# Patient Record
Sex: FEMALE | Race: WHITE | Employment: OTHER | URBAN - METROPOLITAN AREA
[De-identification: names, ages, dates, MRNs, and addresses within clinical notes are randomized per-mention and may not be internally consistent; named-entity substitution may affect disease eponyms.]

---

## 2017-01-01 ENCOUNTER — TELEPHONE (OUTPATIENT)
Dept: FAMILY MEDICINE | Facility: CLINIC | Age: 82
End: 2017-01-01

## 2017-01-01 ENCOUNTER — HOSPITAL ENCOUNTER (OUTPATIENT)
Dept: GENERAL RADIOLOGY | Facility: CLINIC | Age: 82
End: 2017-07-17
Attending: RADIOLOGY
Payer: MEDICARE

## 2017-01-01 ENCOUNTER — ANTICOAGULATION THERAPY VISIT (OUTPATIENT)
Dept: ANTICOAGULATION | Facility: CLINIC | Age: 82
End: 2017-01-01
Payer: COMMERCIAL

## 2017-01-01 ENCOUNTER — APPOINTMENT (OUTPATIENT)
Dept: GENERAL RADIOLOGY | Facility: CLINIC | Age: 82
End: 2017-01-01
Attending: FAMILY MEDICINE
Payer: MEDICARE

## 2017-01-01 ENCOUNTER — OFFICE VISIT (OUTPATIENT)
Dept: FAMILY MEDICINE | Facility: CLINIC | Age: 82
End: 2017-01-01
Payer: COMMERCIAL

## 2017-01-01 ENCOUNTER — MEDICAL CORRESPONDENCE (OUTPATIENT)
Dept: HEALTH INFORMATION MANAGEMENT | Facility: CLINIC | Age: 82
End: 2017-01-01

## 2017-01-01 ENCOUNTER — ALLIED HEALTH/NURSE VISIT (OUTPATIENT)
Dept: FAMILY MEDICINE | Facility: CLINIC | Age: 82
End: 2017-01-01
Payer: COMMERCIAL

## 2017-01-01 ENCOUNTER — CARE COORDINATION (OUTPATIENT)
Dept: CARE COORDINATION | Facility: CLINIC | Age: 82
End: 2017-01-01

## 2017-01-01 ENCOUNTER — TRANSFERRED RECORDS (OUTPATIENT)
Dept: HEALTH INFORMATION MANAGEMENT | Facility: CLINIC | Age: 82
End: 2017-01-01

## 2017-01-01 ENCOUNTER — APPOINTMENT (OUTPATIENT)
Dept: GENERAL RADIOLOGY | Facility: CLINIC | Age: 82
End: 2017-01-01
Attending: EMERGENCY MEDICINE
Payer: MEDICARE

## 2017-01-01 ENCOUNTER — OFFICE VISIT (OUTPATIENT)
Dept: CARDIOLOGY | Facility: CLINIC | Age: 82
End: 2017-01-01
Attending: INTERNAL MEDICINE
Payer: COMMERCIAL

## 2017-01-01 ENCOUNTER — HOSPITAL ENCOUNTER (EMERGENCY)
Facility: CLINIC | Age: 82
Discharge: HOME | End: 2017-07-11
Attending: EMERGENCY MEDICINE | Admitting: EMERGENCY MEDICINE
Payer: MEDICARE

## 2017-01-01 ENCOUNTER — TELEPHONE (OUTPATIENT)
Dept: NURSING | Facility: CLINIC | Age: 82
End: 2017-01-01

## 2017-01-01 ENCOUNTER — OFFICE VISIT - HEALTHEAST (OUTPATIENT)
Dept: GERIATRICS | Facility: CLINIC | Age: 82
End: 2017-01-01

## 2017-01-01 ENCOUNTER — RADIANT APPOINTMENT (OUTPATIENT)
Dept: GENERAL RADIOLOGY | Facility: CLINIC | Age: 82
End: 2017-01-01
Attending: PHYSICIAN ASSISTANT
Payer: COMMERCIAL

## 2017-01-01 ENCOUNTER — RADIANT APPOINTMENT (OUTPATIENT)
Dept: GENERAL RADIOLOGY | Facility: CLINIC | Age: 82
End: 2017-01-01
Attending: FAMILY MEDICINE
Payer: COMMERCIAL

## 2017-01-01 ENCOUNTER — COMMUNICATION - HEALTHEAST (OUTPATIENT)
Dept: SCHEDULING | Facility: CLINIC | Age: 82
End: 2017-01-01

## 2017-01-01 ENCOUNTER — AMBULATORY - HEALTHEAST (OUTPATIENT)
Dept: ADMINISTRATIVE | Facility: CLINIC | Age: 82
End: 2017-01-01

## 2017-01-01 ENCOUNTER — ANTICOAGULATION THERAPY VISIT (OUTPATIENT)
Dept: ANTICOAGULATION | Facility: CLINIC | Age: 82
End: 2017-01-01

## 2017-01-01 ENCOUNTER — DOCUMENTATION ONLY (OUTPATIENT)
Dept: CARE COORDINATION | Facility: CLINIC | Age: 82
End: 2017-01-01

## 2017-01-01 ENCOUNTER — APPOINTMENT (OUTPATIENT)
Dept: CT IMAGING | Facility: CLINIC | Age: 82
End: 2017-01-01
Attending: FAMILY MEDICINE
Payer: MEDICARE

## 2017-01-01 ENCOUNTER — HOSPITAL ENCOUNTER (OUTPATIENT)
Dept: ULTRASOUND IMAGING | Facility: CLINIC | Age: 82
Discharge: HOME | End: 2017-10-30
Attending: PHYSICIAN ASSISTANT | Admitting: PHYSICIAN ASSISTANT
Payer: MEDICARE

## 2017-01-01 ENCOUNTER — HOSPITAL ENCOUNTER (OUTPATIENT)
Dept: ULTRASOUND IMAGING | Facility: CLINIC | Age: 82
Discharge: HOME | End: 2017-09-11
Attending: EMERGENCY MEDICINE | Admitting: EMERGENCY MEDICINE
Payer: MEDICARE

## 2017-01-01 ENCOUNTER — HOSPITAL ENCOUNTER (OUTPATIENT)
Dept: GENERAL RADIOLOGY | Facility: CLINIC | Age: 82
End: 2017-10-30
Attending: RADIOLOGY
Payer: MEDICARE

## 2017-01-01 ENCOUNTER — HOSPITAL ENCOUNTER (EMERGENCY)
Facility: CLINIC | Age: 82
Discharge: HOME | End: 2017-09-01
Attending: EMERGENCY MEDICINE | Admitting: EMERGENCY MEDICINE
Payer: MEDICARE

## 2017-01-01 ENCOUNTER — HOSPITAL ENCOUNTER (OUTPATIENT)
Dept: GENERAL RADIOLOGY | Facility: CLINIC | Age: 82
Discharge: HOME | End: 2017-10-31
Attending: RADIOLOGY | Admitting: RADIOLOGY
Payer: MEDICARE

## 2017-01-01 ENCOUNTER — NURSE TRIAGE (OUTPATIENT)
Dept: NURSING | Facility: CLINIC | Age: 82
End: 2017-01-01

## 2017-01-01 ENCOUNTER — HOSPITAL ENCOUNTER (OUTPATIENT)
Dept: ULTRASOUND IMAGING | Facility: CLINIC | Age: 82
Discharge: HOME | End: 2017-07-17
Attending: EMERGENCY MEDICINE | Admitting: EMERGENCY MEDICINE
Payer: MEDICARE

## 2017-01-01 ENCOUNTER — TELEPHONE (OUTPATIENT)
Dept: CARE COORDINATION | Facility: CLINIC | Age: 82
End: 2017-01-01

## 2017-01-01 ENCOUNTER — HOSPITAL ENCOUNTER (OUTPATIENT)
Dept: CARDIOLOGY | Facility: CLINIC | Age: 82
Discharge: HOME | End: 2017-11-21
Attending: FAMILY MEDICINE | Admitting: FAMILY MEDICINE
Payer: MEDICARE

## 2017-01-01 ENCOUNTER — DOCUMENTATION ONLY (OUTPATIENT)
Dept: CARDIOLOGY | Facility: CLINIC | Age: 82
End: 2017-01-01

## 2017-01-01 ENCOUNTER — RECORDS - HEALTHEAST (OUTPATIENT)
Dept: ADMINISTRATIVE | Facility: OTHER | Age: 82
End: 2017-01-01

## 2017-01-01 ENCOUNTER — APPOINTMENT (OUTPATIENT)
Dept: ULTRASOUND IMAGING | Facility: CLINIC | Age: 82
End: 2017-01-01
Attending: FAMILY MEDICINE
Payer: MEDICARE

## 2017-01-01 ENCOUNTER — HOSPITAL ENCOUNTER (OUTPATIENT)
Dept: GENERAL RADIOLOGY | Facility: CLINIC | Age: 82
End: 2017-09-11
Attending: RADIOLOGY
Payer: MEDICARE

## 2017-01-01 ENCOUNTER — HOSPITAL ENCOUNTER (EMERGENCY)
Facility: CLINIC | Age: 82
Discharge: ANOTHER HOSPITAL | End: 2017-07-26
Attending: FAMILY MEDICINE | Admitting: FAMILY MEDICINE
Payer: MEDICARE

## 2017-01-01 ENCOUNTER — AMBULATORY - HEALTHEAST (OUTPATIENT)
Dept: GERIATRICS | Facility: CLINIC | Age: 82
End: 2017-01-01

## 2017-01-01 ENCOUNTER — OFFICE VISIT (OUTPATIENT)
Dept: PHARMACY | Facility: CLINIC | Age: 82
End: 2017-01-01
Payer: COMMERCIAL

## 2017-01-01 ENCOUNTER — TELEPHONE (OUTPATIENT)
Dept: CARDIOLOGY | Facility: CLINIC | Age: 82
End: 2017-01-01

## 2017-01-01 VITALS
OXYGEN SATURATION: 94 % | HEIGHT: 66 IN | HEART RATE: 69 BPM | DIASTOLIC BLOOD PRESSURE: 55 MMHG | SYSTOLIC BLOOD PRESSURE: 116 MMHG | WEIGHT: 164.4 LBS | TEMPERATURE: 96.5 F | BODY MASS INDEX: 26.42 KG/M2

## 2017-01-01 VITALS
WEIGHT: 174.9 LBS | BODY MASS INDEX: 28.11 KG/M2 | HEIGHT: 66 IN | SYSTOLIC BLOOD PRESSURE: 121 MMHG | OXYGEN SATURATION: 96 % | DIASTOLIC BLOOD PRESSURE: 57 MMHG | TEMPERATURE: 96.5 F | HEART RATE: 69 BPM

## 2017-01-01 VITALS
DIASTOLIC BLOOD PRESSURE: 60 MMHG | WEIGHT: 168.3 LBS | HEIGHT: 66 IN | BODY MASS INDEX: 27.05 KG/M2 | SYSTOLIC BLOOD PRESSURE: 120 MMHG | TEMPERATURE: 96.1 F | OXYGEN SATURATION: 97 % | HEART RATE: 69 BPM

## 2017-01-01 VITALS
DIASTOLIC BLOOD PRESSURE: 53 MMHG | SYSTOLIC BLOOD PRESSURE: 116 MMHG | HEART RATE: 69 BPM | OXYGEN SATURATION: 97 % | WEIGHT: 171.3 LBS | TEMPERATURE: 96.4 F | BODY MASS INDEX: 27.53 KG/M2 | HEIGHT: 66 IN

## 2017-01-01 VITALS — DIASTOLIC BLOOD PRESSURE: 58 MMHG | SYSTOLIC BLOOD PRESSURE: 106 MMHG | HEART RATE: 70 BPM

## 2017-01-01 VITALS
HEART RATE: 67 BPM | OXYGEN SATURATION: 98 % | BODY MASS INDEX: 25.34 KG/M2 | WEIGHT: 157 LBS | DIASTOLIC BLOOD PRESSURE: 86 MMHG | SYSTOLIC BLOOD PRESSURE: 130 MMHG

## 2017-01-01 VITALS
WEIGHT: 171 LBS | BODY MASS INDEX: 27.48 KG/M2 | TEMPERATURE: 96.8 F | HEART RATE: 70 BPM | HEIGHT: 66 IN | SYSTOLIC BLOOD PRESSURE: 113 MMHG | DIASTOLIC BLOOD PRESSURE: 45 MMHG

## 2017-01-01 VITALS
HEIGHT: 66 IN | DIASTOLIC BLOOD PRESSURE: 57 MMHG | HEART RATE: 69 BPM | WEIGHT: 170.7 LBS | OXYGEN SATURATION: 97 % | BODY MASS INDEX: 27.43 KG/M2 | TEMPERATURE: 96.3 F | SYSTOLIC BLOOD PRESSURE: 124 MMHG

## 2017-01-01 VITALS
TEMPERATURE: 97 F | SYSTOLIC BLOOD PRESSURE: 115 MMHG | BODY MASS INDEX: 27.48 KG/M2 | DIASTOLIC BLOOD PRESSURE: 69 MMHG | WEIGHT: 171 LBS | HEART RATE: 70 BPM | HEIGHT: 66 IN | OXYGEN SATURATION: 96 %

## 2017-01-01 VITALS
TEMPERATURE: 97 F | BODY MASS INDEX: 27.48 KG/M2 | HEART RATE: 69 BPM | HEIGHT: 66 IN | WEIGHT: 171 LBS | SYSTOLIC BLOOD PRESSURE: 118 MMHG | DIASTOLIC BLOOD PRESSURE: 66 MMHG | OXYGEN SATURATION: 96 %

## 2017-01-01 VITALS
OXYGEN SATURATION: 94 % | HEART RATE: 71 BPM | RESPIRATION RATE: 20 BRPM | TEMPERATURE: 97.5 F | BODY MASS INDEX: 28.61 KG/M2 | HEIGHT: 66 IN | WEIGHT: 178 LBS | DIASTOLIC BLOOD PRESSURE: 63 MMHG | SYSTOLIC BLOOD PRESSURE: 118 MMHG

## 2017-01-01 VITALS
RESPIRATION RATE: 20 BRPM | OXYGEN SATURATION: 97 % | SYSTOLIC BLOOD PRESSURE: 126 MMHG | TEMPERATURE: 98.1 F | DIASTOLIC BLOOD PRESSURE: 48 MMHG

## 2017-01-01 VITALS
BODY MASS INDEX: 26.36 KG/M2 | OXYGEN SATURATION: 94 % | WEIGHT: 164 LBS | HEART RATE: 70 BPM | TEMPERATURE: 97.9 F | DIASTOLIC BLOOD PRESSURE: 72 MMHG | SYSTOLIC BLOOD PRESSURE: 124 MMHG | HEIGHT: 66 IN

## 2017-01-01 VITALS
WEIGHT: 174 LBS | OXYGEN SATURATION: 96 % | DIASTOLIC BLOOD PRESSURE: 64 MMHG | HEART RATE: 70 BPM | BODY MASS INDEX: 28.08 KG/M2 | SYSTOLIC BLOOD PRESSURE: 123 MMHG

## 2017-01-01 VITALS
SYSTOLIC BLOOD PRESSURE: 135 MMHG | OXYGEN SATURATION: 96 % | HEART RATE: 69 BPM | BODY MASS INDEX: 28.25 KG/M2 | HEIGHT: 66 IN | DIASTOLIC BLOOD PRESSURE: 44 MMHG | WEIGHT: 175.8 LBS

## 2017-01-01 VITALS
DIASTOLIC BLOOD PRESSURE: 76 MMHG | OXYGEN SATURATION: 97 % | TEMPERATURE: 97.9 F | HEART RATE: 70 BPM | SYSTOLIC BLOOD PRESSURE: 128 MMHG | HEIGHT: 66 IN

## 2017-01-01 VITALS
HEART RATE: 72 BPM | SYSTOLIC BLOOD PRESSURE: 119 MMHG | RESPIRATION RATE: 16 BRPM | OXYGEN SATURATION: 98 % | DIASTOLIC BLOOD PRESSURE: 68 MMHG

## 2017-01-01 VITALS
HEIGHT: 66 IN | TEMPERATURE: 97.9 F | DIASTOLIC BLOOD PRESSURE: 49 MMHG | OXYGEN SATURATION: 97 % | HEART RATE: 71 BPM | SYSTOLIC BLOOD PRESSURE: 123 MMHG

## 2017-01-01 VITALS
TEMPERATURE: 96.3 F | SYSTOLIC BLOOD PRESSURE: 111 MMHG | WEIGHT: 166.3 LBS | HEART RATE: 69 BPM | HEIGHT: 66 IN | BODY MASS INDEX: 26.73 KG/M2 | OXYGEN SATURATION: 98 % | DIASTOLIC BLOOD PRESSURE: 53 MMHG

## 2017-01-01 VITALS
HEIGHT: 66 IN | OXYGEN SATURATION: 97 % | DIASTOLIC BLOOD PRESSURE: 83 MMHG | HEART RATE: 69 BPM | BODY MASS INDEX: 27.5 KG/M2 | TEMPERATURE: 97.3 F | WEIGHT: 171.1 LBS | SYSTOLIC BLOOD PRESSURE: 118 MMHG

## 2017-01-01 VITALS
TEMPERATURE: 97.9 F | HEIGHT: 66 IN | HEART RATE: 70 BPM | DIASTOLIC BLOOD PRESSURE: 55 MMHG | OXYGEN SATURATION: 96 % | SYSTOLIC BLOOD PRESSURE: 122 MMHG

## 2017-01-01 VITALS
DIASTOLIC BLOOD PRESSURE: 59 MMHG | RESPIRATION RATE: 17 BRPM | SYSTOLIC BLOOD PRESSURE: 98 MMHG | OXYGEN SATURATION: 97 %

## 2017-01-01 VITALS
DIASTOLIC BLOOD PRESSURE: 63 MMHG | OXYGEN SATURATION: 98 % | RESPIRATION RATE: 18 BRPM | HEART RATE: 73 BPM | SYSTOLIC BLOOD PRESSURE: 103 MMHG

## 2017-01-01 DIAGNOSIS — L89.90 PRESSURE ULCER: ICD-10-CM

## 2017-01-01 DIAGNOSIS — R82.90 NONSPECIFIC FINDING ON EXAMINATION OF URINE: ICD-10-CM

## 2017-01-01 DIAGNOSIS — Z98.890 S/P TVR (TRICUSPID VALVE REPAIR): ICD-10-CM

## 2017-01-01 DIAGNOSIS — I50.32 CHRONIC DIASTOLIC CONGESTIVE HEART FAILURE (H): Primary | ICD-10-CM

## 2017-01-01 DIAGNOSIS — Z95.2 HEART VALVE REPLACED: ICD-10-CM

## 2017-01-01 DIAGNOSIS — Z79.01 LONG TERM CURRENT USE OF ANTICOAGULANT THERAPY: ICD-10-CM

## 2017-01-01 DIAGNOSIS — R41.3 MEMORY LOSS: ICD-10-CM

## 2017-01-01 DIAGNOSIS — J95.811 POSTPROCEDURAL PNEUMOTHORAX: Primary | ICD-10-CM

## 2017-01-01 DIAGNOSIS — R11.2 NON-INTRACTABLE VOMITING WITH NAUSEA, UNSPECIFIED VOMITING TYPE: ICD-10-CM

## 2017-01-01 DIAGNOSIS — J90 PLEURAL EFFUSION: ICD-10-CM

## 2017-01-01 DIAGNOSIS — I50.9 CONGESTIVE HEART FAILURE, UNSPECIFIED CONGESTIVE HEART FAILURE CHRONICITY, UNSPECIFIED CONGESTIVE HEART FAILURE TYPE: Chronic | ICD-10-CM

## 2017-01-01 DIAGNOSIS — Z98.890 S/P THORACENTESIS: ICD-10-CM

## 2017-01-01 DIAGNOSIS — I89.0 LYMPHEDEMA OF BOTH LOWER EXTREMITIES: ICD-10-CM

## 2017-01-01 DIAGNOSIS — I48.91 ATRIAL FIBRILLATION WITH RVR (H): Primary | Chronic | ICD-10-CM

## 2017-01-01 DIAGNOSIS — L89.159 DECUBITUS ULCER OF SACRAL REGION, UNSPECIFIED ULCER STAGE: Primary | ICD-10-CM

## 2017-01-01 DIAGNOSIS — I50.32 CHRONIC DIASTOLIC CONGESTIVE HEART FAILURE (H): Chronic | ICD-10-CM

## 2017-01-01 DIAGNOSIS — M85.80 OSTEOPENIA, UNSPECIFIED LOCATION: ICD-10-CM

## 2017-01-01 DIAGNOSIS — N30.00 ACUTE CYSTITIS WITHOUT HEMATURIA: Primary | ICD-10-CM

## 2017-01-01 DIAGNOSIS — E87.1 HYPONATREMIA: Primary | ICD-10-CM

## 2017-01-01 DIAGNOSIS — Z95.2 HEART VALVE REPLACED: Chronic | ICD-10-CM

## 2017-01-01 DIAGNOSIS — L89.159 DECUBITUS ULCER OF SACRAL REGION, UNSPECIFIED ULCER STAGE: ICD-10-CM

## 2017-01-01 DIAGNOSIS — I10 HTN (HYPERTENSION): ICD-10-CM

## 2017-01-01 DIAGNOSIS — E78.5 HYPERLIPIDEMIA LDL GOAL <100: ICD-10-CM

## 2017-01-01 DIAGNOSIS — J95.811 POSTPROCEDURAL PNEUMOTHORAX: ICD-10-CM

## 2017-01-01 DIAGNOSIS — E63.9 NUTRITIONAL DEFICIENCY: ICD-10-CM

## 2017-01-01 DIAGNOSIS — I07.1 TRICUSPID VALVE INSUFFICIENCY, UNSPECIFIED ETIOLOGY: Chronic | ICD-10-CM

## 2017-01-01 DIAGNOSIS — I07.1 SEVERE TRICUSPID REGURGITATION: ICD-10-CM

## 2017-01-01 DIAGNOSIS — G47.00 INSOMNIA, UNSPECIFIED TYPE: ICD-10-CM

## 2017-01-01 DIAGNOSIS — R79.89 ELEVATED SERUM CREATININE: ICD-10-CM

## 2017-01-01 DIAGNOSIS — J90 PLEURAL EFFUSION, LEFT: ICD-10-CM

## 2017-01-01 DIAGNOSIS — I10 ESSENTIAL HYPERTENSION: ICD-10-CM

## 2017-01-01 DIAGNOSIS — Z79.01 LONG TERM CURRENT USE OF ANTICOAGULANT THERAPY: Chronic | ICD-10-CM

## 2017-01-01 DIAGNOSIS — I10 BENIGN ESSENTIAL HYPERTENSION: ICD-10-CM

## 2017-01-01 DIAGNOSIS — Z95.1 S/P CABG (CORONARY ARTERY BYPASS GRAFT): ICD-10-CM

## 2017-01-01 DIAGNOSIS — M54.50 CHRONIC BILATERAL LOW BACK PAIN WITHOUT SCIATICA: ICD-10-CM

## 2017-01-01 DIAGNOSIS — J90 PLEURAL EFFUSION: Primary | ICD-10-CM

## 2017-01-01 DIAGNOSIS — R82.90 NONSPECIFIC FINDING ON EXAMINATION OF URINE: Primary | ICD-10-CM

## 2017-01-01 DIAGNOSIS — Z95.2 S/P AVR (AORTIC VALVE REPLACEMENT): ICD-10-CM

## 2017-01-01 DIAGNOSIS — G89.29 CHRONIC BILATERAL LOW BACK PAIN WITHOUT SCIATICA: ICD-10-CM

## 2017-01-01 DIAGNOSIS — I50.9 CONGESTIVE HEART FAILURE, UNSPECIFIED CONGESTIVE HEART FAILURE CHRONICITY, UNSPECIFIED CONGESTIVE HEART FAILURE TYPE: Primary | Chronic | ICD-10-CM

## 2017-01-01 DIAGNOSIS — I48.91 ATRIAL FIBRILLATION WITH RVR (H): ICD-10-CM

## 2017-01-01 DIAGNOSIS — Z87.2 HISTORY OF PRESSURE ULCER: ICD-10-CM

## 2017-01-01 DIAGNOSIS — Z23 NEED FOR PROPHYLACTIC VACCINATION AND INOCULATION AGAINST INFLUENZA: ICD-10-CM

## 2017-01-01 DIAGNOSIS — R39.89 URINARY PROBLEM: ICD-10-CM

## 2017-01-01 DIAGNOSIS — I48.91 ATRIAL FIBRILLATION WITH RVR (H): Chronic | ICD-10-CM

## 2017-01-01 DIAGNOSIS — I50.33 ACUTE ON CHRONIC DIASTOLIC CONGESTIVE HEART FAILURE (H): ICD-10-CM

## 2017-01-01 DIAGNOSIS — R41.82 ALTERED MENTAL STATUS, UNSPECIFIED ALTERED MENTAL STATUS TYPE: ICD-10-CM

## 2017-01-01 DIAGNOSIS — I89.0 LYMPHEDEMA OF BOTH LOWER EXTREMITIES: Chronic | ICD-10-CM

## 2017-01-01 DIAGNOSIS — I50.32 CHRONIC DIASTOLIC CONGESTIVE HEART FAILURE (H): Primary | Chronic | ICD-10-CM

## 2017-01-01 DIAGNOSIS — I07.1 TRICUSPID VALVE INSUFFICIENCY, UNSPECIFIED ETIOLOGY: ICD-10-CM

## 2017-01-01 DIAGNOSIS — I50.23 ACUTE ON CHRONIC SYSTOLIC CONGESTIVE HEART FAILURE (H): ICD-10-CM

## 2017-01-01 DIAGNOSIS — I25.10 CAD (CORONARY ARTERY DISEASE): ICD-10-CM

## 2017-01-01 DIAGNOSIS — R32 URINARY INCONTINENCE, UNSPECIFIED TYPE: Primary | ICD-10-CM

## 2017-01-01 DIAGNOSIS — L30.8 OTHER ECZEMA: Primary | ICD-10-CM

## 2017-01-01 DIAGNOSIS — I48.91 A-FIB (H): ICD-10-CM

## 2017-01-01 DIAGNOSIS — R06.02 SHORTNESS OF BREATH ON EXERTION: ICD-10-CM

## 2017-01-01 DIAGNOSIS — R82.90 BAD ODOR OF URINE: ICD-10-CM

## 2017-01-01 DIAGNOSIS — I89.0 LYMPHEDEMA OF BOTH LOWER EXTREMITIES: Primary | Chronic | ICD-10-CM

## 2017-01-01 DIAGNOSIS — F03.90 DEMENTIA WITHOUT BEHAVIORAL DISTURBANCE, UNSPECIFIED DEMENTIA TYPE: ICD-10-CM

## 2017-01-01 DIAGNOSIS — J30.2 ACUTE SEASONAL ALLERGIC RHINITIS, UNSPECIFIED TRIGGER: ICD-10-CM

## 2017-01-01 DIAGNOSIS — L30.9 DERMATITIS: ICD-10-CM

## 2017-01-01 DIAGNOSIS — I10 BENIGN ESSENTIAL HYPERTENSION: Chronic | ICD-10-CM

## 2017-01-01 DIAGNOSIS — N39.0 URINARY TRACT INFECTION, SITE UNSPECIFIED: ICD-10-CM

## 2017-01-01 DIAGNOSIS — R06.00 DYSPNEA, UNSPECIFIED TYPE: ICD-10-CM

## 2017-01-01 DIAGNOSIS — R41.3 MEMORY LOSS OR IMPAIRMENT: ICD-10-CM

## 2017-01-01 DIAGNOSIS — M54.9 BACK PAIN, UNSPECIFIED BACK LOCATION, UNSPECIFIED BACK PAIN LATERALITY, UNSPECIFIED CHRONICITY: ICD-10-CM

## 2017-01-01 DIAGNOSIS — R63.4 WEIGHT LOSS: ICD-10-CM

## 2017-01-01 DIAGNOSIS — J90 RECURRENT PLEURAL EFFUSION ON LEFT: ICD-10-CM

## 2017-01-01 DIAGNOSIS — Z79.01 LONG TERM CURRENT USE OF ANTICOAGULANT THERAPY: Primary | Chronic | ICD-10-CM

## 2017-01-01 DIAGNOSIS — L29.9 PRURITIC DISORDER: ICD-10-CM

## 2017-01-01 DIAGNOSIS — R06.02 SOB (SHORTNESS OF BREATH): ICD-10-CM

## 2017-01-01 DIAGNOSIS — Z95.5 STENTED CORONARY ARTERY: ICD-10-CM

## 2017-01-01 DIAGNOSIS — R06.02 SOB (SHORTNESS OF BREATH): Primary | ICD-10-CM

## 2017-01-01 DIAGNOSIS — L72.3 SEBACEOUS CYST: ICD-10-CM

## 2017-01-01 DIAGNOSIS — R53.83 FATIGUE, UNSPECIFIED TYPE: ICD-10-CM

## 2017-01-01 DIAGNOSIS — R39.89 URINARY PROBLEM: Primary | ICD-10-CM

## 2017-01-01 DIAGNOSIS — K59.00 CONSTIPATION, UNSPECIFIED CONSTIPATION TYPE: ICD-10-CM

## 2017-01-01 DIAGNOSIS — R63.0 DECREASED APPETITE: ICD-10-CM

## 2017-01-01 DIAGNOSIS — R32 URINARY INCONTINENCE, UNSPECIFIED TYPE: ICD-10-CM

## 2017-01-01 DIAGNOSIS — K92.1 BLACK STOOL: ICD-10-CM

## 2017-01-01 DIAGNOSIS — J95.811 PNEUMOTHORAX OF LEFT LUNG AFTER BIOPSY: ICD-10-CM

## 2017-01-01 DIAGNOSIS — F03.90 DEMENTIA WITHOUT BEHAVIORAL DISTURBANCE, UNSPECIFIED DEMENTIA TYPE: Primary | ICD-10-CM

## 2017-01-01 DIAGNOSIS — N30.00 ACUTE CYSTITIS WITHOUT HEMATURIA: ICD-10-CM

## 2017-01-01 DIAGNOSIS — K80.20 CHOLELITHIASIS WITHOUT CHOLANGITIS: ICD-10-CM

## 2017-01-01 LAB
ALBUMIN SERPL-MCNC: 3.2 G/DL (ref 3.4–5)
ALBUMIN SERPL-MCNC: 3.4 G/DL (ref 3.4–5)
ALBUMIN SERPL-MCNC: 3.6 G/DL (ref 3.4–5)
ALBUMIN SERPL-MCNC: 3.7 G/DL (ref 3.4–5)
ALBUMIN UR-MCNC: ABNORMAL MG/DL
ALBUMIN UR-MCNC: ABNORMAL MG/DL
ALBUMIN UR-MCNC: NEGATIVE MG/DL
ALP SERPL-CCNC: 184 U/L (ref 40–150)
ALP SERPL-CCNC: 197 U/L (ref 40–150)
ALP SERPL-CCNC: 198 U/L (ref 40–150)
ALP SERPL-CCNC: 267 U/L (ref 40–150)
ALT SERPL W P-5'-P-CCNC: 17 U/L (ref 0–50)
ALT SERPL W P-5'-P-CCNC: 21 U/L (ref 0–50)
ALT SERPL W P-5'-P-CCNC: 24 U/L (ref 0–50)
ALT SERPL W P-5'-P-CCNC: 26 U/L (ref 0–50)
ANION GAP SERPL CALCULATED.3IONS-SCNC: 10 MMOL/L (ref 3–14)
ANION GAP SERPL CALCULATED.3IONS-SCNC: 5 MMOL/L (ref 3–14)
ANION GAP SERPL CALCULATED.3IONS-SCNC: 5 MMOL/L (ref 3–14)
ANION GAP SERPL CALCULATED.3IONS-SCNC: 6 MMOL/L (ref 3–14)
ANION GAP SERPL CALCULATED.3IONS-SCNC: 7 MMOL/L (ref 3–14)
ANION GAP SERPL CALCULATED.3IONS-SCNC: 8 MMOL/L (ref 3–14)
ANION GAP SERPL CALCULATED.3IONS-SCNC: 9 MMOL/L (ref 3–14)
APPEARANCE UR: ABNORMAL
APPEARANCE UR: ABNORMAL
APPEARANCE UR: CLEAR
AST SERPL W P-5'-P-CCNC: 23 U/L (ref 0–45)
AST SERPL W P-5'-P-CCNC: 25 U/L (ref 0–45)
AST SERPL W P-5'-P-CCNC: 28 U/L (ref 0–45)
AST SERPL W P-5'-P-CCNC: 31 U/L (ref 0–45)
BACTERIA #/AREA URNS HPF: ABNORMAL /HPF
BACTERIA SPEC CULT: ABNORMAL
BACTERIA SPEC CULT: ABNORMAL
BACTERIA SPEC CULT: NORMAL
BASE EXCESS BLDV CALC-SCNC: 3.6 MMOL/L
BASOPHILS # BLD AUTO: 0 10E9/L (ref 0–0.2)
BASOPHILS # BLD AUTO: 0 10E9/L (ref 0–0.2)
BASOPHILS # BLD AUTO: 0.1 10E9/L (ref 0–0.2)
BASOPHILS # BLD AUTO: 0.1 10E9/L (ref 0–0.2)
BASOPHILS NFR BLD AUTO: 0.3 %
BASOPHILS NFR BLD AUTO: 0.6 %
BASOPHILS NFR BLD AUTO: 0.7 %
BASOPHILS NFR BLD AUTO: 1 %
BILIRUB SERPL-MCNC: 0.8 MG/DL (ref 0.2–1.3)
BILIRUB SERPL-MCNC: 0.8 MG/DL (ref 0.2–1.3)
BILIRUB SERPL-MCNC: 0.9 MG/DL (ref 0.2–1.3)
BILIRUB SERPL-MCNC: 1.1 MG/DL (ref 0.2–1.3)
BILIRUB UR QL STRIP: NEGATIVE
BUN SERPL-MCNC: 20 MG/DL (ref 7–30)
BUN SERPL-MCNC: 21 MG/DL (ref 7–30)
BUN SERPL-MCNC: 24 MG/DL (ref 7–30)
BUN SERPL-MCNC: 25 MG/DL (ref 7–30)
BUN SERPL-MCNC: 26 MG/DL (ref 7–30)
BUN SERPL-MCNC: 27 MG/DL (ref 7–30)
BUN SERPL-MCNC: 36 MG/DL (ref 7–30)
CALCIUM SERPL-MCNC: 10 MG/DL (ref 8.5–10.1)
CALCIUM SERPL-MCNC: 10.1 MG/DL (ref 8.5–10.1)
CALCIUM SERPL-MCNC: 10.1 MG/DL (ref 8.5–10.1)
CALCIUM SERPL-MCNC: 10.3 MG/DL (ref 8.5–10.1)
CALCIUM SERPL-MCNC: 10.4 MG/DL (ref 8.5–10.1)
CALCIUM SERPL-MCNC: 9.7 MG/DL (ref 8.5–10.1)
CALCIUM SERPL-MCNC: 9.8 MG/DL (ref 8.5–10.1)
CHLORIDE SERPL-SCNC: 100 MMOL/L (ref 94–109)
CHLORIDE SERPL-SCNC: 100 MMOL/L (ref 94–109)
CHLORIDE SERPL-SCNC: 101 MMOL/L (ref 94–109)
CHLORIDE SERPL-SCNC: 102 MMOL/L (ref 94–109)
CHLORIDE SERPL-SCNC: 103 MMOL/L (ref 94–109)
CHLORIDE SERPL-SCNC: 103 MMOL/L (ref 94–109)
CHLORIDE SERPL-SCNC: 104 MMOL/L (ref 94–109)
CO2 SERPL-SCNC: 26 MMOL/L (ref 20–32)
CO2 SERPL-SCNC: 26 MMOL/L (ref 20–32)
CO2 SERPL-SCNC: 27 MMOL/L (ref 20–32)
CO2 SERPL-SCNC: 28 MMOL/L (ref 20–32)
CO2 SERPL-SCNC: 29 MMOL/L (ref 20–32)
COLOR UR AUTO: YELLOW
CREAT SERPL-MCNC: 0.99 MG/DL (ref 0.52–1.04)
CREAT SERPL-MCNC: 1.07 MG/DL (ref 0.52–1.04)
CREAT SERPL-MCNC: 1.12 MG/DL (ref 0.52–1.04)
CREAT SERPL-MCNC: 1.12 MG/DL (ref 0.52–1.04)
CREAT SERPL-MCNC: 1.14 MG/DL (ref 0.52–1.04)
CREAT SERPL-MCNC: 1.18 MG/DL (ref 0.52–1.04)
CREAT SERPL-MCNC: 1.38 MG/DL (ref 0.52–1.04)
DIFFERENTIAL METHOD BLD: ABNORMAL
EOSINOPHIL # BLD AUTO: 0.1 10E9/L (ref 0–0.7)
EOSINOPHIL # BLD AUTO: 0.2 10E9/L (ref 0–0.7)
EOSINOPHIL # BLD AUTO: 0.2 10E9/L (ref 0–0.7)
EOSINOPHIL # BLD AUTO: 0.6 10E9/L (ref 0–0.7)
EOSINOPHIL NFR BLD AUTO: 0.7 %
EOSINOPHIL NFR BLD AUTO: 3.9 %
EOSINOPHIL NFR BLD AUTO: 4 %
EOSINOPHIL NFR BLD AUTO: 7.3 %
ERYTHROCYTE [DISTWIDTH] IN BLOOD BY AUTOMATED COUNT: 14.9 % (ref 10–15)
ERYTHROCYTE [DISTWIDTH] IN BLOOD BY AUTOMATED COUNT: 15.1 % (ref 10–15)
ERYTHROCYTE [DISTWIDTH] IN BLOOD BY AUTOMATED COUNT: 15.3 % (ref 10–15)
ERYTHROCYTE [DISTWIDTH] IN BLOOD BY AUTOMATED COUNT: 17.9 % (ref 10–15)
FOLATE SERPL-MCNC: 57.8 NG/ML
GFR SERPL CREATININE-BSD FRML MDRD: 36 ML/MIN/1.7M2
GFR SERPL CREATININE-BSD FRML MDRD: 44 ML/MIN/1.7M2
GFR SERPL CREATININE-BSD FRML MDRD: 45 ML/MIN/1.7M2
GFR SERPL CREATININE-BSD FRML MDRD: 46 ML/MIN/1.7M2
GFR SERPL CREATININE-BSD FRML MDRD: 46 ML/MIN/1.7M2
GFR SERPL CREATININE-BSD FRML MDRD: 49 ML/MIN/1.7M2
GFR SERPL CREATININE-BSD FRML MDRD: 53 ML/MIN/1.7M2
GLUCOSE SERPL-MCNC: 100 MG/DL (ref 70–99)
GLUCOSE SERPL-MCNC: 119 MG/DL (ref 70–99)
GLUCOSE SERPL-MCNC: 121 MG/DL (ref 70–99)
GLUCOSE SERPL-MCNC: 135 MG/DL (ref 70–99)
GLUCOSE SERPL-MCNC: 155 MG/DL (ref 70–99)
GLUCOSE SERPL-MCNC: 82 MG/DL (ref 70–99)
GLUCOSE SERPL-MCNC: 94 MG/DL (ref 70–99)
GLUCOSE UR STRIP-MCNC: NEGATIVE MG/DL
HCO3 BLDV-SCNC: 28 MMOL/L (ref 21–28)
HCT VFR BLD AUTO: 35.8 % (ref 35–47)
HCT VFR BLD AUTO: 39.6 % (ref 35–47)
HCT VFR BLD AUTO: 40.8 % (ref 35–47)
HCT VFR BLD AUTO: 41.1 % (ref 35–47)
HGB BLD-MCNC: 11.3 G/DL (ref 11.7–15.7)
HGB BLD-MCNC: 12.3 G/DL (ref 11.7–15.7)
HGB BLD-MCNC: 12.9 G/DL (ref 11.7–15.7)
HGB BLD-MCNC: 13 G/DL (ref 11.7–15.7)
HGB UR QL STRIP: ABNORMAL
HGB UR QL STRIP: ABNORMAL
HGB UR QL STRIP: NEGATIVE
HYALINE CASTS #/AREA URNS LPF: 9 /LPF (ref 0–2)
IMM GRANULOCYTES # BLD: 0 10E9/L (ref 0–0.4)
IMM GRANULOCYTES NFR BLD: 0 %
IMM GRANULOCYTES NFR BLD: 0.3 %
IMM GRANULOCYTES NFR BLD: 0.4 %
INR POINT OF CARE: 2.3 (ref 0.86–1.14)
INR POINT OF CARE: 2.4 (ref 0.86–1.14)
INR POINT OF CARE: 2.5 (ref 0.86–1.14)
INR POINT OF CARE: 2.5 (ref 0.86–1.14)
INR POINT OF CARE: 2.6 (ref 0.86–1.14)
INR POINT OF CARE: 2.8 (ref 0.86–1.14)
INR POINT OF CARE: 3 (ref 0.86–1.14)
INR POINT OF CARE: 3.1 (ref 0.86–1.14)
INR POINT OF CARE: 3.3 (ref 0.86–1.14)
INR POINT OF CARE: 4.5 (ref 0.86–1.14)
INR POINT OF CARE: 4.6 (ref 0.86–1.14)
INR PPP: 1.1
INR PPP: 1.14 (ref 0.86–1.14)
INR PPP: 1.18 (ref 0.86–1.14)
INR PPP: 1.23 (ref 0.86–1.14)
INR PPP: 1.4
INR PPP: 1.7
INR PPP: 1.76 (ref 0.86–1.14)
INR PPP: 1.8
INR PPP: 1.86
INR PPP: 1.86 (ref 0.86–1.14)
INR PPP: 1.88 (ref 0.86–1.14)
INR PPP: 2.15 (ref 0.86–1.14)
INR PPP: 2.2
INR PPP: 2.33 (ref 0.86–1.14)
INR PPP: 2.6
INR PPP: 2.6
INR PPP: 2.7
INR PPP: 2.8
INR PPP: 3.3
INR PPP: 3.3
INR PPP: 3.6
INR PPP: 4.4
INR PPP: 5.3
KETONES UR STRIP-MCNC: NEGATIVE MG/DL
LACTATE BLD-SCNC: 1.1 MMOL/L (ref 0.7–2.1)
LACTATE BLD-SCNC: 1.7 MMOL/L (ref 0.7–2.1)
LEUKOCYTE ESTERASE UR QL STRIP: ABNORMAL
LEUKOCYTE ESTERASE UR QL STRIP: NEGATIVE
LIPASE SERPL-CCNC: 60 U/L (ref 73–393)
LYMPHOCYTES # BLD AUTO: 0.6 10E9/L (ref 0.8–5.3)
LYMPHOCYTES # BLD AUTO: 0.8 10E9/L (ref 0.8–5.3)
LYMPHOCYTES # BLD AUTO: 1 10E9/L (ref 0.8–5.3)
LYMPHOCYTES # BLD AUTO: 1 10E9/L (ref 0.8–5.3)
LYMPHOCYTES NFR BLD AUTO: 12.6 %
LYMPHOCYTES NFR BLD AUTO: 14.6 %
LYMPHOCYTES NFR BLD AUTO: 16.4 %
LYMPHOCYTES NFR BLD AUTO: 4.4 %
Lab: NORMAL
MCH RBC QN AUTO: 26.2 PG (ref 26.5–33)
MCH RBC QN AUTO: 26.2 PG (ref 26.5–33)
MCH RBC QN AUTO: 26.5 PG (ref 26.5–33)
MCH RBC QN AUTO: 28.2 PG (ref 26.5–33)
MCHC RBC AUTO-ENTMCNC: 31.1 G/DL (ref 31.5–36.5)
MCHC RBC AUTO-ENTMCNC: 31.4 G/DL (ref 31.5–36.5)
MCHC RBC AUTO-ENTMCNC: 31.6 G/DL (ref 31.5–36.5)
MCHC RBC AUTO-ENTMCNC: 31.9 G/DL (ref 31.5–36.5)
MCV RBC AUTO: 82 FL (ref 78–100)
MCV RBC AUTO: 84 FL (ref 78–100)
MCV RBC AUTO: 85 FL (ref 78–100)
MCV RBC AUTO: 89 FL (ref 78–100)
MICRO REPORT STATUS: ABNORMAL
MICRO REPORT STATUS: NORMAL
MICROORGANISM SPEC CULT: ABNORMAL
MONOCYTES # BLD AUTO: 0.7 10E9/L (ref 0–1.3)
MONOCYTES # BLD AUTO: 0.9 10E9/L (ref 0–1.3)
MONOCYTES # BLD AUTO: 1 10E9/L (ref 0–1.3)
MONOCYTES # BLD AUTO: 1.5 10E9/L (ref 0–1.3)
MONOCYTES NFR BLD AUTO: 10.7 %
MONOCYTES NFR BLD AUTO: 12.5 %
MONOCYTES NFR BLD AUTO: 12.9 %
MONOCYTES NFR BLD AUTO: 15.3 %
NEUTROPHILS # BLD AUTO: 11.8 10E9/L (ref 1.6–8.3)
NEUTROPHILS # BLD AUTO: 3.9 10E9/L (ref 1.6–8.3)
NEUTROPHILS # BLD AUTO: 3.9 10E9/L (ref 1.6–8.3)
NEUTROPHILS # BLD AUTO: 5.4 10E9/L (ref 1.6–8.3)
NEUTROPHILS NFR BLD AUTO: 63.7 %
NEUTROPHILS NFR BLD AUTO: 66.2 %
NEUTROPHILS NFR BLD AUTO: 67.9 %
NEUTROPHILS NFR BLD AUTO: 83.6 %
NITRATE UR QL: NEGATIVE
NITRATE UR QL: POSITIVE
NON-SQ EPI CELLS #/AREA URNS LPF: ABNORMAL /LPF
NON-SQ EPI CELLS #/AREA URNS LPF: ABNORMAL /LPF
NT-PROBNP SERPL-MCNC: 1424 PG/ML (ref 0–1800)
NT-PROBNP SERPL-MCNC: 1633 PG/ML (ref 0–1800)
PCO2 BLDV: 41 MM HG (ref 40–50)
PH BLDV: 7.44 PH (ref 7.32–7.43)
PH UR STRIP: 7 PH (ref 5–7)
PH UR STRIP: 7 PH (ref 5–7)
PH UR STRIP: 7.5 PH (ref 5–7)
PLATELET # BLD AUTO: 220 10E9/L (ref 150–450)
PLATELET # BLD AUTO: 222 10E9/L (ref 150–450)
PLATELET # BLD AUTO: 243 10E9/L (ref 150–450)
PLATELET # BLD AUTO: 250 10E9/L (ref 150–450)
PO2 BLDV: 33 MM HG (ref 25–47)
POTASSIUM SERPL-SCNC: 3.9 MMOL/L (ref 3.4–5.3)
POTASSIUM SERPL-SCNC: 4 MMOL/L (ref 3.4–5.3)
POTASSIUM SERPL-SCNC: 4 MMOL/L (ref 3.4–5.3)
POTASSIUM SERPL-SCNC: 4.1 MMOL/L (ref 3.4–5.3)
POTASSIUM SERPL-SCNC: 4.3 MMOL/L (ref 3.4–5.3)
PROT SERPL-MCNC: 6.7 G/DL (ref 6.8–8.8)
PROT SERPL-MCNC: 7.6 G/DL (ref 6.8–8.8)
PROT SERPL-MCNC: 7.7 G/DL (ref 6.8–8.8)
PROT SERPL-MCNC: 7.7 G/DL (ref 6.8–8.8)
RBC # BLD AUTO: 4.01 10E12/L (ref 3.8–5.2)
RBC # BLD AUTO: 4.64 10E12/L (ref 3.8–5.2)
RBC # BLD AUTO: 4.92 10E12/L (ref 3.8–5.2)
RBC # BLD AUTO: 4.96 10E12/L (ref 3.8–5.2)
RBC #/AREA URNS AUTO: 1 /HPF (ref 0–2)
RBC #/AREA URNS AUTO: ABNORMAL /HPF
RBC #/AREA URNS AUTO: ABNORMAL /HPF
RBC #/AREA URNS AUTO: ABNORMAL /HPF (ref 0–2)
SODIUM SERPL-SCNC: 131 MMOL/L (ref 133–144)
SODIUM SERPL-SCNC: 136 MMOL/L (ref 133–144)
SODIUM SERPL-SCNC: 137 MMOL/L (ref 133–144)
SODIUM SERPL-SCNC: 139 MMOL/L (ref 133–144)
SODIUM SERPL-SCNC: 140 MMOL/L (ref 133–144)
SOURCE: ABNORMAL
SP GR UR STRIP: 1.01 (ref 1–1.03)
SP GR UR STRIP: 1.02 (ref 1–1.03)
SPECIMEN SOURCE: ABNORMAL
SPECIMEN SOURCE: ABNORMAL
SPECIMEN SOURCE: NORMAL
SQUAMOUS #/AREA URNS AUTO: 2 /HPF (ref 0–1)
TROPONIN I SERPL-MCNC: <0.015 UG/L (ref 0–0.04)
TROPONIN I SERPL-MCNC: NORMAL UG/L (ref 0–0.04)
TROPONIN I SERPL-MCNC: NORMAL UG/L (ref 0–0.04)
TSH SERPL DL<=0.005 MIU/L-ACNC: 2.21 MU/L (ref 0.4–4)
URN SPEC COLLECT METH UR: ABNORMAL
URN SPEC COLLECT METH UR: ABNORMAL
UROBILINOGEN UR STRIP-ACNC: 0.2 EU/DL (ref 0.2–1)
UROBILINOGEN UR STRIP-MCNC: NORMAL MG/DL (ref 0–2)
UROBILINOGEN UR STRIP-MCNC: NORMAL MG/DL (ref 0–2)
VIT B12 SERPL-MCNC: 873 PG/ML (ref 193–986)
WBC # BLD AUTO: 14.1 10E9/L (ref 4–11)
WBC # BLD AUTO: 5.8 10E9/L (ref 4–11)
WBC # BLD AUTO: 6.2 10E9/L (ref 4–11)
WBC # BLD AUTO: 8.1 10E9/L (ref 4–11)
WBC #/AREA URNS AUTO: 3 /HPF (ref 0–2)
WBC #/AREA URNS AUTO: >100 /HPF
WBC #/AREA URNS AUTO: ABNORMAL /HPF
WBC #/AREA URNS AUTO: ABNORMAL /HPF (ref 0–2)
WBC CLUMPS #/AREA URNS HPF: PRESENT /HPF

## 2017-01-01 PROCEDURE — 71010 XR CHEST 1 VW: CPT

## 2017-01-01 PROCEDURE — 99605 MTMS BY PHARM NP 15 MIN: CPT | Performed by: PHARMACIST

## 2017-01-01 PROCEDURE — 99207 ZZC NO CHARGE NURSE ONLY: CPT

## 2017-01-01 PROCEDURE — 99215 OFFICE O/P EST HI 40 MIN: CPT | Performed by: FAMILY MEDICINE

## 2017-01-01 PROCEDURE — 40000986 XR CHEST 1 VW

## 2017-01-01 PROCEDURE — 99285 EMERGENCY DEPT VISIT HI MDM: CPT | Mod: 25 | Performed by: FAMILY MEDICINE

## 2017-01-01 PROCEDURE — 99207 ZZC NO CHARGE NURSE ONLY: CPT | Performed by: REGISTERED NURSE

## 2017-01-01 PROCEDURE — 99607 MTMS BY PHARM ADDL 15 MIN: CPT | Performed by: PHARMACIST

## 2017-01-01 PROCEDURE — 32555 ASPIRATE PLEURA W/ IMAGING: CPT

## 2017-01-01 PROCEDURE — 85610 PROTHROMBIN TIME: CPT | Mod: QW

## 2017-01-01 PROCEDURE — 93288 INTERROG EVL PM/LDLS PM IP: CPT

## 2017-01-01 PROCEDURE — 81003 URINALYSIS AUTO W/O SCOPE: CPT | Performed by: FAMILY MEDICINE

## 2017-01-01 PROCEDURE — 99213 OFFICE O/P EST LOW 20 MIN: CPT | Performed by: FAMILY MEDICINE

## 2017-01-01 PROCEDURE — 80048 BASIC METABOLIC PNL TOTAL CA: CPT | Performed by: FAMILY MEDICINE

## 2017-01-01 PROCEDURE — 85025 COMPLETE CBC W/AUTO DIFF WBC: CPT | Performed by: FAMILY MEDICINE

## 2017-01-01 PROCEDURE — 93288 INTERROG EVL PM/LDLS PM IP: CPT | Mod: 26 | Performed by: INTERNAL MEDICINE

## 2017-01-01 PROCEDURE — 93005 ELECTROCARDIOGRAM TRACING: CPT

## 2017-01-01 PROCEDURE — 36416 COLLJ CAPILLARY BLOOD SPEC: CPT

## 2017-01-01 PROCEDURE — 36415 COLL VENOUS BLD VENIPUNCTURE: CPT | Performed by: FAMILY MEDICINE

## 2017-01-01 PROCEDURE — 99214 OFFICE O/P EST MOD 30 MIN: CPT | Performed by: FAMILY MEDICINE

## 2017-01-01 PROCEDURE — 85610 PROTHROMBIN TIME: CPT | Performed by: RADIOLOGY

## 2017-01-01 PROCEDURE — 81001 URINALYSIS AUTO W/SCOPE: CPT | Performed by: EMERGENCY MEDICINE

## 2017-01-01 PROCEDURE — 71260 CT THORAX DX C+: CPT

## 2017-01-01 PROCEDURE — 80053 COMPREHEN METABOLIC PANEL: CPT | Performed by: EMERGENCY MEDICINE

## 2017-01-01 PROCEDURE — 71020 XR CHEST 2 VW: CPT

## 2017-01-01 PROCEDURE — 87086 URINE CULTURE/COLONY COUNT: CPT | Performed by: PHYSICIAN ASSISTANT

## 2017-01-01 PROCEDURE — 85610 PROTHROMBIN TIME: CPT | Performed by: FAMILY MEDICINE

## 2017-01-01 PROCEDURE — 81001 URINALYSIS AUTO W/SCOPE: CPT | Performed by: PHYSICIAN ASSISTANT

## 2017-01-01 PROCEDURE — 80053 COMPREHEN METABOLIC PANEL: CPT | Performed by: FAMILY MEDICINE

## 2017-01-01 PROCEDURE — 93010 ELECTROCARDIOGRAM REPORT: CPT | Performed by: FAMILY MEDICINE

## 2017-01-01 PROCEDURE — 83605 ASSAY OF LACTIC ACID: CPT | Performed by: FAMILY MEDICINE

## 2017-01-01 PROCEDURE — 99284 EMERGENCY DEPT VISIT MOD MDM: CPT | Mod: 25 | Performed by: EMERGENCY MEDICINE

## 2017-01-01 PROCEDURE — 87088 URINE BACTERIA CULTURE: CPT | Performed by: PHYSICIAN ASSISTANT

## 2017-01-01 PROCEDURE — 36415 COLL VENOUS BLD VENIPUNCTURE: CPT | Performed by: EMERGENCY MEDICINE

## 2017-01-01 PROCEDURE — 87086 URINE CULTURE/COLONY COUNT: CPT | Performed by: EMERGENCY MEDICINE

## 2017-01-01 PROCEDURE — 83880 ASSAY OF NATRIURETIC PEPTIDE: CPT | Performed by: EMERGENCY MEDICINE

## 2017-01-01 PROCEDURE — 99214 OFFICE O/P EST MOD 30 MIN: CPT | Performed by: PHYSICIAN ASSISTANT

## 2017-01-01 PROCEDURE — 84443 ASSAY THYROID STIM HORMONE: CPT | Performed by: FAMILY MEDICINE

## 2017-01-01 PROCEDURE — 99215 OFFICE O/P EST HI 40 MIN: CPT | Performed by: PHYSICIAN ASSISTANT

## 2017-01-01 PROCEDURE — 87040 BLOOD CULTURE FOR BACTERIA: CPT | Mod: 91 | Performed by: FAMILY MEDICINE

## 2017-01-01 PROCEDURE — 99285 EMERGENCY DEPT VISIT HI MDM: CPT | Mod: 25 | Performed by: EMERGENCY MEDICINE

## 2017-01-01 PROCEDURE — 83880 ASSAY OF NATRIURETIC PEPTIDE: CPT | Performed by: FAMILY MEDICINE

## 2017-01-01 PROCEDURE — 99214 OFFICE O/P EST MOD 30 MIN: CPT | Performed by: INTERNAL MEDICINE

## 2017-01-01 PROCEDURE — 25000128 H RX IP 250 OP 636: Performed by: FAMILY MEDICINE

## 2017-01-01 PROCEDURE — 83690 ASSAY OF LIPASE: CPT | Performed by: FAMILY MEDICINE

## 2017-01-01 PROCEDURE — 85025 COMPLETE CBC W/AUTO DIFF WBC: CPT | Performed by: EMERGENCY MEDICINE

## 2017-01-01 PROCEDURE — 90662 IIV NO PRSV INCREASED AG IM: CPT | Performed by: FAMILY MEDICINE

## 2017-01-01 PROCEDURE — G0180 MD CERTIFICATION HHA PATIENT: HCPCS | Performed by: FAMILY MEDICINE

## 2017-01-01 PROCEDURE — G0179 MD RECERTIFICATION HHA PT: HCPCS | Performed by: FAMILY MEDICINE

## 2017-01-01 PROCEDURE — 84484 ASSAY OF TROPONIN QUANT: CPT | Performed by: FAMILY MEDICINE

## 2017-01-01 PROCEDURE — 25000125 ZZHC RX 250: Performed by: RADIOLOGY

## 2017-01-01 PROCEDURE — 99213 OFFICE O/P EST LOW 20 MIN: CPT | Mod: 25 | Performed by: FAMILY MEDICINE

## 2017-01-01 PROCEDURE — 93010 ELECTROCARDIOGRAM REPORT: CPT | Performed by: EMERGENCY MEDICINE

## 2017-01-01 PROCEDURE — 84484 ASSAY OF TROPONIN QUANT: CPT | Performed by: EMERGENCY MEDICINE

## 2017-01-01 PROCEDURE — 87186 SC STD MICRODIL/AGAR DIL: CPT | Performed by: PHYSICIAN ASSISTANT

## 2017-01-01 PROCEDURE — 87086 URINE CULTURE/COLONY COUNT: CPT | Performed by: FAMILY MEDICINE

## 2017-01-01 PROCEDURE — 87088 URINE BACTERIA CULTURE: CPT | Performed by: FAMILY MEDICINE

## 2017-01-01 PROCEDURE — 85610 PROTHROMBIN TIME: CPT | Performed by: EMERGENCY MEDICINE

## 2017-01-01 PROCEDURE — 99285 EMERGENCY DEPT VISIT HI MDM: CPT | Mod: 25

## 2017-01-01 PROCEDURE — G0008 ADMIN INFLUENZA VIRUS VAC: HCPCS | Performed by: FAMILY MEDICINE

## 2017-01-01 PROCEDURE — 82746 ASSAY OF FOLIC ACID SERUM: CPT | Performed by: FAMILY MEDICINE

## 2017-01-01 PROCEDURE — 99441 ZZC PHYSICIAN TELEPHONE EVALUATION 5-10 MIN: CPT | Performed by: PHYSICIAN ASSISTANT

## 2017-01-01 PROCEDURE — 83605 ASSAY OF LACTIC ACID: CPT | Performed by: EMERGENCY MEDICINE

## 2017-01-01 PROCEDURE — 96374 THER/PROPH/DIAG INJ IV PUSH: CPT | Mod: 59

## 2017-01-01 PROCEDURE — 25000125 ZZHC RX 250: Performed by: FAMILY MEDICINE

## 2017-01-01 PROCEDURE — 82607 VITAMIN B-12: CPT | Performed by: FAMILY MEDICINE

## 2017-01-01 PROCEDURE — 87186 SC STD MICRODIL/AGAR DIL: CPT | Performed by: FAMILY MEDICINE

## 2017-01-01 PROCEDURE — 82803 BLOOD GASES ANY COMBINATION: CPT | Performed by: EMERGENCY MEDICINE

## 2017-01-01 PROCEDURE — 76705 ECHO EXAM OF ABDOMEN: CPT

## 2017-01-01 RX ORDER — WARFARIN SODIUM 4 MG/1
TABLET ORAL
Qty: 30 TABLET | Refills: 2 | Status: SHIPPED | OUTPATIENT
Start: 2017-01-01 | End: 2017-01-01

## 2017-01-01 RX ORDER — CLOPIDOGREL BISULFATE 75 MG/1
75 TABLET ORAL DAILY
Qty: 90 TABLET | Refills: 0 | Status: SHIPPED | OUTPATIENT
Start: 2017-01-01

## 2017-01-01 RX ORDER — CLOPIDOGREL BISULFATE 75 MG/1
75 TABLET ORAL DAILY
Qty: 90 TABLET | Refills: 0 | Status: CANCELLED | OUTPATIENT
Start: 2017-01-01

## 2017-01-01 RX ORDER — WARFARIN SODIUM 4 MG/1
TABLET ORAL
Qty: 40 TABLET | Refills: 2 | Status: SHIPPED | OUTPATIENT
Start: 2017-01-01 | End: 2017-01-01

## 2017-01-01 RX ORDER — FUROSEMIDE 10 MG/ML
40 INJECTION INTRAMUSCULAR; INTRAVENOUS ONCE
Status: COMPLETED | OUTPATIENT
Start: 2017-01-01 | End: 2017-01-01

## 2017-01-01 RX ORDER — TORSEMIDE 20 MG/1
TABLET ORAL
Qty: 60 TABLET | Refills: 0 | Status: SHIPPED | OUTPATIENT
Start: 2017-01-01 | End: 2017-01-01

## 2017-01-01 RX ORDER — EZETIMIBE 10 MG/1
10 TABLET ORAL DAILY
Qty: 90 TABLET | Refills: 0 | Status: SHIPPED | OUTPATIENT
Start: 2017-01-01 | End: 2017-01-01

## 2017-01-01 RX ORDER — DONEPEZIL HYDROCHLORIDE 5 MG/1
TABLET, FILM COATED ORAL
Qty: 90 TABLET | Refills: 0 | Status: ON HOLD | OUTPATIENT
Start: 2017-01-01 | End: 2018-01-01

## 2017-01-01 RX ORDER — SILVER SULFADIAZINE 10 MG/G
CREAM TOPICAL DAILY PRN
COMMUNITY
End: 2017-01-01

## 2017-01-01 RX ORDER — POTASSIUM CHLORIDE 750 MG/1
10 TABLET, EXTENDED RELEASE ORAL 2 TIMES DAILY
Qty: 90 TABLET | Refills: 1 | Status: SHIPPED | OUTPATIENT
Start: 2017-01-01 | End: 2017-01-01

## 2017-01-01 RX ORDER — WARFARIN SODIUM 4 MG/1
TABLET ORAL
Qty: 30 TABLET | Refills: 2 | COMMUNITY
Start: 2017-01-01 | End: 2017-01-01

## 2017-01-01 RX ORDER — POTASSIUM CHLORIDE 750 MG/1
TABLET, EXTENDED RELEASE ORAL
Qty: 90 TABLET | Refills: 0 | OUTPATIENT
Start: 2017-01-01

## 2017-01-01 RX ORDER — TRIAMCINOLONE ACETONIDE 1 MG/G
CREAM TOPICAL
Qty: 30 G | Refills: 0 | Status: ON HOLD | OUTPATIENT
Start: 2017-01-01 | End: 2018-01-01

## 2017-01-01 RX ORDER — POTASSIUM CHLORIDE 750 MG/1
TABLET, EXTENDED RELEASE ORAL
Qty: 180 TABLET | Refills: 1 | Status: SHIPPED | OUTPATIENT
Start: 2017-01-01

## 2017-01-01 RX ORDER — SPIRONOLACTONE 25 MG/1
25 TABLET ORAL 2 TIMES DAILY
Qty: 60 TABLET | Refills: 1 | Status: ON HOLD
Start: 2017-01-01 | End: 2018-01-01

## 2017-01-01 RX ORDER — CIPROFLOXACIN 250 MG/1
250 TABLET, FILM COATED ORAL 2 TIMES DAILY
Qty: 6 TABLET | Refills: 0 | Status: SHIPPED | OUTPATIENT
Start: 2017-01-01 | End: 2017-01-01

## 2017-01-01 RX ORDER — DONEPEZIL HYDROCHLORIDE 5 MG/1
TABLET, FILM COATED ORAL
Qty: 90 TABLET | Refills: 0 | Status: SHIPPED | OUTPATIENT
Start: 2017-01-01 | End: 2017-01-01

## 2017-01-01 RX ORDER — WARFARIN SODIUM 4 MG/1
TABLET ORAL
Qty: 40 TABLET | Refills: 2 | COMMUNITY
Start: 2017-01-01 | End: 2018-01-01

## 2017-01-01 RX ORDER — WARFARIN SODIUM 4 MG/1
TABLET ORAL
Qty: 30 TABLET | Refills: 1 | COMMUNITY
Start: 2017-01-01 | End: 2017-01-01

## 2017-01-01 RX ORDER — POTASSIUM CHLORIDE 750 MG/1
TABLET, EXTENDED RELEASE ORAL
Qty: 90 TABLET | Refills: 1 | Status: SHIPPED | OUTPATIENT
Start: 2017-01-01 | End: 2017-01-01

## 2017-01-01 RX ORDER — ACETAMINOPHEN 325 MG/1
325-650 TABLET ORAL EVERY 6 HOURS PRN
Qty: 100 TABLET | Refills: 1 | Status: SHIPPED | OUTPATIENT
Start: 2017-01-01

## 2017-01-01 RX ORDER — WARFARIN SODIUM 4 MG/1
TABLET ORAL
Qty: 40 TABLET | Refills: 2 | COMMUNITY
Start: 2017-01-01 | End: 2017-01-01

## 2017-01-01 RX ORDER — CLOPIDOGREL BISULFATE 75 MG/1
TABLET ORAL
Qty: 90 TABLET | Refills: 0 | OUTPATIENT
Start: 2017-01-01

## 2017-01-01 RX ORDER — CEPHALEXIN 500 MG/1
500 CAPSULE ORAL 2 TIMES DAILY
Qty: 10 CAPSULE | Refills: 0 | Status: SHIPPED | OUTPATIENT
Start: 2017-01-01 | End: 2017-01-01

## 2017-01-01 RX ORDER — WARFARIN SODIUM 4 MG/1
TABLET ORAL
Qty: 30 TABLET | Refills: 1 | Status: SHIPPED | OUTPATIENT
Start: 2017-01-01 | End: 2017-01-01

## 2017-01-01 RX ORDER — TORSEMIDE 20 MG/1
20 TABLET ORAL 2 TIMES DAILY
Qty: 60 TABLET | Refills: 0 | Status: SHIPPED | OUTPATIENT
Start: 2017-01-01

## 2017-01-01 RX ORDER — LIDOCAINE 40 MG/G
CREAM TOPICAL
Status: DISCONTINUED | OUTPATIENT
Start: 2017-01-01 | End: 2017-01-01 | Stop reason: HOSPADM

## 2017-01-01 RX ORDER — DONEPEZIL HYDROCHLORIDE 5 MG/1
5 TABLET, FILM COATED ORAL AT BEDTIME
Qty: 90 TABLET | Refills: 0 | Status: SHIPPED | OUTPATIENT
Start: 2017-01-01 | End: 2017-01-01

## 2017-01-01 RX ORDER — IOPAMIDOL 755 MG/ML
80 INJECTION, SOLUTION INTRAVASCULAR ONCE
Status: COMPLETED | OUTPATIENT
Start: 2017-01-01 | End: 2017-01-01

## 2017-01-01 RX ORDER — TRIAMCINOLONE ACETONIDE 1 MG/G
CREAM TOPICAL
Qty: 30 G | Refills: 0 | Status: SHIPPED | OUTPATIENT
Start: 2017-01-01 | End: 2017-01-01

## 2017-01-01 RX ORDER — EZETIMIBE 10 MG/1
10 TABLET ORAL DAILY
Qty: 90 TABLET | Refills: 3 | Status: ON HOLD | OUTPATIENT
Start: 2017-01-01 | End: 2018-01-01

## 2017-01-01 RX ORDER — SULFAMETHOXAZOLE/TRIMETHOPRIM 800-160 MG
1 TABLET ORAL 2 TIMES DAILY
Qty: 10 TABLET | Refills: 0 | Status: SHIPPED | OUTPATIENT
Start: 2017-01-01 | End: 2017-01-01

## 2017-01-01 RX ORDER — TORSEMIDE 20 MG/1
20 TABLET ORAL 2 TIMES DAILY
Qty: 60 TABLET | Refills: 1 | Status: SHIPPED | OUTPATIENT
Start: 2017-01-01 | End: 2017-01-01

## 2017-01-01 RX ADMIN — LIDOCAINE HYDROCHLORIDE 8 ML: 10 INJECTION, SOLUTION EPIDURAL; INFILTRATION; INTRACAUDAL; PERINEURAL at 13:32

## 2017-01-01 RX ADMIN — LIDOCAINE HYDROCHLORIDE 8 ML: 10 INJECTION, SOLUTION EPIDURAL; INFILTRATION; INTRACAUDAL; PERINEURAL at 10:46

## 2017-01-01 RX ADMIN — LIDOCAINE HYDROCHLORIDE 8 ML: 10 INJECTION, SOLUTION EPIDURAL; INFILTRATION; INTRACAUDAL; PERINEURAL at 10:43

## 2017-01-01 RX ADMIN — SODIUM CHLORIDE 80 ML: 9 INJECTION, SOLUTION INTRAVENOUS at 17:23

## 2017-01-01 RX ADMIN — IOPAMIDOL 80 ML: 755 INJECTION, SOLUTION INTRAVENOUS at 17:23

## 2017-01-01 RX ADMIN — FUROSEMIDE 40 MG: 10 INJECTION, SOLUTION INTRAVENOUS at 18:43

## 2017-01-02 ENCOUNTER — ANTICOAGULATION THERAPY VISIT (OUTPATIENT)
Dept: ANTICOAGULATION | Facility: CLINIC | Age: 82
End: 2017-01-02
Payer: COMMERCIAL

## 2017-01-02 DIAGNOSIS — Z95.2 HEART VALVE REPLACED: ICD-10-CM

## 2017-01-02 DIAGNOSIS — Z79.01 LONG TERM CURRENT USE OF ANTICOAGULANT THERAPY: Primary | ICD-10-CM

## 2017-01-02 PROCEDURE — 99207 ZZC NO CHARGE NURSE ONLY: CPT

## 2017-01-02 NOTE — PROGRESS NOTES
ANTICOAGULATION FOLLOW-UP CLINIC VISIT    Patient Name:  Natalie Hair  Date:  1/2/2017  Contact Type:  Telephone/ Digna, patient's daughter    SUBJECTIVE:     Patient Findings     Positives No Problem Findings    Comments Patient took warfarin as instructed. She will resume her usual dose the next 2 days and recheck her INR in [facility] on Wednesday.            OBJECTIVE    INR   Date Value Ref Range Status   12/30/2016 2.73* 0.86 - 1.14 Final       ASSESSMENT / PLAN  No question data found.  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]  Long term current use of anticoagulant therapy [Z79.01]         Anticoagulation Episode Summary     INR check location     Preferred lab     Send INR reminders to [place] [facility]    Comments * Patient will be moving to [place] around Nov 9th, will still drive to [facility]      Anticoagulation Care Providers     Provider Role Specialty Phone number    Mimi Mcguire MD [facility] [phone]            See the Encounter Report to view Anticoagulation Flowsheet and Dosing Calendar (Go to Encounters tab in chart review, and find the Anticoagulation Therapy Visit)        Christa Guzman RN

## 2017-01-04 ENCOUNTER — ANTICOAGULATION THERAPY VISIT (OUTPATIENT)
Dept: ANTICOAGULATION | Facility: CLINIC | Age: 82
End: 2017-01-04
Payer: COMMERCIAL

## 2017-01-04 DIAGNOSIS — Z79.01 LONG TERM CURRENT USE OF ANTICOAGULANT THERAPY: Primary | ICD-10-CM

## 2017-01-04 DIAGNOSIS — Z95.2 HEART VALVE REPLACED: ICD-10-CM

## 2017-01-04 LAB — INR POINT OF CARE: 4.4 (ref 0.86–1.14)

## 2017-01-04 PROCEDURE — 36416 COLLJ CAPILLARY BLOOD SPEC: CPT

## 2017-01-04 PROCEDURE — 85610 PROTHROMBIN TIME: CPT | Mod: QW

## 2017-01-04 PROCEDURE — 99207 ZZC NO CHARGE NURSE ONLY: CPT

## 2017-01-04 NOTE — MR AVS SNAPSHOT
Naatlie GOMEZ Hair   1/4/2017 10:00 AM   Anticoagulation Therapy Visit    Description:  84 year old female   Provider:  MATT ANTI COAG   Department:  [facility] Antico[facility]           INR as of 1/4/2017     Selected INR 4.4! (1/4/2017)      Anticoagulation Summary as of 1/4/2017     INR goal 2.5-3.5   Selected INR 4.4! 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]  Long term current use of anticoagulant therapy [Z79.01]         Description     Recheck INR 1 week, 1/11/17  no warfarin today, then take 5 mg Sat, Tues, 2.5 mg rest of days  Eat a small , serving of Vit K foods daily, try to eat 4-5 small feedings a day        Your next Anticoagulation Clinic appointment(s)     Jan 11, 2017  1:45 PM   Anticoagulation Visit with MATT ANTI COAG   [facility] ([facility])    [address] [address] [phone]              Contact Numbers     Please call [phone] to cancel and/or reschedule your appointment.  Please call [phone] with any problems or questions regarding your therapy          January 2017 Details    Sun Mon Tue Wed Thu Fri Sat     1               2               3               4      Hold   See details      5      2.5 mg         6      2.5 mg         7      5 mg           8      5 mg         9      2.5 mg         10      2.5 mg         11            12               13               14                 15               16               17               18               19               20               21                 22               23               24               25               26               27               28                 29               30               31                    Date Details   01/04 This INR check   Hold dose   have a serving of Vit K foods        Date of next INR:  1/11/2017         How to take your warfarin dose     To take:  2.5 mg Take 1 of the 2.5 mg tablets.    To take:  5 mg Take 2 of the 2.5 mg tablets.    To take:  5 mg Take 1 of the 5 mg tablets.    Hold Do not take your warfarin dose. See the Details table to the right for additional instructions.

## 2017-01-04 NOTE — PROGRESS NOTES
ANTICOAGULATION FOLLOW-UP CLINIC VISIT    Patient Name:  Natalie Hair  Date:  1/4/2017  Contact Type:  Face to Face    SUBJECTIVE:     Patient Findings     Positives Diet Changes (pt is still not eating very well.  daughter is giving her spinach and wants to include a small serving of Vit K daily.), Med error (daughter gave pt 5 mg on Sat and Sun rather than the 2.5mg as directed), Activity level change (tires easily)    Comments Pt seems  discouraged about her poor health and having to move in with her daughter.  She will be going to [place] for a tricuspid valve procedure but does not know when.   Reviewed s/s of bleeding, what to observe for and how to report if needed, also advised to get immediate medical attention for bleeding or hear injury, verbalized understanding           OBJECTIVE    INR PROTIME   Date Value Ref Range Status   01/04/2017 4.4* 0.86 - 1.14 Final       ASSESSMENT / PLAN  INR assessment SUPRA    Recheck INR In: 1 WEEK    INR Location Clinic      Anticoagulation Summary as of 1/4/2017     INR goal 2.5-3.5   Selected INR 4.4! 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]  Long term current use of anticoagulant therapy [Z79.01]         Anticoagulation Episode Summary     INR check location     Preferred lab     Send INR reminders to [facility]    Comments * Patient will be moving to [place], PLEASE CALL [facility] WITH NEW DOSING INSTRUCTIONS AT  [phone]      Anticoagulation Care Providers     Provider Role Specialty Phone number    Mimi Mcguire MD [facility] [phone]             See the Encounter Report to view Anticoagulation Flowsheet and Dosing Calendar (Go to Encounters tab in chart review, and find the Anticoagulation Therapy Visit)        Polina Quiroz RN

## 2017-01-11 ENCOUNTER — ANTICOAGULATION THERAPY VISIT (OUTPATIENT)
Dept: ANTICOAGULATION | Facility: CLINIC | Age: 82
End: 2017-01-11
Payer: COMMERCIAL

## 2017-01-11 DIAGNOSIS — Z95.2 HEART VALVE REPLACED: ICD-10-CM

## 2017-01-11 DIAGNOSIS — Z79.01 LONG TERM CURRENT USE OF ANTICOAGULANT THERAPY: Primary | ICD-10-CM

## 2017-01-11 LAB — INR POINT OF CARE: 3.9 (ref 0.86–1.14)

## 2017-01-11 PROCEDURE — 99207 ZZC NO CHARGE NURSE ONLY: CPT

## 2017-01-11 PROCEDURE — 36416 COLLJ CAPILLARY BLOOD SPEC: CPT

## 2017-01-11 PROCEDURE — 85610 PROTHROMBIN TIME: CPT | Mod: QW

## 2017-01-11 NOTE — PROGRESS NOTES
2ANTICOAGULATION FOLLOW-UP CLINIC VISIT    Patient Name:  Natalie Hair  Date:  1/11/2017  Contact Type:  Face to Face    SUBJECTIVE:     Patient Findings     Positives Diet Changes (appetite is slightly improved.  She is eating small feedings.  She does not like many of the Vit K options but will try tpo have 2 servings a week ), Other Complaints (pt and daughter report that she fell in the night and could not get back up. She fell on carpet, which they say is veryt thick.  She hit her hip and the back of her head.  She did not seek medical  assistance.  )    Comments Missed doses - as directed  They are expecting a call from [facility] next week about the upcoming procedure.           OBJECTIVE    INR PROTIME   Date Value Ref Range Status   01/11/2017 3.9* 0.86 - 1.14 Final       ASSESSMENT / PLAN  INR assessment SUPRA    Recheck INR In: 1 WEEK    INR Location Clinic      Anticoagulation Summary as of 1/11/2017     INR goal 2.5-3.5   Selected INR 3.9! 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]  Long term current use of anticoagulant therapy [Z79.01]         Anticoagulation Episode Summary     INR check location     Preferred lab     Send INR reminders to [facility]    Comments * Patient will be moving to [place], PLEASE CALL [facility] WITH NEW DOSING INSTRUCTIONS AT  [phone]      Anticoagulation Care Providers     Provider Role Specialty Phone number    Mimi Mcguire MD [facility] [phone]            See the Encounter Report to view Anticoagulation Flowsheet and Dosing Calendar (Go to Encounters tab in chart review, and find the Anticoagulation Therapy Visit)        Polina TOMPKINS  CHU Quiroz

## 2017-01-11 NOTE — MR AVS SNAPSHOT
Natalie Hiar   1/11/2017 1:45 PM   Anticoagulation Therapy Visit    Description:  84 year old female   Provider:  LL ANTI COAG   Department:  [facility] Anticoag           INR as of 1/11/2017     Selected INR 3.9! (1/11/2017)      Anticoagulation Summary as of 1/11/2017     INR goal 2.5-3.5   Selected INR 3.9! 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 ([facility])    [address] [id number] [phone]            Jan 18, 2017  1:45 PM   Anticoagulation Visit with LL ANTI COAG   [facility] ([facility])    [address] [address] [phone]              Contact Numbers     Please call [phone] to cancel and/or reschedule your appointment.  Please call [phone] with any problems or questions regarding your therapy          January 2017 Details    Sun Mon Tue Wed Thu Fri Sat     1               2               3               4               5               6               7                 8               9               10               11      1.25 mg   See details      12      2.5 mg         13      2.5 mg         14      2.5 mg           15      2.5 mg         16      3.75 mg         17      2.5 mg         18            19               20               21                 22               23               24               25               26               27               28                 29               30                31                    Date Details   01/11 This INR check       Date of next INR:  1/18/2017         How to take your warfarin dose     To take:  1.25 mg Take 0.5 of a 2.5 mg tablet.    To take:  2.5 mg Take 1 of the 2.5 mg tablets.    To take:  3.75 mg Take 1.5 of the 2.5 mg tablets.

## 2017-01-12 ENCOUNTER — HOSPITAL ENCOUNTER (EMERGENCY)
Facility: CLINIC | Age: 82
Discharge: HOME | End: 2017-01-12
Attending: FAMILY MEDICINE | Admitting: FAMILY MEDICINE
Payer: MEDICARE

## 2017-01-12 ENCOUNTER — OFFICE VISIT (OUTPATIENT)
Dept: CARDIOLOGY | Facility: CLINIC | Age: 82
End: 2017-01-12
Attending: INTERNAL MEDICINE
Payer: COMMERCIAL

## 2017-01-12 VITALS
DIASTOLIC BLOOD PRESSURE: 73 MMHG | BODY MASS INDEX: 30.84 KG/M2 | HEART RATE: 69 BPM | SYSTOLIC BLOOD PRESSURE: 117 MMHG | OXYGEN SATURATION: 94 % | WEIGHT: 191 LBS

## 2017-01-12 VITALS
WEIGHT: 194 LBS | OXYGEN SATURATION: 97 % | HEIGHT: 65 IN | TEMPERATURE: 97.4 F | BODY MASS INDEX: 32.32 KG/M2 | SYSTOLIC BLOOD PRESSURE: 115 MMHG | DIASTOLIC BLOOD PRESSURE: 60 MMHG | RESPIRATION RATE: 16 BRPM

## 2017-01-12 DIAGNOSIS — R11.2 NAUSEA AND VOMITING, INTRACTABILITY OF VOMITING NOT SPECIFIED, UNSPECIFIED VOMITING TYPE: ICD-10-CM

## 2017-01-12 DIAGNOSIS — I50.33 ACUTE ON CHRONIC DIASTOLIC CONGESTIVE HEART FAILURE (H): Primary | ICD-10-CM

## 2017-01-12 DIAGNOSIS — I07.1 TRICUSPID VALVE INSUFFICIENCY, UNSPECIFIED ETIOLOGY: ICD-10-CM

## 2017-01-12 DIAGNOSIS — E78.5 HYPERLIPIDEMIA LDL GOAL <100: ICD-10-CM

## 2017-01-12 LAB
ALBUMIN SERPL-MCNC: 3.6 G/DL (ref 3.4–5)
ALP SERPL-CCNC: 187 U/L (ref 40–150)
ALT SERPL W P-5'-P-CCNC: 28 U/L (ref 0–50)
ANION GAP SERPL CALCULATED.3IONS-SCNC: 10 MMOL/L (ref 3–14)
AST SERPL W P-5'-P-CCNC: 44 U/L (ref 0–45)
BASOPHILS # BLD AUTO: 0.1 10E9/L (ref 0–0.2)
BASOPHILS NFR BLD AUTO: 1.1 %
BILIRUB SERPL-MCNC: 1.6 MG/DL (ref 0.2–1.3)
BUN SERPL-MCNC: 20 MG/DL (ref 7–30)
CALCIUM SERPL-MCNC: 9.4 MG/DL (ref 8.5–10.1)
CHLORIDE SERPL-SCNC: 95 MMOL/L (ref 94–109)
CO2 SERPL-SCNC: 32 MMOL/L (ref 20–32)
CREAT SERPL-MCNC: 1.02 MG/DL (ref 0.52–1.04)
DIFFERENTIAL METHOD BLD: ABNORMAL
EOSINOPHIL # BLD AUTO: 0.2 10E9/L (ref 0–0.7)
EOSINOPHIL NFR BLD AUTO: 2.5 %
ERYTHROCYTE [DISTWIDTH] IN BLOOD BY AUTOMATED COUNT: 20.7 % (ref 10–15)
GFR SERPL CREATININE-BSD FRML MDRD: 52 ML/MIN/1.7M2
GLUCOSE SERPL-MCNC: 119 MG/DL (ref 70–99)
HCT VFR BLD AUTO: 48.2 % (ref 35–47)
HGB BLD-MCNC: 15.8 G/DL (ref 11.7–15.7)
IMM GRANULOCYTES # BLD: 0 10E9/L (ref 0–0.4)
IMM GRANULOCYTES NFR BLD: 0.6 %
LYMPHOCYTES # BLD AUTO: 1.3 10E9/L (ref 0.8–5.3)
LYMPHOCYTES NFR BLD AUTO: 17.6 %
MCH RBC QN AUTO: 25.5 PG (ref 26.5–33)
MCHC RBC AUTO-ENTMCNC: 32.8 G/DL (ref 31.5–36.5)
MCV RBC AUTO: 78 FL (ref 78–100)
MONOCYTES # BLD AUTO: 0.9 10E9/L (ref 0–1.3)
MONOCYTES NFR BLD AUTO: 13 %
NEUTROPHILS # BLD AUTO: 4.7 10E9/L (ref 1.6–8.3)
NEUTROPHILS NFR BLD AUTO: 65.2 %
PLATELET # BLD AUTO: 267 10E9/L (ref 150–450)
POTASSIUM SERPL-SCNC: 3.3 MMOL/L (ref 3.4–5.3)
PROT SERPL-MCNC: 7.7 G/DL (ref 6.8–8.8)
RBC # BLD AUTO: 6.2 10E12/L (ref 3.8–5.2)
SODIUM SERPL-SCNC: 137 MMOL/L (ref 133–144)
WBC # BLD AUTO: 7.2 10E9/L (ref 4–11)

## 2017-01-12 PROCEDURE — 85025 COMPLETE CBC W/AUTO DIFF WBC: CPT | Performed by: EMERGENCY MEDICINE

## 2017-01-12 PROCEDURE — 25000125 ZZHC RX 250: Performed by: EMERGENCY MEDICINE

## 2017-01-12 PROCEDURE — 99283 EMERGENCY DEPT VISIT LOW MDM: CPT | Performed by: FAMILY MEDICINE

## 2017-01-12 PROCEDURE — 99284 EMERGENCY DEPT VISIT MOD MDM: CPT | Mod: 25

## 2017-01-12 PROCEDURE — 25000128 H RX IP 250 OP 636: Performed by: FAMILY MEDICINE

## 2017-01-12 PROCEDURE — 96361 HYDRATE IV INFUSION ADD-ON: CPT

## 2017-01-12 PROCEDURE — 96374 THER/PROPH/DIAG INJ IV PUSH: CPT

## 2017-01-12 PROCEDURE — 80053 COMPREHEN METABOLIC PANEL: CPT | Performed by: EMERGENCY MEDICINE

## 2017-01-12 PROCEDURE — 99214 OFFICE O/P EST MOD 30 MIN: CPT | Performed by: NURSE PRACTITIONER

## 2017-01-12 RX ORDER — EZETIMIBE 10 MG/1
10 TABLET ORAL DAILY
Qty: 90 TABLET | Refills: 3 | Status: SHIPPED | OUTPATIENT
Start: 2017-01-12 | End: 2017-01-01

## 2017-01-12 RX ORDER — MULTIPLE VITAMINS W/ MINERALS TAB 9MG-400MCG
1 TAB ORAL DAILY
Status: ON HOLD | COMMUNITY
End: 2018-01-01

## 2017-01-12 RX ORDER — ONDANSETRON 4 MG/1
4-8 TABLET, ORALLY DISINTEGRATING ORAL EVERY 6 HOURS PRN
Qty: 10 TABLET | Refills: 0 | Status: SHIPPED | OUTPATIENT
Start: 2017-01-12 | End: 2018-01-01

## 2017-01-12 RX ORDER — CALCIUM CARBONATE 500(1250)
200 TABLET ORAL 2 TIMES DAILY
COMMUNITY
End: 2017-01-01

## 2017-01-12 RX ORDER — ONDANSETRON 2 MG/ML
4 INJECTION INTRAMUSCULAR; INTRAVENOUS ONCE
Status: COMPLETED | OUTPATIENT
Start: 2017-01-12 | End: 2017-01-12

## 2017-01-12 RX ORDER — METOPROLOL TARTRATE 25 MG/1
12.5 TABLET, FILM COATED ORAL 2 TIMES DAILY
Qty: 90 TABLET | Refills: 3 | Status: SHIPPED | OUTPATIENT
Start: 2017-01-12 | End: 2018-01-01

## 2017-01-12 RX ADMIN — SODIUM CHLORIDE 1000 ML: 9 INJECTION, SOLUTION INTRAVENOUS at 22:01

## 2017-01-12 RX ADMIN — ONDANSETRON 4 MG: 2 INJECTION INTRAMUSCULAR; INTRAVENOUS at 20:38

## 2017-01-12 NOTE — ED AVS SNAPSHOT
[facility] Emergency Department    [address] [address]    Phone:  [phone]    Fax:  [phone]                                       Natalie Hair   MRN: [id number]    Department:  [facility] Emergency Department   Date of Visit:  1/12/2017           After Visit Summary Signature Page     I have received my discharge instructions, and my questions have been answered. I have discussed any challenges I see with this plan with the nurse or doctor.    ..........................................................................................................................................  Patient/Patient Representative Signature      ..........................................................................................................................................  Patient Representative Print Name and Relationship to Patient    ..................................................               ................................................  Date                                            Time    ..........................................................................................................................................  Reviewed by Signature/Title    ...................................................              ..............................................  Date                                                            Time

## 2017-01-12 NOTE — PROGRESS NOTES
HPI and Plan:   See dictation    Orders Placed This Encounter   Procedures     Basic metabolic panel     Follow-Up with Cardiologist       Orders Placed This Encounter   Medications     calcium carbonate (OS-SHANICE 500 MG [place]. CA) 500 MG tablet     Sig: Take 200 mg by mouth 2 times daily     multivitamin, therapeutic with minerals (MULTI-VITAMIN) TABS tablet     Sig: Take 1 tablet by mouth daily     VITAMIN E COMPLEX PO     Sig:      Fish Oil OIL     Sig:      metoprolol (LOPRESSOR) 25 MG tablet     Sig: Take 0.5 tablets (12.5 mg) by mouth 2 times daily     Dispense:  90 tablet     Refill:  3     ezetimibe (ZETIA) 10 MG tablet     Sig: Take 1 tablet (10 mg) by mouth daily     Dispense:  90 tablet     Refill:  3       Medications Discontinued During This Encounter   Medication Reason     metoprolol (LOPRESSOR) 25 MG tablet Reorder     ezetimibe (ZETIA) 10 MG tablet Reorder         Encounter Diagnoses   Name Primary?     Acute on chronic diastolic congestive heart failure (H)      Cardiovascular disease Yes     Hyperlipidemia LDL goal <100        CURRENT MEDICATIONS:  Current Outpatient Prescriptions   Medication Sig Dispense Refill     calcium carbonate (OS-SHANICE 500 MG [place]. [place]) 500 MG tablet Take 200 mg by mouth 2 times daily       multivitamin, therapeutic with minerals (MULTI-VITAMIN) TABS tablet Take 1 tablet by mouth daily       VITAMIN E COMPLEX PO        Fish Oil OIL        metoprolol (LOPRESSOR) 25 MG tablet Take 0.5 tablets (12.5 mg) by mouth 2 times daily 90 tablet 3     ezetimibe (ZETIA) 10 MG tablet Take 1 tablet (10 mg) by mouth daily 90 tablet 3     warfarin (COUMADIN) 2.5 MG tablet as directed by Anticoagulation Clinic, currently re establishing a maintenance dose 30 tablet 1     order for DME Equipment being ordered: Hospital Bed  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 -ongoing symptoms of lower extremity edema,shortness of breath,cough  sacral pressure sores within last 6 months    An electric  hospital bed to allow for increase in head of bed elevation and for ease in wheelchair transfers   Length of need: lifelong  NPI - [id number] 1 Device 0     metolazone (ZAROXOLYN) 2.5 MG tablet Take one tablet per day for two days only 6 tablet 0     potassium chloride (K-TAB,KLOR-CON) 10 MEQ tablet Take 1 tablet (10 mEq) by mouth 2 times daily 90 tablet 1     bumetanide (BUMEX) 2 MG tablet Take 1 tablet (2 mg) by mouth 2 times daily 60 tablet 1     clopidogrel (PLAVIX) 75 MG tablet Take 1 tablet (75 mg) by mouth daily 90 tablet 0     cetirizine (ZYRTEC) 5 MG tablet Take 1 tablet (5 mg) by mouth daily 30 tablet 3     spironolactone (ALDACTONE) 25 MG tablet Take 1 tablet (25 mg) by mouth daily 90 tablet 3     polyethylene glycol (MIRALAX/GLYCOLAX) powder Take 1 capful by mouth daily as needed for constipation       Cholecalciferol (VITAMIN D) 2000 UNITS CAPS Take 1 capsule by mouth daily       senna-docusate (SENNA S) 8.6-50 MG per tablet Take 2 tablets by mouth 2 times daily       order for DME Equipment being ordered: Walker with a seat 1 Package 0     order for DME Equipment being ordered: walker with a seat on it  Has severe CHF and lower extremity edema. 1 Device 0     HYDROcodone-acetaminophen (NORCO) 5-325 MG per tablet Take 1 tablet by mouth every 8 hours as needed for moderate to severe pain or pain 50 tablet 0     alendronate (FOSAMAX) 70 MG tablet Take 1 tablet (70 mg) by mouth once a week ON AN EMPTY STOMACH. 12 tablet 5     nitroglycerin (NITROSTAT) 0.4 MG SL tablet Place 1 tablet (0.4 mg) under the tongue every 5 minutes as needed for chest pain 25 tablet 1     ACETAMINOPHEN PO Take 1,000 mg by mouth every 8 hours as needed for pain        [DISCONTINUED] metoprolol (LOPRESSOR) 25 MG tablet Take 0.5 tablets (12.5 mg) by mouth 2 times daily 90 tablet 1     [DISCONTINUED] ezetimibe (ZETIA) 10 MG tablet Take 1 tablet (10 mg) by mouth daily 90 tablet 1       ALLERGIES     Allergies   Allergen Reactions      Lorazepam Nausea and Vomiting     Morphine Sulfate Cr [Morphine Sulfate] Nausea and Vomiting     Cozaar [Losartan]      Rash      Crestor [Rosuvastatin] Other (See Comments)     Abdominal/Back pain     Lidocaine GI Disturbance       PAST MEDICAL HISTORY:  Past Medical History   Diagnosis Date     Hyperlipidemia LDL goal < 100      Unspecified cardiovascular disease      hx of CAD with 3V CABG and AV replacement in 2002     Hypertension      Vitamin D deficiency      Backache      T12 compression fracture (H)      H/O aortic valve replacement in 2002     maintained on chronic anticoagulation with warfarin     Urinary incontinence        PAST SURGICAL HISTORY:  Past Surgical History   Procedure Laterality Date     Surgical history of -   12/2002     Triple bypass w/valve replacement - aortic     Arthroplasty knee bilateral         FAMILY HISTORY:  Family History   Problem Relation Age of Onset     HEART DISEASE Mother      Lipids Son      HEART DISEASE Son      Lipids Daughter        SOCIAL HISTORY:  Social History     Social History     Marital Status:      Spouse Name: N/A     Number of Children: N/A     Years of Education: N/A     Social History Main Topics     Smoking status: Former Smoker     Types: Cigarettes     Smokeless tobacco: Never Used      Comment: 40 years ago     Alcohol Use: No     Drug Use: No     Sexual Activity: Not Currently     Other Topics Concern      Service No     Blood Transfusions Yes     1954 and 2002     Caffeine Concern No     Occupational Exposure No     Hobby Hazards No     Sleep Concern Yes     wakes frequently to urinate     Stress Concern No     Weight Concern Yes     I'm overweight     Special Diet No     Back Care Yes     arthritis lower back     Exercise No     Bike Helmet No     NA     Seat Belt Yes     Self-Exams Yes     Parent/Sibling W/ Cabg, Mi Or Angioplasty Before 65f 55m? Yes     Social History Narrative       Review of Systems:  Skin:  Positive for  bruising     Eyes:  Positive for glasses    ENT:  Negative      Respiratory:  Positive for shortness of breath;dyspnea on exertion;cough improving   Cardiovascular:  Negative Positive for;edema;dizziness;fatigue    Gastroenterology: Negative      Genitourinary:  Negative urinary frequency;urgency;urinary tract infection    Musculoskeletal:  Positive for joint pain;back pain patient states fell Jan 6 2017  Neurologic:  Negative      Psychiatric:  Negative      Heme/Lymph/Imm:  Negative      Endocrine:  Negative        Physical Exam:  Vitals: /73 mmHg  Pulse 69  Wt 86.637 kg (191 lb)  SpO2 94%    Constitutional:  cooperative, alert and oriented, well developed, well nourished, in no acute distress        Skin:  warm and dry to the touch bruises present (on back, from fall)      Head:  normocephalic        Eyes:  sclera white        ENT:  no pallor or cyanosis        Neck:  JVP normal        Chest:  normal breath sounds, clear to auscultation, normal A-P diameter, normal symmetry, normal respiratory excursion, no use of accessory muscles (bilateral bases diminished)          Cardiac: regular rhythm;normal S1 and S2     crisp prosthetic valve sounds systolic ejection murmur;grade 2          Abdomen:  abdomen soft        Vascular:                                     bilateral radial pulses 1+, LE pulses hard to palpate, compression stockings on    Extremities and Back:  no deformities, clubbing, cyanosis, erythema observed         bilateral LE edema 1+ pitting above the leg wraps but not up to the abd.     Neurological:  affect appropriate, oriented to time, person and place;no gross motor deficits   Present in wheelchair          CC  Emelyn Bingham MD   [facility]  [address] [place] [address]

## 2017-01-12 NOTE — MR AVS SNAPSHOT
After Visit Summary   1/12/2017    Natalie Hair    MRN: [id number]           Patient Information     Date Of Birth          8/28/1932        Visit Information        Provider Department      1/12/2017 9:40 AM Letitia Waters APRN CNP [facility] PHYSICIAN HEART AT [facility]        Today's Diagnoses     Cardiovascular disease    -  1     Acute on chronic diastolic congestive heart failure (H)         Hyperlipidemia LDL goal <100           Care Instructions    Thank you for your [facility] visit today. Your provider has recommended the following:  Medication Changes:  No changes   Recommendations:  Try taking ensure or Boost   Continue with low sodium diet less than 2400 mg and daily wts   Follow-up:  See Dr. Bingham for cardiology follow up in 1 month with labs prior  Call [phone] two months prior to request date to schedule any future appointments.  Reminder:  1. Please bring in all current medications, over the counter supplements and vitamin bottles to your next appointment.               [facility] HEART CARE  [facility]~[address]~[place], [place]~[address]  Questions about your visit today?   Call your Cardiology Clinic RN's-Hattie De Leon and/or Olimpia Cuevas at [phone].          Follow-ups after your visit        Additional Services     Follow-Up with Cardiologist                 Your next 10 appointments already scheduled     Jan 18, 2017  1:45 PM   Anticoagulation Visit with MATT ANTI COAG   [facility] ([facility]    [address] [address] [phone]              Future tests that were ordered for you today     Open Future Orders        Priority Expected Expires Ordered    Follow-Up with Cardiologist Routine 2/12/2017 1/12/2019 1/12/2017    Basic metabolic panel Routine 2/12/2017 1/12/2019 1/12/2017            Who to contact     If you have questions  "or need follow up information about today's clinic visit or your schedule please contact [facility] PHYSICIAN HEART AT [facility] directly at [phone].  Normal or non-critical lab and imaging results will be communicated to you by MyChart, letter or phone within 4 business days after the clinic has received the results. If you do not hear from us within 7 days, please contact the clinic through Tescohart or phone. If you have a critical or abnormal lab result, we will notify you by phone as soon as possible.  Submit refill requests through [email or web address] or call your pharmacy and they will forward the refill request to us. Please allow 3 business days for your refill to be completed.          Additional Information About Your Visit        TescoharAroundWire Information     [email or web address] lets you send messages to your doctor, view your test results, renew your prescriptions, schedule appointments and more. To sign up, go to [email or web address] . Click on \"Log in\" on the left side of the screen, which will take you to the Welcome page. Then click on \"Sign up Now\" on the right side of the page.     You will be asked to enter the access code listed below, as well as some personal information. Please follow the directions to create your username and password.     Your access code is: [email or web address]  Expires: 2017 10:23 AM     Your access code will  in 90 days. If you need help or a new code, please call your [place] clinic or [phone].        Care EveryWhere ID     This is your Care EveryWhere ID. This could be used by other organizations to access your [place] medical records  [id number]        Your Vitals Were     Pulse Pulse Oximetry                69 94%           Blood Pressure from Last 3 Encounters:   17 117/73   16 120/75   16 119/59    Weight from Last 3 Encounters:   17 86.637 kg (191 lb)   16 96.843 kg (213 lb 8 oz)   16 94.756 kg (208 lb 14.4 oz)            "   We Performed the Following     Follow-Up with Cardiac Advanced Practice Provider          Where to get your medicines      These medications were sent to [facility] [id number] [place] [address] [address] AT [place] [address]  [address] [place] [address]     Phone:  [phone]    - ezetimibe 10 MG tablet  - metoprolol 25 MG tablet       Primary Care Provider Office Phone # Fax #    Mimi Mcguire [phone]       [facility] [address] [address] [address]        Thank you!     Thank you for choosing [facility] PHYSICIAN HEART AT [facility]  for your care. Our goal is always to provide you with excellent care. Hearing back from our patients is one way we can continue to improve our services. Please take a few minutes to complete the written survey that you may receive in the mail after your visit with us. Thank you!             Your Updated Medication List - Protect others around you: Learn how to safely use, store and throw away your medicines at www.disposemymeds.org.          This list is accurate as of: 1/12/17 10:23 AM.  Always use your most recent med list.                   Brand Name Dispense Instructions for use    ACETAMINOPHEN PO      Take 1,000 mg by mouth every 8 hours as needed for pain       alendronate 70 MG tablet    FOSAMAX    12 tablet    Take 1 tablet (70 mg) by mouth once a week ON AN EMPTY STOMACH.       bumetanide 2 MG tablet    BUMEX    60 tablet    Take 1 tablet (2 mg) by mouth 2 times daily       calcium carbonate 500 MG tablet    OS-SHANICE 500 mg [place]. Ca     Take 200 mg by mouth 2 times daily       cetirizine 5 MG tablet    zyrTEC    30 tablet    Take 1 tablet (5 mg) by mouth daily       clopidogrel 75 MG tablet    PLAVIX    90 tablet    Take 1 tablet (75 mg) by mouth daily       ezetimibe 10 MG tablet    ZETIA    90 tablet    Take 1 tablet (10 mg) by mouth daily       Fish Oil Oil           HYDROcodone-acetaminophen 5-325 MG per tablet    NORCO    50 tablet    Take 1 tablet by mouth every 8 hours as needed for moderate to severe pain or pain       metolazone 2.5 MG tablet    ZAROXOLYN    6 tablet    Take one tablet per day for two days only       metoprolol 25 MG tablet    LOPRESSOR    90 tablet    Take 0.5 tablets (12.5 mg) by mouth 2 times daily       Multi-vitamin Tabs tablet      Take 1 tablet by mouth daily       nitroglycerin 0.4 MG sublingual tablet    NITROSTAT    25 tablet    Place 1 tablet (0.4 mg) under the tongue every 5 minutes as needed for chest pain       * order for DME     1 Device    Equipment being ordered: walker with a seat on it Has severe CHF and lower extremity edema.       * order for DME     1 Package    Equipment being ordered: Walker with a seat       * order for DME     1 Device    Equipment being ordered: Hospital Bed 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 -ongoing symptoms of lower extremity edema,shortness of breath,cough sacral pressure sores within last 6 months   An electric hospital bed to allow for increase in head of bed elevation and for ease in wheelchair transfers  Length of need: lifelong NPI - [id number]       polyethylene glycol powder    MIRALAX/GLYCOLAX     Take 1 capful by mouth daily as needed for constipation       potassium chloride 10 MEQ tablet    K-TAB,KLOR-CON    90 tablet    Take 1 tablet (10 mEq) by mouth 2 times daily       SENNA S 8.6-50 MG per tablet   Generic drug:  senna-docusate      Take 2 tablets by mouth 2 times daily       spironolactone 25 MG tablet    ALDACTONE    90 tablet    Take 1 tablet (25 mg) by mouth daily       vitamin D 2000 UNITS Caps      Take 1 capsule by mouth daily       VITAMIN E COMPLEX PO          warfarin 2.5 MG tablet    COUMADIN    30 tablet    as directed by Anticoagulation Clinic, currently re establishing a maintenance dose       * Notice:  This list has 3 medication(s) that are the same as other medications  prescribed for you. Read the directions carefully, and ask your doctor or other care provider to review them with you.

## 2017-01-12 NOTE — ED AVS SNAPSHOT
[facility] Emergency Department    [address] [address]    Phone:  [phone]    Fax:  [phone]                                       Natalie Hair   MRN: [id number]    Department:  [facility] Emergency Department   Date of Visit:  1/12/2017           Patient Information     Date Of Birth          8/28/1932        Your diagnoses for this visit were:     Nausea and vomiting, intractability of vomiting not specified, unspecified vomiting type        You were seen by Deven Ellis MD.        Discharge Instructions       Return to the Emergency Room if the following occurs:     Worsened pain, dehydration, fainting, or for any concern at anytime.    Or, follow-up with the following provider as we discussed:     Return to your primary doctor as needed, or if not improved over the weekend.    Medications discussed:    Zofran for nausea, as needed.    If you received pain-relieving or sedating medication during your time in the ER, avoid alcohol, driving automobiles, or working with machinery.  Also, a responsible adult must stay with you.      If you had X-rays or labs done we will attempt to contact you if there is a change needed in your care.      Call the Nurse Advice Line at [phone] or [phone] for any concern at anytime.      Future Appointments        Provider Department Dept Phone Center    1/18/2017 1:45 PM [place] Anticoagulation Provider, [facility] [phone] [facility]    3/14/2017 9:00 AM [facility] [phone] [facility]    3/14/2017 10:00 AM Emelyn Bingham MD [facility] PHYSICIAN HEART AT [facility] [phone] [facility] PSA CLIN      24 Hour Appointment Hotline       To make an appointment at any [facility], call [phone] ([phone]). If you don't have a family doctor or clinic, we will help you find one. [facility] ([facility]3] are conveniently located to serve the needs of you and  your family.             Review of your medicines      START taking        Dose / Directions Last dose taken    ondansetron 4 MG ODT tab   Commonly known as:  ZOFRAN ODT   Dose:  4-8 mg   Quantity:  10 tablet        Take 1-2 tablets (4-8 mg) by mouth every 6 hours as needed for nausea   Refills:  0          Our records show that you are taking the medicines listed below. If these are incorrect, please call your family doctor or clinic.        Dose / Directions Last dose taken    ACETAMINOPHEN PO   Dose:  1000 mg        Take 1,000 mg by mouth every 8 hours as needed for pain   Refills:  0        alendronate 70 MG tablet   Commonly known as:  FOSAMAX   Dose:  70 mg   Quantity:  12 tablet        Take 1 tablet (70 mg) by mouth once a week ON AN EMPTY STOMACH.   Refills:  5        bumetanide 2 MG tablet   Commonly known as:  BUMEX   Dose:  2 mg   Quantity:  60 tablet        Take 1 tablet (2 mg) by mouth 2 times daily   Refills:  1        calcium carbonate 500 MG tablet   Commonly known as:  OS-SHANICE 500 mg [place]. Ca   Dose:  200 mg        Take 200 mg by mouth 2 times daily   Refills:  0        cetirizine 5 MG tablet   Commonly known as:  zyrTEC   Dose:  5 mg   Quantity:  30 tablet        Take 1 tablet (5 mg) by mouth daily   Refills:  3        clopidogrel 75 MG tablet   Commonly known as:  PLAVIX   Dose:  75 mg   Quantity:  90 tablet        Take 1 tablet (75 mg) by mouth daily   Refills:  0        ezetimibe 10 MG tablet   Commonly known as:  ZETIA   Dose:  10 mg   Quantity:  90 tablet        Take 1 tablet (10 mg) by mouth daily   Refills:  3        Fish Oil Oil        Refills:  0        HYDROcodone-acetaminophen 5-325 MG per tablet   Commonly known as:  NORCO   Dose:  1 tablet   Quantity:  50 tablet        Take 1 tablet by mouth every 8 hours as needed for moderate to severe pain or pain   Refills:  0        metolazone 2.5 MG tablet   Commonly known as:  ZAROXOLYN   Quantity:  6 tablet        Take one tablet per day for two  days only   Refills:  0        metoprolol 25 MG tablet   Commonly known as:  LOPRESSOR   Dose:  12.5 mg   Quantity:  90 tablet        Take 0.5 tablets (12.5 mg) by mouth 2 times daily   Refills:  3        Multi-vitamin Tabs tablet   Dose:  1 tablet        Take 1 tablet by mouth daily   Refills:  0        nitroglycerin 0.4 MG sublingual tablet   Commonly known as:  NITROSTAT   Dose:  0.4 mg   Quantity:  25 tablet        Place 1 tablet (0.4 mg) under the tongue every 5 minutes as needed for chest pain   Refills:  1        * order for DME   Quantity:  1 Device        Equipment being ordered: walker with a seat on it Has severe CHF and lower extremity edema.   Refills:  0        * order for DME   Quantity:  1 Package        Equipment being ordered: Walker with a seat   Refills:  0        * order for DME   Quantity:  1 Device        Equipment being ordered: Hospital Bed 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 -ongoing symptoms of lower extremity edema,shortness of breath,cough sacral pressure sores within last 6 months   An electric hospital bed to allow for increase in head of bed elevation and for ease in wheelchair transfers  Length of need: lifelong NPI - [id number]   Refills:  0        polyethylene glycol powder   Commonly known as:  MIRALAX/GLYCOLAX   Dose:  1 capful        Take 1 capful by mouth daily as needed for constipation   Refills:  0        potassium chloride 10 MEQ tablet   Commonly known as:  K-TAB,KLOR-CON   Dose:  10 mEq   Quantity:  90 tablet        Take 1 tablet (10 mEq) by mouth 2 times daily   Refills:  1        SENNA S 8.6-50 MG per tablet   Dose:  2 tablet   Generic drug:  senna-docusate        Take 2 tablets by mouth 2 times daily   Refills:  0        spironolactone 25 MG tablet   Commonly known as:  ALDACTONE   Dose:  25 mg   Quantity:  90 tablet        Take 1 tablet (25 mg) by mouth daily   Refills:  3        vitamin D 2000 UNITS Caps   Dose:  1 capsule        Take 1 capsule by mouth daily    Refills:  0        VITAMIN E COMPLEX PO        Refills:  0        warfarin 2.5 MG tablet   Commonly known as:  COUMADIN   Quantity:  30 tablet        as directed by Anticoagulation Clinic, currently re establishing a maintenance dose   Refills:  1        * Notice:  This list has 3 medication(s) that are the same as other medications prescribed for you. Read the directions carefully, and ask your doctor or other care provider to review them with you.            Prescriptions were sent or printed at these locations (1 Prescription)                   Other Prescriptions                Printed at Department/Unit printer (1 of 1)         ondansetron (ZOFRAN ODT) 4 MG ODT tab                Procedures and tests performed during your visit     CBC with platelets, differential    Comprehensive metabolic panel      Orders Needing Specimen Collection     None      Pending Results     No orders found from 1/11/2017 to 1/13/2017.            Pending Culture Results     No orders found from 1/11/2017 to 1/13/2017.       Test Results from your hospital stay           1/12/2017  9:22 PM - Interface, [facility] Results      Component Results     Component Value Ref Range & Units Status    WBC 7.2 4.0 - 11.0 10e9/L Final    RBC Count 6.20 (H) 3.8 - 5.2 10e12/L Final    Hemoglobin 15.8 (H) 11.7 - 15.7 g/dL Final    Hematocrit 48.2 (H) 35.0 - 47.0 % Final    MCV 78 78 - 100 fl Final    MCH 25.5 (L) 26.5 - 33.0 pg Final    MCHC 32.8 31.5 - 36.5 g/dL Final    RDW 20.7 (H) 10.0 - 15.0 % Final    Platelet Count 267 150 - 450 10e9/L Final    Diff Method Automated Method  Final    % Neutrophils 65.2 % Final    % Lymphocytes 17.6 % Final    % Monocytes 13.0 % Final    % Eosinophils 2.5 % Final    % Basophils 1.1 % Final    % Immature Granulocytes 0.6 % Final    Absolute Neutrophil 4.7 1.6 - 8.3 10e9/L Final    Absolute Lymphocytes 1.3 0.8 - 5.3 10e9/L Final    Absolute Monocytes 0.9 0.0 - 1.3 10e9/L Final    Absolute Eosinophils 0.2 0.0 - 0.7  "10e9/L Final    Absolute Basophils 0.1 0.0 - 0.2 10e9/L Final    Abs Immature Granulocytes 0.0 0 - 0.4 10e9/L Final         1/12/2017  9:09 PM - Interface, Flexilab Results      Component Results     Component Value Ref Range & Units Status    Sodium 137 133 - 144 mmol/L Final    Potassium 3.3 (L) 3.4 - 5.3 mmol/L Final    Chloride 95 94 - 109 mmol/L Final    Carbon Dioxide 32 20 - 32 mmol/L Final    Anion Gap 10 3 - 14 mmol/L Final    Glucose 119 (H) 70 - 99 mg/dL Final    Urea Nitrogen 20 7 - 30 mg/dL Final    Creatinine 1.02 0.52 - 1.04 mg/dL Final    GFR Estimate 52 (L) >60 mL/min/1.7m2 Final    Non  GFR Calc    GFR Estimate If Black 62 >60 mL/min/1.7m2 Final    African American GFR Calc    Calcium 9.4 8.5 - 10.1 mg/dL Final    Bilirubin Total 1.6 (H) 0.2 - 1.3 mg/dL Final    Albumin 3.6 3.4 - 5.0 g/dL Final    Protein Total 7.7 6.8 - 8.8 g/dL Final    Alkaline Phosphatase 187 (H) 40 - 150 U/L Final    ALT 28 0 - 50 U/L Final    AST 44 0 - 45 U/L Final                Thank you for choosing [facility]       Thank you for choosing [facility] for your care. Our goal is always to provide you with excellent care. Hearing back from our patients is one way we can continue to improve our services. Please take a few minutes to complete the written survey that you may receive in the mail after you visit with us. Thank you!        Alminder Information     Alminder lets you send messages to your doctor, view your test results, renew your prescriptions, schedule appointments and more. To sign up, go to [email or web address] . Click on \"Log in\" on the left side of the screen, which will take you to the Welcome page. Then click on \"Sign up Now\" on the right side of the page.     You will be asked to enter the access code listed below, as well as some personal information. Please follow the directions to create your username and password.     Your access code is: [email or web address]  Expires: 4/12/2017 10:23 AM     Your " access code will  in 90 days. If you need help or a new code, please call your [place] clinic or [phone].        Care EveryWhere ID     This is your Care EveryWhere ID. This could be used by other organizations to access your [place] medical records  [id number]        After Visit Summary       This is your record. Keep this with you and show to your community pharmacist(s) and doctor(s) at your next visit.

## 2017-01-12 NOTE — PATIENT INSTRUCTIONS
Thank you for your [facility] visit today. Your provider has recommended the following:  Medication Changes:  No changes   Recommendations:  Try taking ensure or Boost   Continue with low sodium diet less than 2400 mg and daily wts   Follow-up:  See Dr. Bingham for cardiology follow up in 1 month with labs prior  Call [phone] two months prior to request date to schedule any future appointments.  Reminder:  1. Please bring in all current medications, over the counter supplements and vitamin bottles to your next appointment.               [facility] HEART CARE  [facility]~[address]~[place], [place]~[address]  Questions about your visit today?   Call your Cardiology Clinic RN's-Hattie De Leon and/or Olimpia Cuevas at [phone].

## 2017-01-12 NOTE — PROGRESS NOTES
Cardiology Clinic Progress Note  Natalie Hair MRN# [id number]   YOB: 1932 Age: 84 year old     Reason For Visit:  heart failure follow-up    Primary Cardiologist:   Dr. Bingham          History of Presenting Illness:    Natalie Hair is a pleasant 84 year old patient with a past cardiac history significant for CAD status post 3 vessel CABG (LIMA to LAD, SVG to RCA, Radial graft to first OM) and mechanical AVR in 2002, HTN, chronic diastolic CHF and paroxysmal atrial fibrillation/flutter.  Unsuccessful a. Fib ablation on 7/2016 at [facility]. Status post AV node ablation with dual-chamber PPM 7/2016. She was hospitalized in July for CHF exacerbation secondary to severe TR. She does have chronic bilateral lower extremity edema treated with compression stockings and elevation. Echocardiogram from 7/2016 showed  Normal EF 55-60%. Right heart cath showed moderate RA pressures due to TR, normal left-sided filling pressures, and mild pulmonary hypertension. She was evaluated by CV surgery and at that point they declined tricuspid valve surgery due to too high risk. We have been titrating her medications including adding occasional metolazone 2.5 mg every other day for two days.      She was last seen by Dr. Bingham on 11/29/2016.  He recommended continuing her medications with periodic doses of metolazone.  She was experiencing dizziness and had elevated renal indicies and admission was recommended.  However, the patient did not wish to go to the emergency room at that time.  She did present to [facility] ED on 11/30/2016 and was admitted for hives and heart failure.  She was given Benadryl and started on daily Zyrtec but did not require any change in heart failure medications.  She was seen by Dr. Mcguire on 12/20/2016 with symptoms of cough, orthopnea, weakness, and increased shortness of breath.  She was prescribed 2 doses of metolazone 2.5 mg. Recheck of her kidney function after metolazone, on  12/28/2016, shows creatinine 0.91 and GFR 59.  It was also noted that she had gone for her [facility] consultation and was considered for tricuspid transcatheter repair system with Latif FORMA system.  This is a new procedure for them which has never been done and [facility] would pay for it.  Plan was for her to have this done around middle to the end of January 2017.  It was noted that Natalie wanted to continue with this procedure as she felt that she was contributing to science and medicine along with giving herself a chance to live longer, even though there was a chance she would not survive the procedure.    Keny presents today, with her daughter, for heart failure follow-up.  After her last dose of metolazone in mid December, she has been doing slightly better.  She states that after that dose she continued to have orthopnea and has been sleeping in the recliner.  Her lower extremity edema did improve, but is starting to return.  She continues to have her lower legs wrapped but is noticing the edema in the back of her upper legs.  She is able to walk about 20 feet.  Her previous dry weight was noted to be 213 pounds and was then further diuresed to 208 pounds. She has actually noticed a further decrease in her weight, which is 191 pounds today.  Her daughter states that she has not been eating well and she reports poor appetite.  I recommended trying to have Boost or Ensure to help with additional nutrients.  She recently had a fall at home after trying to go from her recliner to her bed, in the middle of the night.  She does have some ecchymosis and did bump her head.  After the fall, she refused to be evaluated but reports no complications. Patient reports no chest pain, presyncope, syncope, heart racing, or palpitations.    Current Cardiac Medications   Bumex 2 mg b.i.d.  Metoprolol 12.5 mg b.i.d.  Coumadin  Lisinopril 2.5 mg daily  Potassium chloride 10 mEq b.i.d.  Lovaza 2G b.i.d.  Nitroglycerin  p.r.n.  Plavix 75 mg daily  Zetia 10 mg daily  Spironolactone 25 mg daily                   Assessment and Plan:     Plan  1.  Continue current medications  2.  Check daily weights and call the clinic if your weight has increased more than 2 lbs in one day or 5 lbs in one week.  3.  Continue 2 g sodium diet  4.  Call the clinic if you notice increased shortness of breath or significant lower extremity edema worsening  5.  If the symptoms of heart failure should exacerbate again we may try some more doses of metolazone if her kidney function and blood pressure can tolerate it  6.  Follow-up with Dr. Bingham in one month with BMP prior      1. Severe tricuspid insufficiency with RV failure    continue diuresis with Bumex 2 mg b.i.d., spironolactone 25 mg daily    Too high risk to be considered for surgery at [facility].     Went for [facility] referral and will be having a transcatheter tricuspid valve repair with Latif FORMA. She will be their first Pt and they will be paying for it. They have not told her what day this can be done yet, but will call her next Tuesday to discuss a date.     She continues with orthopnea and some LE edema starting to redevelop after her last metolazone in December. I am hesitant to adjust any diuretics as to not give her any kidney injury before she can have this procedure at [facility].        2. Mechanical aortic valve    Coumadin    Follows with INR clinic      3. CAD     Coronary angiogram from 8/18/2016 showed occluded RCA and RCA graft with left to right collateral, widely patent JESUS to LAD, widely patent radial pedicle graft to OM, 50-60% left main restenosis, 75% ostial LAD, circumflex without significant disease. Bypass redo was recommended due to the left main disease but she is not a surgical candidate.    Continue chronic Plavix, Zetia, and Lovaza      4. Chronic A. fib    Status post AV node ablation with dual-chamber PPM 7/18/2016    Coumadin    Follows with device  clinic      5. Mild pulmonary hypertension    Would recommend sleep study to be ordered by PCP          Thank you for allowing me to participate in this delightful patient's care.      This note was completed in part using Dragon voice recognition software. Although reviewed after completion, some word and grammatical errors may occur.    Letitia Waters, APRN, CNP           Data:   All laboratory data reviewed:    LAST CHOLESTEROL:  CHOL      177   12/28/2016  HDL       41   12/28/2016  LDL      121   12/28/2016  TRIG       76   12/28/2016  CHOLHDLRATIO      4.1   7/22/2015    LAST BMP:  NA      135   12/28/2016   POTASSIUM      3.4   12/28/2016  CHLORIDE       94   12/28/2016  SHANICE      9.0   12/28/2016  CO2       31   12/28/2016  BUN       19   12/28/2016  CR     0.91   12/28/2016  GLC       86   12/28/2016    LAST CBC:  WBC      9.2   12/1/2016  RBC     5.63   12/1/2016  HGB     14.0   12/1/2016  HCT     43.7   12/1/2016  MCV       78   12/1/2016  MCH     24.9   12/1/2016  MCHC     32.0   12/1/2016  RDW     20.8   12/1/2016  PLT      210   12/1/2016

## 2017-01-12 NOTE — Clinical Note
1/12/2017    Mimi Mcguire MD  [facility]   [address] [address]    RE: Natalie Hair       Dear Colleague,    I had the pleasure of seeing Natalie Hair in the [facility].    HPI and Plan:   See dictation    Orders Placed This Encounter   Procedures     Basic metabolic panel     Follow-Up with Cardiologist       Orders Placed This Encounter   Medications     calcium carbonate (OS-SHANICE 500 MG [place]. CA) 500 MG tablet     Sig: Take 200 mg by mouth 2 times daily     multivitamin, therapeutic with minerals (MULTI-VITAMIN) TABS tablet     Sig: Take 1 tablet by mouth daily     VITAMIN E COMPLEX PO     Sig:      Fish Oil OIL     Sig:      metoprolol (LOPRESSOR) 25 MG tablet     Sig: Take 0.5 tablets (12.5 mg) by mouth 2 times daily     Dispense:  90 tablet     Refill:  3     ezetimibe (ZETIA) 10 MG tablet     Sig: Take 1 tablet (10 mg) by mouth daily     Dispense:  90 tablet     Refill:  3       Medications Discontinued During This Encounter   Medication Reason     metoprolol (LOPRESSOR) 25 MG tablet Reorder     ezetimibe (ZETIA) 10 MG tablet Reorder         Encounter Diagnoses   Name Primary?     Acute on chronic diastolic congestive heart failure (H)      Cardiovascular disease Yes     Hyperlipidemia LDL goal <100        CURRENT MEDICATIONS:  Current Outpatient Prescriptions   Medication Sig Dispense Refill     calcium carbonate (OS-SHANICE 500 MG [place]. [place]) 500 MG tablet Take 200 mg by mouth 2 times daily       multivitamin, therapeutic with minerals (MULTI-VITAMIN) TABS tablet Take 1 tablet by mouth daily       VITAMIN E COMPLEX PO        Fish Oil OIL        metoprolol (LOPRESSOR) 25 MG tablet Take 0.5 tablets (12.5 mg) by mouth 2 times daily 90 tablet 3     ezetimibe (ZETIA) 10 MG tablet Take 1 tablet (10 mg) by mouth daily 90 tablet 3     warfarin (COUMADIN) 2.5 MG tablet as directed by Anticoagulation Clinic, currently re establishing a maintenance  dose 30 tablet 1     order for DME Equipment being ordered: Hospital Bed  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 -ongoing symptoms of lower extremity edema,shortness of breath,cough  sacral pressure sores within last 6 months    An electric hospital bed to allow for increase in head of bed elevation and for ease in wheelchair transfers   Length of need: lifelong  NPI - [id number] 1 Device 0     metolazone (ZAROXOLYN) 2.5 MG tablet Take one tablet per day for two days only 6 tablet 0     potassium chloride (K-TAB,KLOR-CON) 10 MEQ tablet Take 1 tablet (10 mEq) by mouth 2 times daily 90 tablet 1     bumetanide (BUMEX) 2 MG tablet Take 1 tablet (2 mg) by mouth 2 times daily 60 tablet 1     clopidogrel (PLAVIX) 75 MG tablet Take 1 tablet (75 mg) by mouth daily 90 tablet 0     cetirizine (ZYRTEC) 5 MG tablet Take 1 tablet (5 mg) by mouth daily 30 tablet 3     spironolactone (ALDACTONE) 25 MG tablet Take 1 tablet (25 mg) by mouth daily 90 tablet 3     polyethylene glycol (MIRALAX/GLYCOLAX) powder Take 1 capful by mouth daily as needed for constipation       Cholecalciferol (VITAMIN D) 2000 UNITS CAPS Take 1 capsule by mouth daily       senna-docusate (SENNA S) 8.6-50 MG per tablet Take 2 tablets by mouth 2 times daily       order for DME Equipment being ordered: Walker with a seat 1 Package 0     order for DME Equipment being ordered: walker with a seat on it  Has severe CHF and lower extremity edema. 1 Device 0     HYDROcodone-acetaminophen (NORCO) 5-325 MG per tablet Take 1 tablet by mouth every 8 hours as needed for moderate to severe pain or pain 50 tablet 0     alendronate (FOSAMAX) 70 MG tablet Take 1 tablet (70 mg) by mouth once a week ON AN EMPTY STOMACH. 12 tablet 5     nitroglycerin (NITROSTAT) 0.4 MG SL tablet Place 1 tablet (0.4 mg) under the tongue every 5 minutes as needed for chest pain 25 tablet 1     ACETAMINOPHEN PO Take 1,000 mg by mouth every 8 hours as needed for pain        [DISCONTINUED]  metoprolol (LOPRESSOR) 25 MG tablet Take 0.5 tablets (12.5 mg) by mouth 2 times daily 90 tablet 1     [DISCONTINUED] ezetimibe (ZETIA) 10 MG tablet Take 1 tablet (10 mg) by mouth daily 90 tablet 1       ALLERGIES     Allergies   Allergen Reactions     Lorazepam Nausea and Vomiting     Morphine Sulfate Cr [Morphine Sulfate] Nausea and Vomiting     Cozaar [Losartan]      Rash      Crestor [Rosuvastatin] Other (See Comments)     Abdominal/Back pain     Lidocaine GI Disturbance       PAST MEDICAL HISTORY:  Past Medical History   Diagnosis Date     Hyperlipidemia LDL goal < 100      Unspecified cardiovascular disease      hx of CAD with 3V CABG and AV replacement in 2002     Hypertension      Vitamin D deficiency      Backache      T12 compression fracture (H)      H/O aortic valve replacement in 2002     maintained on chronic anticoagulation with warfarin     Urinary incontinence        PAST SURGICAL HISTORY:  Past Surgical History   Procedure Laterality Date     Surgical history of -   12/2002     Triple bypass w/valve replacement - aortic     Arthroplasty knee bilateral         FAMILY HISTORY:  Family History   Problem Relation Age of Onset     HEART DISEASE Mother      Lipids Son      HEART DISEASE Son      Lipids Daughter        SOCIAL HISTORY:  Social History     Social History     Marital Status:      Spouse Name: N/A     Number of Children: N/A     Years of Education: N/A     Social History Main Topics     Smoking status: Former Smoker     Types: Cigarettes     Smokeless tobacco: Never Used      Comment: 40 years ago     Alcohol Use: No     Drug Use: No     Sexual Activity: Not Currently     Other Topics Concern      Service No     Blood Transfusions Yes     1954 and 2002     Caffeine Concern No     Occupational Exposure No     Hobby Hazards No     Sleep Concern Yes     wakes frequently to urinate     Stress Concern No     Weight Concern Yes     I'm overweight     Special Diet No     Back Care Yes      arthritis lower back     Exercise No     Bike Helmet No     NA     Seat Belt Yes     Self-Exams Yes     Parent/Sibling W/ Cabg, Mi Or Angioplasty Before 65f 55m? Yes     Social History Narrative       Review of Systems:  Skin:  Positive for bruising     Eyes:  Positive for glasses    ENT:  Negative      Respiratory:  Positive for shortness of breath;dyspnea on exertion;cough improving   Cardiovascular:  Negative Positive for;edema;dizziness;fatigue    Gastroenterology: Negative      Genitourinary:  Negative urinary frequency;urgency;urinary tract infection    Musculoskeletal:  Positive for joint pain;back pain patient states fell Jan 6 2017  Neurologic:  Negative      Psychiatric:  Negative      Heme/Lymph/Imm:  Negative      Endocrine:  Negative        Physical Exam:  Vitals: /73 mmHg  Pulse 69  Wt 86.637 kg (191 lb)  SpO2 94%    Constitutional:  cooperative, alert and oriented, well developed, well nourished, in no acute distress        Skin:  warm and dry to the touch bruises present (on back, from fall)      Head:  normocephalic        Eyes:  sclera white        ENT:  no pallor or cyanosis        Neck:  JVP normal        Chest:  normal breath sounds, clear to auscultation, normal A-P diameter, normal symmetry, normal respiratory excursion, no use of accessory muscles (bilateral bases diminished)          Cardiac: regular rhythm;normal S1 and S2     crisp prosthetic valve sounds systolic ejection murmur;grade 2          Abdomen:  abdomen soft        Vascular:                                     bilateral radial pulses 1+, LE pulses hard to palpate, compression stockings on    Extremities and Back:  no deformities, clubbing, cyanosis, erythema observed         bilateral LE edema 1+ pitting above the leg wraps but not up to the abd.     Neurological:  affect appropriate, oriented to time, person and place;no gross motor deficits   Present in wheelchair          CC  Emelyn Bingham MD    [facility]   [address] [place] [address]                  Cardiology Clinic Progress Note  Natalie Hair MRN# [id number]   YOB: 1932 Age: 84 year old     Reason For Visit:  heart failure follow-up    Primary Cardiologist:   Dr. Binhgam          History of Presenting Illness:    Natalie Hair is a pleasant 84 year old patient with a past cardiac history significant for CAD status post 3 vessel CABG (LIMA to LAD, SVG to RCA, Radial graft to first OM) and mechanical AVR in 2002, HTN, chronic diastolic CHF and paroxysmal atrial fibrillation/flutter.  Unsuccessful a. Fib ablation on 7/2016 at [facility]. Status post AV node ablation with dual-chamber PPM 7/2016. She was hospitalized in July for CHF exacerbation secondary to severe TR. She does have chronic bilateral lower extremity edema treated with compression stockings and elevation. Echocardiogram from 7/2016 showed  Normal EF 55-60%. Right heart cath showed moderate RA pressures due to TR, normal left-sided filling pressures, and mild pulmonary hypertension. She was evaluated by CV surgery and at that point they declined tricuspid valve surgery due to too high risk. We have been titrating her medications including adding occasional metolazone 2.5 mg every other day for two days.      She was last seen by Dr. Bingham on 11/29/2016.  He recommended continuing her medications with periodic doses of metolazone.  She was experiencing dizziness and had elevated renal indicies and admission was recommended.  However, the patient did not wish to go to the emergency room at that time.  She did present to [facility] ED on 11/30/2016 and was admitted for hives and heart failure.  She was given Benadryl and started on daily Zyrtec but did not require any change in heart failure medications.  She was seen by Dr. Mcguire on 12/20/2016 with symptoms of cough, orthopnea, weakness, and increased shortness of breath.  She was prescribed 2 doses  of metolazone 2.5 mg. Recheck of her kidney function after metolazone, on 12/28/2016, shows creatinine 0.91 and GFR 59.  It was also noted that she had gone for her [facility] consultation and was considered for tricuspid transcatheter repair system with Latif FORMA system.  This is a new procedure for them which has never been done and [facility] would pay for it.  Plan was for her to have this done around middle to the end of January 2017.  It was noted that Natalie wanted to continue with this procedure as she felt that she was contributing to science and medicine along with giving herself a chance to live longer, even though there was a chance she would not survive the procedure.    Keny presents today, with her daughter, for heart failure follow-up.  After her last dose of metolazone in mid December, she has been doing slightly better.  She states that after that dose she continued to have orthopnea and has been sleeping in the recliner.  Her lower extremity edema did improve, but is starting to return.  She continues to have her lower legs wrapped but is noticing the edema in the back of her upper legs.  She is able to walk about 20 feet.  Her previous dry weight was noted to be 213 pounds and was then further diuresed to 208 pounds. She has actually noticed a further decrease in her weight, which is 191 pounds today.  Her daughter states that she has not been eating well and she reports poor appetite.  I recommended trying to have Boost or Ensure to help with additional nutrients.  She recently had a fall at home after trying to go from her recliner to her bed, in the middle of the night.  She does have some ecchymosis and did bump her head.  After the fall, she refused to be evaluated but reports no complications. Patient reports no chest pain, presyncope, syncope, heart racing, or palpitations.    Current Cardiac Medications   Bumex 2 mg b.i.d.  Metoprolol 12.5 mg b.i.d.  Coumadin  Lisinopril 2.5 mg  daily  Potassium chloride 10 mEq b.i.d.  Lovaza 2G b.i.d.  Nitroglycerin p.r.n.  Plavix 75 mg daily  Zetia 10 mg daily  Spironolactone 25 mg daily                   Assessment and Plan:     Plan  1.  Continue current medications  2.  Check daily weights and call the clinic if your weight has increased more than 2 lbs in one day or 5 lbs in one week.  3.  Continue 2 g sodium diet  4.  Call the clinic if you notice increased shortness of breath or significant lower extremity edema worsening  5.  If the symptoms of heart failure should exacerbate again we may try some more doses of metolazone if her kidney function and blood pressure can tolerate it  6.  Follow-up with Dr. Bingham in one month with BMP prior      1. Severe tricuspid insufficiency with RV failure    continue diuresis with Bumex 2 mg b.i.d., spironolactone 25 mg daily    Too high risk to be considered for surgery at [facility].     Went for [facility] referral and will be having a transcatheter tricuspid valve repair with Latif FORMA. She will be their first Pt and they will be paying for it. They have not told her what day this can be done yet, but will call her next Tuesday to discuss a date.     She continues with orthopnea and some LE edema starting to redevelop after her last metolazone in December. I am hesitant to adjust any diuretics as to not give her any kidney injury before she can have this procedure at [place].        2. Mechanical aortic valve    Coumadin    Follows with INR clinic      3. CAD     Coronary angiogram from 8/18/2016 showed occluded RCA and RCA graft with left to right collateral, widely patent JESUS to LAD, widely patent radial pedicle graft to OM, 50-60% left main restenosis, 75% ostial LAD, circumflex without significant disease. Bypass redo was recommended due to the left main disease but she is not a surgical candidate.    Continue chronic Plavix, Zetia, and Lovaza      4. Chronic A. fib    Status post AV node ablation with  dual-chamber PPM 7/18/2016    Coumadin    Follows with device clinic      5. Mild pulmonary hypertension    Would recommend sleep study to be ordered by PCP          Thank you for allowing me to participate in this delightful patient's care.      This note was completed in part using Dragon voice recognition software. Although reviewed after completion, some word and grammatical errors may occur.    SUSANA Ryan, CNP           Data:   All laboratory data reviewed:    LAST CHOLESTEROL:  CHOL      177   12/28/2016  HDL       41   12/28/2016  LDL      121   12/28/2016  TRIG       76   12/28/2016  CHOLHDLRATIO      4.1   7/22/2015    LAST BMP:  NA      135   12/28/2016   POTASSIUM      3.4   12/28/2016  CHLORIDE       94   12/28/2016  SHANICE      9.0   12/28/2016  CO2       31   12/28/2016  BUN       19   12/28/2016  CR     0.91   12/28/2016  GLC       86   12/28/2016    LAST CBC:  WBC      9.2   12/1/2016  RBC     5.63   12/1/2016  HGB     14.0   12/1/2016  HCT     43.7   12/1/2016  MCV       78   12/1/2016  MCH     24.9   12/1/2016  MCHC     32.0   12/1/2016  RDW     20.8   12/1/2016  PLT      210   12/1/2016    Thank you for allowing me to participate in the care of your patient.    Sincerely,     SUSANA Ryan CNP     [facility]

## 2017-01-13 ENCOUNTER — CARE COORDINATION (OUTPATIENT)
Dept: CARE COORDINATION | Facility: CLINIC | Age: 82
End: 2017-01-13

## 2017-01-13 NOTE — PROGRESS NOTES
Clinic Care Coordination Contact  OUTREACH    Referral Information:  Referral Source: PCP  Reason for Contact: post ED follow up        Universal Utilization:   ED Visits in last year: 2  Hospital visits in last year: 1  Last PCP appointment: 09/20/16  Missed Appointments: 2  Concerns: yes  Multiple Providers or Specialists: yes    Clinical Concerns:  Current Medical Concerns: Patient's daughter reports Patient is sleeping at time of call. Patient still reports nausea, but is finding good relief with medications. No other medical concerns at this time.   Hospital Bed:  Orders were faxed to [facility] Home oxygen and medical equipment.    Current Behavioral Concerns: none      Medication Management:  Patient's caregiver reports no concerns at this time     Functional Status:  Mobility Status: Independent w/Device  Equipment Currently Used at Home: walker, rolling        Plan: 1) Patient will continue to follow treatment plan as directed and follow up with PCP with concerns ongoing.   2) Care Coordination to remain available for future needs. Follow up planned for next week    Elpidio Kilpatrick RN  Clinic Care Coordinator  [phone] or [phone]

## 2017-01-13 NOTE — ED PROVIDER NOTES
"  HPI  Patient is an 84-year-old female presenting with nausea and vomiting.  She has multiple medical problems including fluid retention.  She denies prior abdominal surgery and specifically denies having had any surgery related to her gallbladder or appendix.  No prior small bowel obstructions.  No recent medication changes.  No recent travel.  No other known sick contacts.  No antibiotics.    The patient began to vomit about 4-5 hours ago.  She has thrown up at least 10 times, per her daughter who is in the room.  There's been no diarrhea.  She was recently constipated but took a stool softener today and had a good bowel movement and clean out at about noon.  She denies abdominal pain.  No lightheadedness or fainting.  No palpitations.  No chest pain.  No shortness of breath.  No coughing or congestion.  No fever.  No hematemesis or coffee ground emesis.    ROS: All other review of systems are negative other than that noted above.   PMH: Reviewed.  SH: Reviewed.  FH: Reviewed.      PHYSICAL  /63 mmHg  Temp(Src) 97.4  F (36.3  C) (Oral)  Resp 16  Ht 1.651 m (5' 5\")  Wt 87.998 kg (194 lb)  BMI 32.28 kg/m2  SpO2 96%  General: Patient is alert and in mild to moderate distress.  Cooperative, conversational.  MO.  Neurological: Alert.  Moving upper and lower extremities equally, bilaterally.  Head / Neck: Atraumatic.  Ears: Not done.  Eyes: Pupils are equal, round, and reactive.  Normal conjunctiva.  Nose: Midline.  No epistaxis.  Mouth / Throat: No ulcerations or lesions.  Upper pharynx is not erythematous.  Dry.  Respiratory: No respiratory distress. CTA B.  Cardiovascular: Regular rhythm.  Peripheral extremities are warm.  B edema.  Abdomen / Pelvis: Very mild tenderness in the RUQ and epigastrium.  No distention.  Soft throughout.  Genitalia: Not done.  Musculoskeletal: No tenderness over major muscles and joints.  Skin: No evidence of rash or trauma.        PHYSICIAN  2045.  Patient has nausea with " vomiting.  No changes in bowels other than that noted above.  No abdominal pain described.  She does have some very mild tenderness in the epigastrium and right upper quadrant.  Zofran given and her nausea has resolved.  Fluid bolus with 1 L normal saline.  Her mucosa is a bit dry and having thrown up that frequently over the long of a period of time would suggest she does have some mild to moderate dehydration.    Labs Ordered and Resulted from Time of ED Arrival Up to the Time of Departure from the ED   CBC WITH PLATELETS DIFFERENTIAL - Abnormal; Notable for the following:     RBC Count 6.20 (*)     Hemoglobin 15.8 (*)     Hematocrit 48.2 (*)     MCH 25.5 (*)     RDW 20.7 (*)     All other components within normal limits   COMPREHENSIVE METABOLIC PANEL - Abnormal; Notable for the following:     Potassium 3.3 (*)     Glucose 119 (*)     GFR Estimate 52 (*)     Bilirubin Total 1.6 (*)     Alkaline Phosphatase 187 (*)     All other components within normal limits       2212.  Patient has unremarkable lab work.  She is able to tolerate both fluid and crackers by mouth.  No recurrent vomiting while here.  She denies nausea.  No pain.  No further work up required here in the ED.  Perhaps early gastroenteritis.  Zofran prescription for home, #10 tablets.  Follow up discussed.  Return for worsening.      IMPRESSION    ICD-10-CM    1. Nausea and vomiting, intractability of vomiting not specified, unspecified vomiting type R11.2            Critical Care Time:  none   Trauma:      CMS Coding:  None        Deven Ellis MD  01/12/17 8786

## 2017-01-13 NOTE — ED NOTES
Pt brought to ED by family after vomiting for about 4 hours now. Pt denies pain or other symptoms.

## 2017-01-13 NOTE — DISCHARGE INSTRUCTIONS
Return to the Emergency Room if the following occurs:     Worsened pain, dehydration, fainting, or for any concern at anytime.    Or, follow-up with the following provider as we discussed:     Return to your primary doctor as needed, or if not improved over the weekend.    Medications discussed:    Zofran for nausea, as needed.    If you received pain-relieving or sedating medication during your time in the ER, avoid alcohol, driving automobiles, or working with machinery.  Also, a responsible adult must stay with you.      If you had X-rays or labs done we will attempt to contact you if there is a change needed in your care.      Call the Nurse Advice Line at [phone] or [phone] for any concern at anytime.

## 2017-01-18 ENCOUNTER — CARE COORDINATION (OUTPATIENT)
Dept: CARE COORDINATION | Facility: CLINIC | Age: 82
End: 2017-01-18

## 2017-01-18 ENCOUNTER — ANTICOAGULATION THERAPY VISIT (OUTPATIENT)
Dept: ANTICOAGULATION | Facility: CLINIC | Age: 82
End: 2017-01-18
Payer: COMMERCIAL

## 2017-01-18 DIAGNOSIS — Z79.01 LONG TERM CURRENT USE OF ANTICOAGULANT THERAPY: Primary | ICD-10-CM

## 2017-01-18 DIAGNOSIS — Z95.2 HEART VALVE REPLACED: ICD-10-CM

## 2017-01-18 LAB — INR POINT OF CARE: 1.8 (ref 0.86–1.14)

## 2017-01-18 PROCEDURE — 36416 COLLJ CAPILLARY BLOOD SPEC: CPT

## 2017-01-18 PROCEDURE — 99207 ZZC NO CHARGE NURSE ONLY: CPT

## 2017-01-18 PROCEDURE — 85610 PROTHROMBIN TIME: CPT | Mod: QW

## 2017-01-18 NOTE — MR AVS SNAPSHOT
Natalie Hair   1/18/2017 1:45 PM   Anticoagulation Therapy Visit    Description:  84 year old female   Provider:  LL ANTI COAG   Department:  [facility]           INR as of 1/18/2017     Selected INR 1.8! (1/18/2017)      Anticoagulation Summary as of 1/18/2017     INR goal 2.5-3.5   Selected INR 1.8! 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  Please call [phone] with any problems or questions regarding your therapy          January 2017 Details    Sun Mon Tue Wed Thu Fri Sat     1               2               3               4               5               6               7                 8               9               10               11               12               13               14                 15               16               17               18      3.75 mg   See details      19      2.5 mg         20      2.5 mg         21      3.75 mg           22      2.5 mg         23      2.5 mg         24      2.5 mg         25            26               27               28                 29               30               31                    Date Details   01/18 This INR check       Date of next INR:  1/25/2017         How to take your warfarin dose     To take:  2.5 mg Take 1 of the 2.5 mg tablets.    To take:  3.75 mg Take 1.5 of the 2.5 mg tablets.

## 2017-01-18 NOTE — PROGRESS NOTES
ANTICOAGULATION FOLLOW-UP CLINIC VISIT    Patient Name:  Natalie Hair  Date:  1/18/2017  Contact Type:  Face to Face    SUBJECTIVE:     Patient Findings     Positives Diet Changes (pt has had a small amount of Vit K foods but is eating a little better since Sat.), Other Complaints (pt was in ED on 1/12/17 with Nausea and vomiting. She was given IV fluids and Zofran and oral zofran which she took for 2 days then it was not needed any longer.  No further vomiting.  , Missed doses (vomited her warfarin dose on Thurs night)    Comments The [facility] has told the pt and family that the review board now is looking at her case to see if they give approval her for surgery.  They will be calling her in about a week with final information.           OBJECTIVE    INR PROTIME   Date Value Ref Range Status   01/18/2017 1.8* 0.86 - 1.14 Final       ASSESSMENT / PLAN  INR assessment SUB    Recheck INR In: 1 WEEK    INR Location Clinic      Anticoagulation Summary as of 1/18/2017     INR goal 2.5-3.5   Selected INR 1.8! 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]  Long term current use of anticoagulant therapy [Z79.01]         Anticoagulation Episode Summary     INR check location     Preferred lab     Send INR reminders to [facility]    Comments * Patient will be moving to [place], PLEASE CALL [facility] WITH NEW DOSING INSTRUCTIONS AT  [phone]      Anticoagulation Care Providers     Provider Role Specialty Phone number    Mimi Mcguire MD [facility] [phone]            See the Encounter Report to view Anticoagulation Flowsheet and Dosing Calendar (Go to Encounters tab  in chart review, and find the Anticoagulation Therapy Visit)        Polina Quiroz RN

## 2017-01-25 ENCOUNTER — ANTICOAGULATION THERAPY VISIT (OUTPATIENT)
Dept: ANTICOAGULATION | Facility: CLINIC | Age: 82
End: 2017-01-25
Payer: COMMERCIAL

## 2017-01-25 DIAGNOSIS — Z95.2 HEART VALVE REPLACED: ICD-10-CM

## 2017-01-25 DIAGNOSIS — Z79.01 LONG TERM CURRENT USE OF ANTICOAGULANT THERAPY: Primary | ICD-10-CM

## 2017-01-25 LAB — INR POINT OF CARE: 1.9 (ref 0.86–1.14)

## 2017-01-25 PROCEDURE — 85610 PROTHROMBIN TIME: CPT | Mod: QW

## 2017-01-25 PROCEDURE — 36416 COLLJ CAPILLARY BLOOD SPEC: CPT

## 2017-01-25 PROCEDURE — 99207 ZZC NO CHARGE NURSE ONLY: CPT

## 2017-01-25 NOTE — MR AVS SNAPSHOT
Natalie Hair   1/25/2017 10:15 AM   Anticoagulation Therapy Visit    Description:  84 year old female   Provider:  LL ANTI COAG   Department:  [facility]           INR as of 1/25/2017     Selected INR 1.9! (1/25/2017)      Anticoagulation Summary as of 1/25/2017     INR goal 2.5-3.5   Selected INR 1.9! 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]  Long term current use of anticoagulant therapy [Z79.01]         Description     Recheck INR 1 week, 2/1/17  Increase warfarin to 5 mg Wed, Sat, 2.5 mg rest of week       Your next Anticoagulation Clinic appointment(s)     Jan 25, 2017 10:15 AM   Anticoagulation Visit with LL ANTI COAG   [facility]    [address] [address] [phone]            Feb 01, 2017 10:30 AM   Anticoagulation Visit with LL ANTI COAG   [facility] ([facility])    [address] [address] [phone]              Contact Numbers     Please call [phone] to cancel and/or reschedule your appointment.  Please call [phone] with any problems or questions regarding your therapy          January 2017 Details    Sun Mon Tue Wed Thu Fri Sat     1               2               3               4               5               6               7                 8               9               10               11               12               13               14                 15               16               17               18               19               20               21                 22               23               24               25      5 mg   See details      26      2.5 mg         27      2.5 mg         28      5 mg           29      2.5 mg         30      2.5 mg         31      2.5 mg              Date Details   01/25 This INR check                How to take your warfarin dose     To take:  2.5 mg Take 1 of the 2.5 mg tablets.    To take:  5 mg Take 2 of the 2.5 mg tablets.           February 2017 Details    Sun Mon Tue Wed Thu Fri Sat        1            2               3               4                 5               6               7               8               9               10               11                 12               13               14               15               16               17               18                 19               20               21               22               23               24               25                 26               27               28                    Date Details   No additional details    Date of next INR:  2/1/2017         How to take your warfarin dose     To take:  5 mg Take 2 of the 2.5 mg tablets.

## 2017-01-25 NOTE — PROGRESS NOTES
ANTICOAGULATION FOLLOW-UP CLINIC VISIT    Patient Name:  Natalie Hair  Date:  1/25/2017  Contact Type:  Face to Face    SUBJECTIVE:     Patient Findings     Positives Diet Changes (appetite is improving, she is eating more at meal time.  Provider at [facility] wants her to start drinking Boost as she has been losing weight.  She is a picky eater and does not like Boost but will try to drink 2 servings a week divided into 4 diluted drinks), Activity level change (she will try to increase her walking and begin chair exercises)    Comments They have heard from [facility] and are  expecting to have the valve procedure in mid Feb.  Writer expecting will have  to increase her warfarin dose if she does begin drinking the Boost.           OBJECTIVE    INR PROTIME   Date Value Ref Range Status   01/25/2017 1.9* 0.86 - 1.14 Final       ASSESSMENT / PLAN  INR assessment SUB    Recheck INR In: 1 WEEK    INR Location Clinic      Anticoagulation Summary as of 1/25/2017     INR goal 2.5-3.5   Selected INR 1.9! 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]  Long term current use of anticoagulant therapy [Z79.01]         Anticoagulation Episode Summary     INR check location     Preferred lab     Send INR reminders to [facility]    Comments * Patient will be moving to [place], PLEASE CALL [facility] WITH NEW DOSING INSTRUCTIONS AT  [phone]      Anticoagulation Care Providers     Provider Role Specialty Phone number    Mimi Mcguire MD Respons[facility] [phone]            See the Encounter Report to view Anticoagulation Flowsheet and Dosing Calendar (Go to Encounters tab in chart review, and  find the Anticoagulation Therapy Visit)        Polina Quiroz RN

## 2017-01-30 DIAGNOSIS — E78.5 HYPERLIPIDEMIA LDL GOAL <100: Primary | ICD-10-CM

## 2017-02-01 ENCOUNTER — ANTICOAGULATION THERAPY VISIT (OUTPATIENT)
Dept: ANTICOAGULATION | Facility: CLINIC | Age: 82
End: 2017-02-01
Payer: COMMERCIAL

## 2017-02-01 DIAGNOSIS — Z95.2 HEART VALVE REPLACED: ICD-10-CM

## 2017-02-01 DIAGNOSIS — Z79.01 LONG TERM CURRENT USE OF ANTICOAGULANT THERAPY: Primary | ICD-10-CM

## 2017-02-01 LAB — INR POINT OF CARE: 2.6 (ref 0.86–1.14)

## 2017-02-01 PROCEDURE — 99207 ZZC NO CHARGE NURSE ONLY: CPT

## 2017-02-01 PROCEDURE — 36416 COLLJ CAPILLARY BLOOD SPEC: CPT

## 2017-02-01 PROCEDURE — 85610 PROTHROMBIN TIME: CPT | Mod: QW

## 2017-02-01 NOTE — MR AVS SNAPSHOT
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  Please call [phone] with any problems or questions regarding your therapy          February 2017 Details    Sun Mon Tue Wed Thu Fri Sat        1      5 mg   See details      2      2.5 mg         3      5 mg         4      2.5 mg           5      2.5 mg         6      5 mg         7      2.5 mg         8            9               10               11                 12               13               14               15               16               17               18                 19               20               21               22               23               24               25                 26               27               28                    Date Details   02/01 This INR check       Date of next INR:  2/8/2017         How to take your warfarin dose     To take:  2.5 mg Take 1 of the 2.5 mg tablets.    To take:  5 mg Take 2 of the 2.5 mg tablets.

## 2017-02-01 NOTE — PROGRESS NOTES
ANTICOAGULATION FOLLOW-UP CLINIC VISIT    Patient Name:  Natalie Hair  Date:  2/1/2017  Contact Type:  Face to Face    SUBJECTIVE:     Patient Findings     Positives Diet Changes (appetite is improved.  She tried tp drink Boost or Ensure but she does not like it and won't be drinking it..  States she just does not like it.  She states she is a fussy eater), Activity level change (improving, no new falls.  P always comes t [facility] in w/chair but does walk art home for shot distances with her walker)    Comments They expect to hear From the [facility] by the end of Feb regarding the experimental heart valve procedure.  Diet is stabalizing           OBJECTIVE    INR PROTIME   Date Value Ref Range Status   02/01/2017 2.6* 0.86 - 1.14 Final       ASSESSMENT / PLAN  INR assessment THER    Recheck INR In: 1 WEEK    INR Location Clinic      Anticoagulation Summary as of 2/1/2017     INR goal 2.5-3.5   Selected INR 2.6 (2/1/2017)   Maintenance plan 5 mg (2.5 mg x 2) on Mon, Wed, Fri; 2.5 mg (2.5 mg x 1) all other days   Full instructions 5 mg on Mon, Wed, Fri; 2.5 mg all other days   Weekly total 25 mg   Plan last modified Polina Quiroz RN (2/1/2017)   Next INR check 2/8/2017   Priority INR   Target end date Indefinite    Indications   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]  Long term current use of anticoagulant therapy [Z79.01]         Anticoagulation Episode Summary     INR check location     Preferred lab     Send INR reminders to [facility]    Comments * Patient will be moving to [place], PLEASE CALL [facility] WITH NEW DOSING INSTRUCTIONS AT  [phone]      Anticoagulation Care Providers     Provider Role Specialty Phone number    Mimi Mcguire MD [facility] [phone]            See the Encounter Report to view Anticoagulation Flowsheet and Dosing Calendar (Go to Encounters tab in chart review, and find the Anticoagulation Therapy Visit)        Polina Quiroz RN

## 2017-02-08 ENCOUNTER — ANTICOAGULATION THERAPY VISIT (OUTPATIENT)
Dept: ANTICOAGULATION | Facility: CLINIC | Age: 82
End: 2017-02-08
Payer: COMMERCIAL

## 2017-02-08 DIAGNOSIS — Z79.01 LONG TERM CURRENT USE OF ANTICOAGULANT THERAPY: Primary | ICD-10-CM

## 2017-02-08 DIAGNOSIS — Z95.2 HEART VALVE REPLACED: ICD-10-CM

## 2017-02-08 LAB — INR POINT OF CARE: 2.3 (ref 0.86–1.14)

## 2017-02-08 PROCEDURE — 99207 ZZC NO CHARGE NURSE ONLY: CPT

## 2017-02-08 PROCEDURE — 36416 COLLJ CAPILLARY BLOOD SPEC: CPT

## 2017-02-08 PROCEDURE — 85610 PROTHROMBIN TIME: CPT | Mod: QW

## 2017-02-08 RX ORDER — WARFARIN SODIUM 2.5 MG/1
TABLET ORAL
Qty: 130 TABLET | Refills: 0 | Status: SHIPPED | OUTPATIENT
Start: 2017-02-08 | End: 2017-02-15

## 2017-02-08 NOTE — TELEPHONE ENCOUNTER
Warfarin 2.5mg    Last Written Prescription Date: 12/28/16  Last Fill Qty: 30, # refills: 1  Last Office Visit with [facility] or [facility] prescribing provider: 12/20/16     Next 5 appointments (look out 90 days)     Mar 14, 2017 10:00 AM   Return Visit with Emelyn Bingham MD   [facility] PHYSICIAN HEART AT [facility] ([facility])    [address] [place] [address]   [phone]                   Date and Result of Last PT/INR:   INR      2.3   2/8/2017  INR      2.6   2/1/2017  INR     2.73   12/30/2016  INR     4.79   12/28/2016  INR      3.7   9/16/2010  INR      3.7   8/12/2010

## 2017-02-08 NOTE — TELEPHONE ENCOUNTER
Signed Prescriptions:                        Disp   Refills    warfarin (COUMADIN) 2.5 MG tablet          130 ta*0        Sig: as directed by Anticoagulation Clinic (5 mg MWF; 2.5           mg all other days)  Authorizing Provider: EVER ARENAS  Ordering User: ISABEL CUI RN refilled medication per [facility] refill protocol.  Isabel Cui RN

## 2017-02-08 NOTE — PROGRESS NOTES
ANTICOAGULATION FOLLOW-UP CLINIC VISIT    Patient Name:  Natalie Hair  Date:  2/8/2017  Contact Type:  Face to Face    SUBJECTIVE:     Patient Findings     Positives Unexplained INR or factor level change    Comments No changes in medications, diet, or activity noted. Took warfarin as instructed, denies missed doses.  Patient had 25mg in the previous 7 days, will increase dose to 27.5mg by the next INR check in 1 week (~10% increase).             OBJECTIVE    INR PROTIME   Date Value Ref Range Status   02/08/2017 2.3* 0.86 - 1.14 Final       ASSESSMENT / PLAN  INR assessment SUB    Recheck INR In: 1 WEEK    INR Location Clinic      Anticoagulation Summary as of 2/8/2017     INR goal 2.5-3.5   Selected INR 2.3! 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]  Long term current use of anticoagulant therapy [Z79.01]         Anticoagulation Episode Summary     INR check location     Preferred lab     Send INR reminders to [facility]    Comments * Patient will be moving to [place], PLEASE CALL [facility] WITH NEW DOSING INSTRUCTIONS AT  [phone]      Anticoagulation Care Providers     Provider Role Specialty Phone number    Mimi Mcguire MD [facility] [phone]            See the Encounter Report to view Anticoagulation Flowsheet and Dosing Calendar (Go to Encounters tab in chart review, and find the Anticoagulation Therapy Visit)        Christa Guzman, CHU

## 2017-02-08 NOTE — MR AVS SNAPSHOT
Natalie Hair   2/8/2017 9:45 AM   Anticoagulation Therapy Visit    Description:  84 year old female   Provider:  MATT ANTI COAG   Department:  [facility]           INR as of 2/8/2017     Selected INR 2.3! (2/8/2017)      Anticoagulation Summary as of 2/8/2017     INR goal 2.5-3.5   Selected INR 2.3! 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]  Long term current use of anticoagulant therapy [Z79.01]         Your next Anticoagulation Clinic appointment(s)     Feb 08, 2017  9:45 AM   Anticoagulation Visit with LL ANTI COAG   [facility]    [address] [address] [phone]            Feb 15, 2017 10:30 AM   Anticoagulation Visit with LL ANTI COAG   [facility] ([facility])    [address] [id number] [phone]              Contact Numbers     Please call [phone] to cancel and/or reschedule your appointment.  Please call [phone] with any problems or questions regarding your therapy          February 2017 Details    Sun Mon Tue Wed Thu Fri Sat        1               2               3               4                 5               6               7               8      7.5 mg   See details      9      2.5 mg         10      5 mg         11      2.5 mg           12      2.5 mg         13      5 mg         14      2.5 mg         15            16               17               18                 19               20               21               22               23               24               25                 26               27               28                    Date Details   02/08 This INR check       Date of next INR:  2/15/2017         How to take your warfarin dose     To take:  2.5 mg Take 1 of the 2.5 mg tablets.    To take:  5 mg Take 2  of the 2.5 mg tablets.    To take:  7.5 mg Take 3 of the 2.5 mg tablets.

## 2017-02-10 DIAGNOSIS — I89.0 LYMPHEDEMA OF BOTH LOWER EXTREMITIES: Chronic | ICD-10-CM

## 2017-02-10 DIAGNOSIS — I50.9 CONGESTIVE HEART FAILURE, UNSPECIFIED CONGESTIVE HEART FAILURE CHRONICITY, UNSPECIFIED CONGESTIVE HEART FAILURE TYPE: Primary | Chronic | ICD-10-CM

## 2017-02-10 RX ORDER — BUMETANIDE 2 MG/1
2 TABLET ORAL 2 TIMES DAILY
Qty: 60 TABLET | Refills: 0 | Status: SHIPPED | OUTPATIENT
Start: 2017-02-10 | End: 2017-03-21

## 2017-02-10 NOTE — TELEPHONE ENCOUNTER
bumetanide      Last Written Prescription Date: 12/6/16  Last Fill Quantity: 60, # refills: 1  Last Office Visit with [facility], [facility] or [facility] prescribing provider: 12/20/16   Next 5 appointments (look out 90 days)     Mar 14, 2017 10:00 AM   Return Visit with Emelyn Bingham MD   [facility] PHYSICIAN HEART AT [facility] ([facility])    [address] [address] [phone]                   POTASSIUM   Date Value Ref Range Status   01/12/2017 3.3* 3.4 - 5.3 mmol/L Final     CREATININE   Date Value Ref Range Status   01/12/2017 1.02 0.52 - 1.04 mg/dL Final     BP Readings from Last 3 Encounters:   01/12/17 115/60   01/12/17 117/73   12/20/16 120/75

## 2017-02-15 ENCOUNTER — ANTICOAGULATION THERAPY VISIT (OUTPATIENT)
Dept: ANTICOAGULATION | Facility: CLINIC | Age: 82
End: 2017-02-15
Payer: COMMERCIAL

## 2017-02-15 DIAGNOSIS — Z95.2 HEART VALVE REPLACED: ICD-10-CM

## 2017-02-15 DIAGNOSIS — Z79.01 LONG TERM CURRENT USE OF ANTICOAGULANT THERAPY: ICD-10-CM

## 2017-02-15 LAB — INR POINT OF CARE: 2.2 (ref 0.86–1.14)

## 2017-02-15 PROCEDURE — 85610 PROTHROMBIN TIME: CPT | Mod: QW

## 2017-02-15 PROCEDURE — 36416 COLLJ CAPILLARY BLOOD SPEC: CPT

## 2017-02-15 PROCEDURE — 99207 ZZC NO CHARGE NURSE ONLY: CPT

## 2017-02-15 RX ORDER — WARFARIN SODIUM 2.5 MG/1
TABLET ORAL
Qty: 130 TABLET | Refills: 0 | COMMUNITY
Start: 2017-02-15 | End: 2017-05-10

## 2017-02-15 NOTE — PROGRESS NOTES
ANTICOAGULATION FOLLOW-UP CLINIC VISIT    Patient Name:  Natalie Hair  Date:  2/15/2017  Contact Type:  Face to Face    SUBJECTIVE:     Patient Findings     Positives Change in diet/appetite (daughter reports that pt is eating but no vit k foods)    Comments Pt will be at [facility] next Thurs for tests and will possibly be scheduled for a procedure in the next 2 weeks           OBJECTIVE    INR Protime   Date Value Ref Range Status   02/15/2017 2.2 (A) 0.86 - 1.14 Final       ASSESSMENT / PLAN  INR assessment SUB    Recheck INR In: 1 WEEK    INR Location Clinic      Anticoagulation Summary as of 2/15/2017     INR goal 2.5-3.5   Today's INR 2.2!   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]  Long term current use of anticoagulant therapy [Z79.01]         Anticoagulation Episode Summary     INR check location     Preferred lab     Send INR reminders to [facility]    Comments * Patient will be moving to [place], PLEASE CALL [facility] WITH NEW DOSING INSTRUCTIONS AT  [phone]      Anticoagulation Care Providers     Provider Role Specialty Phone number    Mimi Mcguire MD [facility] [phone]            See the Encounter Report to view Anticoagulation Flowsheet and Dosing Calendar (Go to Encounters tab in chart review, and find the Anticoagulation Therapy Visit)        Polina Quiroz RN

## 2017-02-15 NOTE — MR AVS SNAPSHOT
Natalie Hair   2/15/2017 10:30 AM   Anticoagulation Therapy Visit    Description:  84 year old female   Provider:  LL ANTI COAG   Department:  [facility]           INR as of 2/15/2017     Today's INR 2.2!      Anticoagulation Summary as of 2/15/2017     INR goal 2.5-3.5   Today's INR 2.2!   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]  Long term current use of anticoagulant therapy [Z79.01]         Description     Recheck INR  1 week, 2/22/17  Increase warfarin 7.5 mg Wed, 2.5mg  Sat and Tues, 5 mg rest of week  (18% increase, last 7 days 27.5 mg, next 7 days 32.5 mg )      Your next Anticoagulation Clinic appointment(s)     Feb 22, 2017  2:45 PM CST   Anticoagulation Visit with [facility] ANTI COAG   [facility]    [address] [address] [phone]              Contact Numbers     Please call [phone] to cancel and/or reschedule your appointment.  Please call [phone] with any problems or questions regarding your therapy          February 2017 Details    Sun Mon Tue Wed Thu Fri Sat        1               2               3               4                 5               6               7               8               9               10               11                 12               13               14               15      7.5 mg   See details      16      5 mg         17      5 mg         18      2.5 mg           19      5 mg         20      5 mg         21      2.5 mg         22            23               24               25                 26               27               28                    Date Details   02/15 This INR check       Date of next INR:  2/22/2017         How to take your warfarin dose     To take:  2.5 mg Take 1 of the 2.5 mg tablets.    To take:  5 mg Take 1 of the 5 mg tablets.    To take:   7.5 mg Take 1 of the 2.5 mg tablets and 1 of the 5 mg tablets.

## 2017-02-21 DIAGNOSIS — Z76.89 HEALTH CARE HOME: Primary | ICD-10-CM

## 2017-02-21 NOTE — TELEPHONE ENCOUNTER
Omega-3-Acid       Last Written Prescription Date: Historical  Last Fill Quantity: ?, # refills: ?    Last Office Visit with [facility] or [facility] prescribing provider:  12/20/2016   Future Office Visit:    Next 5 appointments (look out 90 days)     Mar 14, 2017 10:00 AM CDT   Return Visit with Emelyn Bingham MD   [facility] PHYSICIAN HEART AT [facility] ([facility])    [address] [address]   [phone]                  Cholesterol   Date Value Ref Range Status   12/28/2016 177 <200 mg/dL Final     HDL Cholesterol   Date Value Ref Range Status   12/28/2016 41 (L) >49 mg/dL Final     LDL Cholesterol Calculated   Date Value Ref Range Status   12/28/2016 121 (H) <100 mg/dL Final     Comment:     Above desirable:  100-129 mg/dl   Borderline High:  130-159 mg/dL   High:             160-189 mg/dL   Very high:       >189 mg/dl       Triglycerides   Date Value Ref Range Status   12/28/2016 76 <150 mg/dL Final     Cholesterol/HDL Ratio   Date Value Ref Range Status   07/22/2015 4.1 0.0 - 5.0 Final     ALT   Date Value Ref Range Status   01/12/2017 28 0 - 50 U/L Final

## 2017-02-22 ENCOUNTER — ANTICOAGULATION THERAPY VISIT (OUTPATIENT)
Dept: ANTICOAGULATION | Facility: CLINIC | Age: 82
End: 2017-02-22
Payer: COMMERCIAL

## 2017-02-22 DIAGNOSIS — Z79.01 LONG TERM CURRENT USE OF ANTICOAGULANT THERAPY: ICD-10-CM

## 2017-02-22 DIAGNOSIS — Z95.2 HEART VALVE REPLACED: ICD-10-CM

## 2017-02-22 LAB — INR POINT OF CARE: 3.1 (ref 0.86–1.14)

## 2017-02-22 PROCEDURE — 36416 COLLJ CAPILLARY BLOOD SPEC: CPT

## 2017-02-22 PROCEDURE — 85610 PROTHROMBIN TIME: CPT | Mod: QW

## 2017-02-22 PROCEDURE — 99207 ZZC NO CHARGE NURSE ONLY: CPT

## 2017-02-22 RX ORDER — OMEGA-3 FATTY ACIDS/FISH OIL 300-1000MG
1 CAPSULE ORAL DAILY
Qty: 90 CAPSULE | Refills: 3 | Status: SHIPPED | OUTPATIENT
Start: 2017-02-22 | End: 2018-01-01

## 2017-02-22 NOTE — MR AVS SNAPSHOT
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]  Long term current use of anticoagulant therapy [Z79.01]         Description     Recheck INR 1 week, 3/1/17  Continue warfarin 2.5 mg Tues, 5 mg rest of week       Your next Anticoagulation Clinic appointment(s)     Feb 22, 2017  2:45 PM CST   Anticoagulation Visit with LL ANTI COAG   [facility] ([facility])    [address] [address] [phone]            Mar 01, 2017 10:30 AM CST   Anticoagulation Visit with LL ANTI COAG   [facility] ([facility])    [address] [address] [phone]              Contact Numbers     Please call [phone] to cancel and/or reschedule your appointment.  Please call [phone] with any problems or questions regarding your therapy          February 2017 Details    Sun Mon Tue Wed Thu Fri Sat        1               2               3               4                 5               6               7               8               9               10               11                 12               13               14               15               16               17               18                 19               20               21               22      5 mg   See details      23      5 mg         24      5 mg         25      5 mg           26      5 mg         27      5 mg         28      2.5 mg              Date Details   02/22 This INR check               How to take your warfarin dose     To take:  2.5 mg Take 1 of the 2.5 mg tablets.    To  take:  5 mg Take 1 of the 5 mg tablets.           March 2017 Details    Sun Mon Tue Wed Thu Fri Sat        1            2               3               4                 5               6               7               8               9               10               11                 12               13               14               15               16               17               18                 19               20               21               22               23               24               25                 26               27               28               29               30               31                 Date Details   No additional details    Date of next INR:  3/1/2017         How to take your warfarin dose     To take:  5 mg Take 1 of the 5 mg tablets.

## 2017-03-01 ENCOUNTER — ANTICOAGULATION THERAPY VISIT (OUTPATIENT)
Dept: ANTICOAGULATION | Facility: CLINIC | Age: 82
End: 2017-03-01
Payer: COMMERCIAL

## 2017-03-01 DIAGNOSIS — Z95.2 HEART VALVE REPLACED: ICD-10-CM

## 2017-03-01 DIAGNOSIS — Z79.01 LONG TERM CURRENT USE OF ANTICOAGULANT THERAPY: ICD-10-CM

## 2017-03-01 LAB — INR POINT OF CARE: 2.6 (ref 0.86–1.14)

## 2017-03-01 PROCEDURE — 99207 ZZC NO CHARGE NURSE ONLY: CPT

## 2017-03-01 PROCEDURE — 36416 COLLJ CAPILLARY BLOOD SPEC: CPT

## 2017-03-01 PROCEDURE — 85610 PROTHROMBIN TIME: CPT | Mod: QW

## 2017-03-01 NOTE — PROGRESS NOTES
ANTICOAGULATION FOLLOW-UP CLINIC VISIT    Patient Name:  Natalie Hair  Date:  3/1/2017  Contact Type:  Face to Face    SUBJECTIVE:     Patient Findings     Positives Change in diet/appetite (states no changes in diet), Other complaints (pt had her testing at [facility] last week and is waiting to hear from the panel of cardiologists if she is a canidate for the MV procedure), Missed doses (states no missed doses)           OBJECTIVE    INR Protime   Date Value Ref Range Status   03/01/2017 2.6 (A) 0.86 - 1.14 Final       ASSESSMENT / PLAN  INR assessment THER    Recheck INR In: 2 WEEKS    INR Location Clinic      Anticoagulation Summary as of 3/1/2017     INR goal 2.5-3.5   Today's INR 2.6   Maintenance plan 2.5 mg (2.5 mg x 1) on Tue; 5 mg (5 mg x 1) all other days   Full instructions 2.5 mg on Tue; 5 mg all other days   Weekly total 32.5 mg   No change documented Polina Quiroz RN   Plan last modified Polina Quiroz RN (2/15/2017)   Next INR check 3/15/2017   Priority INR   Target end date Indefinite    Indications   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]  Long term current use of anticoagulant therapy [Z79.01]         Anticoagulation Episode Summary     INR check location     Preferred lab     Send INR reminders to [facility]    Comments * Patient will be moving to [place], PLEASE CALL [facility] WITH NEW DOSING INSTRUCTIONS AT  [phone]      Anticoagulation Care Providers     Provider Role Specialty Phone number    Mimi Mcguire MD [facility] [phone]            See the Encounter Report to view Anticoagulation Flowsheet and Dosing Calendar (Go to Encounters tab in chart review, and find the Anticoagulation Therapy Visit)        Polina Quiroz RN

## 2017-03-01 NOTE — MR AVS SNAPSHOT
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 [address] [phone]              Contact Numbers     Please call [phone] to cancel and/or reschedule your appointment.  Please call [phone] with any problems or questions regarding your therapy          March 2017 Details    Sun Mon Tue Wed Thu Fri Sat        1      5 mg   See details      2      5 mg         3      5 mg         4      5 mg           5      5 mg         6      5 mg         7      2.5 mg         8      5 mg         9      5 mg         10      5 mg         11      5 mg           12      5 mg         13      5 mg         14      2.5 mg         15            16               17               18                 19               20               21               22               23               24               25                 26               27               28               29               30               31                 Date Details   03/01 This INR check       Date of next INR:  3/15/2017         How to take your warfarin dose     To take:  2.5 mg Take 1 of the 2.5 mg tablets.    To take:  5 mg Take 1 of the 5 mg tablets.

## 2017-03-03 ENCOUNTER — TELEPHONE (OUTPATIENT)
Dept: FAMILY MEDICINE | Facility: CLINIC | Age: 82
End: 2017-03-03

## 2017-03-03 DIAGNOSIS — I50.9 CONGESTIVE HEART FAILURE, UNSPECIFIED CONGESTIVE HEART FAILURE CHRONICITY, UNSPECIFIED CONGESTIVE HEART FAILURE TYPE: Chronic | ICD-10-CM

## 2017-03-03 DIAGNOSIS — I50.43 ACUTE ON CHRONIC COMBINED SYSTOLIC AND DIASTOLIC CONGESTIVE HEART FAILURE (H): ICD-10-CM

## 2017-03-03 DIAGNOSIS — I50.810 RIGHT-SIDED CONGESTIVE HEART FAILURE (H): ICD-10-CM

## 2017-03-03 DIAGNOSIS — I89.0 LYMPHEDEMA OF BOTH LOWER EXTREMITIES: ICD-10-CM

## 2017-03-03 RX ORDER — CLOPIDOGREL BISULFATE 75 MG/1
75 TABLET ORAL DAILY
Qty: 90 TABLET | Refills: 0 | Status: SHIPPED | OUTPATIENT
Start: 2017-03-03 | End: 2017-01-01

## 2017-03-03 RX ORDER — METOLAZONE 2.5 MG/1
TABLET ORAL
Qty: 6 TABLET | Refills: 0 | Status: SHIPPED | OUTPATIENT
Start: 2017-03-03 | End: 2017-01-01

## 2017-03-03 NOTE — TELEPHONE ENCOUNTER
Patient's daughter reports that they only use the metolazone when the other medication is not working for her water retention.   Please advise for refills in Dr. Mcguire's absence. Thank you.  Amy Gama RN

## 2017-03-03 NOTE — TELEPHONE ENCOUNTER
Daughter is calling wondering if the DME for [facility] bed ever got send?  They still have not heard anything from anywhere.  I do not see any notation that it was faxed anywhere.  They would like DME faxed to [facility], so this has been done.  Hopefully they will still take the order that is dated 12/20/16.  Thank you..Claire Carroll

## 2017-03-03 NOTE — TELEPHONE ENCOUNTER
clopidogrel           Last Written Prescription Date: 12/6/16  Last Fill Quantity: 90, # refills: 0    Last Office Visit with [facility] or  Health prescribing provider:  12/20/16   Future Office Visit:    Next 5 appointments (look out 90 days)     Mar 14, 2017 10:00 AM CDT   Return Visit with Emelyn Bingham MD   [facility] PHYSICIAN HEART [facility] ([facility])    [address] [place] [address]   [phone]                   Lab Results   Component Value Date    WBC 7.2 01/12/2017     Lab Results   Component Value Date    RBC 6.20 01/12/2017     Lab Results   Component Value Date    HGB 15.8 01/12/2017     Lab Results   Component Value Date    HCT 48.2 01/12/2017     No components found for: MCT  Lab Results   Component Value Date    MCV 78 01/12/2017     Lab Results   Component Value Date    MCH 25.5 01/12/2017     Lab Results   Component Value Date    MCHC 32.8 01/12/2017     Lab Results   Component Value Date    RDW 20.7 01/12/2017     Lab Results   Component Value Date     01/12/2017     Lab Results   Component Value Date    AST 44 01/12/2017     Lab Results   Component Value Date    ALT 28 01/12/2017     Creatinine   Date Value Ref Range Status   01/12/2017 1.02 0.52 - 1.04 mg/dL Final   ]      metolazone      Last Written Prescription Date: 12/20/16  Last Fill Quantity: 6, # refills: 0  Last Office Visit with [facility] or  Health prescribing provider: 12/20/16  Next 5 appointments (look out 90 days)     Mar 14, 2017 10:00 AM CDT   Return Visit with Emelyn Bingham MD   [facility] PHYSICIAN HEART AT [facility] ([facility])    [address] [place] [address]   [phone]                   Potassium   Date Value Ref Range Status   01/12/2017 3.3 (L) 3.4 - 5.3 mmol/L Final     Creatinine   Date Value Ref Range Status   01/12/2017 1.02 0.52 - 1.04 mg/dL Final     BP Readings from Last 3 Encounters:   01/12/17  115/60   01/12/17 117/73   12/20/16 120/75

## 2017-03-10 ENCOUNTER — TELEPHONE (OUTPATIENT)
Dept: FAMILY MEDICINE | Facility: CLINIC | Age: 82
End: 2017-03-10

## 2017-03-14 ENCOUNTER — OFFICE VISIT (OUTPATIENT)
Dept: CARDIOLOGY | Facility: CLINIC | Age: 82
End: 2017-03-14
Attending: NURSE PRACTITIONER
Payer: COMMERCIAL

## 2017-03-14 VITALS
OXYGEN SATURATION: 94 % | SYSTOLIC BLOOD PRESSURE: 104 MMHG | DIASTOLIC BLOOD PRESSURE: 64 MMHG | BODY MASS INDEX: 32.25 KG/M2 | WEIGHT: 193.8 LBS | HEART RATE: 70 BPM

## 2017-03-14 DIAGNOSIS — I50.33 ACUTE ON CHRONIC DIASTOLIC CONGESTIVE HEART FAILURE (H): ICD-10-CM

## 2017-03-14 LAB
ANION GAP SERPL CALCULATED.3IONS-SCNC: 8 MMOL/L (ref 3–14)
BUN SERPL-MCNC: 21 MG/DL (ref 7–30)
CALCIUM SERPL-MCNC: 10 MG/DL (ref 8.5–10.1)
CHLORIDE SERPL-SCNC: 100 MMOL/L (ref 94–109)
CO2 SERPL-SCNC: 33 MMOL/L (ref 20–32)
CREAT SERPL-MCNC: 0.96 MG/DL (ref 0.52–1.04)
GFR SERPL CREATININE-BSD FRML MDRD: 55 ML/MIN/1.7M2
GLUCOSE SERPL-MCNC: 126 MG/DL (ref 70–99)
POTASSIUM SERPL-SCNC: 3.8 MMOL/L (ref 3.4–5.3)
SODIUM SERPL-SCNC: 141 MMOL/L (ref 133–144)

## 2017-03-14 PROCEDURE — 80048 BASIC METABOLIC PNL TOTAL CA: CPT | Performed by: NURSE PRACTITIONER

## 2017-03-14 PROCEDURE — 99214 OFFICE O/P EST MOD 30 MIN: CPT | Performed by: INTERNAL MEDICINE

## 2017-03-14 PROCEDURE — 36415 COLL VENOUS BLD VENIPUNCTURE: CPT | Performed by: NURSE PRACTITIONER

## 2017-03-14 NOTE — LETTER
3/14/2017    Mimi Mcguire MD  [facility]   [address] [address]    RE: Natalie Hair       Dear Colleague,       I had the pleasure to follow up with your patient, Ms. Natalie Hair, along with her daughter in the Cardiovascular Medicine Clinic.  She is a patient well known to me.  She unfortunately has a very complex recent and remote cardiac history.  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  She has had multiple admissions over the last year and a half for diastolic heart failure.  She also has had periods of time where she was in paroxysmal atrial fibrillation and flutter.      This past summer, she was admitted to [facility] with an attempted ablation; however, this was unsuccessful.  She ultimately underwent AV node ablation with a dual-chamber pacemaker placement at that point.  She unfortunately has had progressive symptoms related to severe tricuspid regurgitation.      During her previous admissions, she has had a formal evaluation by the Cardiothoracic Surgery Service at the [facility].  The consensus opinion was that she would be prohibitively high risk and we have recommended that she go to [facility] for a second opinion.  The patient does have an appointment scheduled on 12/09/2016.  The patient has been on a variety of diuretics and her regimen has been relatively stable.       She states that her p.o. intake is quite poor.  She has no desire or taste for food.  This is confirmed by her daughter.  She has been seen at [facility] for potential transcatheter approach for her severe TR (Latif FORMA valve).  They will be contacting her shortly whether she is a good candidate.       PHYSICAL EXAMINATION:   VITAL SIGNS:  Blood pressure 104/64, pulse 70, weight 87.9 kg (193 lb 12.8 oz), SpO2 94 %, not currently breastfeeding.  GENERAL:  The patient is alert, oriented, appears older than stated age.    HEENT:  Oropharynx is clear.   NECK:  JVP is 7-8 cm at a seated position with pulsations of her jugular veins noted.   CARDIOVASCULAR:  Distant heart tones, irregular with a 1-2/6 systolic ejection murmur best heard at the right upper sternal border.   LUNGS:  Diminished, however, no rales are noted.   ABDOMEN:  Obese, soft, nontender.     EXTREMITIES:  Dressings are in place.     Outpatient Encounter Prescriptions as of 3/14/2017   Medication Sig Dispense Refill     metolazone (ZAROXOLYN) 2.5 MG tablet Take one tablet per day for two days only 6 tablet 0     clopidogrel (PLAVIX) 75 MG tablet Take 1 tablet (75 mg) by mouth daily 90 tablet 0     omega 3 1000 MG CAPS Take 1 g by mouth daily 90 capsule 3     warfarin (COUMADIN) 2.5 MG tablet as directed by Anticoagulation Clinic (2.5 mg Tues, 5 mg rest of week ) 130 tablet 0     bumetanide (BUMEX) 2 MG tablet Take 1 tablet (2 mg) by mouth 2 times daily 60 tablet 0     calcium carbonate (OS-SHANICE 500 MG [place]. CA) 500 MG tablet Take 200 mg by mouth 2 times daily       multivitamin, therapeutic with minerals (MULTI-VITAMIN) TABS tablet Take 1 tablet by mouth daily       VITAMIN E COMPLEX PO        Fish Oil OIL        metoprolol (LOPRESSOR) 25 MG tablet Take 0.5 tablets (12.5 mg) by mouth 2 times daily 90 tablet 3     ezetimibe (ZETIA) 10 MG tablet Take 1 tablet (10 mg) by mouth daily 90 tablet 3     ondansetron (ZOFRAN ODT) 4 MG ODT tab Take 1-2 tablets (4-8 mg) by mouth every 6 hours as needed for nausea 10 tablet 0     potassium chloride (K-TAB,KLOR-CON) 10 MEQ tablet Take 1 tablet (10 mEq) by mouth 2 times daily 90 tablet 1     cetirizine (ZYRTEC) 5 MG tablet Take 1 tablet (5 mg) by mouth daily 30 tablet 3     spironolactone (ALDACTONE) 25 MG tablet Take 1 tablet (25 mg) by mouth daily 90 tablet 3     polyethylene glycol (MIRALAX/GLYCOLAX) powder Take 1 capful by mouth daily as needed for constipation       Cholecalciferol (VITAMIN D) 2000 UNITS CAPS Take 1 capsule by  mouth daily       senna-docusate (SENNA S) 8.6-50 MG per tablet Take 2 tablets by mouth 2 times daily       HYDROcodone-acetaminophen (NORCO) 5-325 MG per tablet Take 1 tablet by mouth every 8 hours as needed for moderate to severe pain or pain 50 tablet 0     alendronate (FOSAMAX) 70 MG tablet Take 1 tablet (70 mg) by mouth once a week ON AN EMPTY STOMACH. 12 tablet 5     nitroglycerin (NITROSTAT) 0.4 MG SL tablet Place 1 tablet (0.4 mg) under the tongue every 5 minutes as needed for chest pain 25 tablet 1     ACETAMINOPHEN PO Take 1,000 mg by mouth every 8 hours as needed for pain        order for DME Equipment being ordered: Hospital Bed  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 -ongoing symptoms of lower extremity edema,shortness of breath,cough  sacral pressure sores within last 6 months    An electric hospital bed to allow for increase in head of bed elevation and for ease in wheelchair transfers   Length of need: lifelong  NPI - [id number] (Patient not taking: Reported on 3/14/2017) 1 Device 0     order for DME Equipment being ordered: Walker with a seat (Patient not taking: Reported on 3/14/2017) 1 Package 0     order for DME Equipment being ordered: walker with a seat on it  Has severe CHF and lower extremity edema. (Patient not taking: Reported on 3/14/2017) 1 Device 0     No facility-administered encounter medications on file as of 3/14/2017.       ASSESSMENT:   1.  Severe tricuspid regurgitation with primarily right-sided heart failure symptoms.  The patient is following at [facility] for possible Latif FORMA valve.  2.  Congestive heart failure with diastolic dysfunction and valvular heart disease.  Most recent echocardiogram was performed on 07/09/2016.  Ejection fraction was preserved with severe tricuspid regurgitation.  Bioprosthetic aortic valve was noted with mean gradient of 7 mm across this valve.  PA systolic pressure could not be estimated due to incomplete envelope on the tricuspid  regurgitation per report.   3.  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  Please see recent cardiac catheterization this past summer.   4.  Status post pacemaker placement for atrial fibrillation/flutter with AV node ablation.      RECOMMENDATIONS:      1.  Went over recent testing and evaluations  2.  At present is is possible candidate for Latif FORMA valve trial at [facility]  3.  Continue presents medications and have liberalized her diet  4.  RTC in ~ 3 months             Thank you for allowing me to participate in the care of your patient.    Sincerely,     Emelyn Bingham MD     [facility]

## 2017-03-14 NOTE — MR AVS SNAPSHOT
After Visit Summary   3/14/2017    Natalie Hair    MRN: [id number]           Patient Information     Date Of Birth          8/28/1932        Visit Information        Provider Department      3/14/2017 10:00 AM Emelyn Bingham MD [facility] PHYSICIAN HEART AT [facility]        Today's Diagnoses     Acute on chronic diastolic congestive heart failure (H)          Care Instructions    Thank you for your [facility] visit today. Your provider has recommended the following:  Medication Changes:  None today. Continue taking your medications as you have been.  Recommendations:  None   Follow-up:  See Dr. Bingham for cardiology follow up in June 2017.  We kindly ask that you call cardiology scheduling at [phone] three months prior to requested revisit date to schedule future cardiology appointments.  Reminder:  1. Please bring in your current medication list or your medication, over the counter supplements and vitamin bottles as we will review these at each office visit.               [facility]~[address]~[place], [place]~[address]  Questions about your visit today?  Call your Cardiology Clinic RN's-Hattie De Leon and/or Olimpia Cuevas at [phone].              Follow-ups after your visit        Additional Services     Follow-Up with Cardiologist                 Your next 10 appointments already scheduled     Mar 14, 2017 10:00 AM CDT   Return Visit with Emelyn Bingham MD   [facility] PHYSICIAN HEART AT [facility] ([facility])    [address] [address] [phone]            Mar 15, 2017  9:45 AM CDT   Anticoagulation Visit with LL ANTI COAJARROD   [facility] ([facility])    [address] [id number] [phone]              Future tests that were ordered for you today     Open Future Orders         "Priority Expected Expires Ordered    Follow-Up with Cardiologist Routine 2017 2017 3/14/2017            Who to contact     If you have questions or need follow up information about today's clinic visit or your schedule please contact [facility] PHYSICIAN HEART AT [facility] directly at [phone].  Normal or non-critical lab and imaging results will be communicated to you by MyChart, letter or phone within 4 business days after the clinic has received the results. If you do not hear from us within 7 days, please contact the clinic through MyChart or phone. If you have a critical or abnormal lab result, we will notify you by phone as soon as possible.  Submit refill requests through [employer] or call your pharmacy and they will forward the refill request to us. Please allow 3 business days for your refill to be completed.          Additional Information About Your Visit        MyChart Information     [employer] lets you send messages to your doctor, view your test results, renew your prescriptions, schedule appointments and more. To sign up, go to [email or web address] . Click on \"Log in\" on the left side of the screen, which will take you to the Welcome page. Then click on \"Sign up Now\" on the right side of the page.     You will be asked to enter the access code listed below, as well as some personal information. Please follow the directions to create your username and password.     Your access code is: [email or web address]  Expires: 2017 11:23 AM     Your access code will  in 90 days. If you need help or a new code, please call your [place] clinic or [phone].        Care EveryWhere ID     This is your Care EveryWhere ID. This could be used by other organizations to access your [place] medical records  [id number]        Your Vitals Were     Pulse Pulse Oximetry BMI (Body Mass Index)             70 94% 32.25 kg/m2          Blood Pressure from Last 3 Encounters:   17 " 104/64   01/12/17 115/60   01/12/17 117/73    Weight from Last 3 Encounters:   03/14/17 87.9 kg (193 lb 12.8 oz)   01/12/17 88 kg (194 lb)   01/12/17 86.6 kg (191 lb)              We Performed the Following     Follow-Up with Cardiologist        Primary Care Provider Office Phone # Fax #    Mimi Mcguire [phone]       [facility] [address] [address] [address]        Thank you!     Thank you for choosing [facility] PHYSICIAN HEART AT [facility]  for your care. Our goal is always to provide you with excellent care. Hearing back from our patients is one way we can continue to improve our services. Please take a few minutes to complete the written survey that you may receive in the mail after your visit with us. Thank you!             Your Updated Medication List - Protect others around you: Learn how to safely use, store and throw away your medicines at www.disposemymeds.org.          This list is accurate as of: 3/14/17  9:50 AM.  Always use your most recent med list.                   Brand Name Dispense Instructions for use    ACETAMINOPHEN PO      Take 1,000 mg by mouth every 8 hours as needed for pain       alendronate 70 MG tablet    FOSAMAX    12 tablet    Take 1 tablet (70 mg) by mouth once a week ON AN EMPTY STOMACH.       bumetanide 2 MG tablet    BUMEX    60 tablet    Take 1 tablet (2 mg) by mouth 2 times daily       calcium carbonate 500 MG tablet    OS-SHANICE 500 mg [place]. Ca     Take 200 mg by mouth 2 times daily       cetirizine 5 MG tablet    zyrTEC    30 tablet    Take 1 tablet (5 mg) by mouth daily       clopidogrel 75 MG tablet    PLAVIX    90 tablet    Take 1 tablet (75 mg) by mouth daily       ezetimibe 10 MG tablet    ZETIA    90 tablet    Take 1 tablet (10 mg) by mouth daily       Fish Oil Oil          HYDROcodone-acetaminophen 5-325 MG per tablet    NORCO    50 tablet    Take 1 tablet by mouth every 8 hours as needed for moderate to  severe pain or pain       metolazone 2.5 MG tablet    ZAROXOLYN    6 tablet    Take one tablet per day for two days only       metoprolol 25 MG tablet    LOPRESSOR    90 tablet    Take 0.5 tablets (12.5 mg) by mouth 2 times daily       Multi-vitamin Tabs tablet      Take 1 tablet by mouth daily       nitroglycerin 0.4 MG sublingual tablet    NITROSTAT    25 tablet    Place 1 tablet (0.4 mg) under the tongue every 5 minutes as needed for chest pain       omega 3 1000 MG Caps     90 capsule    Take 1 g by mouth daily       ondansetron 4 MG ODT tab    ZOFRAN ODT    10 tablet    Take 1-2 tablets (4-8 mg) by mouth every 6 hours as needed for nausea       * order for DME     1 Device    Equipment being ordered: walker with a seat on it Has severe CHF and lower extremity edema.       * order for DME     1 Package    Equipment being ordered: Walker with a seat       * order for DME     1 Device    Equipment being ordered: Hospital Bed 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 -ongoing symptoms of lower extremity edema,shortness of breath,cough sacral pressure sores within last 6 months   An electric hospital bed to allow for increase in head of bed elevation and for ease in wheelchair transfers  Length of need: lifelong NPI - [id number]       polyethylene glycol powder    MIRALAX/GLYCOLAX     Take 1 capful by mouth daily as needed for constipation       potassium chloride 10 MEQ tablet    K-TAB,KLOR-CON    90 tablet    Take 1 tablet (10 mEq) by mouth 2 times daily       SENNA S 8.6-50 MG per tablet   Generic drug:  senna-docusate      Take 2 tablets by mouth 2 times daily       spironolactone 25 MG tablet    ALDACTONE    90 tablet    Take 1 tablet (25 mg) by mouth daily       vitamin D 2000 UNITS Caps      Take 1 capsule by mouth daily       VITAMIN E COMPLEX PO          warfarin 2.5 MG tablet    COUMADIN    130 tablet    as directed by Anticoagulation Clinic (2.5 mg Tues, 5 mg rest of week )       * Notice:  This list has  3 medication(s) that are the same as other medications prescribed for you. Read the directions carefully, and ask your doctor or other care provider to review them with you.

## 2017-03-14 NOTE — PATIENT INSTRUCTIONS
Thank you for your [facility] visit today. Your provider has recommended the following:  Medication Changes:  None today. Continue taking your medications as you have been.  Recommendations:  None   Follow-up:  See Dr. Bingham for cardiology follow up in June 2017.  We kindly ask that you call cardiology scheduling at [phone] three months prior to requested revisit date to schedule future cardiology appointments.  Reminder:  1. Please bring in your current medication list or your medication, over the counter supplements and vitamin bottles as we will review these at each office visit.               [facility] HEART CARE  [facility]~[address]~[place], [place]~[address]  Questions about your visit today?  Call your Cardiology Clinic RN's-Hattie De Leon and/or Olimpia Cuevas at [phone].

## 2017-03-14 NOTE — PROGRESS NOTES
Dear Dr. Mcguire:      I had the pleasure to follow up with your patient, Ms. Natalie Hair, along with her daughter in the Cardiovascular Medicine Clinic.  She is a patient well known to me.  She unfortunately has a very complex recent and remote cardiac history.  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  She has had multiple admissions over the last year and a half for diastolic heart failure.  She also has had periods of time where she was in paroxysmal atrial fibrillation and flutter.      This past summer, she was admitted to [facility] with an attempted ablation; however, this was unsuccessful.  She ultimately underwent AV node ablation with a dual-chamber pacemaker placement at that point.  She unfortunately has had progressive symptoms related to severe tricuspid regurgitation.      During her previous admissions, she has had a formal evaluation by the Cardiothoracic Surgery Service at the [facility].  The consensus opinion was that she would be prohibitively high risk and we have recommended that she go to [facility] for a second opinion.  The patient does have an appointment scheduled on 12/09/2016.  The patient has been on a variety of diuretics and her regimen has been relatively stable.       She states that her p.o. intake is quite poor.  She has no desire or taste for food.  This is confirmed by her daughter.  She has been seen at [place] for potential transcatheter approach for her severe TR (Latif FORMA valve).  They will be contacting her shortly whether she is a good candidate.         PHYSICAL EXAMINATION:   VITAL SIGNS:  Blood pressure 104/64, pulse 70, weight 87.9 kg (193 lb 12.8 oz), SpO2 94 %, not currently breastfeeding.  GENERAL:  The patient is alert, oriented, appears older than stated age.   HEENT:  Oropharynx is clear.   NECK:  JVP is 7-8 cm at a seated position with pulsations of her jugular veins noted.    CARDIOVASCULAR:  Distant heart tones, irregular with a 1-2/6 systolic ejection murmur best heard at the right upper sternal border.   LUNGS:  Diminished, however, no rales are noted.   ABDOMEN:  Obese, soft, nontender.     EXTREMITIES:  Dressings are in place.      ASSESSMENT:   1.  Severe tricuspid regurgitation with primarily right-sided heart failure symptoms.  The patient is following at [facility] for possible Latif FORMA valve.  2.  Congestive heart failure with diastolic dysfunction and valvular heart disease.  Most recent echocardiogram was performed on 07/09/2016.  Ejection fraction was preserved with severe tricuspid regurgitation.  Bioprosthetic aortic valve was noted with mean gradient of 7 mm across this valve.  PA systolic pressure could not be estimated due to incomplete envelope on the tricuspid regurgitation per report.   3.  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  Please see recent cardiac catheterization this past summer.   4.  Status post pacemaker placement for atrial fibrillation/flutter with AV node ablation.      RECOMMENDATIONS:      1.  Went over recent testing and evaluations  2.  At present is is possible candidate for Latif FORMA valve trial at [place]  3.  Continue presents medications and have liberalized her diet  4.  RTC in ~ 3 months       Emelyn Bingham MD

## 2017-03-15 ENCOUNTER — ANTICOAGULATION THERAPY VISIT (OUTPATIENT)
Dept: ANTICOAGULATION | Facility: CLINIC | Age: 82
End: 2017-03-15
Payer: COMMERCIAL

## 2017-03-15 DIAGNOSIS — Z95.2 HEART VALVE REPLACED: ICD-10-CM

## 2017-03-15 DIAGNOSIS — Z79.01 LONG TERM CURRENT USE OF ANTICOAGULANT THERAPY: ICD-10-CM

## 2017-03-15 LAB — INR POINT OF CARE: 2.7 (ref 0.86–1.14)

## 2017-03-15 PROCEDURE — 85610 PROTHROMBIN TIME: CPT | Mod: QW

## 2017-03-15 PROCEDURE — 99207 ZZC NO CHARGE NURSE ONLY: CPT

## 2017-03-15 PROCEDURE — 36416 COLLJ CAPILLARY BLOOD SPEC: CPT

## 2017-03-15 NOTE — PROGRESS NOTES
"  ANTICOAGULATION FOLLOW-UP CLINIC VISIT    Patient Name:  Natalie Hair  Date:  3/15/2017  Contact Type:  Face to Face    SUBJECTIVE:     Patient Findings     Positives No Problem Findings (no changes, concerns or problems reported)    Comments Pt is still waiting for an answer from [facility] on the possibility of the heart valve procedure, they haven't said yes or no, just \" wait for a call\"           OBJECTIVE    INR Protime   Date Value Ref Range Status   03/15/2017 2.7 (A) 0.86 - 1.14 Final       ASSESSMENT / PLAN  INR assessment THER    Recheck INR In: 2 WEEKS    INR Location Clinic      Anticoagulation Summary as of 3/15/2017     INR goal 2.5-3.5   Today's INR 2.7   Maintenance plan 2.5 mg (2.5 mg x 1) on Tue; 5 mg (5 mg x 1) all other days   Full instructions 2.5 mg on Tue; 5 mg all other days   Weekly total 32.5 mg   No change documented Polina Quiroz RN   Plan last modified Polina Quiroz RN (2/15/2017)   Next INR check 3/29/2017   Priority INR   Target end date Indefinite    Indications   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]  Long term current use of anticoagulant therapy [Z79.01]         Anticoagulation Episode Summary     INR check location     Preferred lab     Send INR reminders to [facility]    Comments * Patient will be moving to [place], PLEASE CALL [facility] WITH NEW DOSING INSTRUCTIONS AT  [phone]      Anticoagulation Care Providers     Provider Role Specialty Phone number    Mimi Mcguire MD [facility] [phone]            See the Encounter Report to view Anticoagulation Flowsheet and Dosing Calendar (Go to Encounters tab in chart review, and find the Anticoagulation Therapy Visit)        Polina Quiroz RN               "

## 2017-03-15 NOTE — MR AVS SNAPSHOT
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  Please call [phone] with any problems or questions regarding your therapy          March 2017 Details    Sun Mon Tue Wed Thu Fri Sat        1               2               3               4                 5               6               7               8               9               10               11                 12               13               14               15      5 mg   See details      16      5 mg         17      5 mg         18      5 mg           19      5 mg         20      5 mg         21      2.5 mg         22      5 mg         23      5 mg         24      5 mg         25      5 mg           26      5 mg         27      5 mg         28      2.5 mg         29            30               31                 Date Details   03/15 This INR check       Date of next INR:  3/29/2017         How to take your warfarin dose     To take:  2.5 mg Take 1 of the 2.5 mg tablets.    To take:  5 mg Take 1 of the 5 mg tablets.

## 2017-03-21 DIAGNOSIS — I50.9 CONGESTIVE HEART FAILURE, UNSPECIFIED CONGESTIVE HEART FAILURE CHRONICITY, UNSPECIFIED CONGESTIVE HEART FAILURE TYPE: Chronic | ICD-10-CM

## 2017-03-21 DIAGNOSIS — I89.0 LYMPHEDEMA OF BOTH LOWER EXTREMITIES: Chronic | ICD-10-CM

## 2017-03-21 NOTE — TELEPHONE ENCOUNTER
Routing refill request to provider for review/approval because:  Would like Provider to review 3/14/17 bmp lab and make refill decision. Thank you.  Anam Dejesus RN

## 2017-03-21 NOTE — TELEPHONE ENCOUNTER
Bumetanide      Last Written Prescription Date: 2/10/17  Last Fill Quantity: 60, # refills: 0  Last Office Visit with [facility], [facility] or [facility] prescribing provider: 12/20/16  Next 5 appointments (look out 90 days)     Jun 05, 2017 10:00 AM CDT   Return Visit with Emelyn Bingham MD   [facility] PHYSICIAN HEART AT [facility] ([facility] Clin[facility])    [address] [place] [address] [phone]                   Potassium   Date Value Ref Range Status   03/14/2017 3.8 3.4 - 5.3 mmol/L Final     Creatinine   Date Value Ref Range Status   03/14/2017 0.96 0.52 - 1.04 mg/dL Final     BP Readings from Last 3 Encounters:   03/14/17 104/64   01/12/17 115/60   01/12/17 117/73

## 2017-03-22 RX ORDER — BUMETANIDE 2 MG/1
2 TABLET ORAL 2 TIMES DAILY
Qty: 60 TABLET | Refills: 0 | Status: SHIPPED | OUTPATIENT
Start: 2017-03-22 | End: 2017-04-18

## 2017-03-22 NOTE — TELEPHONE ENCOUNTER
Daughter called and spoke with Claire  and said that Natalie had been out of her pills for 2 days. I justo Cardiologist 3/14/27 office visit note and result note comment stating that she should continue current medications so I ordered it.  Anam Dejesus RN

## 2017-03-28 NOTE — TELEPHONE ENCOUNTER
The ASCVD Risk score (Madison DELORIS Jr, et al., 2013) failed to calculate for the following reasons:    The 2013 ASCVD risk score is only valid for ages 40 to 79

## 2017-03-29 ENCOUNTER — ANTICOAGULATION THERAPY VISIT (OUTPATIENT)
Dept: ANTICOAGULATION | Facility: CLINIC | Age: 82
End: 2017-03-29
Payer: COMMERCIAL

## 2017-03-29 DIAGNOSIS — Z79.01 LONG TERM CURRENT USE OF ANTICOAGULANT THERAPY: ICD-10-CM

## 2017-03-29 DIAGNOSIS — Z95.2 HEART VALVE REPLACED: ICD-10-CM

## 2017-03-29 LAB — INR POINT OF CARE: 3.5 (ref 0.86–1.14)

## 2017-03-29 PROCEDURE — 99207 ZZC NO CHARGE NURSE ONLY: CPT

## 2017-03-29 PROCEDURE — 85610 PROTHROMBIN TIME: CPT | Mod: QW

## 2017-03-29 PROCEDURE — 36416 COLLJ CAPILLARY BLOOD SPEC: CPT

## 2017-03-29 NOTE — MR AVS SNAPSHOT
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]  Long term current use of anticoagulant therapy [Z79.01]         Description     Recheck INR 4/12/17  Continue warfarin 2.5 mg Tues, 5 mg rest of week      Your next Anticoagulation Clinic appointment(s)     Mar 29, 2017  9:45 AM CDT   Anticoagulation Visit with LL ANTI COAG   [facility]    [address] [address] [phone]            Apr 12, 2017 11:45 AM CDT   Anticoagulation Visit with LL ANTI COAG   [facility] ([facility])    [address] [address] [phone]              Contact Numbers     Please call [phone] to cancel and/or reschedule your appointment.  Please call [phone] with any problems or questions regarding your therapy          March 2017 Details    Sun Mon Tue Wed Thu Fri Sat        1               2               3               4                 5               6               7               8               9               10               11                 12               13               14               15               16               17               18                 19               20               21               22               23               24               25                 26               27               28               29      5 mg   See details      30      5 mg         31      5 mg           Date Details   03/29 This INR check               How to take your warfarin dose     To take:  5 mg Take 1 of the 5 mg  tablets.           April 2017 Details    Sun Mon Tue Wed Thu Fri Sat           1      5 mg           2      5 mg         3      5 mg         4      2.5 mg         5      5 mg         6      5 mg         7      5 mg         8      5 mg           9      5 mg         10      5 mg         11      2.5 mg         12            13               14               15                 16               17               18               19               20               21               22                 23               24               25               26               27               28               29                 30                      Date Details   No additional details    Date of next INR:  4/12/2017         How to take your warfarin dose     To take:  2.5 mg Take 1 of the 2.5 mg tablets.    To take:  5 mg Take 1 of the 5 mg tablets.

## 2017-03-29 NOTE — PROGRESS NOTES
ANTICOAGULATION FOLLOW-UP CLINIC VISIT    Patient Name:  Natalie Hair  Date:  3/29/2017  Contact Type:  Face to Face    SUBJECTIVE:     Patient Findings     Positives Other complaints (pt will be going to [place] TO HAVE A TRICUSPID VALVE PROCEDURE to be done 4/20,  her pre-op is there on 4/19)           OBJECTIVE    INR Protime   Date Value Ref Range Status   03/29/2017 3.5 (A) 0.86 - 1.14 Final       ASSESSMENT / PLAN  INR assessment THER    Recheck INR In: 2 WEEKS    INR Location Clinic      Anticoagulation Summary as of 3/29/2017     INR goal 2.5-3.5   Today's INR 3.5   Maintenance plan 2.5 mg (2.5 mg x 1) on Tue; 5 mg (5 mg x 1) all other days   Full instructions 2.5 mg on Tue; 5 mg all other days   Weekly total 32.5 mg   No change documented Polina Quiroz RN   Plan last modified Polina Quiroz RN (2/15/2017)   Next INR check 4/12/2017   Priority INR   Target end date Indefinite    Indications   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]  Long term current use of anticoagulant therapy [Z79.01]         Anticoagulation Episode Summary     INR check location     Preferred lab     Send INR reminders to [facility]    Comments * Patient will be moving to [place], PLEASE CALL [facility] WITH NEW DOSING INSTRUCTIONS AT  [phone]      Anticoagulation Care Providers     Provider Role Specialty Phone number    Mimi Mcguire MD [facility] [phone]            See the Encounter Report to view Anticoagulation Flowsheet and Dosing Calendar (Go to Encounters tab in chart review, and find the Anticoagulation Therapy Visit)        Polina Quiroz RN

## 2017-04-12 ENCOUNTER — ANTICOAGULATION THERAPY VISIT (OUTPATIENT)
Dept: ANTICOAGULATION | Facility: CLINIC | Age: 82
End: 2017-04-12
Payer: COMMERCIAL

## 2017-04-12 DIAGNOSIS — Z95.2 HEART VALVE REPLACED: ICD-10-CM

## 2017-04-12 DIAGNOSIS — Z79.01 LONG TERM CURRENT USE OF ANTICOAGULANT THERAPY: ICD-10-CM

## 2017-04-12 LAB — INR POINT OF CARE: 4.7 (ref 0.86–1.14)

## 2017-04-12 PROCEDURE — 36416 COLLJ CAPILLARY BLOOD SPEC: CPT

## 2017-04-12 PROCEDURE — 85610 PROTHROMBIN TIME: CPT | Mod: QW

## 2017-04-12 PROCEDURE — 99207 ZZC NO CHARGE NURSE ONLY: CPT

## 2017-04-12 NOTE — MR AVS SNAPSHOT
Natalie GOMEZ Hair   4/12/2017 11:45 AM   Anticoagulation Therapy Visit    Description:  84 year old female   Provider:  LL ANTI COAG   Department:  [facility] Antico[facility]           INR as of 4/12/2017     Today's INR 4.7!      Anticoagulation Summary as of 4/12/2017     INR goal 2.5-3.5   Today's INR 4.7!   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]  Long term current use of anticoagulant therapy [Z79.01]         Description     Recheck INR 2 weeks, 4/26/17  No warfarin today, 2.5 mg Thurs, then resume usual dose of 2.5 mg Tues, 5 mg rest of week       Your next Anticoagulation Clinic appointment(s)     Apr 12, 2017 11:45 AM CDT   Anticoagulation Visit with LL ANTI COAG   [facility] ([facility])    [address] [id number] [phone]            Apr 26, 2017 11:30 AM CDT   Anticoagulation Visit with LL ANTI COAG   [facility] ([facility])    [address] [address] [phone]              Contact Numbers     Please call [phone] to cancel and/or reschedule your appointment.  Please call [phone] with any problems or questions regarding your therapy          April 2017 Details    Sun Mon Tue Wed Thu Fri Sat           1                 2               3               4               5               6               7               8                 9               10               11               12      Hold   See details      13      2.5 mg         14      5 mg         15      5 mg           16      5 mg         17      5 mg         18      2.5 mg         19      5 mg         20      5 mg         21      5 mg         22      5 mg           23      5 mg         24      5 mg         25      2.5 mg         26            27               28               29                 30                       Date Details   04/12 This INR check   Hold dose   have a serving of eda PUSHPA       Date of next INR:  4/26/2017         How to take your warfarin dose     To take:  2.5 mg Take 1 of the 2.5 mg tablets.    To take:  5 mg Take 1 of the 5 mg tablets.    Hold Do not take your warfarin dose. See the Details table to the right for additional instructions.

## 2017-04-12 NOTE — PROGRESS NOTES
ANTICOAGULATION FOLLOW-UP CLINIC VISIT    Patient Name:  Natalie Hair  Date:  4/12/2017  Contact Type:  Face to Face    SUBJECTIVE:     Patient Findings     Positives Change in diet/appetite (She will have an axtra serving of greens today, then resume her usual weekly amount), Other complaints (pt has been very stressed about the up coming surgery and one of her daughters has been very ill and is in the hospital, possibly out today), Activity level change (minimal activity due to SOB)    Comments Surgery has been rescheduled for 5/4/17 at [place].  Unsure why INR elevated today, cause may be stress. Daughter reports that the front of pt legs are a darker pink color than usual.  There are no other changes such as pain, or  increase swelling or SOB. Reviewed s/s of bleeding with daughter and pt, what to observe for and how to report if needed, and to seek immediate medical assistance for bleeding or head trauma., verbalized understanding           OBJECTIVE    INR Protime   Date Value Ref Range Status   04/12/2017 4.7 (A) 0.86 - 1.14 Final       ASSESSMENT / PLAN  INR assessment SUPRA    Recheck INR In: 2 WEEKS sooner if problems with bruising or bleeding   INR Location Clinic      Anticoagulation Summary as of 4/12/2017     INR goal 2.5-3.5   Today's INR 4.7!   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]  Long term current use of anticoagulant therapy [Z79.01]         Anticoagulation Episode Summary     INR check location     Preferred lab     Send INR reminders to [facility]    Comments * Patient will be moving to [place], PLEASE CALL REENA WITH NEW DOSING INSTRUCTIONS AT   [phone]  To [place] for surgery 5/3, surgery 5/4      Anticoagulation Care Providers     Provider Role Specialty Phone number    Mimi Mcguire MD [facility] [phone]            See the Encounter Report to view Anticoagulation Flowsheet and Dosing Calendar (Go to Encounters tab in chart review, and find the Anticoagulation Therapy Visit)        Polina Quiroz RN

## 2017-04-18 DIAGNOSIS — I50.9 CONGESTIVE HEART FAILURE, UNSPECIFIED CONGESTIVE HEART FAILURE CHRONICITY, UNSPECIFIED CONGESTIVE HEART FAILURE TYPE: Chronic | ICD-10-CM

## 2017-04-18 DIAGNOSIS — I89.0 LYMPHEDEMA OF BOTH LOWER EXTREMITIES: Chronic | ICD-10-CM

## 2017-04-18 NOTE — TELEPHONE ENCOUNTER
BUMETANIDE 2MG TABLETS        Last Written Prescription Date: 3/22/17  Last Fill Quantity: 60, # refills: 0  Last Office Visit with [facility], [facility] or [facility] prescribing provider: 12/20/16  Next 5 appointments (look out 90 days)     Jun 05, 2017 10:00 AM CDT   Return Visit with Emelyn Bingham MD   [facility] PHYSICIAN HEART AT [facility] ([facility])    [address] [place] [address] [phone]                   Potassium   Date Value Ref Range Status   03/14/2017 3.8 3.4 - 5.3 mmol/L Final     Creatinine   Date Value Ref Range Status   03/14/2017 0.96 0.52 - 1.04 mg/dL Final     BP Readings from Last 3 Encounters:   03/14/17 104/64   01/12/17 115/60   01/12/17 117/73

## 2017-04-19 RX ORDER — BUMETANIDE 2 MG/1
TABLET ORAL
Qty: 180 TABLET | Refills: 2 | Status: SHIPPED | OUTPATIENT
Start: 2017-04-19 | End: 2017-01-01

## 2017-04-26 ENCOUNTER — ANTICOAGULATION THERAPY VISIT (OUTPATIENT)
Dept: ANTICOAGULATION | Facility: CLINIC | Age: 82
End: 2017-04-26
Payer: COMMERCIAL

## 2017-04-26 DIAGNOSIS — Z95.2 HEART VALVE REPLACED: ICD-10-CM

## 2017-04-26 DIAGNOSIS — Z79.01 LONG TERM CURRENT USE OF ANTICOAGULANT THERAPY: ICD-10-CM

## 2017-04-26 LAB — INR POINT OF CARE: 6.3 (ref 0.86–1.14)

## 2017-04-26 PROCEDURE — 85610 PROTHROMBIN TIME: CPT | Mod: QW

## 2017-04-26 PROCEDURE — 36416 COLLJ CAPILLARY BLOOD SPEC: CPT

## 2017-04-26 PROCEDURE — 99207 ZZC NO CHARGE NURSE ONLY: CPT

## 2017-04-26 NOTE — MR AVS SNAPSHOT
Natalie GOMEZ Reginaldo   4/26/2017 11:30 AM   Anticoagulation Therapy Visit    Description:  84 year old female   Provider:  MATT ANTI JOE   Department:  [facility]           INR as of 4/26/2017     Today's INR 6.3!      Anticoagulation Summary as of 4/26/2017     INR goal 2.5-3.5   Today's INR 6.3!   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  Please call [phone] with any problems or questions regarding your therapy          April 2017 Details    Sun Mon Tue Wed Thu Fri Sat           1                 2               3               4               5               6               7               8                 9               10               11               12               13               14               15                 16               17               18               19               20               21               22                 23               24               25               26      Hold   See details      27      Hold   See details      28 29 30                      Date Details   04/26 This INR check   Hold dose   have a serving of Boost today      04/27 Hold dose   Have a serving of Boost today       Date of next INR:  4/28/2017         How to take your warfarin dose     To take:  5 mg Take 1 of the 5 mg tablets.    Hold Do not  take your warfarin dose. See the Details table to the right for additional instructions.

## 2017-04-26 NOTE — PROGRESS NOTES
ANTICOAGULATION FOLLOW-UP CLINIC VISIT    Patient Name:  Natalie Hair  Date:  4/26/2017  Contact Type:  Face to Face    SUBJECTIVE:     Patient Findings     Positives Change in diet/appetite (she will have a serving of Boost today and tomorow for elevated INR), Other complaints (Pt seen at [facility] last week, see below.)    Comments Daughter reports that they were called last week and asked to go to [place] for testing which she did on Wed. They did some tests but the INR was 3.9, too high to finish what was planned.  She was sent home to hold a dose of warfarin, eat a large serving of greens and come back the next day to finish the tests.  Her INR was 2.9 on Friday.  They did not do the test they had planned but did place a stent, which they were told would help her to breathe easier.  She did stay o/night at [facility], returning home Sat. Pt states she is not breathing easier.  Daughter also tells writer in private that she feels her mother is too weak to do the valve procedure.  They return to [place] on 5/3/17 for a recheck of the valve placement.    Discussed the increased risk of bleeding with the elevated INR, discussed what to observe for and how to report if needed.  Also instructed pt to seek medical assistance for active bleeding or head trauma, verbalized understanding           OBJECTIVE    INR Protime   Date Value Ref Range Status   04/26/2017 6.3 (A) 0.86 - 1.14 Final       ASSESSMENT / PLAN  INR assessment SUPRA    Recheck INR In: 2 DAYS    INR Location Outside lab      Anticoagulation Summary as of 4/26/2017     INR goal 2.5-3.5   Today's INR 6.3!   Maintenance plan 2.5 mg (2.5 mg x 1) on Tue; 5 mg (5 mg x 1) all other days   Full instructions 4/26: Hold; 4/27: Hold; Otherwise 2.5 mg on Tue; 5 mg all other days   Weekly total 32.5 mg   Plan last modified Polina Quiroz RN (2/15/2017)   Next INR check 4/28/2017   Priority INR   Target end date Indefinite    Indications   Heart  valve replaced [Z95.2]  Long term current use of anticoagulant therapy [Z79.01]         Anticoagulation Episode Summary     INR check location     Preferred lab     Send INR reminders to [facility]    Comments * Patient will be moving to [place], PLEASE CALL REENA WITH NEW DOSING INSTRUCTIONS AT  [phone]  To [place] for surgery 5/3, surgery 5/4      Anticoagulation Care Providers     Provider Role Specialty Phone number    Mimi Mcguire MD [facility] [phone]            See the Encounter Report to view Anticoagulation Flowsheet and Dosing Calendar (Go to Encounters tab in chart review, and find the Anticoagulation Therapy Visit)        Polina Quiroz RN

## 2017-04-28 ENCOUNTER — ANTICOAGULATION THERAPY VISIT (OUTPATIENT)
Dept: ANTICOAGULATION | Facility: CLINIC | Age: 82
End: 2017-04-28
Payer: COMMERCIAL

## 2017-04-28 DIAGNOSIS — Z95.2 HEART VALVE REPLACED: ICD-10-CM

## 2017-04-28 DIAGNOSIS — Z79.01 LONG TERM CURRENT USE OF ANTICOAGULANT THERAPY: ICD-10-CM

## 2017-04-28 LAB — INR PPP: 1.64 (ref 0.86–1.14)

## 2017-04-28 PROCEDURE — 85610 PROTHROMBIN TIME: CPT | Performed by: FAMILY MEDICINE

## 2017-04-28 PROCEDURE — 36415 COLL VENOUS BLD VENIPUNCTURE: CPT | Performed by: FAMILY MEDICINE

## 2017-04-28 PROCEDURE — 99207 ZZC NO CHARGE NURSE ONLY: CPT | Performed by: REGISTERED NURSE

## 2017-04-28 NOTE — MR AVS SNAPSHOT
Natalie PATRICIA Hair   4/28/2017   Anticoagulation Therapy Visit    Description:  84 year old female   Provider:  Polina Quiroz, RN   Department:  [facility]           INR as of 4/28/2017     Today's INR 1.64!      Anticoagulation Summary as of 4/28/2017     INR goal 2.5-3.5   Today's INR 1.64!   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]  Long term current use of anticoagulant therapy [Z79.01]         Description     Recheck [facility] [place] [facility], 5/2/17  Take warfarin 7.5 mg today, 5 mg Sat, Sun, Mon      Your next Anticoagulation Clinic appointment(s)     May 02, 2017  9:45 AM CDT   Anticoagulation Visit with [place] ANTI COAG   [facility] ([facility])    [address] [address]   [phone]            May 10, 2017 10:45 AM CDT   Anticoagulation Visit with  ANTI COAG   [facility] ([facility])    [address] [address]   [phone]              April 2017 Details    Sun Mon Tue Wed Thu Fri Sat           1                 2               3               4               5               6               7               8                 9               10               11               12               13               14               15                 16               17               18               19               20               21               22                 23               24               25               26               27               28      7.5 mg   See details      29      5 mg           30      5 mg                Date Details   04/28 This INR check               How to take your warfarin dose     To take:  5 mg Take 1 of the 5 mg tablets.    To take:  7.5 mg Take 1 of the 2.5 mg tablets and 1 of the 5 mg tablets.           May 2017 Details    Sun Mon Tue Wed Thu Fri Sat      1       5 mg         2            3               4               5               6                 7               8               9               10               11               12               13                 14               15               16               17               18               19               20                 21               22               23               24               25               26               27                 28               29               30               31                   Date Details   No additional details    Date of next INR:  5/2/2017         How to take your warfarin dose     To take:  2.5 mg Take 1 of the 2.5 mg tablets.    To take:  5 mg Take 1 of the 5 mg tablets.

## 2017-04-28 NOTE — PROGRESS NOTES
ANTICOAGULATION FOLLOW-UP CLINIC VISIT    Patient Name:  Natalie Hair  Date:  4/28/2017  Contact Type:  Telephone/ INR resulted from a lab draw today, pt called writer talked with her daughter, Emely.  She was given new dosing and recheck instructions, verbalized understanding    SUBJECTIVE:     Patient Findings     Positives Change in diet/appetite (Pt has a serving of Boost Wed and Thurs and 1 salad)    Comments Activity level change - daughter reports pt continues t be weak, she needs assistance to go from sit to stand at the toilet and from her chair.  Daughter is also walking wiht her when she walks to the bathroom   Pt will be going back ot the [facility] for a recheck on Wed.           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]  Long term current use of anticoagulant therapy [Z79.01]         Anticoagulation Episode Summary     INR check location     Preferred lab     Send INR reminders to [facility]    Comments * Patient will be moving to [place], PLEASE CALL EMELY WITH NEW DOSING INSTRUCTIONS AT  [phone]  To [place] for surgery 5/3, surgery 5/4      Anticoagulation Care Providers     Provider Role Specialty Phone number    Mimi Mcguire MD [facility] [phone]            See the Encounter Report to view Anticoagulation Flowsheet and Dosing Calendar (Go to Encounters tab in chart review, and find  the Anticoagulation Therapy Visit)        Polina Quiroz RN

## 2017-05-02 ENCOUNTER — ANTICOAGULATION THERAPY VISIT (OUTPATIENT)
Dept: ANTICOAGULATION | Facility: CLINIC | Age: 82
End: 2017-05-02
Payer: COMMERCIAL

## 2017-05-02 DIAGNOSIS — Z79.01 LONG TERM CURRENT USE OF ANTICOAGULANT THERAPY: ICD-10-CM

## 2017-05-02 DIAGNOSIS — Z95.2 HEART VALVE REPLACED: ICD-10-CM

## 2017-05-02 LAB — INR POINT OF CARE: 2 (ref 0.86–1.14)

## 2017-05-02 PROCEDURE — 36416 COLLJ CAPILLARY BLOOD SPEC: CPT

## 2017-05-02 PROCEDURE — 85610 PROTHROMBIN TIME: CPT | Mod: QW

## 2017-05-02 PROCEDURE — 99207 ZZC NO CHARGE NURSE ONLY: CPT

## 2017-05-02 NOTE — PROGRESS NOTES
ANTICOAGULATION FOLLOW-UP CLINIC VISIT    Patient Name:  Natalie Hair  Date:  5/2/2017  Contact Type:  Face to Face    SUBJECTIVE:     Patient Findings     Positives Missed doses    Comments Surgery at [facility] was postponed due to pt needing a stent, she has follow up there tomorrow and will communicate any changes to the [facility]           OBJECTIVE    INR Protime   Date Value Ref Range Status   05/02/2017 2.0 (A) 0.86 - 1.14 Final       ASSESSMENT / PLAN  INR assessment SUB    Recheck INR In: 6 DAYS    INR Location Clinic      Anticoagulation Summary as of 5/2/2017     INR goal 2.5-3.5   Today's INR 2.0!   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]  Long term current use of anticoagulant therapy [Z79.01]         Anticoagulation Episode Summary     INR check location     Preferred lab     Send INR reminders to [facility]    Comments * Patient will be moving to [place], PLEASE CALL REENA WITH NEW DOSING INSTRUCTIONS AT  [phone]  To [place] 5/3 for follow of stent placement surgery 6/1      Anticoagulation Care Providers     Provider Role Specialty Phone number    Mimi Mcguire MD [facility] [phone]            See the Encounter Report to view Anticoagulation Flowsheet and Dosing Calendar (Go to Encounters tab in chart review, and find the Anticoagulation Therapy Visit)        Letitia Aquino RN

## 2017-05-02 NOTE — MR AVS SNAPSHOT
Natalie GOMEZ Hair   5/2/2017 9:45 AM   Anticoagulation Therapy Visit    Description:  84 year old female   Provider:  [place] ANTI COAG   Department:  [facility]           INR as of 5/2/2017     Today's INR 2.0!      Anticoagulation Summary as of 5/2/2017     INR goal 2.5-3.5   Today's INR 2.0!   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        Your next Anticoagulation Clinic appointment(s)     May 10, 2017 10:45 AM CDT   Anticoagulation Visit with LL ANTI COAG   [facility] ([facility])    [address] [id number] [phone]              Contact Numbers     Please call [phone] to cancel and/or reschedule your appointment.  Please call [phone] with any problems or questions regarding your therapy          May 2017 Details    Sun Mon Tue Wed Thu Fri Sat      1               2      7.5 mg   See details      3      5 mg         4      5 mg         5      5 mg         6      5 mg           7      5 mg         8      5 mg         9      2.5 mg         10            11               12               13                 14               15               16               17               18               19               20                 21               22               23               24               25               26               27                 28               29               30               31                   Date Details   05/02 This INR check       Date of next INR:  5/10/2017         How to take your warfarin dose     To take:  2.5 mg Take 1 of the 2.5 mg tablets.    To take:  5 mg Take 1 of the 5 mg tablets.    To take:  7.5 mg Take 1 of the 2.5 mg tablets and 1 of  the 5 mg tablets.

## 2017-05-04 ENCOUNTER — AMBULATORY - HEALTHEAST (OUTPATIENT)
Dept: CARDIAC REHAB | Facility: HOSPITAL | Age: 82
End: 2017-05-04

## 2017-05-04 DIAGNOSIS — Z95.5 STENTED CORONARY ARTERY: ICD-10-CM

## 2017-05-06 DIAGNOSIS — Z95.2 HEART VALVE REPLACED: ICD-10-CM

## 2017-05-06 DIAGNOSIS — Z79.01 LONG TERM CURRENT USE OF ANTICOAGULANT THERAPY: ICD-10-CM

## 2017-05-08 RX ORDER — WARFARIN SODIUM 2.5 MG/1
TABLET ORAL
Qty: 156 TABLET | Refills: 0 | Status: SHIPPED | OUTPATIENT
Start: 2017-05-08 | End: 2017-01-01

## 2017-05-08 NOTE — TELEPHONE ENCOUNTER
WARFARIN SOD 2.5MG TABLETS (GREEN)      Last Written Prescription Date: 2/15/17  Last Fill Qty: 130, # refills: 0  Last Office Visit with [facility], [facility] or [facility] prescribing provider: 12/20/16  Next 5 appointments (look out 90 days)     Jun 05, 2017 10:00 AM CDT   Return Visit with Emelyn Bingham MD   [facility] PHYSICIAN HEART AT [facility] ([facility])    [address] [place] [address]   [phone]                   Date and Result of Last PT/INR:   Lab Results   Component Value Date    INR 2.0 05/02/2017    INR 1.64 04/28/2017    INR 6.3 04/26/2017    INR 2.73 12/30/2016

## 2017-05-10 ENCOUNTER — ANTICOAGULATION THERAPY VISIT (OUTPATIENT)
Dept: ANTICOAGULATION | Facility: CLINIC | Age: 82
End: 2017-05-10
Payer: COMMERCIAL

## 2017-05-10 DIAGNOSIS — Z79.01 LONG TERM CURRENT USE OF ANTICOAGULANT THERAPY: ICD-10-CM

## 2017-05-10 DIAGNOSIS — Z95.2 HEART VALVE REPLACED: ICD-10-CM

## 2017-05-10 LAB — INR POINT OF CARE: 4.8 (ref 0.86–1.14)

## 2017-05-10 PROCEDURE — 85610 PROTHROMBIN TIME: CPT | Mod: QW

## 2017-05-10 PROCEDURE — 36416 COLLJ CAPILLARY BLOOD SPEC: CPT

## 2017-05-10 RX ORDER — WARFARIN SODIUM 2.5 MG/1
TABLET ORAL
Qty: 130 TABLET | Refills: 0 | COMMUNITY
Start: 2017-05-10 | End: 2017-05-22

## 2017-05-10 NOTE — MR AVS SNAPSHOT
Natalie PATRICIA Hair   5/10/2017 10:45 AM   Anticoagulation Therapy Visit    Description:  84 year old female   Provider:  LL ANTI COAG   Department:  [facility]tico[facility]           INR as of 5/10/2017     Today's INR 4.8!      Anticoagulation Summary as of 5/10/2017     INR goal 2.5-3.5   Today's INR 4.8!   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]  Long term current use of anticoagulant therapy [Z79.01]         Description     Recheck INR 1 week  No warfarn today, 2.5 mg Thurs, 5 mg rest of week       Your next Anticoagulation Clinic appointment(s)     May 10, 2017 10:45 AM CDT   Anticoagulation Visit with LL ANTI COAG   [facility] ([facility])    [address] [address] [phone]            May 17, 2017  1:45 PM CDT   Anticoagulation Visit with LL ANTI COAG   [facility] ([facility])    [address] [address] [phone]              Contact Numbers     Please call [phone] to cancel and/or reschedule your appointment.  Please call [phone] with any problems or questions regarding your therapy          May 2017 Details    Sun Mon Tue Wed Thu Fri Sat      1               2               3               4               5               6                 7               8               9               10      Hold   See details      11      5 mg         12      2.5 mg         13      5 mg           14      5 mg         15      2.5 mg         16      5 mg         17            18               19               20                 21               22               23               24               25               26               27                 28               29               30               31                   Date Details   05/10 This INR check       Date of next INR:   5/17/2017         How to take your warfarin dose     To take:  2.5 mg Take 1 of the 2.5 mg tablets.    To take:  5 mg Take 1 of the 5 mg tablets.    Hold Do not take your warfarin dose. See the Details table to the right for additional instructions.

## 2017-05-11 ENCOUNTER — AMBULATORY - HEALTHEAST (OUTPATIENT)
Dept: CARDIAC REHAB | Facility: HOSPITAL | Age: 82
End: 2017-05-11

## 2017-05-11 DIAGNOSIS — Z95.5 STENTED CORONARY ARTERY: ICD-10-CM

## 2017-05-11 ASSESSMENT — MIFFLIN-ST. JEOR: SCORE: 1245.08

## 2017-05-15 ENCOUNTER — AMBULATORY - HEALTHEAST (OUTPATIENT)
Dept: CARDIAC REHAB | Facility: HOSPITAL | Age: 82
End: 2017-05-15

## 2017-05-15 DIAGNOSIS — Z95.5 STENTED CORONARY ARTERY: ICD-10-CM

## 2017-05-17 ENCOUNTER — ANTICOAGULATION THERAPY VISIT (OUTPATIENT)
Dept: ANTICOAGULATION | Facility: CLINIC | Age: 82
End: 2017-05-17
Payer: COMMERCIAL

## 2017-05-17 ENCOUNTER — TELEPHONE (OUTPATIENT)
Dept: ANTICOAGULATION | Facility: CLINIC | Age: 82
End: 2017-05-17

## 2017-05-17 DIAGNOSIS — Z95.2 HEART VALVE REPLACED: ICD-10-CM

## 2017-05-17 DIAGNOSIS — Z79.01 LONG TERM CURRENT USE OF ANTICOAGULANT THERAPY: ICD-10-CM

## 2017-05-17 LAB — INR POINT OF CARE: 4.5 (ref 0.86–1.14)

## 2017-05-17 PROCEDURE — 36416 COLLJ CAPILLARY BLOOD SPEC: CPT

## 2017-05-17 PROCEDURE — 85610 PROTHROMBIN TIME: CPT | Mod: QW

## 2017-05-17 PROCEDURE — 99207 ZZC NO CHARGE NURSE ONLY: CPT

## 2017-05-17 NOTE — MR AVS SNAPSHOT
Natalie GOMEZ Hair   5/17/2017 1:45 PM   Anticoagulation Therapy Visit    Description:  84 year old female   Provider:  LL ANTI COAG   Department:  [facility]           INR as of 5/17/2017     Today's INR 4.5!      Anticoagulation Summary as of 5/17/2017     INR goal 2.5-3.5   Today's INR 4.5!   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  Please call [phone] with any problems or questions regarding your therapy          May 2017 Details    Sun Mon Tue Wed Thu Fri Sat      1               2               3               4               5               6                 7               8               9               10               11               12               13                 14               15               16               17      Hold   See details      18      Hold         19      2.5 mg         20      5 mg           21      5 mg         22            23               24               25               26               27                 28               29               30               31                   Date Details   05/17 This INR check       Date of next INR:  5/22/2017         How to take your warfarin dose     To take:  2.5 mg Take 1 of the 2.5 mg tablets.    To take:  5 mg Take 1 of the 5 mg tablets.    Hold Do not take your warfarin dose. See the Details table to the right for additional instructions.

## 2017-05-17 NOTE — TELEPHONE ENCOUNTER
Dr Mcguire,    Pt is having valve repair at [facility] on May 30. Going to [place] on May 28,29 for testing. Per daughter pt was told to hold warfarin starting May 26. No mention made of bridging with enoxaparin. Just checking with you if pt would need bridging? She has done lovenox bridging in the past with cardiac procedure.    Bryanna Burch, RN, BSN  [facility]

## 2017-05-17 NOTE — PROGRESS NOTES
ANTICOAGULATION FOLLOW-UP CLINIC VISIT    Patient Name:  Natalie Hair  Date:  5/17/2017  Contact Type:  Face to Face    SUBJECTIVE:     Patient Findings     Positives No Problem Findings    Comments Poor appetite. Not drinking much of the boost, too rich. INR still high at 4.5   Plans to go to [place] on May 30 for 2 days of testing before her valve repair on June 1. Per daughter she is to stop the warfarin on May 26.   She has been tired. Tried cardiac rehab but they told her she was too shortness of breath to do the exercises.              OBJECTIVE    INR Protime   Date Value Ref Range Status   05/17/2017 4.5 (A) 0.86 - 1.14 Final       ASSESSMENT / PLAN  INR assessment SUPRA    Recheck INR In: 5 DAYS    INR Location Clinic      Anticoagulation Summary as of 5/17/2017     INR goal 2.5-3.5   Today's INR 4.5!   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]  Long term current use of anticoagulant therapy [Z79.01]         Anticoagulation Episode Summary     INR check location     Preferred lab     Send INR reminders to [facility]    Comments * Patient will be moving to [place], PLEASE CALL REENA WITH NEW DOSING INSTRUCTIONS AT  [phone]  To [place] 5/3 for follow of stent placement surgery 6/1      Anticoagulation Care Providers     Provider Role Specialty Phone number    Mimi Mcguire MD Responsible Family Practice [phone]            See the Encounter Report to view Anticoagulation Flowsheet and Dosing Calendar (Go to Encounters tab in chart review, and find the Anticoagulation Therapy Visit)        Bryanna Burch RN

## 2017-05-19 NOTE — TELEPHONE ENCOUNTER
Spoke with patient's daughter Digna regarding Dr. Mcguire's response below to not bridge if Shmuel did not say to. Digna asked if patient needed a preop visit for cardiac invasive procedure in June, appointment scheduled for May 26th with PCP for pre-op.  Zaynab

## 2017-05-19 NOTE — TELEPHONE ENCOUNTER
If [place] cardiology told her not to worry about bridging, then I wouldn't worry about it. They understand which risk factors are important to bridge for.   cgb

## 2017-05-22 ENCOUNTER — ANTICOAGULATION THERAPY VISIT (OUTPATIENT)
Dept: ANTICOAGULATION | Facility: CLINIC | Age: 82
End: 2017-05-22
Payer: COMMERCIAL

## 2017-05-22 DIAGNOSIS — Z79.01 LONG TERM CURRENT USE OF ANTICOAGULANT THERAPY: ICD-10-CM

## 2017-05-22 DIAGNOSIS — Z95.2 HEART VALVE REPLACED: ICD-10-CM

## 2017-05-22 LAB — INR POINT OF CARE: 2.6 (ref 0.86–1.14)

## 2017-05-22 PROCEDURE — 85610 PROTHROMBIN TIME: CPT | Mod: QW

## 2017-05-22 PROCEDURE — 99207 ZZC NO CHARGE NURSE ONLY: CPT

## 2017-05-22 PROCEDURE — 36416 COLLJ CAPILLARY BLOOD SPEC: CPT

## 2017-05-22 RX ORDER — WARFARIN SODIUM 2.5 MG/1
TABLET ORAL
Qty: 130 TABLET | Refills: 0 | COMMUNITY
Start: 2017-05-22 | End: 2017-01-01

## 2017-05-22 NOTE — PROGRESS NOTES
ANTICOAGULATION FOLLOW-UP CLINIC VISIT    Patient Name:  Natalie Hair  Date:  5/22/2017  Contact Type:  Face to Face    SUBJECTIVE:     Patient Findings     Positives Change in diet/appetite (pt has had a half serving of Boost for 3 of the past 5 days, )    Comments To [facility] for testing 5/30 and 31, Latif Forma Tricuspid Transcath repair on 6/1/17             OBJECTIVE    INR Protime   Date Value Ref Range Status   05/22/2017 2.6 (A) 0.86 - 1.14 Final       ASSESSMENT / PLAN  INR assessment THER    Recheck INR In: 2 WEEKS    INR Location Clinic      Anticoagulation Summary as of 5/22/2017     INR goal 2.5-3.5   Today's INR 2.6   Maintenance plan 2.5 mg (2.5 mg x 1) on Mon, Wed, Fri; 5 mg (5 mg x 1) all other days   Full instructions 5/22: 5 mg; 5/23: 2.5 mg; 5/25: 2.5 mg; 5/26: Hold; 5/27: Hold; 5/28: Hold; 5/29: Hold; 5/30: Hold; 5/31: Hold; 6/1: Hold; Otherwise 2.5 mg on Mon, Wed, Fri; 5 mg all other days   Weekly total 27.5 mg   Plan last modified Polina Quiroz RN (5/10/2017)   Next INR check 6/5/2017   Priority INR   Target end date Indefinite    Indications   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]  Long term current use of anticoagulant therapy [Z79.01]         Anticoagulation Episode Summary     INR check location     Preferred lab     Send INR reminders to [facility]    Comments * Patient will be moving to [place], PLEASE CALL REENA WITH NEW DOSING INSTRUCTIONS AT  [phone]  To [place] 5/3 for follow of stent placement surgery 6/1      Anticoagulation Care Providers     Provider Role Specialty Phone number    Mimi Mcguire MD [facility] [phone]            See the Encounter Report to view Anticoagulation Flowsheet and Dosing Calendar (Go to Encounters tab in chart review, and find the Anticoagulation Therapy Visit)        Polina Quiroz RN

## 2017-05-22 NOTE — MR AVS SNAPSHOT
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]  Long term current use of anticoagulant therapy [Z79.01]         Description     Recheck INR 6/7/17      Your next Anticoagulation Clinic appointment(s)     Jun 05, 2017 10:45 AM CDT   Anticoagulation Visit with [place] [facility]   [facility]    [address] [id number] [phone]            Jun 07, 2017 10:45 AM CDT   Anticoagulation Visit with  ANTI COAG   [facility]    [address] [address] [phone]              Contact Numbers     Please call [phone] to cancel and/or reschedule your appointment.  Please call [phone] with any problems or questions regarding your therapy          May 2017 Details    Sun Mon Tue Wed Thu Fri Sat      1               2               3               4               5               6                 7               8               9               10               11               12               13                 14               15               16               17               18               19               20                 21               22      5 mg   See details      23      2.5 mg         24      2.5 mg         25      2.5 mg         26      Hold         27      Hold           28      Hold         29      Hold         30      Hold   See details      31      Hold    See details          Date Details   05/22 This INR check      05/30 Hold dose   [facility] for cardiac tests      05/31 Hold dose   [facility] for cardiac tests               How to take your warfarin dose     To take:  2.5 mg Take 1 of the 2.5 mg tablets.    To take:  5 mg Take 1 of the 5 mg tablets.    Hold Do not take your warfarin dose. See the Details table to the right for additional instructions.                June 2017 Details    Sun Mon Tue Wed Thu Fri Sat         1      Hold   See details      2      2.5 mg         3      5 mg           4      5 mg         5      2.5 mg         6      5 mg         7            8               9               10                 11               12               13               14               15               16               17                 18               19               20               21               22               23               24                 25               26               27               28               29               30                 Date Details   06/01 Hold dose   Latif Forma Tricuspid Trans cath repair system       Date of next INR:  6/7/2017         How to take your warfarin dose     To take:  2.5 mg Take 1 of the 2.5 mg tablets.    To take:  5 mg Take 1 of the 5 mg tablets.    Hold Do not take your warfarin dose. See the Details table to the right for additional instructions.

## 2017-05-26 ENCOUNTER — CARE COORDINATION (OUTPATIENT)
Dept: CARE COORDINATION | Facility: CLINIC | Age: 82
End: 2017-05-26

## 2017-05-26 ENCOUNTER — OFFICE VISIT (OUTPATIENT)
Dept: FAMILY MEDICINE | Facility: CLINIC | Age: 82
End: 2017-05-26
Payer: COMMERCIAL

## 2017-05-26 VITALS
BODY MASS INDEX: 28.57 KG/M2 | TEMPERATURE: 95.3 F | HEIGHT: 66 IN | DIASTOLIC BLOOD PRESSURE: 61 MMHG | SYSTOLIC BLOOD PRESSURE: 134 MMHG | WEIGHT: 177.8 LBS | HEART RATE: 70 BPM

## 2017-05-26 DIAGNOSIS — I50.32 CHRONIC DIASTOLIC CONGESTIVE HEART FAILURE (H): Chronic | ICD-10-CM

## 2017-05-26 DIAGNOSIS — Z79.01 LONG TERM CURRENT USE OF ANTICOAGULANT THERAPY: Chronic | ICD-10-CM

## 2017-05-26 DIAGNOSIS — I10 BENIGN ESSENTIAL HYPERTENSION: Chronic | ICD-10-CM

## 2017-05-26 DIAGNOSIS — Z87.2 HISTORY OF PRESSURE ULCER: ICD-10-CM

## 2017-05-26 DIAGNOSIS — Z95.2 HEART VALVE REPLACED: Chronic | ICD-10-CM

## 2017-05-26 DIAGNOSIS — I48.91 ATRIAL FIBRILLATION WITH RVR (H): Chronic | ICD-10-CM

## 2017-05-26 DIAGNOSIS — Z01.818 PREOP GENERAL PHYSICAL EXAM: Primary | ICD-10-CM

## 2017-05-26 DIAGNOSIS — K59.09 OTHER CONSTIPATION: ICD-10-CM

## 2017-05-26 DIAGNOSIS — L20.89 OTHER ATOPIC DERMATITIS: ICD-10-CM

## 2017-05-26 DIAGNOSIS — I27.0 PRIMARY PULMONARY HYPERTENSION (H): Chronic | ICD-10-CM

## 2017-05-26 PROBLEM — Z95.5 STENTED CORONARY ARTERY: Status: ACTIVE | Noted: 2017-05-26

## 2017-05-26 PROBLEM — Z95.1 S/P CABG (CORONARY ARTERY BYPASS GRAFT): Status: ACTIVE | Noted: 2017-05-26

## 2017-05-26 LAB
ALBUMIN SERPL-MCNC: 3.5 G/DL (ref 3.4–5)
ALP SERPL-CCNC: 225 U/L (ref 40–150)
ALT SERPL W P-5'-P-CCNC: 21 U/L (ref 0–50)
ANION GAP SERPL CALCULATED.3IONS-SCNC: 8 MMOL/L (ref 3–14)
AST SERPL W P-5'-P-CCNC: 39 U/L (ref 0–45)
BASOPHILS # BLD AUTO: 0 10E9/L (ref 0–0.2)
BASOPHILS NFR BLD AUTO: 0.6 %
BILIRUB SERPL-MCNC: 1.2 MG/DL (ref 0.2–1.3)
BUN SERPL-MCNC: 24 MG/DL (ref 7–30)
CALCIUM SERPL-MCNC: 10 MG/DL (ref 8.5–10.1)
CHLORIDE SERPL-SCNC: 99 MMOL/L (ref 94–109)
CO2 SERPL-SCNC: 27 MMOL/L (ref 20–32)
CREAT SERPL-MCNC: 1.01 MG/DL (ref 0.52–1.04)
DIFFERENTIAL METHOD BLD: ABNORMAL
EOSINOPHIL # BLD AUTO: 0.3 10E9/L (ref 0–0.7)
EOSINOPHIL NFR BLD AUTO: 4.4 %
ERYTHROCYTE [DISTWIDTH] IN BLOOD BY AUTOMATED COUNT: 15.9 % (ref 10–15)
GFR SERPL CREATININE-BSD FRML MDRD: 52 ML/MIN/1.7M2
GLUCOSE SERPL-MCNC: 114 MG/DL (ref 70–99)
HCT VFR BLD AUTO: 44.8 % (ref 35–47)
HGB BLD-MCNC: 14.8 G/DL (ref 11.7–15.7)
LYMPHOCYTES # BLD AUTO: 1 10E9/L (ref 0.8–5.3)
LYMPHOCYTES NFR BLD AUTO: 15.9 %
MCH RBC QN AUTO: 28.6 PG (ref 26.5–33)
MCHC RBC AUTO-ENTMCNC: 33 G/DL (ref 31.5–36.5)
MCV RBC AUTO: 87 FL (ref 78–100)
MONOCYTES # BLD AUTO: 0.9 10E9/L (ref 0–1.3)
MONOCYTES NFR BLD AUTO: 14 %
NEUTROPHILS # BLD AUTO: 4.1 10E9/L (ref 1.6–8.3)
NEUTROPHILS NFR BLD AUTO: 65.1 %
PLATELET # BLD AUTO: 193 10E9/L (ref 150–450)
POTASSIUM SERPL-SCNC: 3.6 MMOL/L (ref 3.4–5.3)
PROT SERPL-MCNC: 7.6 G/DL (ref 6.8–8.8)
RBC # BLD AUTO: 5.17 10E12/L (ref 3.8–5.2)
SODIUM SERPL-SCNC: 134 MMOL/L (ref 133–144)
WBC # BLD AUTO: 6.4 10E9/L (ref 4–11)

## 2017-05-26 PROCEDURE — 80053 COMPREHEN METABOLIC PANEL: CPT | Performed by: FAMILY MEDICINE

## 2017-05-26 PROCEDURE — 85025 COMPLETE CBC W/AUTO DIFF WBC: CPT | Performed by: FAMILY MEDICINE

## 2017-05-26 PROCEDURE — 36415 COLL VENOUS BLD VENIPUNCTURE: CPT | Performed by: FAMILY MEDICINE

## 2017-05-26 PROCEDURE — 99215 OFFICE O/P EST HI 40 MIN: CPT | Performed by: FAMILY MEDICINE

## 2017-05-26 RX ORDER — TRIAMCINOLONE ACETONIDE 1 MG/G
CREAM TOPICAL
Qty: 30 G | Refills: 0 | Status: SHIPPED | OUTPATIENT
Start: 2017-05-26 | End: 2017-01-01 | Stop reason: ALTCHOICE

## 2017-05-26 NOTE — MR AVS SNAPSHOT
After Visit Summary   5/26/2017    Natalie Hair    MRN: [id number]           Patient Information     Date Of Birth          8/28/1932        Visit Information        Provider Department      5/26/2017 9:00 AM Mimi Mcguire MD [facility]        Today's Diagnoses     Preop general physical exam    -  1    Chronic diastolic congestive heart failure (H)        Primary pulmonary hypertension (H)        Atrial fibrillation with RVR (H)        Benign essential hypertension, BP goal <150/90        Long term current use of anticoagulant therapy        Heart valve replaced        History of pressure ulcer        Other constipation        Other atopic dermatitis          Care Instructions      Before Your Surgery      Call your surgeon if there is any change in your health. This includes signs of a cold or flu (such as a sore throat, runny nose, cough, rash or fever).    Do not smoke, drink alcohol or take over the counter medicine (unless your surgeon or primary care doctor tells you to) for the 24 hours before and after surgery.    If you take prescribed drugs: Follow your doctor s orders about which medicines to take and which to stop until after surgery.    Eating and drinking prior to surgery: follow the instructions from your surgeon    Take a shower or bath the night before surgery. Use the soap your surgeon gave you to gently clean your skin. If you do not have soap from your surgeon, use your regular soap. Do not shave or scrub the surgery site.  Wear clean pajamas and have clean sheets on your bed.           Follow-ups after your visit        Your next 10 appointments already scheduled     Jun 05, 2017 10:45 AM CDT   [facility]    [address] [address]   [phone]            Jun 07, 2017 10:45 AM CDT   Anticoagulation Visit with [place] [facility]   [facility]  [facility] ([facility])    [address] [address]   [phone]              Who to contact     Normal or non-critical lab and imaging results will be communicated to you by MyChart, letter or phone within 4 business days after the clinic has received the results. If you do not hear from us within 7 days, please contact the clinic through MyChart or phone. If you have a critical or abnormal lab result, we will notify you by phone as soon as possible.  Submit refill requests through [facility] or call your pharmacy and they will forward the refill request to us. Please allow 3 business days for your refill to be completed.          If you need to speak with a  for additional information , please call: [phone]             Additional Information About Your Visit        [facility] [employer] Information     [facility] lets you send messages to your doctor, view your test results, renew your prescriptions, schedule appointments and more. To sign up, go to [email or web address] . Click on \"Log in\" on the left side of the screen, which will take you to the Welcome page. Then click on \"Sign up Now\" on the right side of the page.     You will be asked to enter the access code listed below, as well as some personal information. Please follow the directions to create your username and password.     Your access code is: [email or web address]  Expires: 2017  9:28 AM     Your access code will  in 90 days. If you need help or a new code, please call your [place] clinic or [phone].        Care EveryWhere ID     This is your Care EveryWhere ID. This could be used by other organizations to access your [place] medical records  [id number]        Your Vitals Were     Pulse Temperature Height BMI (Body Mass Index)          70 95.3  F (35.2  C) (Tympanic) 5' 6\" (1.676 m) 28.7 kg/m2         Blood Pressure from Last 3 Encounters:   17 134/61   17 104/64   17 115/60    " Weight from Last 3 Encounters:   05/26/17 177 lb 12.8 oz (80.6 kg)   03/14/17 193 lb 12.8 oz (87.9 kg)   01/12/17 194 lb (88 kg)              We Performed the Following     CBC with platelets differential     Comprehensive metabolic panel (BMP + Alb, Alk Phos, ALT, AST, Total. Bili, TP)          Today's Medication Changes          These changes are accurate as of: 5/26/17  9:28 AM.  If you have any questions, ask your nurse or doctor.               Start taking these medicines.        Dose/Directions    triamcinolone 0.1 % cream   Commonly known as:  KENALOG   Used for:  Other atopic dermatitis   Started by:  Mimi Mcguire MD        Apply sparingly to affected area two times daily for 3-5 days.   Quantity:  30 g   Refills:  0         Stop taking these medicines if you haven't already. Please contact your care team if you have questions.     HYDROcodone-acetaminophen 5-325 MG per tablet   Commonly known as:  NORCO   Stopped by:  Mimi Mcguire MD                Where to get your medicines      These medications were sent to [facility] [address] [place] [address] AT [address] [address]     Phone:  [phone]     triamcinolone 0.1 % cream                Primary Care Provider Office Phone # Fax #    Mimi Mcguire [phone]       [facility] [id number] [place] [address]        Thank you!     Thank you for choosing [facility]  for your care. Our goal is always to provide you with excellent care. Hearing back from our patients is one way we can continue to improve our services. Please take a few minutes to complete the written survey that you may receive in the mail after your visit with us. Thank you!             Your Updated Medication List - Protect others around you: Learn how to safely use, store and throw away your medicines at www.disposemymeds.org.          This list is  accurate as of: 5/26/17  9:28 AM.  Always use your most recent med list.                   Brand Name Dispense Instructions for use    ACETAMINOPHEN PO      Take 1,000 mg by mouth every 8 hours as needed for pain       alendronate 70 MG tablet    FOSAMAX    12 tablet    TAKE 1 TABLET BY MOUTH ONCE WEEKLY ON EMPTY STOMACH       bumetanide 2 MG tablet    BUMEX    180 tablet    TAKE 1 TABLET(2 MG) BY MOUTH TWICE DAILY       calcium carbonate 500 MG tablet    OS-SHANICE 500 mg [place]. Ca     Take 200 mg by mouth 2 times daily       cetirizine 5 MG tablet    zyrTEC    30 tablet    Take 1 tablet (5 mg) by mouth daily       clopidogrel 75 MG tablet    PLAVIX    90 tablet    Take 1 tablet (75 mg) by mouth daily       ezetimibe 10 MG tablet    ZETIA    90 tablet    Take 1 tablet (10 mg) by mouth daily       Fish Oil Oil          metolazone 2.5 MG tablet    ZAROXOLYN    6 tablet    Take one tablet per day for two days only       metoprolol 25 MG tablet    LOPRESSOR    90 tablet    Take 0.5 tablets (12.5 mg) by mouth 2 times daily       Multi-vitamin Tabs tablet      Take 1 tablet by mouth daily       nitroglycerin 0.4 MG sublingual tablet    NITROSTAT    25 tablet    Place 1 tablet (0.4 mg) under the tongue every 5 minutes as needed for chest pain       omega 3 1000 MG Caps     90 capsule    Take 1 g by mouth daily       ondansetron 4 MG ODT tab    ZOFRAN ODT    10 tablet    Take 1-2 tablets (4-8 mg) by mouth every 6 hours as needed for nausea       * order for DME     1 Device    Equipment being ordered: walker with a seat on it Has severe CHF and lower extremity edema.       * order for DME     1 Package    Equipment being ordered: Walker with a seat       * order for DME     1 Device    Equipment being ordered: Hospital Bed 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 -ongoing symptoms of lower extremity edema,shortness of breath,cough sacral pressure sores within last 6 months   An electric hospital bed to allow for increase in  head of bed elevation and for ease in wheelchair transfers  Length of need: lifelong NPI - [id number]       polyethylene glycol powder    MIRALAX/GLYCOLAX     Take 1 capful by mouth daily as needed for constipation       potassium chloride 10 MEQ tablet    K-TAB,KLOR-CON    90 tablet    Take 1 tablet (10 mEq) by mouth 2 times daily       SENNA S 8.6-50 MG per tablet   Generic drug:  senna-docusate      Take 2 tablets by mouth 2 times daily       spironolactone 25 MG tablet    ALDACTONE    90 tablet    Take 1 tablet (25 mg) by mouth daily       SSD 1 % cream   Generic drug:  silver sulfADIAZINE     400 g    APPLY TWICE DAILY TO THE AFFECTED AREA(S) AS DIRECTED       triamcinolone 0.1 % cream    KENALOG    30 g    Apply sparingly to affected area two times daily for 3-5 days.       TYLENOL PM EXTRA STRENGTH PO          UNABLE TO FIND      MEDICATION NAME: Zquil       vitamin D 2000 UNITS Caps      Take 1 capsule by mouth daily       VITAMIN E COMPLEX PO          * warfarin 2.5 MG tablet    COUMADIN    156 tablet    Take 2.5 mg on Tue; 5 mg all other days       * warfarin 2.5 MG tablet    COUMADIN    130 tablet    as directed by Anticoagulation Clinic currently establishing a maintenance dose       * Notice:  This list has 5 medication(s) that are the same as other medications prescribed for you. Read the directions carefully, and ask your doctor or other care provider to review them with you.

## 2017-05-26 NOTE — PROGRESS NOTES
"Clinic Care Coordination Contact  OUTREACH    Referral Information:  Referral Source: PCP  Reason for Contact: \"Caregiver Concerns, Concerns with ADLs/IADLs, Recent Discharge From Hospital. Pt is having surgery at [facility] next week.  Daughter is primary care giver and is exhausted. Patient can feed herself and put on her own shirt but needs help with transfers, showering, and toileting.\"  Care Conference: No     Universal Utilization:   ED Visits in last year: 3  Hospital visits in last year: 4  Last PCP appointment: 05/25/17     Concerns: yes  Multiple Providers or Specialists: yes    Clinical Concerns:  Current Medical Concerns: Pt is going to [place] on May 30 & 31 for tests with a procedure on June 1st.  Pt to remain hospitalized for several days after her procedure.  Current Behavioral Concerns: Per pt's daughter, Digna, pt is often forgetful, highly anxious, and is unable to be left alone due to anxiety.    Education Provided to patient: HIRAL and Digna discussed options.  Digna understands that pt will undergo her tests/procedures at [place] & then may come home with Home Care orders vs going to a TCU for cares.  HIRAL discussed that cares for the pt such as bathing, dressing, bedtime cares are privately paid, however, due to pt's weakness, this writer will request a Home Care referral today for RN, PT/OT and HHA to eval and treat.      Medication Management:  Digna sets up pt's medications and directs her to take them.     Functional Status:  Mobility Status: Dependent/Assisted by Another; pt used to be more independent, however, she has recently declined.  Equipment Currently Used at Home: wheelchair, shower chair, hospital bed,   Transportation: Digna or family transports pt      Psychosocial:  Current living arrangement:: I live in a private home with Digna, my daughter & son in law, in [place]-  Moved back in Sept 2016  Financial/Insurance: Social Security/ [employer]     Pt moved in with her daughter and son in law " about 6 months ago and Digna has been the sole care giver to the pt.  Digna is exhausted.  Much time was spent in supportive listening and encouragement with Digna, but also discussed options for short term & long term cares for the pt.  Short term:  Home Care, family meeting to address giving Digna a day off each week where a family member is with the pt, and/or privately paying for cares.  Long term:  Digna will call the [facility] to get a [place], where a PHRN & SW will come to pt's home and evaluate her needs, discuss finances and assist with programs that will be care for the pt.     Resources and Interventions:  Current Resources: None     [facility] Referral Placed: 05/26/17  Advanced Care Plans/Directives on file:: No  Referrals Placed: Home Care, [facility] Resources, [facility] Line     Goals:   Goal 1 Statement: Pt's family is interested in services for pt's home.  Goal 1 Progression Percent: 20%  Goal 1 Progression Date: 05/26/17  Barriers: None presented today, just a delay due to pt's hospitalization next week at [place]  Strengths: Pt has great family support  Patient/Caregiver understanding: Digna thanked writer for conversation, referrals and resources.    Frequency of Care Coordination: monthly (pending home care input)        Plan: Digna to call a family meeting this weekend.  Digna to call [facility] to set up a [place] Choice Assessment.  SW to request [facility] RN, PT/OT and HHA from PCP.    Jessie Gan  Social Work Care Coordinator  [facility]  [phone]

## 2017-05-26 NOTE — PROGRESS NOTES
[facility]  [address] [address] [address]  [phone]  Dept: [phone]    PRE-OP EVALUATION:  Today's date: 2017    Natalie Hair (: 1932) presents for pre-operative evaluation assessment as requested by Dr. Muller.  She requires evaluation and anesthesia risk assessment prior to undergoing surgery/procedure for treatment of Tricuspid Regurgitation, severe.  Proposed procedure: Latif Forma Tricuspid Transcatheter Repair system     Date of Surgery/ Procedure:   Time of Surgery/ Procedure: To be determined   Hospital/Surgical Facility: [facility]   Fax number for surgical facility: [phone]  Primary Physician: Mimi Mcguire  Type of Anesthesia Anticipated: General    Patient has a Health Care Directive or Living Will:  NO    1. Yes - Do you have a history of heart attack, stroke, stent, bypass or surgery on an artery in the head, neck, heart or legs? See history   2. NO - Do you ever have any pain or discomfort in your chest?  3. NO - Do you have a history of  Heart Failure?  4. Yes - Are you troubled by shortness of breath when: walking on the level, up a slight hill or at night? Yes.   5. NO - Do you currently have a cold, bronchitis or other respiratory infection?  6. Yes - Do you have a cough, shortness of breath or wheezing? Has ongoing issues with SHORTNESS OF BREATH and orthopnea   7. NO - Do you sometimes get pains in the calves of your legs when you walk?  8. NO - Do you or anyone in your family have previous history of blood clots?  9. NO - Do you or does anyone in your family have a serious bleeding problem such as prolonged bleeding following surgeries or cuts?  10. NO - Have you ever had problems with anemia or been told to take iron pills?  11. NO - Have you had any abnormal blood loss such as black, tarry or bloody stools, or abnormal vaginal bleeding?  12. Yes - Have you ever had a blood transfusion? Last transfusion was over 14 years ago    13. NO - Have you or any of your relatives ever had problems with anesthesia?  14. NO - Do you have sleep apnea, excessive snoring or daytime drowsiness?  15. Yes - Do you have any prosthetic heart valves? Has two artifical knees   16. NO - Do you have prosthetic joints?  17. NO - Is there any chance that you may be pregnant?      HPI:                                                      Brief HPI related to upcoming procedure:     1.  Severe tricuspid regurgitation with primarily right-sided heart failure symptoms.  The patient is following at [facility] for possible Latif FORMA valve  2.  Congestive heart failure with diastolic dysfunction and valvular heart disease.  Most recent echocardiogram was performed on 07/09/2016.  Ejection fraction was preserved with severe tricuspid regurgitation.  Bioprosthetic aortic valve was noted with mean gradient of 7 mm across this valve.  PA systolic pressure could not be estimated due to incomplete envelope on the tricuspid regurgitation per report.   3.  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  Please see recent cardiac catheterization this past summer.  - multivessel CABG in the past along with AVR in 2002.  4.  Status post pacemaker placement for atrial fibrillation/flutter with AV node ablation.   A-fib ablation done in Feb 2017 , per patient's daughter   5. Cardiac stent placed at  [facility] on 5/4/2017 - patient doesn't know what type of stent.        Sacral ulcers - very uncomfortable  Daughter tries to off load the sores but maxime doesn't like this position  Has a hospital bed, elevates head of bed  Sleeping in recliner   Sores are not currently open.   They had healed with silvadene and they are still using this.         Constipation -   miralax 2x/week  sennas 2x/day   Colace 2x/day   Doing prune and apple juice       MEDICAL HISTORY:                                                      Patient Active Problem List     Diagnosis Date Noted     S/P CABG (coronary artery bypass graft) - 2002 05/26/2017     Priority: Medium     Stented coronary artery - 5/4/2017 at [place] 05/26/2017     Priority: Medium     History of pressure ulcer 12/23/2016     Priority: Medium     Health Care Home 12/23/2016     Priority: Medium     *See Letters for [facility] Care Plan: My Access Plan         Near syncope 12/01/2016     Priority: Medium     Chronic diastolic congestive heart failure (H) 12/01/2016     Priority: Medium     Right sided heart failure       Tricuspid valve insufficiency, unspecified etiology 09/27/2016     Priority: Medium     Primary pulmonary hypertension (H) 09/09/2016     Priority: Medium     Lymphedema of both lower extremities 09/09/2016     Priority: Medium     Atrial fibrillation with RVR (H) 07/18/2016     Priority: Medium     Bilateral low back pain without sciatica 03/02/2016     Priority: Medium     Osteopenia 03/02/2016     Priority: Medium     osteopenia on dexa scan 2/10- right hip -2, left hip -1.3, spine -0.8  FRAX score- hip 4.7%, osteo 19%       Benign essential hypertension, BP goal <150/90 09/08/2015     Priority: Medium     Unstable angina (H) 09/02/2015     Priority: Medium     Long term current use of anticoagulant therapy 04/09/2014     Priority: Medium     9/2/15Take plavix and Coumadin indefinitely.Follow-up with [facility] in 2-4 weeks. Phone [phone]  INR goal 2.5- 3.5 Mitral valve           Urinary incontinence 04/09/2014     Priority: Medium     Advanced directives, counseling/discussion 12/26/2011     Priority: Medium     Advance Directive Problem List Overview:   Name Relationship Phone    Primary Health Care Agent            Alternative Health Care Agent          Discussed advance care planning with patient; information given to patient to review. 12/26/2011          Hyperlipidemia LDL goal <100 10/31/2010     Priority: Medium     Vitamin D deficiency 01/02/2008     Priority: Medium     Problem list  name updated by automated process. Provider to review       History of T12 compression fracture 01/24/2007     Priority: Medium     osteopenia on dexa scan 2/10- right hip -2, left hip -1.3, spine -0.8  FRAX score- hip 4.7%, osteo 19%       Backache 01/24/2007     Priority: Medium     CT scan 8/06 shows degeneration in facet joints and DDD  Problem list name updated by automated process. Provider to review       Cardiovascular disease      Priority: Medium     CAD status post 3 vessel CABG (LIMA to LAD, SVG to RCA, Radial graft to first OM) and mechanical AVR in 12/2002 in [facility]         Heart valve replaced 02/15/2006     Priority: Medium     AVR 12/02 [facility]- #20 ATS mechanical aortic valve, on coumadin  Problem list name updated by automated process. Provider to review        Past Medical History:   Diagnosis Date     Backache      H/O aortic valve replacement in 2002    maintained on chronic anticoagulation with warfarin     Hyperlipidemia LDL goal < 100      Hypertension      T12 compression fracture (H)      Unspecified cardiovascular disease     hx of CAD with 3V CABG and AV replacement in 2002     Urinary incontinence      Vitamin D deficiency      Past Surgical History:   Procedure Laterality Date     ARTHROPLASTY KNEE BILATERAL       SURGICAL HISTORY OF -   12/2002    Triple bypass w/valve replacement - aortic     Current Outpatient Prescriptions   Medication Sig Dispense Refill     UNABLE TO FIND MEDICATION NAME: Zquil       Diphenhydramine-APAP, sleep, (TYLENOL PM EXTRA STRENGTH PO)        warfarin (COUMADIN) 2.5 MG tablet as directed by Anticoagulation Clinic currently establishing a maintenance dose 130 tablet 0     alendronate (FOSAMAX) 70 MG tablet TAKE 1 TABLET BY MOUTH ONCE WEEKLY ON EMPTY STOMACH 12 tablet 0     bumetanide (BUMEX) 2 MG tablet TAKE 1 TABLET(2 MG) BY MOUTH TWICE DAILY 180 tablet 2     metolazone (ZAROXOLYN) 2.5 MG tablet Take one tablet per day for two days only 6  tablet 0     clopidogrel (PLAVIX) 75 MG tablet Take 1 tablet (75 mg) by mouth daily 90 tablet 0     omega 3 1000 MG CAPS Take 1 g by mouth daily 90 capsule 3     calcium carbonate (OS-SHANICE 500 MG [place]. [place]) 500 MG tablet Take 200 mg by mouth 2 times daily       multivitamin, therapeutic with minerals (MULTI-VITAMIN) TABS tablet Take 1 tablet by mouth daily       metoprolol (LOPRESSOR) 25 MG tablet Take 0.5 tablets (12.5 mg) by mouth 2 times daily 90 tablet 3     ezetimibe (ZETIA) 10 MG tablet Take 1 tablet (10 mg) by mouth daily 90 tablet 3     potassium chloride (K-TAB,KLOR-CON) 10 MEQ tablet Take 1 tablet (10 mEq) by mouth 2 times daily 90 tablet 1     spironolactone (ALDACTONE) 25 MG tablet Take 1 tablet (25 mg) by mouth daily 90 tablet 3     Cholecalciferol (VITAMIN D) 2000 UNITS CAPS Take 1 capsule by mouth daily       senna-docusate (SENNA S) 8.6-50 MG per tablet Take 2 tablets by mouth 2 times daily       ACETAMINOPHEN PO Take 1,000 mg by mouth every 8 hours as needed for pain        SSD 1 % cream APPLY TWICE DAILY TO THE AFFECTED AREA(S) AS DIRECTED 400 g 0     warfarin (COUMADIN) 2.5 MG tablet Take 2.5 mg on Tue; 5 mg all other days 156 tablet 0     VITAMIN E COMPLEX PO        Fish Oil OIL        ondansetron (ZOFRAN ODT) 4 MG ODT tab Take 1-2 tablets (4-8 mg) by mouth every 6 hours as needed for nausea 10 tablet 0     order for DME Equipment being ordered: Hospital Bed  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 -ongoing symptoms of lower extremity edema,shortness of breath,cough  sacral pressure sores within last 6 months    An electric hospital bed to allow for increase in head of bed elevation and for ease in wheelchair transfers   Length of need: lifelong  NPI - [id number] (Patient not taking: Reported on 3/14/2017) 1 Device 0     cetirizine (ZYRTEC) 5 MG tablet Take 1 tablet (5 mg) by mouth daily 30 tablet 3     polyethylene glycol (MIRALAX/GLYCOLAX) powder Take 1 capful by mouth daily as needed for  constipation       order for DME Equipment being ordered: Walker with a seat (Patient not taking: Reported on 3/14/2017) 1 Package 0     order for DME Equipment being ordered: walker with a seat on it  Has severe CHF and lower extremity edema. (Patient not taking: Reported on 3/14/2017) 1 Device 0     HYDROcodone-acetaminophen (NORCO) 5-325 MG per tablet Take 1 tablet by mouth every 8 hours as needed for moderate to severe pain or pain 50 tablet 0     nitroglycerin (NITROSTAT) 0.4 MG SL tablet Place 1 tablet (0.4 mg) under the tongue every 5 minutes as needed for chest pain 25 tablet 1     OTC products: no recent use of OTC NSAIDS or Steroids  Taking a daily asa in place of coumadin, per cardiologist at [place]    Allergies   Allergen Reactions     Lorazepam Nausea and Vomiting     Morphine Sulfate Cr [Morphine Sulfate] Nausea and Vomiting     Cozaar [Losartan]      Rash      Crestor [Rosuvastatin] Other (See Comments)     Abdominal/Back pain     Lidocaine GI Disturbance      Latex Allergy: NO    Social History   Substance Use Topics     Smoking status: Former Smoker     Types: Cigarettes     Smokeless tobacco: Never Used      Comment: 40 years ago     Alcohol use No     History   Drug Use No       REVIEW OF SYSTEMS:                                                    CONSTITUTIONAL:anorexia and weight loss  INTEGUMENTARY/SKIN: sore sacral area  E: NEGATIVE for vision changes or irritation  E/M: NEGATIVE for ear, mouth and throat problems  RESP:POSITIVE for SOB/dyspnea  CV: NEGATIVE for chest pain, palpitations   Positive for  peripheral edema  GI: NEGATIVE for nausea, abdominal pain, heartburn, or change in bowel habits  M: NEGATIVE for significant arthralgias or myalgia  N: NEGATIVE for weakness, dizziness or paresthesias  E: NEGATIVE for temperature intolerance, skin/hair changes  H: NEGATIVE for bleeding problems    EXAM:                                                    /61 (BP Location: Right arm, Patient  "Position: Chair, Cuff Size: Adult Regular)  Pulse 70  Temp 95.3  F (35.2  C) (Tympanic)  Ht 5' 6\" (1.676 m)  Wt 177 lb 12.8 oz (80.6 kg)  BMI 28.7 kg/m2    GENERAL APPEARANCE: healthy, alert and no distress   In a wheelchair      EYES: EOMI, PERRL     HENT: ear canals and TM's normal and nose and mouth without ulcers or lesions     NECK: no adenopathy, no asymmetry, masses, or scars and thyroid normal to palpation     RESP: lungs clear to auscultation - no rales, rhonchi or wheezes     CV: regular rates and rhythm, normal S1 S2, no S3 or S4 and no murmur, click or rub     ABDOMEN:  soft, nontender, no HSM or masses and bowel sounds normal     MS: 2+ edema to the knee, wearing mikaela hose bilaterally     NEURO: Normal strength and tone, sensory exam grossly normal, mentation intact and speech normal     PSYCH: mentation appears normal. and affect normal/bright      Sacrum: no open wounds. erythematous irritated appearing skin in the gluteal cleft.     DIAGNOSTICS:                                                      EKG: not indicated here. Will have echo prior to surgery next week     Results for orders placed or performed in visit on 05/26/17   CBC with platelets differential   Result Value Ref Range    WBC 6.4 4.0 - 11.0 10e9/L    RBC Count 5.17 3.8 - 5.2 10e12/L    Hemoglobin 14.8 11.7 - 15.7 g/dL    Hematocrit 44.8 35.0 - 47.0 %    MCV 87 78 - 100 fl    MCH 28.6 26.5 - 33.0 pg    MCHC 33.0 31.5 - 36.5 g/dL    RDW 15.9 (H) 10.0 - 15.0 %    Platelet Count 193 150 - 450 10e9/L    Diff Method Automated Method     % Neutrophils 65.1 %    % Lymphocytes 15.9 %    % Monocytes 14.0 %    % Eosinophils 4.4 %    % Basophils 0.6 %    Absolute Neutrophil 4.1 1.6 - 8.3 10e9/L    Absolute Lymphocytes 1.0 0.8 - 5.3 10e9/L    Absolute Monocytes 0.9 0.0 - 1.3 10e9/L    Absolute Eosinophils 0.3 0.0 - 0.7 10e9/L    Absolute Basophils 0.0 0.0 - 0.2 10e9/L   Comprehensive metabolic panel (BMP + Alb, Alk Phos, ALT, AST, Total. Bili, " TP)   Result Value Ref Range    Sodium 134 133 - 144 mmol/L    Potassium 3.6 3.4 - 5.3 mmol/L    Chloride 99 94 - 109 mmol/L    Carbon Dioxide 27 20 - 32 mmol/L    Anion Gap 8 3 - 14 mmol/L    Glucose 114 (H) 70 - 99 mg/dL    Urea Nitrogen 24 7 - 30 mg/dL    Creatinine 1.01 0.52 - 1.04 mg/dL    GFR Estimate 52 (L) >60 mL/min/1.7m2    GFR Estimate If Black 63 >60 mL/min/1.7m2    Calcium 10.0 8.5 - 10.1 mg/dL    Bilirubin Total 1.2 0.2 - 1.3 mg/dL    Albumin 3.5 3.4 - 5.0 g/dL    Protein Total 7.6 6.8 - 8.8 g/dL    Alkaline Phosphatase 225 (H) 40 - 150 U/L    ALT 21 0 - 50 U/L    AST 39 0 - 45 U/L         Recent Labs   Lab Test 05/22/17 05/17/17 03/14/17   0842   01/12/17   2030   12/01/16   0625   HGB   --    --    --    --    --   15.8*   --   14.0   PLT   --    --    --    --    --   267   --   210   INR  2.6*  4.5*   < >   --    < >   --    < >   --    NA   --    --    --   141   --   137   < >   --    POTASSIUM   --    --    --   3.8   --   3.3*   < >   --    CR   --    --    --   0.96   --   1.02   < >   --     < > = values in this interval not displayed.        IMPRESSION:                                                    Reason for surgery/procedure: Severe TR - Latif Forma Tricuspid Transcatheter Repair system    Diagnosis/reason for consult:   Pre-op consultation  CAD with history of CABG and stents  A-fib with ablation  Pacemaker   Patient Active Problem List   Diagnosis     Heart valve replaced     Cardiovascular disease     History of T12 compression fracture     Backache     Vitamin D deficiency     Hyperlipidemia LDL goal <100     Advanced directives, counseling/discussion     Long term current use of anticoagulant therapy     Urinary incontinence     Unstable angina (H)     Benign essential hypertension, BP goal <150/90     Bilateral low back pain without sciatica     Osteopenia     Atrial fibrillation with RVR (H)     Primary pulmonary hypertension (H)     Lymphedema of both lower extremities      Tricuspid valve insufficiency, unspecified etiology     Near syncope     Chronic diastolic congestive heart failure (H)     History of pressure ulcer     Health Care Home     S/P CABG (coronary artery bypass graft) - 2002     Stented coronary artery - 5/4/2017 at [place]         The proposed surgical procedure is considered HIGH risk.    REVISED CARDIAC RISK INDEX  The patient has the following serious cardiovascular risks for perioperative complications such as (MI, PE, VFib and 3  AV Block):  High risk surgery (>5% cardiac complication risk)  Congestive Heart Failure (pulmonary edema, PND, s3 deonte, CXR with pulmonary congestion, basilar rales)  INTERPRETATION: 2 risks: Class III (moderate risk - 6.6% complication rate)    The patient has the following additional risks for perioperative complications:  No identified additional risks      RECOMMENDATIONS:                                                      --Consult hospital rounder / IM to assist post-op medical management    Cardiovascular Risk  Cannot Perform 4 METs exercise without shortness of breath.  Attempted cardiac rehab but they discontinued treatment until she had upcoming procedure due to shortness of breath with exercise.  It is likely the SHORTNESS OF BREATH is due to CHF from severe TR, so surgery is expected to help.     Patient is already on a Beta Blocker. Continue Beta blocker therapy after surgery, using Beta blocker order set as necessary for NPO status.    Diuretics - continue meds and potassium on the day of surgery     Pulmonary Risk  Incentive spirometry post op  Respiratory Therapy (Respiratory Care IP Consult)  post op  NG tube decompression if abdominal distension or significant vomiting       --Patient is to take all scheduled medications on the day of surgery EXCEPT for modifications listed below.    Anticoagulant or Antiplatelet Medication Use  ASPIRIN: patient was asked to take baby aspirin until surgery   PLAVIX: This med is likely  to be continued but patient's daughter will call cardiac team today and ask.   WARFARIN: held for now     Sacrum - history of open wounds. Bright red erythema:   No open wound at this time. I recommended topical steroid cream with aquaphor or emollient for a few days. Then stop steroid and continue with good daily emollient. Change diaper immediately after urination or stooling     Greatly diminished appetite:  Likely due to chronic dz   Will readdress after surgery.     Constipation:  Recommended that she take the miralax every day  Continue bid senna and colace     Homecare/ referral  Patient needs help with most ADLs.   Daughter is main caregiver and needs respite.  Care coordination referral is placed today         APPROVAL GIVEN to proceed with proposed procedure, without further diagnostic evaluation       Signed Electronically by: Mimi Mcguire MD    Copy of this evaluation report is provided to requesting physician.    [place] Preop Guidelines

## 2017-06-01 ENCOUNTER — TRANSFERRED RECORDS (OUTPATIENT)
Dept: HEALTH INFORMATION MANAGEMENT | Facility: CLINIC | Age: 82
End: 2017-06-01

## 2017-06-08 NOTE — TELEPHONE ENCOUNTER
This RN contacts Susy Han's daughter, during chart prep for patients upcoming cardiology revisit with MD Zachery on 6/16/17. Notes indicate patient was to have possible Latif FORMA valve procedure at [facility] (conflicting dates: 5/30/17 and 6/5/17) I called to talk with Emely regarding if and when procedure was done. Emely states Mom had procedure on 6/1/17. Was supposed to be a 3 day stay but her Mom is having issues and is still currently IP. She states pt will be discharged to a rehab facility. I recommended we cancel the upcoming revisit with MD Zachery for now and reschedule this visit once [facility] discharges the patient and gives their recommendations. Emely in agreement to this plan. INR appt pending 6/12/17 also cancelled at this time pending discharge orders. Jessie De Leon, RN Cardiology at [facility] June 8, 2017, 11:18 AM

## 2017-06-12 NOTE — NURSING NOTE
Daughter presents to clinic asking for help to get Natalie moved from the TCU she is at as she feels she is getting bad care there. She had heart surgery on 6/1 at the [facility] and was discharged on 6/9 to a TCU. She feels her mother is not being taken care very well at all there. I did contact our care coordinator Jessie and she states she cannot get involved in that but advised she contact the  and or  for that TCU and asked to have her moved to another facility. She agreed with this plan and was going to go talk to them today.    Ashley Espinoza RN

## 2017-06-12 NOTE — MR AVS SNAPSHOT
"              After Visit Summary   2017    Natalie Hair    MRN: [id number]           Patient Information     Date Of Birth          1932        Visit Information        Provider Department      2017 10:30 AM [facility]        Today's Diagnoses     Chronic diastolic congestive heart failure (H)    -  1       Follow-ups after your visit        Who to contact     Normal or non-critical lab and imaging results will be communicated to you by MyChart, letter or phone within 4 business days after the clinic has received the results. If you do not hear from us within 7 days, please contact the clinic through MyChart or phone. If you have a critical or abnormal lab result, we will notify you by phone as soon as possible.  Submit refill requests through CompleteSet or call your pharmacy and they will forward the refill request to us. Please allow 3 business days for your refill to be completed.          If you need to speak with a  for additional information , please call: [phone]             Additional Information About Your Visit        CompleteSet Information     CompleteSet lets you send messages to your doctor, view your test results, renew your prescriptions, schedule appointments and more. To sign up, go to [email or web address] . Click on \"Log in\" on the left side of the screen, which will take you to the Welcome page. Then click on \"Sign up Now\" on the right side of the page.     You will be asked to enter the access code listed below, as well as some personal information. Please follow the directions to create your username and password.     Your access code is: [email or web address]  Expires: 2017  9:28 AM     Your access code will  in 90 days. If you need help or a new code, please call your [place] clinic or [phone].        Care EveryWhere ID     This is your Care EveryWhere ID. This could be used by other organizations to access your [place] medical " records  [id number]         Blood Pressure from Last 3 Encounters:   05/26/17 134/61   03/14/17 104/64   01/12/17 115/60    Weight from Last 3 Encounters:   05/26/17 177 lb 12.8 oz (80.6 kg)   03/14/17 193 lb 12.8 oz (87.9 kg)   01/12/17 194 lb (88 kg)              Today, you had the following     No orders found for display       Primary Care Provider Office Phone # Fax #    Mimi Mcguire [phone]       [facility] [address] [address]        Thank you!     Thank you for choosing [facility]  for your care. Our goal is always to provide you with excellent care. Hearing back from our patients is one way we can continue to improve our services. Please take a few minutes to complete the written survey that you may receive in the mail after your visit with us. Thank you!             Your Updated Medication List - Protect others around you: Learn how to safely use, store and throw away your medicines at www.disposemymeds.org.          This list is accurate as of: 6/12/17  3:25 PM.  Always use your most recent med list.                   Brand Name Dispense Instructions for use    ACETAMINOPHEN PO      Take 1,000 mg by mouth every 8 hours as needed for pain       alendronate 70 MG tablet    FOSAMAX    12 tablet    TAKE 1 TABLET BY MOUTH ONCE WEEKLY ON EMPTY STOMACH       bumetanide 2 MG tablet    BUMEX    180 tablet    TAKE 1 TABLET(2 MG) BY MOUTH TWICE DAILY       calcium carbonate 1250 MG tablet    OS-SHANICE 500 mg [place]. [place]     Take 200 mg by mouth 2 times daily       cetirizine 5 MG tablet    zyrTEC    30 tablet    Take 1 tablet (5 mg) by mouth daily       clopidogrel 75 MG tablet    PLAVIX    90 tablet    Take 1 tablet (75 mg) by mouth daily       ezetimibe 10 MG tablet    ZETIA    90 tablet    Take 1 tablet (10 mg) by mouth daily       Fish Oil Oil          metolazone 2.5 MG tablet    ZAROXOLYN    6 tablet    Take one tablet per day for two days only        metoprolol 25 MG tablet    LOPRESSOR    90 tablet    Take 0.5 tablets (12.5 mg) by mouth 2 times daily       Multi-vitamin Tabs tablet      Take 1 tablet by mouth daily       nitroglycerin 0.4 MG sublingual tablet    NITROSTAT    25 tablet    Place 1 tablet (0.4 mg) under the tongue every 5 minutes as needed for chest pain       omega 3 1000 MG Caps     90 capsule    Take 1 g by mouth daily       ondansetron 4 MG ODT tab    ZOFRAN ODT    10 tablet    Take 1-2 tablets (4-8 mg) by mouth every 6 hours as needed for nausea       * order for DME     1 Device    Equipment being ordered: walker with a seat on it Has severe CHF and lower extremity edema.       * order for DME     1 Package    Equipment being ordered: Walker with a seat       * order for DME     1 Device    Equipment being ordered: Hospital Bed 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 -ongoing symptoms of lower extremity edema,shortness of breath,cough sacral pressure sores within last 6 months   An electric hospital bed to allow for increase in head of bed elevation and for ease in wheelchair transfers  Length of need: lifelong NPI - [id number]       polyethylene glycol powder    MIRALAX/GLYCOLAX     Take 1 capful by mouth daily as needed for constipation       potassium chloride 10 MEQ tablet    K-TAB,KLOR-CON    90 tablet    Take 1 tablet (10 mEq) by mouth 2 times daily       SENNA S 8.6-50 MG per tablet   Generic drug:  senna-docusate      Take 2 tablets by mouth 2 times daily       spironolactone 25 MG tablet    ALDACTONE    90 tablet    Take 1 tablet (25 mg) by mouth daily       SSD 1 % cream   Generic drug:  silver sulfADIAZINE     400 g    APPLY TWICE DAILY TO THE AFFECTED AREA(S) AS DIRECTED       triamcinolone 0.1 % cream    KENALOG    30 g    Apply sparingly to affected area two times daily for 3-5 days.       TYLENOL PM EXTRA STRENGTH PO          UNABLE TO FIND      MEDICATION NAME: Zquil       vitamin D 2000 UNITS Caps      Take 1 capsule by  mouth daily       VITAMIN E COMPLEX PO          * warfarin 2.5 MG tablet    COUMADIN    156 tablet    Take 2.5 mg on Tue; 5 mg all other days       * warfarin 2.5 MG tablet    COUMADIN    130 tablet    as directed by Anticoagulation Clinic currently establishing a maintenance dose       * Notice:  This list has 5 medication(s) that are the same as other medications prescribed for you. Read the directions carefully, and ask your doctor or other care provider to review them with you.

## 2017-06-26 NOTE — PROGRESS NOTES
ANTICOAGULATION FOLLOW-UP CLINIC VISIT    Patient Name:  Natalie Hair  Date:  6/26/2017  Contact Type:  Face to Face    SUBJECTIVE:     Patient Findings     Positives Change in diet/appetite (picky eater, states everything tastes sweet.  She is trying to be consistent with eating, she eats a minimal amount of greens), Activity level change (Activity is less than in the TCU where she had therapy 2 times a day  She is waking behind her w/chair or walker.  Daughter reports that  she walked Sat but refused to do therapy walking on Sun.  She walked into [facility] today, (behind her w/chair))    Comments Writer encouraged pt to do her prescribed exercises to gain strength back in her legs.  Daughter, Digna reports that the physicians at [facility] told them her surgery went very well, they could see an improvement in her heart immediately.  Pt states she does feel better, SOB has greatly decreased           OBJECTIVE    INR Protime   Date Value Ref Range Status   06/26/2017 4.5 (A) 0.86 - 1.14 Final       ASSESSMENT / PLAN  INR assessment SUPRA    Recheck INR In: 2 DAYS    INR Location Outside lab      Anticoagulation Summary as of 6/26/2017     INR goal 2.5-3.5   Today's INR 4.5!   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]  Long term current use of anticoagulant therapy [Z79.01]         Anticoagulation Episode Summary     INR check location     Preferred lab     Send INR reminders to [facility]    Comments * Patient will be moving to [place], PLEASE CALL REENA WITH NEW DOSING INSTRUCTIONS AT  [phone]  To [place] 5/3 for follow of stent placement surgery 6/1      Anticoagulation Care Providers     Provider Role Specialty Phone number    Mimi Mcguire MD  [facility] [phone]            See the Encounter Report to view Anticoagulation Flowsheet and Dosing Calendar (Go to Encounters tab in chart review, and find the Anticoagulation Therapy Visit)        Polina Quiroz RN

## 2017-06-26 NOTE — TELEPHONE ENCOUNTER
clopidogrel (PLAVIX) 75 MG tablet           Last Written Prescription Date: 3/3/17  Last Fill Quantity: 90, # refills: 0    Last Office Visit with G, P or [facility] prescribing provider:  5/26/17   Future Office Visit:    Next 5 appointments (look out 90 days)     Jun 27, 2017  9:30 AM CDT   SHORT with Mimi Mcguire MD   [facility]    [address] [address]   [phone]            Jul 12, 2017 10:00 AM CDT   SHORT with Irais Olivia IVET   [facility] ([facility]    [address] [address]   [phone]                   Lab Results   Component Value Date    WBC 6.4 05/26/2017     Lab Results   Component Value Date    RBC 5.17 05/26/2017     Lab Results   Component Value Date    HGB 14.8 05/26/2017     Lab Results   Component Value Date    HCT 44.8 05/26/2017     No components found for: MCT  Lab Results   Component Value Date    MCV 87 05/26/2017     Lab Results   Component Value Date    MCH 28.6 05/26/2017     Lab Results   Component Value Date    MCHC 33.0 05/26/2017     Lab Results   Component Value Date    RDW 15.9 05/26/2017     Lab Results   Component Value Date     05/26/2017     Lab Results   Component Value Date    AST 39 05/26/2017     Lab Results   Component Value Date    ALT 21 05/26/2017     Creatinine   Date Value Ref Range Status   05/26/2017 1.01 0.52 - 1.04 mg/dL Final   ]

## 2017-06-26 NOTE — TELEPHONE ENCOUNTER
POTASSIUM CL 10MEQ ER TABLETS        Last Written Prescription Date: 12/6/16  Last Fill Quantity: 90, # refills: 1  Last Office Visit with [facility], [facility] or [facility] prescribing provider: 5/26/17  Next 5 appointments (look out 90 days)     Jun 27, 2017  9:30 AM CDT   SHORT with Mimi Mcguire MD   [facility]    [address] [address] [phone]                   Potassium   Date Value Ref Range Status   05/26/2017 3.6 3.4 - 5.3 mmol/L Final     Creatinine   Date Value Ref Range Status   05/26/2017 1.01 0.52 - 1.04 mg/dL Final     BP Readings from Last 3 Encounters:   05/26/17 134/61   03/14/17 104/64   01/12/17 115/60

## 2017-06-26 NOTE — MR AVS SNAPSHOT
Natalie Hair   6/26/2017 10:00 AM   Anticoagulation Therapy Visit    Description:  84 year old female   Provider:  [facility]   Department:  [facility]           INR as of 6/26/2017     Today's INR 4.5!      Anticoagulation Summary as of 6/26/2017     INR goal 2.5-3.5   Today's INR 4.5!   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  Please call [phone] with any problems or questions regarding your therapy          June 2017 Details    Sun Mon Tue Wed Thu Fri Sat         1               2               3                 4               5               6               7               8               9               10                 11               12               13               14               15               16               17                 18               19               20               21               22               23               24                 25               26      Hold   See details      27      4 mg         28            29               30                 Date Details   06/26 This INR check       Date of next INR:  6/28/2017         How to take your warfarin dose     To take:  4 mg Take 1 of the 4 mg tablets.    Hold Do not take your warfarin dose. See the Details table to the right for additional instructions.

## 2017-06-27 PROBLEM — L89.159 DECUBITUS ULCER OF SACRAL REGION, UNSPECIFIED ULCER STAGE: Status: ACTIVE | Noted: 2017-01-01

## 2017-06-27 NOTE — PROGRESS NOTES
"  SUBJECTIVE:                                                    Natalie Hair is a 84 year old female who presents to clinic today for the following health issues:    [facility] June [facility]st  [facility]  - experimental procedure not done in this [place].   She was inpatient for 9 days, then to TCU for 15d, now home with her daughter for 3d    She has no memory of hospitalization or TCU stay. Daughter says short term memory \"is shot\"   She is less shortness of breath and more able to walk  Less lower extremity edema   No pain   Wearing compression hose daily     No appetite at all. Many foods she won't eat  Will eat shrimp    Med changes - on torsemide , off bumex   Off silvadene cream   Off metolazone   Off asa  Warfarin is now 4mg  Seeing coumadin clinic     Itchy abdomen - no wounds   No lotion  Uses body wash with scrubbing sponge in the shower     Bed sores -   Closed when she went to [facility] and open when she was discharged   Now they are better since time at the TCU   Using product with zinc oxide and calamine     May 4 - stent   Hasn't done cardiac rehab yet - daughter wonders if she should do this now.     Short term memory loss:   Doesn't know where we are   Identifies year   Identifies president   Identifies daughter       COGNITIVE SCREEN  1) Repeat 3 items (Banana, Sunrise, Chair)    2) Clock draw: ABNORMAL. She put the time 11:45  3) 3 item recall: Recalls 1 object   Results: ABNORMAL clock, 1-2 items recalled: PROBABLE COGNITIVE IMPAIRMENT, **INFORM PROVIDER**    Mini-CogTM Copyright KATHLEEN Sabillon. Licensed by the author for use in [facility]; reprinted with permission (loni@.[email or web address]). All rights reserved.      Review of systems:  No f/c   No n/v   No d/c   No rash        Problem list and histories reviewed & adjusted, as indicated.  Additional history: as documented    Patient Active Problem List   Diagnosis     Heart valve replaced     Cardiovascular disease     History " of T12 compression fracture     Backache     Vitamin D deficiency     Hyperlipidemia LDL goal <100     Advanced directives, counseling/discussion     Long term current use of anticoagulant therapy     Urinary incontinence     Unstable angina (H)     Benign essential hypertension, BP goal <150/90     Bilateral low back pain without sciatica     Osteopenia     Atrial fibrillation with RVR (H)     Primary pulmonary hypertension (H)     Lymphedema of both lower extremities     Tricuspid valve insufficiency, unspecified etiology     Near syncope     Chronic diastolic congestive heart failure (H)     History of pressure ulcer     Health Care Home     S/P CABG (coronary artery bypass graft) - 2002     Stented coronary artery - 5/4/2017 at [place]     Current Outpatient Prescriptions   Medication     warfarin (COUMADIN) 4 MG tablet     potassium chloride (K-TAB,KLOR-CON) 10 MEQ tablet     TORSEMIDE PO     ezetimibe (ZETIA) 10 MG tablet     UNABLE TO FIND     Diphenhydramine-APAP, sleep, (TYLENOL PM EXTRA STRENGTH PO)     alendronate (FOSAMAX) 70 MG tablet     clopidogrel (PLAVIX) 75 MG tablet     omega 3 1000 MG CAPS     calcium carbonate (OS-SHANICE 500 MG [place]. [place]) 500 MG tablet     multivitamin, therapeutic with minerals (MULTI-VITAMIN) TABS tablet     VITAMIN E COMPLEX PO     Fish Oil OIL     metoprolol (LOPRESSOR) 25 MG tablet     ondansetron (ZOFRAN ODT) 4 MG ODT tab     order for DME     spironolactone (ALDACTONE) 25 MG tablet     polyethylene glycol (MIRALAX/GLYCOLAX) powder     Cholecalciferol (VITAMIN D) 2000 UNITS CAPS     senna-docusate (SENNA S) 8.6-50 MG per tablet     order for DME     order for DME     ACETAMINOPHEN PO     [DISCONTINUED] warfarin (COUMADIN) 4 MG tablet     triamcinolone (KENALOG) 0.1 % cream     SSD 1 % cream     metolazone (ZAROXOLYN) 2.5 MG tablet     cetirizine (ZYRTEC) 5 MG tablet     nitroglycerin (NITROSTAT) 0.4 MG SL tablet     No current facility-administered medications for this visit.   "       Allergies   Allergen Reactions     Lorazepam Nausea and Vomiting     Morphine Sulfate Cr [Morphine Sulfate] Nausea and Vomiting     Cozaar [Losartan]      Rash      Crestor [Rosuvastatin] Other (See Comments)     Abdominal/Back pain     Lidocaine GI Disturbance     /44 (BP Location: Right arm, Patient Position: Sitting, Cuff Size: Adult Regular)  Pulse 69  Ht 5' 6\" (1.676 m)  Wt 175 lb 12.8 oz (79.7 kg)  SpO2 96%  BMI 28.37 kg/m2  GENERAL - Pt is alert and oriented in no acute distress.  Affect is appropriate. Good eye contact. She is very quiet and calm. Answers appropriately, cooperative   HEET - Head is normocephalic, atraumatic.    PERRLA,EEMI. Conjunctiva are free of icterus or erythema.    Oropharynx free of masses and lesions, no tonsillar exudate or petechiae.    NECK - Neck is supple w/o LA or thyromegaly  RESPIRATORY - Clear to auscultation bilaterally.  No wheezing noted  CV - RRR, no murmurs, rubs, gallops.  ABD - +BS, soft, nontender, no rebound, no guarding. No palpable organomegaly.  EXTREM -2+ edema bilaterally   SKIN - no obvious rashes or lesions  Sacrum - erythema but no open wounds.       Assessment/Plan -  (I50.9) Congestive heart failure, unspecified congestive heart failure chronicity, unspecified congestive heart failure type (H)  (primary encounter diagnosis)  Comment: doing fairly well from CHF perspective. Continue with mikaela hose and usual meds. Care coordination and home care referral placed as she is pretty deconditioned and daughter is getting exhausted caring for her.   Plan: warfarin (COUMADIN) 4 MG tablet, CBC with         platelets differential, Comprehensive metabolic        panel (BMP + Alb, Alk Phos, ALT, AST, Total.         Bili, TP), CARE COORDINATION REFERRAL,         CARDIOVASCULAR REFERRAL, HOME CARE NURSING         REFERRAL            (I89.0) Lymphedema of both lower extremities  Comment: improving. Still present.  Plan: CARE COORDINATION REFERRAL, HOME " "CARE NURSING         REFERRAL            (R06.02) Shortness of breath on exertion  Comment: improved.  Plan: potassium chloride (K-TAB,KLOR-CON) 10 MEQ         tablet, CARE COORDINATION REFERRAL, HOME CARE         NURSING REFERRAL            (I10) Essential hypertension  Comment: check labs today   Plan: potassium chloride (K-TAB,KLOR-CON) 10 MEQ         tablet, CBC with platelets differential,         Comprehensive metabolic panel (BMP + Alb, Alk         Phos, ALT, AST, Total. Bili, TP), CARE         COORDINATION REFERRAL            (R53.83) Fatigue, unspecified type  Comment: likely due to deconditioning and post major surgery.   Plan: CBC with platelets differential, Comprehensive         metabolic panel (BMP + Alb, Alk Phos, ALT, AST,        Total. Bili, TP), CARE COORDINATION REFERRAL,         HOME CARE NURSING REFERRAL            (I07.1) Tricuspid valve insufficiency, unspecified etiology  Comment: had FORMA device placement at [facility] 6/1/2017   Plan: CARE COORDINATION REFERRAL, CARDIOVASCULAR         REFERRAL, HOME CARE NURSING REFERRAL            (L89.159) Decubitus ulcer of sacral region, unspecified ulcer stage  Comment: continue to do careful primitivo cares with frequent changes and good emollient or diaper cream to reddened area. Try to offload area by lying on side or standing more frequently  Plan: HOME CARE NURSING REFERRAL            (R41.3) Memory loss or impairment  Comment: likely due to combination of age, illness, surgery and long rehab. Work on protein intake. Continue to monitor.   Plan: HOME CARE NURSING REFERRAL            (L29.9) Pruritic disorder  Comment: likely due to xerosis. No scrubbing sponge or body wash. Just wash \"hot spots\" with gentle bar soap. Use good emollient after every shower. The patient indicates understanding of these issues and agrees with the plan.   Plan:       ROZINA Mcguire MD   "

## 2017-06-27 NOTE — MR AVS SNAPSHOT
After Visit Summary   6/27/2017    Natalie Hair    MRN: [id number]           Patient Information     Date Of Birth          8/28/1932        Visit Information        Provider Department      6/27/2017 9:30 AM Mimi Mcguire MD [facility]        Today's Diagnoses     Fatigue, unspecified type    -  1    Congestive heart failure, unspecified congestive heart failure chronicity, unspecified congestive heart failure type (H)        Lymphedema of both lower extremities        Shortness of breath on exertion        Essential hypertension        Tricuspid valve insufficiency, unspecified etiology        Decubitus ulcer of sacral region, unspecified ulcer stage           Follow-ups after your visit        Additional Services     CARDIOVASCULAR REFERRAL       Recently had surgery at [facility] for TR, FORMA device placed by [facility] 6/1/2017            CARE COORDINATION REFERRAL       Services are provided by a Care Coordinator for people with complex needs such as: medical, social, or financial troubles.  The Care Coordinator works with the patient and their Primary Care Provider to determine health goals, obtain resources, achieve outcomes, and develop care plans that help coordinate the patient's care.     Reason for Referral: Concerns with ADLs/IADLs, Mental Status/Behavioral Changes, Recent Discharge From Hospital and needs PT/OT, severely deconditioned     Provide additional details for Care Coordination to best meet the patient's current needs: patient just had surgery at [facility] with experimental product placed to improve tricuspid regurgitation and improve CHF.  She was inpt for 9 days, then TCU for 15 days, now home with daughter who is caregiver.     Needs PT/OT. Needs cardiac rehab.   Memory issues and eating issues ( not hungry)     Clinical Staff have discussed the Care Coordination Referral with the patient and/or caregiver: yes                  Your next 10 appointments already  "scheduled     2017 10:00 AM CDT   SHORT with [facility] ([facility])    [address] [facility] [address] [phone]              Who to contact     Normal or non-critical lab and imaging results will be communicated to you by MyChart, letter or phone within 4 business days after the clinic has received the results. If you do not hear from us within 7 days, please contact the clinic through MyChart or phone. If you have a critical or abnormal lab result, we will notify you by phone as soon as possible.  Submit refill requests through [employer] or call your pharmacy and they will forward the refill request to us. Please allow 3 business days for your refill to be completed.          If you need to speak with a  for additional information , please call: [phone]             Additional Information About Your Visit        Re[employer] Information     [employer] lets you send messages to your doctor, view your test results, renew your prescriptions, schedule appointments and more. To sign up, go to [email or web address] . Click on \"Log in\" on the left side of the screen, which will take you to the Welcome page. Then click on \"Sign up Now\" on the right side of the page.     You will be asked to enter the access code listed below, as well as some personal information. Please follow the directions to create your username and password.     Your access code is: [email or web address]  Expires: 2017  9:28 AM     Your access code will  in 90 days. If you need help or a new code, please call your [facility] clinic or [phone].        Care EveryWhere ID     This is your Care EveryWhere ID. This could be used by other organizations to access your [facility] medical records  [id number]        Your Vitals Were     Pulse Height Pulse Oximetry BMI (Body Mass Index)          69 5' 6\" (1.676 m) 96% 28.37 kg/m2         Blood Pressure " from Last 3 Encounters:   06/27/17 135/44   05/26/17 134/61   03/14/17 104/64    Weight from Last 3 Encounters:   06/27/17 175 lb 12.8 oz (79.7 kg)   05/26/17 177 lb 12.8 oz (80.6 kg)   03/14/17 193 lb 12.8 oz (87.9 kg)              We Performed the Following     CARDIOVASCULAR REFERRAL     CARE COORDINATION REFERRAL     CBC with platelets differential     Comprehensive metabolic panel (BMP + Alb, Alk Phos, ALT, AST, Total. Bili, TP)          Today's Medication Changes          These changes are accurate as of: 6/27/17 10:27 AM.  If you have any questions, ask your nurse or doctor.               Stop taking these medicines if you haven't already. Please contact your care team if you have questions.     bumetanide 2 MG tablet   Commonly known as:  BUMEX   Stopped by:  Mimi Mcguire MD                Where to get your medicines      These medications were sent to [facility] [address] [place] [address] [address] [address]     Phone:  [phone]     potassium chloride 10 MEQ tablet    warfarin 4 MG tablet                Primary Care Provider Office Phone # Fax #    Mimi Mcguire [phone]       [facility] [id number] [place] [address]        Equal Access to Services     JA FOWLER AH: Hadii aad ku hadasho Soomaali, waaxda luqadaha, qaybta kaalmada adeegyada, lyric milan hayapurva sanchez . So [facility] [phone].    ATENCIÓN: Si habla español, tiene a simmons disposición servicios gratuitos de asistencia lingüística. Brendaame al [phone].    We comply with applicable federal civil rights laws and [place] laws. We do not discriminate on the basis of race, color, national origin, age, disability sex, sexual orientation or gender identity.            Thank you!     Thank you for choosing [facility]  for your care. Our goal is always to provide you with excellent care. Hearing  back from our patients is one way we can continue to improve our services. Please take a few minutes to complete the written survey that you may receive in the mail after your visit with us. Thank you!             Your Updated Medication List - Protect others around you: Learn how to safely use, store and throw away your medicines at www.disposemymeds.org.          This list is accurate as of: 6/27/17 10:27 AM.  Always use your most recent med list.                   Brand Name Dispense Instructions for use Diagnosis    ACETAMINOPHEN PO      Take 1,000 mg by mouth every 8 hours as needed for pain        alendronate 70 MG tablet    FOSAMAX    12 tablet    TAKE 1 TABLET BY MOUTH ONCE WEEKLY ON EMPTY STOMACH    Osteopenia       calcium carbonate 1250 MG tablet    OS-SHANICE 500 mg [place]. Ca     Take 200 mg by mouth 2 times daily        cetirizine 5 MG tablet    zyrTEC    30 tablet    Take 1 tablet (5 mg) by mouth daily    Acute urticaria       clopidogrel 75 MG tablet    PLAVIX    90 tablet    Take 1 tablet (75 mg) by mouth daily    Congestive heart failure, unspecified congestive heart failure chronicity, unspecified congestive heart failure type (H), Lymphedema of both lower extremities       ezetimibe 10 MG tablet    ZETIA    90 tablet    Take 1 tablet (10 mg) by mouth daily    Hyperlipidemia LDL goal <100       Fish Oil Oil           metolazone 2.5 MG tablet    ZAROXOLYN    6 tablet    Take one tablet per day for two days only    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), Lymphedema of both lower extremities, Right-sided congestive heart failure (H)       metoprolol 25 MG tablet    LOPRESSOR    90 tablet    Take 0.5 tablets (12.5 mg) by mouth 2 times daily        Multi-vitamin Tabs tablet      Take 1 tablet by mouth daily        nitroglycerin 0.4 MG sublingual tablet    NITROSTAT    25 tablet    Place 1 tablet (0.4 mg) under the tongue every 5 minutes as needed for chest pain     Unspecified cardiovascular disease       omega 3 1000 MG Caps     90 capsule    Take 1 g by mouth daily    Health Care Home       ondansetron 4 MG ODT tab    ZOFRAN ODT    10 tablet    Take 1-2 tablets (4-8 mg) by mouth every 6 hours as needed for nausea        * order for DME     1 Device    Equipment being ordered: walker with a seat on it Has severe CHF and lower extremity edema.    Acute on chronic diastolic congestive heart failure (H), Lymphedema of both lower extremities       * order for DME     1 Package    Equipment being ordered: Walker with a seat    Acute on chronic diastolic congestive heart failure (H), At risk for falling       * order for DME     1 Device    Equipment being ordered: Hospital Bed 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 -ongoing symptoms of lower extremity edema,shortness of breath,cough sacral pressure sores within last 6 months   An electric hospital bed to allow for increase in head of bed elevation and for ease in wheelchair transfers  Length of need: lifelong NPI - [id number]    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), Lymphedema of both lower extremities, Right-sided congestive heart failure (H), Tricuspid valve insufficiency, unspecified etiology, Congestive heart failure, unspecified congestive heart failure chronicity, unspecified congestive heart failure type (H), History of pressure ulcer       polyethylene glycol powder    MIRALAX/GLYCOLAX     Take 1 capful by mouth daily as needed for constipation        potassium chloride 10 MEQ tablet    K-TAB,KLOR-CON    90 tablet    Take 1 tablet (10 mEq) by mouth 2 times daily    Shortness of breath on exertion, Essential hypertension       SENNA S 8.6-50 MG per tablet   Generic drug:  senna-docusate      Take 2 tablets by mouth 2 times daily        spironolactone 25 MG tablet    ALDACTONE    90 tablet    Take 1 tablet (25 mg) by mouth daily    Congestive heart failure, unspecified congestive  heart failure chronicity, unspecified congestive heart failure type (H), Lymphedema of both lower extremities       SSD 1 % cream   Generic drug:  silver sulfADIAZINE     400 g    APPLY TWICE DAILY TO THE AFFECTED AREA(S) AS DIRECTED    History of pressure ulcer       TORSEMIDE PO      Take 20 mg by mouth 2 times daily        triamcinolone 0.1 % cream    KENALOG    30 g    Apply sparingly to affected area two times daily for 3-5 days.    Other atopic dermatitis       TYLENOL PM EXTRA STRENGTH PO           UNABLE TO FIND      MEDICATION NAME: Zquil        vitamin D 2000 UNITS Caps      Take 1 capsule by mouth daily        VITAMIN E COMPLEX PO           warfarin 4 MG tablet    COUMADIN    30 tablet    As directed by Anticoagulation Clinic, current dose 4 mg daily    Congestive heart failure, unspecified congestive heart failure chronicity, unspecified congestive heart failure type (H)       * Notice:  This list has 3 medication(s) that are the same as other medications prescribed for you. Read the directions carefully, and ask your doctor or other care provider to review them with you.

## 2017-06-27 NOTE — NURSING NOTE
"Chief Complaint   Patient presents with     Derm Problem       Initial There were no vitals taken for this visit. Estimated body mass index is 28.7 kg/(m^2) as calculated from the following:    Height as of 5/26/17: 5' 6\" (1.676 m).    Weight as of 5/26/17: 177 lb 12.8 oz (80.6 kg).  Medication Reconciliation: complete   Zainab Tejada CMA    "

## 2017-06-28 NOTE — TELEPHONE ENCOUNTER
Joaquina Quiles called regarding verbal orders needed for pt for a nurse. Pt has a heart condition, may have a possible valve replacement surgery, pt has weakness and decongestant. Joaquina would like a verbal order for pt to have a nurse. Joaquina phone number call back is [phone]. Please advise. Thanks    Sanjuana Laird, [place] .

## 2017-06-28 NOTE — TELEPHONE ENCOUNTER
"FYI to Dr. Mcguire: Verbal order given to [facility] to have an RN visit NatalieKosta Kunz said that an RN can go out there within 24 hours. \"PHYSICAL THERAPY and OT are backed up on their schedule.\"   Anam Dejesus, RN    "

## 2017-06-29 NOTE — TELEPHONE ENCOUNTER
Reason for Call: Request for an order or referral:    Order or referral being requested: Liane from  [facility] calling for verbal orders of the followin)  Order for Physical therapy, occupational therapy and social work evaluations  2)  Skilled Nursing for:   3 x a week x 1 week      2 x a week x 2 weeks      1 x a week x 2 weeks      4 PRN visits          Date needed: as soon as possible    Has the patient been seen by the PCP for this problem? Not Applicable    Additional comments: none    Phone number Patient can be reached at:  Other phone number:  [phone]    Best Time:  anytime    Can we leave a detailed message on this number?  YES    Call taken on 2017 at 11:21 AM by Claire Carroll

## 2017-06-29 NOTE — PROGRESS NOTES
ANTICOAGULATION FOLLOW-UP CLINIC VISIT    Patient Name:  Natalie Hair  Date:  6/29/2017  Contact Type:  Telephone/ result reported to writer kavita Cleaning RN, [phone], [facility]. Dosing instructions called in to dtr Digna, who correctly read back dosing instructions.    SUBJECTIVE:     Patient Findings     Positives Intentional hold of therapy (6/26), No Problem Findings (none reported)    Comments Per [facility] Cleaning, there were no changes at appt yesterday.           OBJECTIVE    INR   Date Value Ref Range Status   06/28/2017 2.7  Final       ASSESSMENT / PLAN  INR assessment THER continue 24mg/7 days   Recheck INR In: 1 WEEK    INR Location Outside lab [facility] result from 6/28/17     Anticoagulation Summary as of 6/29/2017     INR goal 2.5-3.5   Today's INR 2.7 (6/28/2017)   Maintenance plan 2 mg (4 mg x 0.5) on Mon, Fri; 4 mg (4 mg x 1) all other days   Full instructions 2 mg on Mon, Fri; 4 mg all other days   Weekly total 24 mg   Plan last modified Susan Caruso RN (6/29/2017)   Next INR check 7/5/2017   Priority INR   Target end date Indefinite    Indications   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]  Long term current use of anticoagulant therapy [Z79.01]         Anticoagulation Episode Summary     INR check location     Preferred lab     Send INR reminders to [facility]    Comments * Patient will be moving to [place], PLEASE CALL REENA WITH NEW DOSING INSTRUCTIONS AT  [phone]  To [facility] 5/3 for follow of stent placement surgery 6/1      Anticoagulation Care Providers     Provider Role Specialty Phone number    Mimi Mcguire MD [facility] [phone]            See the Encounter Report to view Anticoagulation Flowsheet and Dosing Calendar (Go to Encounters tab in chart review, and find the Anticoagulation Therapy Visit)    Susan Caruso RN

## 2017-06-29 NOTE — MR AVS SNAPSHOT
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  Recheck INR Wednesday 7/5/17, [facility].      June 2017 Details    Sun Mon Tue Wed Thu Fri Sat         1               2               3                 4               5               6               7               8               9               10                 11               12               13               14               15               16               17                 18               19               20               21               22               23               24                 25               26               27               28               29      4 mg   See details      30      2 mg           Date Details   06/29 This INR check               How to take your warfarin dose     To take:  2 mg Take 0.5 of a 4 mg tablet.    To take:  4 mg Take 1 of the 4 mg tablets.           July 2017 Details    Sun Mon Tue Wed Thu Fri Sat           1      4 mg           2      4 mg         3      2 mg         4      4 mg         5            6               7               8                 9               10               11               12               13               14               15                 16               17               18               19               20               21               22                 23               24               25               26               27               28               29                  30               31                     Date Details   No additional details    Date of next INR:  7/5/2017         How to take your warfarin dose     To take:  2 mg Take 0.5 of a 4 mg tablet.    To take:  4 mg Take 1 of the 4 mg tablets.

## 2017-06-30 NOTE — LETTER
[facility] Home  Complex Care Plan  About Me  Patient Name:  Natalie Hair    YOB: 1932  Age:   84 year old   Ale MRN: [id number] Telephone Information:     Home Phone [phone]   Mobile [phone]   Home Phone [phone]       Address:    [address] [place] [address] Email address:  No e-mail address on record      Emergency Contact(s)  Name Relationship Lgl Grd Work Phone Home Phone Mobile Phone   1. REENA KHOURY Daughter  none [phone]           Primary language:  English     needed? No   [facility]:  [phone] op. 1  Other communication barriers: No  Preferred Method of Communication:  Phone, Letter  Current living arrangement: I live in a private home with family (Son and Daughter In law, Digna, in [place])  Mobility Status/ Medical Equipment: Dependent/Assisted by Another  Other information to know about me:    Health Maintenance  Health Maintenance Reviewed:      My Access Plan  Medical Emergency 911   Primary Clinic Line [facility] [phone]   24 Hour Appointment Line [phone] or  [phone] ([phone]) (toll-free)   24 Hour Nurse Line [phone] (toll-free)   Preferred Urgent Care [facility] [phone]   [facility] [place], [place]  [phone]   Preferred Pharmacy [facility] [address] [place] [address]     Behavioral Health Crisis Line [facility], [phone] or 911     My Care Team Members  Patient Care Team       Relationship Specialty Notifications Start End    Mimi Mcguire MD PCP - General Family Practice  11/17/16     Phone: [phone] Fax: [phone]         [facility] [address] [facility] [address]    Emelyn Bingham MD MD Cardiology Admissions 8/1/16     Phone: [phone] Fax: [phone]          [facility] [address]  [place] [address]    Letitia Waters APRN CNP Nurse Practitioner Nurse Practitioner Admissions 8/1/16     Phone: [phone] Fax: [phone]          [facility] [address] [place] [address]    eJssie Larson   Admissions 5/26/17     Phone: [phone] Fax: [phone]             My Care Plans  Self Management and Treatment Plan  Goals and (Comments)  Goal #1: I will follow up with my Home PT plan ongoing.            Action Plans on File: CHF  Advance Care Plans/Directives Type:        My Medical and Care Information  Problem List   Patient Active Problem List   Diagnosis     Heart valve replaced     Cardiovascular disease     History of T12 compression fracture     Backache     Vitamin D deficiency     Hyperlipidemia LDL goal <100     Advanced directives, counseling/discussion     Long term current use of anticoagulant therapy     Urinary incontinence     Unstable angina (H)     Benign essential hypertension, BP goal <150/90     Bilateral low back pain without sciatica     Osteopenia     Atrial fibrillation with RVR (H)     Primary pulmonary hypertension (H)     Lymphedema of both lower extremities     Tricuspid valve insufficiency, unspecified etiology     Near syncope     Chronic diastolic congestive heart failure (H)     History of pressure ulcer     Health Care Home     S/P CABG (coronary artery bypass graft) - 2002     Stented coronary artery - 5/4/2017 at [facility]     Decubitus ulcer of sacral region, unspecified ulcer stage      Current Medications and Allergies:  See printed Medication Report.    Care Coordination Start Date: 05/26/17   Frequency of Care Coordination: monthly (pending home care input)   Form Last Updated: 06/30/2017

## 2017-06-30 NOTE — LETTER
June 30, 2017      Natalie Hair  [address] [place] [address]    Dear Natalie,  I am the Clinic Care Coordinator that works with your primary care provider's clinic. I wanted to thank you for spending the time to talk with me.  Below is a description of what Clinic Care Coordination is and how I can further assist you.     The Clinic Care Coordinator role is a Registered Nurse and/or  who understands the health care system. The goal of Clinic Care Coordination is to help you manage your health and improve access to the [facility] system in the most efficient manner.  The Registered Nurse can assist you in meeting your health care goals by providing education, coordinating services, and strengthening the communication among your providers. The  can assist you with financial, behavioral, psychosocial, and chemical dependency and counseling/psychiatric resources.    Please feel free to keep this letter and contact information to contact me at [phone], [phone] with any further questions or concerns that may arise. We at [facility] are focused on providing you with the highest-quality healthcare experience possible and that all starts with you.       Sincerely,     Elpidio Kilpatrick RN  Clinic Care Coordinator    Enclosed: I have enclosed a copy of the Complex Care Plan. This has helpful information and goals that we have talked about. Please keep this in an easy to access place to use as needed.

## 2017-06-30 NOTE — PROGRESS NOTES
Clinic Care Coordination Contact  OUTREACH    Referral Information:  Referral Source: PCP  Reason for Contact: Follow up        Universal Utilization:   ED Visits in last year: 3  Hospital visits in last year: 4  Last PCP appointment: 05/25/17  Missed Appointments: 2  Concerns: yes  Multiple Providers or Specialists: yes    Clinical Concerns:  Current Medical Concerns: Patient has home care in place and they are providing services. Spoke to Emely, Emely reports that things are going well now that home care is involved. No other concerns at this time.      Medication Management:  Patient is knowledgeable on medications and is adherent. No financial concerns at this time.      Functional Status:  Mobility Status: Dependent/Assisted by Another  Equipment Currently Used at Home: wheelchair        Psychosocial:  Current living arrangement:: I live in a private home with family (Son and Daughter In law, Digna, in [place])     Resources and Interventions:  Current Resources: Home Care; Home Care  [facility] Number: [phone]  Senior Linkage Line Referral Placed: 05/26/17  Advanced Care Plans/Directives on file:: No  Referrals Placed: Home Care, [facility], Senior Linkage Line        Plan: 1) Patient will continue to follow treatment plan as directed and follow up with PCP with concerns ongoing.   2) Care Coordination to remain available for future needs. Follow up planned for 2 weeks    Elpidio Kilpatrick RN  Clinic Care Coordinator  [phone] or [phone]

## 2017-07-05 NOTE — MR AVS SNAPSHOT
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 Recheck INR on Thursday, 7/13/17.      Your next Anticoagulation Clinic appointment(s)     Jul 05, 2017  1:30 PM CDT   Anticoagulation Visit with [facility]AG   [facility]    [address] [address]   [phone]              July 2017 Details    Sun Mon Tue Wed Thu Fri Sat           1                 2               3               4               5      4 mg   See details      6      4 mg         7      2 mg         8      4 mg           9      4 mg         10      2 mg         11      4 mg         12      4 mg         13            14               15                 16               17               18               19               20               21               22                 23               24               25               26               27               28               29                 30               31                     Date Details   07/05 This INR check       Date of next INR:  7/13/2017         How to take your warfarin dose     To take:  2 mg Take 0.5 of a 4 mg tablet.    To take:  4 mg Take 1 of the 4 mg tablets.

## 2017-07-05 NOTE — PROGRESS NOTES
ANTICOAGULATION FOLLOW-UP CLINIC VISIT    Patient Name:  Natalie Hair  Date:  7/5/2017  Contact Type:  Telephone/ Tawnya SAGE,  [facility] [phone]    SUBJECTIVE:     Patient Findings     Positives No Problem Findings           OBJECTIVE    INR   Date Value Ref Range Status   06/28/2017 2.7  Final       ASSESSMENT / PLAN  INR assessment THER    Recheck INR In: 8 DAYS    INR Location [facility] INR      Anticoagulation Summary as of 7/5/2017     INR goal 2.5-3.5   Today's INR    Maintenance plan 2 mg (4 mg x 0.5) on Mon, Fri; 4 mg (4 mg x 1) all other days   Full instructions 2 mg on Mon, Fri; 4 mg all other days   Weekly total 24 mg   No change documented Jessie Xiong RPH   Plan last modified Susan Caruso RN (6/29/2017)   Next INR check 7/13/2017   Priority INR   Target end date Indefinite    Indications   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]  Long term current use of anticoagulant therapy [Z79.01]         Anticoagulation Episode Summary     INR check location     Preferred lab     Send INR reminders to [facility]    Comments * Patient will be moving to [place], PLEASE CALL REENA WITH NEW DOSING INSTRUCTIONS AT  [phone]  To [place] 5/3 for follow of stent placement surgery 6/1      Anticoagulation Care Providers     Provider Role Specialty Phone number    Mimi Mcguire MD [facility] [phone]            See the Encounter Report to view Anticoagulation Flowsheet and Dosing Calendar (Go to Encounters tab in chart review, and find the Anticoagulation Therapy Visit)      Jessie Xiong RPH

## 2017-07-11 NOTE — TELEPHONE ENCOUNTER
Daughter states they did not change any meds but did an Echo and found fluid in her left lung. She also was having some troubles breathing this morning.  Advised her she should take her to the ER and Dr. Schmitt heard this conversation and agrees with this.    Ashley Espinoza RN

## 2017-07-11 NOTE — TELEPHONE ENCOUNTER
Reason for Call: Request for an order or referral:    Order or referral being requested: Gisela - JAEL  calling to request 1 more social work visit for Natalie.  Please review and advise.  Thank you..Claire Carroll    Date needed: as soon as possible    Has the patient been seen by the PCP for this problem? Previously perhaps  Additional comments: none    Phone number Patient can be reached at:  Other phone number:  [phone] -  Gisela     Best Time:  anytime    Can we leave a detailed message on this number?  YES - confidential voice mail    Call taken on 7/11/2017 at 10:31 AM by Claire Carroll

## 2017-07-11 NOTE — TELEPHONE ENCOUNTER
I think they followed up with [facility] last week. Did they change any of her meds?     I know the memory problems have been present since surgery. They should come in to discuss it, at their convenience    ROZINA Mcguire MD

## 2017-07-11 NOTE — TELEPHONE ENCOUNTER
Reason for call:  Patient reporting a symptom    Symptom or request: Wilma from  [facility] calling for daughter Digna.  Digna is reporting that she is noticing that her mother has had more difficulty in remembering things the past week.  She has also gained 4 lbs since last week so thinking she is retaining fluid.  Wilma states that her vitals were fine - b/p was 112/52.  Wondering if she should be seen?  Please review and advise.  Thank you..Claire Carroll    Duration (how long have symptoms been present): for about a week    Have you been treated for this before? Yes    Additional comments: none    Phone Number patient can be reached at:  Other phone number:  Digna (daughter)  [phone]    Best Time:  anytime    Can we leave a detailed message on this number:  YES    Call taken on 7/11/2017 at 10:22 AM by Claire Carroll

## 2017-07-11 NOTE — ED AVS SNAPSHOT
[facility]    [address] [address]    Phone:  [phone]    Fax:  [phone]                                       Natalie Hair   MRN: [id number]    Department:  [facility]   Date of Visit:  7/11/2017           After Visit Summary Signature Page     I have received my discharge instructions, and my questions have been answered. I have discussed any challenges I see with this plan with the nurse or doctor.    ..........................................................................................................................................  Patient/Patient Representative Signature      ..........................................................................................................................................  Patient Representative Print Name and Relationship to Patient    ..................................................               ................................................  Date                                            Time    ..........................................................................................................................................  Reviewed by Signature/Title    ...................................................              ..............................................  Date                                                            Time

## 2017-07-11 NOTE — ED AVS SNAPSHOT
[facility]    [address]    [facility] [address]    Phone:  [phone]    Fax:  [phone]                                       Natalie Hair   MRN: [id number]    Department:  [facility]   Date of Visit:  7/11/2017           Patient Information     Date Of Birth          8/28/1932        Your diagnoses for this visit were:     Pleural effusion, left     Dyspnea, unspecified type        You were seen by Simone Borjas MD.      Follow-up Information     Follow up with Mimi Mcguire MD.    Specialty:  Family Practice    Why:  As needed    Contact information:    [facility]  [address] [address] [phone]          Discharge Instructions           You should contact the radiology department to discuss thoracentesis to remove fluid from your lung.  You are on Coumadin and this will have to be held prior to the thoracentesis.  However with your recent valve surgery this may not be able to be held.  You may want to contact your cardiologist at [place] to see if the Coumadin can be held for thoracentesis.        Pleural Effusion     Pleural effusion is fluid buildup between the layers of tissue that line the outside of the lungs.   Your healthcare provider has told you that you have pleural effusion. Pleural effusion means that you have extra fluid between the pleura. This area is called the pleural space. The pleura are 2 layers of thin, smooth tissue that surround the lungs and line the chest. The pleural space usually holds only a small amount of fluid. This fluid lubricates the pleura. But if too much fluid fills the space, it can make it hard or painful to breathe.  There are 2 types of pleural effusion:    Inflammatory. This is caused by a lung disease like pneumonia or lung cancer, both of which irritates the pleura.    Noninflammatory. This is caused by abnormal fluid pressures inside the lungs. The pressure can  be caused by congestive heart failure (CHF). In CHF, extra fluid collects inside the lung tissues because of a weak heart muscle. This extra fluid then leaks into the pleural space. Other causes of noninflammatory pleural effusions include kidney disease, liver disease, and malnutrition.  What are the symptoms of pleural effusion?  The symptoms of pleural effusion include:    Sharp pains in the chest, especially when taking a breath, coughing, or sneezing    Trouble breathing    Cough    Fever  What are the causes of pleural effusion?  Common causes include:    Congestive heart failure    Cirrhosis of the liver    Pneumonia    Viral lung infection    Cancer    Blood clot in the lung (pulmonary embolism)    Heart surgery  Chest infections like pneumonia and heart disease are the most common causes. Less common causes include lung cancer.  How is pleural effusion diagnosed?  Your healthcare provider will examine you and ask about your health history. Tests include:    Blood tests    Analysis of fluid in pleural space, chest X-ray, CT scan, or ultrasound  How is pleural effusion treated?  The extra fluid may be drained from the pleural space. This is done with a procedure called thoracentesis. This procedure uses a thin needle to draw out the fluid from the pleural space. In some cases, a tube is placed in the chest to drain the extra fluid. The tube will likely stay in place for several days.  You may have other treatments, depending on the cause of your pleural effusion. If it s because of a bacterial infection, you will be given antibiotics to fight the infection. If it s because of a heart condition, you will be given medicines and other treatment for your heart. Your healthcare provider can tell you more about the cause of your pleural effusion and your treatment choices.  What are the long-term concerns?  If untreated, pleural effusion can lead to serious health problems, such as collapsed lung from fluid filling  the pleural space.     Call 911  Call 911 if you have:    Trouble breathing    Worsening chest pain   When to call your healthcare provider   Call your healthcare provider right away if you have:    Continued coughing    Fever  Date Last Reviewed: 12/1/2016 2000-2017 [employer]. [address] [place] [address]. All rights reserved. This information is not intended as a substitute for professional medical care. Always follow your healthcare professional's instructions.          Future Appointments        Provider Department Dept Phone Center    7/12/2017 10:00 AM Irais Olivia [facility] [phone] [facility]    7/13/2017 2:30 PM Mimi Mcguire MD [facility] [phone] [place]    7/20/2017 2:00 PM Cardiac Rehab Evaluations [facility] Cardiac Rehab [phone] [facility]      24 Hour Appointment Hotline       To make an appointment at any [facility], call [phone] ([phone]). If you don't have a family doctor or clinic, we will help you find one. [place] clinics are conveniently located to serve the needs of you and your family.          ED Discharge Orders     US Thoracentesis                    Review of your medicines      Our records show that you are taking the medicines listed below. If these are incorrect, please call your family doctor or clinic.        Dose / Directions Last dose taken    ACETAMINOPHEN PO   Dose:  1000 mg        Take 1,000 mg by mouth every 8 hours as needed for pain   Refills:  0        alendronate 70 MG tablet   Commonly known as:  FOSAMAX   Quantity:  12 tablet        TAKE 1 TABLET BY MOUTH ONCE WEEKLY ON EMPTY STOMACH   Refills:  0        CALCIUM + D PO   Dose:  1 tablet        Take 1 tablet by mouth daily   Refills:  0        cetirizine 5 MG tablet   Commonly known as:  zyrTEC   Dose:  5 mg   Quantity:  30 tablet        Take 1 tablet (5 mg) by mouth daily   Refills:  3         clopidogrel 75 MG tablet   Commonly known as:  PLAVIX   Dose:  75 mg   Quantity:  90 tablet        Take 1 tablet (75 mg) by mouth daily   Refills:  0        ezetimibe 10 MG tablet   Commonly known as:  ZETIA   Dose:  10 mg   Quantity:  90 tablet        Take 1 tablet (10 mg) by mouth daily   Refills:  0        metolazone 2.5 MG tablet   Commonly known as:  ZAROXOLYN   Quantity:  6 tablet        Take one tablet per day for two days only   Refills:  0        metoprolol 25 MG tablet   Commonly known as:  LOPRESSOR   Dose:  12.5 mg   Quantity:  90 tablet        Take 0.5 tablets (12.5 mg) by mouth 2 times daily   Refills:  3        Multi-vitamin Tabs tablet   Dose:  1 tablet        Take 1 tablet by mouth daily   Refills:  0        nitroGLYcerin 0.4 MG sublingual tablet   Commonly known as:  NITROSTAT   Dose:  0.4 mg   Quantity:  25 tablet        Place 1 tablet (0.4 mg) under the tongue every 5 minutes as needed for chest pain   Refills:  1        omega 3 1000 MG Caps   Dose:  1 g   Quantity:  90 capsule        Take 1 g by mouth daily   Refills:  3        ondansetron 4 MG ODT tab   Commonly known as:  ZOFRAN ODT   Dose:  4-8 mg   Quantity:  10 tablet        Take 1-2 tablets (4-8 mg) by mouth every 6 hours as needed for nausea   Refills:  0        * order for DME   Quantity:  1 Device        Equipment being ordered: walker with a seat on it Has severe CHF and lower extremity edema.   Refills:  0        * order for DME   Quantity:  1 Package        Equipment being ordered: Walker with a seat   Refills:  0        * order for DME   Quantity:  1 Device        Equipment being ordered: Hospital Bed 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 -ongoing symptoms of lower extremity edema,shortness of breath,cough sacral pressure sores within last 6 months   An electric hospital bed to allow for increase in head of bed elevation and for ease in wheelchair transfers  Length of need: lifelong NPI - [id number]   Refills:  0         polyethylene glycol powder   Commonly known as:  MIRALAX/GLYCOLAX   Dose:  1 capful        Take 1 capful by mouth every morning   Refills:  0        potassium chloride 10 MEQ tablet   Commonly known as:  K-TAB,KLOR-CON   Dose:  10 mEq   Quantity:  90 tablet        Take 1 tablet (10 mEq) by mouth 2 times daily   Refills:  1        SENNA S 8.6-50 MG per tablet   Dose:  2 tablet   Generic drug:  senna-docusate        Take 2 tablets by mouth 2 times daily   Refills:  0        spironolactone 25 MG tablet   Commonly known as:  ALDACTONE   Dose:  25 mg   Quantity:  90 tablet        Take 1 tablet (25 mg) by mouth daily   Refills:  3        SSD 1 % cream   Quantity:  400 g   Generic drug:  silver sulfADIAZINE        APPLY TWICE DAILY TO THE AFFECTED AREA(S) AS DIRECTED   Refills:  0        TORSEMIDE PO   Dose:  20 mg        Take 20 mg by mouth 2 times daily   Refills:  0        triamcinolone 0.1 % cream   Commonly known as:  KENALOG   Quantity:  30 g        Apply sparingly to affected area two times daily for 3-5 days.   Refills:  0        TYLENOL PM EXTRA STRENGTH PO   Dose:  2-3 tablet        Take 2-3 tablets by mouth nightly as needed   Refills:  0        UNABLE TO FIND   Dose:  2 tablet        Take 2 tablets by mouth nightly as needed MEDICATION NAME: Zquil   Refills:  0        vitamin D 2000 UNITS Caps   Dose:  1 capsule        Take 1 capsule by mouth daily   Refills:  0        VITAMIN E COMPLEX PO   Dose:  1 capsule        Take 1 capsule by mouth daily   Refills:  0        warfarin 4 MG tablet   Commonly known as:  COUMADIN   Quantity:  30 tablet        Take 2mg by mouth Mon & Fri and 4mg all other days or as directed by Anticoagulation Clinic   Refills:  1        ZZZQUIL PO   Dose:  50 mg        Take 50 mg by mouth nightly as needed Alternates with Tylenol PM   Refills:  0        * Notice:  This list has 3 medication(s) that are the same as other medications prescribed for you. Read the directions carefully, and ask  your doctor or other care provider to review them with you.            Procedures and tests performed during your visit     CBC with platelets differential    Comprehensive metabolic panel    EKG 12-lead, tracing only    INR    Lactic acid whole blood    Troponin I    XR Chest 2 Views      Orders Needing Specimen Collection     None      Pending Results     No orders found from 7/9/2017 to 7/12/2017.            Pending Culture Results     No orders found from 7/9/2017 to 7/12/2017.            Pending Results Instructions     If you had any lab results that were not finalized at the time of your Discharge, you can call the ED Lab Result RN at [phone]. You will be contacted by this team for any positive Lab results or changes in treatment. The nurses are available 7 days a week from 10A to 6:30P.  You can leave a message 24 hours per day and they will return your call.        Test Results From Your Hospital Stay        7/11/2017  8:57 PM      Component Results     Component Value Ref Range & Units Status    WBC 6.2 4.0 - 11.0 10e9/L Final    RBC Count 4.64 3.8 - 5.2 10e12/L Final    Hemoglobin 12.3 11.7 - 15.7 g/dL Final    Hematocrit 39.6 35.0 - 47.0 % Final    MCV 85 78 - 100 fl Final    MCH 26.5 26.5 - 33.0 pg Final    MCHC 31.1 (L) 31.5 - 36.5 g/dL Final    RDW 14.9 10.0 - 15.0 % Final    Platelet Count 250 150 - 450 10e9/L Final    Diff Method Automated Method  Final    % Neutrophils 63.7 % Final    % Lymphocytes 16.4 % Final    % Monocytes 15.3 % Final    % Eosinophils 3.9 % Final    % Basophils 0.7 % Final    % Immature Granulocytes 0.0 % Final    Absolute Neutrophil 3.9 1.6 - 8.3 10e9/L Final    Absolute Lymphocytes 1.0 0.8 - 5.3 10e9/L Final    Absolute Monocytes 0.9 0.0 - 1.3 10e9/L Final    Absolute Eosinophils 0.2 0.0 - 0.7 10e9/L Final    Absolute Basophils 0.0 0.0 - 0.2 10e9/L Final    Abs Immature Granulocytes 0.0 0 - 0.4 10e9/L Final         7/11/2017  9:10 PM      Component Results     Component  Value Ref Range & Units Status    Sodium 137 133 - 144 mmol/L Final    Potassium 4.0 3.4 - 5.3 mmol/L Final    Chloride 102 94 - 109 mmol/L Final    Carbon Dioxide 28 20 - 32 mmol/L Final    Anion Gap 7 3 - 14 mmol/L Final    Glucose 94 70 - 99 mg/dL Final    Urea Nitrogen 26 7 - 30 mg/dL Final    Creatinine 0.99 0.52 - 1.04 mg/dL Final    GFR Estimate 53 (L) >60 mL/min/1.7m2 Final    Non  GFR Calc    GFR Estimate If Black 64 >60 mL/min/1.7m2 Final    African American GFR Calc    Calcium 9.7 8.5 - 10.1 mg/dL Final    Bilirubin Total 0.8 0.2 - 1.3 mg/dL Final    Albumin 3.7 3.4 - 5.0 g/dL Final    Protein Total 7.7 6.8 - 8.8 g/dL Final    Alkaline Phosphatase 197 (H) 40 - 150 U/L Final    ALT 24 0 - 50 U/L Final    AST 31 0 - 45 U/L Final         7/11/2017  8:58 PM      Component Results     Component Value Ref Range & Units Status    INR 2.15 (H) 0.86 - 1.14 Final         7/11/2017  8:51 PM      Component Results     Component Value Ref Range & Units Status    Lactic Acid 1.1 0.7 - 2.1 mmol/L Final         7/11/2017  9:10 PM      Component Results     Component Value Ref Range & Units Status    Troponin I ES  0.000 - 0.045 ug/L Final    <0.015  The 99th percentile for upper reference range is 0.045 ug/L.  Troponin values in   the range of 0.045 - 0.120 ug/L may be associated with risks of adverse   clinical events.           7/11/2017  8:57 PM      Narrative     CHEST TWO VIEW   7/11/2017 8:12 PM     HISTORY: Shortness of breath, recent tricuspid surgery with a forma  device.    COMPARISON: 11/30/2016    FINDINGS: Cardiomegaly. Previous sternotomy with valve replacement.  Pacer with 2 leads in place. Moderate-sized left pleural effusion,  much increased from 11/30/2016. Right lung clear.        Impression     IMPRESSION: Moderate-sized left pleural effusion.    JOESPH DAWN MD                Thank you for choosing [facility]       Thank you for choosing [facility] for your care. Our goal is always to  "provide you with excellent care. Hearing back from our patients is one way we can continue to improve our services. Please take a few minutes to complete the written survey that you may receive in the mail after you visit with us. Thank you!        [employer] Information     [employer] lets you send messages to your doctor, view your test results, renew your prescriptions, schedule appointments and more. To sign up, go to [email or web address] . Click on \"Log in\" on the left side of the screen, which will take you to the Welcome page. Then click on \"Sign up Now\" on the right side of the page.     You will be asked to enter the access code listed below, as well as some personal information. Please follow the directions to create your username and password.     Your access code is: [email or web address]  Expires: 2017  9:28 AM     Your access code will  in 90 days. If you need help or a new code, please call your [facility] clinic or [phone].        Care EveryWhere ID     This is your Care EveryWhere ID. This could be used by other organizations to access your [facility] medical records  [id number]        Equal Access to Services     JA FOWLER : Hadausten Monroe, ariana ulloa, ari rodriges, lyric knapp. So [facility] [phone].    ATENCIÓN: Si habla español, tiene a simmons disposición servicios gratuitos de asistencia lingüística. Kevin al [phone].    We comply with applicable federal civil rights laws and [place] laws. We do not discriminate on the basis of race, color, national origin, age, disability sex, sexual orientation or gender identity.            After Visit Summary       This is your record. Keep this with you and show to your community pharmacist(s) and doctor(s) at your next visit.                  "

## 2017-07-12 NOTE — PROGRESS NOTES
SUBJECTIVE/OBJECTIVE:                Natalie Hari is a 84 year old female coming in for a follow-up visit for Medication Therapy Management. Patient is here today with her daughter Digna Gonzalez, she is currently living with her daughter. This is patient's first visit this year.    Chief Complaint: Follow up from our visit on 11/2/16. Digna is unsure why pt is on certain medications and would like to clarify to ensure efficacy and safety of current regimen. Patient is having fluid in her lungs drained - they talked with [facility] and okay to stop warfarin for the procedure, patient has not taken warfarin today - daughter is going to let radiology know after our visit.     Allergies/ADRs: Reviewed in Epic  Tobacco: History of tobacco dependence - quit 40 years ago   Alcohol: not currently using  PMH: Reviewed in Epic    Medication Adherence: no issues reported. Daughter helps patient with her medications.     HFpEF/HTN/afib: Current medications include metoprolol tartrate 12.5 mg twice daily, spironolactone 25mg twice daily, torsemide 20mg twice daily and metolazone 2.5 mg for two days as needed when fluid in legs is bad. Pt is also taking warfarin and clopidogrel 75 mg daily. Pt was discharged late last night for SOB (fluids in the lungs). Pt has NTG on hand and has not needed to use. Pt has an upcoming thoracentesis procedure. Pt is not currently on warfarin due to upcoming procedure. Pt has been on warfarin since her first mechanical valve placement. Digna confirmed with [facility] this morning that it is appropriate to stop warfarin. Pt is out of Plavix, although patient's daughter thinks patient has it at home in her pill box. Pt reports the following medication side effects: fatigue most likely from shortness of breath. Shortness of breath is improved since yesterday.    ECHO:  Date 6/2/17, EF 67%       Insomnia: Current medications include: zzzQuil 50mg at bedtime or Tylenol PM if patient is having  pain. Digna reports pt having worsening cognition/memory. Pt occasionally asked where they are. Daughter and patient report patient's mood is good.     Seasonal Allergies/Itch: Currently taking Zyrtec 5 mg at bedtime. Pt has itchiness in the stomach region, denies rash. Notes no side effects, states Zyrtec helps with itching, also taking Benadryl (see insomnia).     Constipation: Currently taking Miralax with orange juice daily and Senna S 2 tablets twice daily. Working well, tolerating well.      Pain: Currently taking acetaminophen 2 tabs as needed, not using daily about once every 2 weeks. Maximum amount of Tylenol between Tylenol PM and Tylenol extra strength is 2000 mg per day.     Hyperlipidemia: Current therapy includes Ezetimibe (Zetia) 10mg once daily.  Pt reports no significant myalgias or other side effects.     Supplements: Currently taking potassium 10 mEq twice daily, omega-3 once daily, vitamin E once daily, MVI once daily, Calcium-Vit D 500 mg-200units twice daily, and vitamin D 2000 units daily. Yogurt once a week, no milk or cheese. Vitamin D, last labs wnls. Potassium wnls.     Osteopenia: Currently taking aledronate 70 mg once weekly. Taking correctly. Getting calcium and vitamin D - see supplements. Tolerating well.     Current labs include:  BP Readings from Last 3 Encounters:   07/12/17 106/58   07/11/17 118/63   06/27/17 135/44     Today's Vitals: /58  Pulse 70  No results found for: A1C.  Lab Results   Component Value Date    CHOL 177 12/28/2016     Lab Results   Component Value Date    TRIG 76 12/28/2016     Lab Results   Component Value Date    HDL 41 12/28/2016     Lab Results   Component Value Date     12/28/2016       Liver Function Studies -   Recent Labs   Lab Test  07/11/17   2030   PROTTOTAL  7.7   ALBUMIN  3.7   BILITOTAL  0.8   ALKPHOS  197*   AST  31   ALT  24       No results found for: UCRR, MICROL, UMALCR    Last Basic Metabolic Panel:  Lab Results   Component  Value Date     07/11/2017      Lab Results   Component Value Date    POTASSIUM 4.0 07/11/2017     Lab Results   Component Value Date    CHLORIDE 102 07/11/2017     Lab Results   Component Value Date    BUN 26 07/11/2017     Lab Results   Component Value Date    CR 0.99 07/11/2017     GFR Estimate   Date Value Ref Range Status   07/11/2017 53 (L) >60 mL/min/1.7m2 Final     Comment:     Non  GFR Calc   06/27/2017 49 (L) >60 mL/min/1.7m2 Final     Comment:     Non  GFR Calc   05/26/2017 52 (L) >60 mL/min/1.7m2 Final     Comment:     Non  GFR Calc     GFR Estimate If Black   Date Value Ref Range Status   07/11/2017 64 >60 mL/min/1.7m2 Final     Comment:      GFR Calc   06/27/2017 59 (L) >60 mL/min/1.7m2 Final     Comment:      GFR Calc   05/26/2017 63 >60 mL/min/1.7m2 Final     Comment:      GFR Calc     TSH   Date Value Ref Range Status   07/05/2016 3.55 0.40 - 4.00 mU/L Final   ]    Most Recent Immunizations   Administered Date(s) Administered     Influenza (High Dose) 3 valent vaccine 12/06/2016     Pneumococcal (PCV 13) 12/06/2016     TDAP Vaccine (Adacel) 12/06/2016       ASSESSMENT:              Current medications were reviewed today as discussed above.      Medication Adherence: no issues identified    HFpEF/HTN/afib: Needs Improvement. Pt is also taking Plavix which can increase risk of bleed. Per [facility], Plavix should be stopped within 5 days of an operation (unclear if it should be stopped for thoracentesis). Patient to check with radiology. Pharmacy is waiting for cardiology to approve refill of Plavix.     Insomnia: stable. Diphenhydramine may be helping with pt's itchiness.     Seasonal Allergies/Itch: Needs Improvement. Pt is on diphenhydramine and cetirizine. Recommend stopping cetirizine to see if itching is stable on just diphenhydramine.      Constipation: Stable.    Pain: Stable.    Hyperlipidemia:  Stable. Patient not able to tolerate statins.     Supplements: Needs Improvement. Pt is taking vitamin E which can contribute to earlier mortality at >/= 400 units.     Osteopenia: Stable.     PLAN:                  1. Stop vitamin E therapy.  2. Reminded patient to talk with radiology about stopping warfarin and ask if Plavix needs to be stopped prior to procedure.   3. Stop Zyrtec for itch. Monitor changes in itch (diphenhydramine may be enough coverage for itchiness relief).  4. Reminded patient to call cardiology to refill Plavix.    I spent 60 minutes with this patient today.  All changes were made via collaborative practice agreement with Mimi Mcguire. A copy of the visit note was provided to the patient's primary care provider.     Will follow up in clinic in 6 months.    The patient was given a summary of these recommendations as an after visit summary.    Maryjane Gonzales, PharmD-4  Irais Olivia, PharmD  Medication Therapy Management

## 2017-07-12 NOTE — PATIENT INSTRUCTIONS
Recommendations from today's MTM visit                                                      1. You can stop taking the vitamin E.  2. You should talk with radiology today and let them know you stopped warfarin, also let them know that you take Plavix. You may need to stop taking clopidogrel (Plavix) before the surgery as well.   3. For sleep, you can continue taking the ZzzQuil at bedtime or acetaminophen (Tylenol) pm. Try not using the cetirizine (Zyrtec) if you are using either ZzzQuil or Tylenol pm. Both of these contain an antihistamine - monitor her itching if it continues without the cetirizine - go back to taking it.   4. Call cardiology about refill for Plavix.       Next MTM visit: We should follow up in 6 months.     To schedule another MT appointment, please call the clinic directly or you may call the [facility] scheduling line at [phone] or toll-free at [phone].     My [facility] Pharmacist's contact information:                                                      It was a pleasure seeing you today!  Please feel free to contact me with any questions or concerns you have.      Irais Olivia, PharmD  [phone] in clinic on Tuesday and Wednesday  Medication Therapy Management Pharmacist

## 2017-07-12 NOTE — DISCHARGE INSTRUCTIONS
You should contact the radiology department to discuss thoracentesis to remove fluid from your lung.  You are on Coumadin and this will have to be held prior to the thoracentesis.  However with your recent valve surgery this may not be able to be held.  You may want to contact your cardiologist at [place] to see if the Coumadin can be held for thoracentesis.        Pleural Effusion     Pleural effusion is fluid buildup between the layers of tissue that line the outside of the lungs.   Your healthcare provider has told you that you have pleural effusion. Pleural effusion means that you have extra fluid between the pleura. This area is called the pleural space. The pleura are 2 layers of thin, smooth tissue that surround the lungs and line the chest. The pleural space usually holds only a small amount of fluid. This fluid lubricates the pleura. But if too much fluid fills the space, it can make it hard or painful to breathe.  There are 2 types of pleural effusion:    Inflammatory. This is caused by a lung disease like pneumonia or lung cancer, both of which irritates the pleura.    Noninflammatory. This is caused by abnormal fluid pressures inside the lungs. The pressure can be caused by congestive heart failure (CHF). In CHF, extra fluid collects inside the lung tissues because of a weak heart muscle. This extra fluid then leaks into the pleural space. Other causes of noninflammatory pleural effusions include kidney disease, liver disease, and malnutrition.  What are the symptoms of pleural effusion?  The symptoms of pleural effusion include:    Sharp pains in the chest, especially when taking a breath, coughing, or sneezing    Trouble breathing    Cough    Fever  What are the causes of pleural effusion?  Common causes include:    Congestive heart failure    Cirrhosis of the liver    Pneumonia    Viral lung infection    Cancer    Blood clot in the lung (pulmonary embolism)    Heart surgery  Chest infections  like pneumonia and heart disease are the most common causes. Less common causes include lung cancer.  How is pleural effusion diagnosed?  Your healthcare provider will examine you and ask about your health history. Tests include:    Blood tests    Analysis of fluid in pleural space, chest X-ray, CT scan, or ultrasound  How is pleural effusion treated?  The extra fluid may be drained from the pleural space. This is done with a procedure called thoracentesis. This procedure uses a thin needle to draw out the fluid from the pleural space. In some cases, a tube is placed in the chest to drain the extra fluid. The tube will likely stay in place for several days.  You may have other treatments, depending on the cause of your pleural effusion. If it s because of a bacterial infection, you will be given antibiotics to fight the infection. If it s because of a heart condition, you will be given medicines and other treatment for your heart. Your healthcare provider can tell you more about the cause of your pleural effusion and your treatment choices.  What are the long-term concerns?  If untreated, pleural effusion can lead to serious health problems, such as collapsed lung from fluid filling the pleural space.     Call 911  Call 911 if you have:    Trouble breathing    Worsening chest pain   When to call your healthcare provider   Call your healthcare provider right away if you have:    Continued coughing    Fever  Date Last Reviewed: 12/1/2016 2000-2017 [employer]. [address] [place] [address], [place] [address]. All rights reserved. This information is not intended as a substitute for professional medical care. Always follow your healthcare professional's instructions.

## 2017-07-12 NOTE — PROGRESS NOTES
Clinic Care Coordination Contact    Situation: Patient chart reviewed by care coordinator.    Background: Sw received ED notification from ED Charge RN informing that pt was in the ED last evening due to plural effusion.  Pt has [facility] RN, PT, OT, HHA and SW services in place to assist the pt in her home.    Assessment: NA    Plan/Recommendations: HIRAL and RNCC will continue to follow pt as needed.    Jessie Gan  Social Work Care Coordinator  [facility]  [phone]

## 2017-07-12 NOTE — ED PROVIDER NOTES
"  History     Chief Complaint   Patient presents with     Shortness of Breath     HX of valve repair in June  at [place]  Patient having  difficulty breathing  Has Hx of Congestive Heart failure  is disoriented and sleepy       HPI  Natalie Hair is a 84 year old female with a history of atrial fibrillation, CHF, unstable angina, and pulmonary HTN, who presents to the ED with her daughter for concerns of shortness of breath. History is obtained from daughter. The patient has a history of severe tricuspid regurgitation and underwent FORMA device placement on 6/1/17 at [facility]. The procedure was without complication and the patient's chronic lymphedema and shortness of breath have steadily improved since then. She followed up at [facility] 6/28 at which time cardiac workup was unremarkable, however they were told there was some fluid in the left lower lung and to \"keep an eye on it.\" The patient's PT came to her home this morning, but the patient was unable to participate in her usual PT as she was sleepy and disoriented, stating \"I've been driving all night. I want to stay in bed,\" although she no longer drives. PT also believed the patient appeared to be wheezing. A home nurse came to their home this afternoon and told the patient's daughter they needed to be seen \"becuase her breathing sounded bad.\" Daughter denies fevers, cough, nausea, vomiting, or abdominal pain. The patient does not have a history of COPD or asthma. She is a non-smoker.  Patient's had a previous history of A. fib and has had a ablation for this.  She has a pacemaker.  Patient denies significant waking.    I have reviewed the Medications, Allergies, Past Medical and Surgical History, and Social History in the [facility] system.    Past Medical History:   Diagnosis Date     Backache      H/O aortic valve replacement in 2002    maintained on chronic anticoagulation with warfarin     Hyperlipidemia LDL goal < 100      Hypertension      T12 " compression fracture (H)      Unspecified cardiovascular disease     hx of CAD with 3V CABG and AV replacement in 2002     Urinary incontinence      Vitamin D deficiency      Patient Active Problem List   Diagnosis     Heart valve replaced     Cardiovascular disease     History of T12 compression fracture     Backache     Vitamin D deficiency     Hyperlipidemia LDL goal <100     Advanced directives, counseling/discussion     Long term current use of anticoagulant therapy     Urinary incontinence     Unstable angina (H)     Benign essential hypertension, BP goal <150/90     Bilateral low back pain without sciatica     Osteopenia     Atrial fibrillation with RVR (H)     Primary pulmonary hypertension (H)     Lymphedema of both lower extremities     Tricuspid valve insufficiency, unspecified etiology     Near syncope     Chronic diastolic congestive heart failure (H)     History of pressure ulcer     Health Care Home     S/P CABG (coronary artery bypass graft) - 2002     Stented coronary artery - 5/4/2017 at [place]     Decubitus ulcer of sacral region, unspecified ulcer stage     No current facility-administered medications for this encounter.      Current Outpatient Prescriptions   Medication     Calcium Citrate-Vitamin D (CALCIUM + D PO)     DiphenhydrAMINE HCl, Sleep, (ZZZQUIL PO)     warfarin (COUMADIN) 4 MG tablet     potassium chloride (K-TAB,KLOR-CON) 10 MEQ tablet     TORSEMIDE PO     ezetimibe (ZETIA) 10 MG tablet     UNABLE TO FIND     Diphenhydramine-APAP, sleep, (TYLENOL PM EXTRA STRENGTH PO)     triamcinolone (KENALOG) 0.1 % cream     alendronate (FOSAMAX) 70 MG tablet     clopidogrel (PLAVIX) 75 MG tablet     omega 3 1000 MG CAPS     multivitamin, therapeutic with minerals (MULTI-VITAMIN) TABS tablet     VITAMIN E COMPLEX PO     metoprolol (LOPRESSOR) 25 MG tablet     ondansetron (ZOFRAN ODT) 4 MG ODT tab     cetirizine (ZYRTEC) 5 MG tablet     spironolactone (ALDACTONE) 25 MG tablet     polyethylene glycol  "(MIRALAX/GLYCOLAX) powder     Cholecalciferol (VITAMIN D) 2000 UNITS CAPS     senna-docusate (SENNA S) 8.6-50 MG per tablet     ACETAMINOPHEN PO     SSD 1 % cream     metolazone (ZAROXOLYN) 2.5 MG tablet     order for DME     order for DME     order for DME     nitroglycerin (NITROSTAT) 0.4 MG SL tablet        Allergies   Allergen Reactions     Lorazepam Nausea and Vomiting     Morphine Sulfate Cr [Morphine Sulfate] Nausea and Vomiting     Crestor [Rosuvastatin] Other (See Comments)     Abdominal/Back pain     Lidocaine GI Disturbance     Cozaar [Losartan] Rash     Rash      Social History     Social History     Marital status:      Spouse name: N/A     Number of children: N/A     Years of education: N/A     Occupational History     Not on file.     Social History Main Topics     Smoking status: Former Smoker     Types: Cigarettes     Smokeless tobacco: Never Used      Comment: 40 years ago     Alcohol use No     Drug use: No     Sexual activity: Not Currently     Other Topics Concern      Service No     Blood Transfusions Yes     1954 and 2002     Caffeine Concern No     Occupational Exposure No     Hobby Hazards No     Sleep Concern Yes     wakes frequently to urinate     Stress Concern No     Weight Concern Yes     I'm overweight     Special Diet No     Back Care Yes     arthritis lower back     Exercise No     Bike Helmet No     NA     Seat Belt Yes     Self-Exams Yes     Parent/Sibling W/ Cabg, Mi Or Angioplasty Before 65f 55m? Yes     Social History Narrative     Family History   Problem Relation Age of Onset     HEART DISEASE Mother      Lipids Son      HEART DISEASE Son      Lipids Daughter        Review of Systems  All other systems reviewed and are negative.    Physical Exam   BP: 121/81  Pulse: 71  Temp: 97.5  F (36.4  C)  Resp: 20  Height: 167.6 cm (5' 6\")  Weight: 80.7 kg (178 lb)  SpO2: 96 %  Physical Exam alert cooperative female in mild respiratory distress.  Patient is able to speak " in complete sentences.  She does have facial pallor.  Neck is supple without JVD or stridor.  Lungs reveal diminished breath sounds at the left base.  No active wheezing.  Cardiac auscultation is regular with a loud holosystolic murmur.  Abdomen was soft and benign.  There is no organomegaly or masses.  Extremities reveal trace pitting edema of both lower extremities.  She is wearing compressive stockings.  Patient states this is normal for her and unchanged.  Neurologically nonfocal exam.    ED Course     ED Course     Procedures             EKG Interpretation:      Interpreted by Simone Borjas  Time reviewed:9;10  Symptoms at time of EKG: Dyspnea   Rhythm: normal sinus   Rate: Normal  Axis: Left Axis Deviation  Ectopy: none  Conduction: left bundle branch block (complete)  ST Segments/ T Waves: Non-specific ST-T wave changes  Q Waves: none  Comparison to prior: Unchanged    Clinical Impression: left bundle branch block                Critical Care time:  none               Labs Ordered and Resulted from Time of ED Arrival Up to the Time of Departure from the ED   CBC WITH PLATELETS DIFFERENTIAL - Abnormal; Notable for the following:        Result Value    MCHC 31.1 (*)     All other components within normal limits   COMPREHENSIVE METABOLIC PANEL - Abnormal; Notable for the following:     GFR Estimate 53 (*)     Alkaline Phosphatase 197 (*)     All other components within normal limits   INR - Abnormal; Notable for the following:     INR 2.15 (*)     All other components within normal limits   LACTIC ACID WHOLE BLOOD   TROPONIN I       Results for orders placed or performed during the hospital encounter of 07/11/17 (from the past 24 hour(s))   XR Chest 2 Views    Narrative    CHEST TWO VIEW   7/11/2017 8:12 PM     HISTORY: Shortness of breath, recent tricuspid surgery with a forma  device.    COMPARISON: 11/30/2016    FINDINGS: Cardiomegaly. Previous sternotomy with valve replacement.  Pacer with 2 leads in place.  Moderate-sized left pleural effusion,  much increased from 11/30/2016. Right lung clear.      Impression    IMPRESSION: Moderate-sized left pleural effusion.    JOESPH DAWN MD   CBC with platelets differential   Result Value Ref Range    WBC 6.2 4.0 - 11.0 10e9/L    RBC Count 4.64 3.8 - 5.2 10e12/L    Hemoglobin 12.3 11.7 - 15.7 g/dL    Hematocrit 39.6 35.0 - 47.0 %    MCV 85 78 - 100 fl    MCH 26.5 26.5 - 33.0 pg    MCHC 31.1 (L) 31.5 - 36.5 g/dL    RDW 14.9 10.0 - 15.0 %    Platelet Count 250 150 - 450 10e9/L    Diff Method Automated Method     % Neutrophils 63.7 %    % Lymphocytes 16.4 %    % Monocytes 15.3 %    % Eosinophils 3.9 %    % Basophils 0.7 %    % Immature Granulocytes 0.0 %    Absolute Neutrophil 3.9 1.6 - 8.3 10e9/L    Absolute Lymphocytes 1.0 0.8 - 5.3 10e9/L    Absolute Monocytes 0.9 0.0 - 1.3 10e9/L    Absolute Eosinophils 0.2 0.0 - 0.7 10e9/L    Absolute Basophils 0.0 0.0 - 0.2 10e9/L    Abs Immature Granulocytes 0.0 0 - 0.4 10e9/L   Comprehensive metabolic panel   Result Value Ref Range    Sodium 137 133 - 144 mmol/L    Potassium 4.0 3.4 - 5.3 mmol/L    Chloride 102 94 - 109 mmol/L    Carbon Dioxide 28 20 - 32 mmol/L    Anion Gap 7 3 - 14 mmol/L    Glucose 94 70 - 99 mg/dL    Urea Nitrogen 26 7 - 30 mg/dL    Creatinine 0.99 0.52 - 1.04 mg/dL    GFR Estimate 53 (L) >60 mL/min/1.7m2    GFR Estimate If Black 64 >60 mL/min/1.7m2    Calcium 9.7 8.5 - 10.1 mg/dL    Bilirubin Total 0.8 0.2 - 1.3 mg/dL    Albumin 3.7 3.4 - 5.0 g/dL    Protein Total 7.7 6.8 - 8.8 g/dL    Alkaline Phosphatase 197 (H) 40 - 150 U/L    ALT 24 0 - 50 U/L    AST 31 0 - 45 U/L   INR   Result Value Ref Range    INR 2.15 (H) 0.86 - 1.14   Lactic acid whole blood   Result Value Ref Range    Lactic Acid 1.1 0.7 - 2.1 mmol/L   Troponin I   Result Value Ref Range    Troponin I ES  0.000 - 0.045 ug/L     <0.015  The 99th percentile for upper reference range is 0.045 ug/L.  Troponin values in   the range of 0.045 - 0.120 ug/L may  be associated with risks of adverse   clinical events.         Medications - No data to display    7:40 PM Patient assessed.  IV was established and blood was obtained.  Chest x-ray is ordered.  Patient had an EKG obtained and reviewed as above.  Discussed results patient blood work being normal except her INR is low therapeutic at 2.15.  She's not taken her dose this evening yet.  Chest x-ray was obtained and showed a worsening left pleural effusion.  Assessments & Plan (with Medical Decision Making)   Patient is a 84-year-old female presents with worsening dyspnea.  No specific chest pain.  She's not had any productive cough or fever.  She has had a recent FORMA procedure for severe tricuspid regurg.  It was noted to have some pleural fluid on the left after procedure and patient was advised to follow-up to have this reassessed.  Patient has had previous A. fib and did have a ablation and subsequent pacemaker placement.  She's had previous congestive failure, pulmonary hypertension, and unstable angina.  Patient denies significant waking.  No known cancer risk.  On exam patient was afebrile vitals stable.  She is not hypoxic.  She did have diminished breath sounds on auscultation of her lungs on the left base.  X-ray was obtained to confirm this and showed a worsening left pleural effusion.  EKG shows a left bundle branch block and no change from previous.  Blood work was unremarkable.  Her INR is 2.15.  Suspect patient will need a thoracentesis for the pleural fluid.  Unfortunately she is on Coumadin so that cannot be performed at this time.  I've ordered thoracentesis and she will contact the radiology department to arrange this and to see how long she should hold her Coumadin.  I did recommend she or her daughter discuss with the [facility] prior to this to see if she is a candidate to be off of the Coumadin.  I'm unfamiliar with the Forma procedure as it is quite new. I do not know if she needs to be on  anticoagulation for this.  Hand pleural effusion is provided.  Reasons to return to the emergency room or discuss.  I have reviewed the nursing notes.    I have reviewed the findings, diagnosis, plan and need for follow up with the patient.       Discharge Medication List as of 7/11/2017 10:00 PM          Final diagnoses:   Pleural effusion, left   Dyspnea, unspecified type     This document serves as a record of the services and decisions personally performed and made by Simone Borjas MD. It was created on HIS/HER behalf by Sneha David, a trained medical scribe. The creation of this document is based the provider's statements to the medical scribe.  Sneha David 7:40 PM 7/11/2017    Provider:   The information in this document, created by the medical scribe for me, accurately reflects the services I personally performed and the decisions made by me. I have reviewed and approved this document for accuracy prior to leaving the patient care area.  Simone Borjas MD 7:40 PM 7/11/2017 7/11/2017   [facility] EMERGENCY DEPARTMENT     Simone Borjas MD  07/12/17 [id number]

## 2017-07-12 NOTE — MR AVS SNAPSHOT
After Visit Summary   7/12/2017    Natalie Hair    MRN: [id number]           Patient Information     Date Of Birth          8/28/1932        Visit Information        Provider Department      7/12/2017 10:00 AM Irais Olivia [facility]TM        Today's Diagnoses     Congestive heart failure, unspecified congestive heart failure chronicity, unspecified congestive heart failure type (H)        Lymphedema of both lower extremities          Care Instructions    Recommendations from today's MTM visit                                                      1. You can stop taking the vitamin E.  2. You should talk with radiology today and let them know you stopped warfarin, also let them know that you take Plavix. You may need to stop taking clopidogrel (Plavix) before the surgery as well.   3. For sleep, you can continue taking the ZzzQuil at bedtime or acetaminophen (Tylenol) pm. Try not using the cetirizine (Zyrtec) if you are using either ZzzQuil or Tylenol pm. Both of these contain an antihistamine - monitor her itching if it continues without the cetirizine - go back to taking it.       Next MTM visit: We should follow up in 6 months.     To schedule another MT appointment, please call the clinic directly or you may call the [facility] scheduling line at [phone] or toll-free at [phone].     My [facility] Pharmacist's contact information:                                                      It was a pleasure seeing you today!  Please feel free to contact me with any questions or concerns you have.      Irais Olivia, PharmD  [phone] in clinic on Tuesday and Wednesday  Medication Therapy Management Pharmacist            Follow-ups after your visit        Your next 10 appointments already scheduled     Jul 13, 2017  2:30 PM CDT   Office Visit with Mimi Mcguire MD   [facility] ([facility])    [address] [facility] [address]  "  [phone]           Bring a current list of meds and any records pertaining to this visit.  For Physicals, please bring immunization records and any forms needing to be filled out.  Please arrive 10 minutes early to complete paperwork.            Jul 20, 2017  2:00 PM CDT   Cardiac Evaluation with [place] [facility]   [facility] ([facility])    [address] [address]   [phone]              Future tests that were ordered for you today     Open Future Orders        Priority Expected Expires Ordered    US Thoracentesis Radiology After Discharge  10/9/2017 7/11/2017            Who to contact     If you have questions or need follow up information about today's clinic visit or your schedule please contact [facility] M[facility] directly at [phone].  Normal or non-critical lab and imaging results will be communicated to you by [employer], letter or phone within 4 business days after the clinic has received the results. If you do not hear from us within 7 days, please contact the clinic through MyChart or phone. If you have a critical or abnormal lab result, we will notify you by phone as soon as possible.  Submit refill requests through [employer] or call your pharmacy and they will forward the refill request to us. Please allow 3 business days for your refill to be completed.          Additional Information About Your Visit        [employer] Information     [employer] lets you send messages to your doctor, view your test results, renew your prescriptions, schedule appointments and more. To sign up, go to [email or web address] . Click on \"Log in\" on the left side of the screen, which will take you to the Welcome page. Then click on \"Sign up Now\" on the right side of the page.     You will be asked to enter the access code listed below, as well as some personal information. Please follow the directions to create your username and password.   "   Your access code is: [email or web address]  Expires: 2017  9:28 AM     Your access code will  in 90 days. If you need help or a new code, please call your [facility] or [phone].        Care EveryWhere ID     This is your Care EveryWhere ID. This could be used by other organizations to access your [facility] medical records  [id number]         Blood Pressure from Last 3 Encounters:   17 118/63   17 135/44   17 134/61    Weight from Last 3 Encounters:   17 178 lb (80.7 kg)   17 175 lb 12.8 oz (79.7 kg)   17 177 lb 12.8 oz (80.6 kg)              Today, you had the following     No orders found for display         Today's Medication Changes          These changes are accurate as of: 17 11:15 AM.  If you have any questions, ask your nurse or doctor.               Stop taking these medicines if you haven't already. Please contact your care team if you have questions.     CALCIUM + D PO   Stopped by:  Irais Olivia RPH           SSD 1 % cream   Generic drug:  silver sulfADIAZINE   Stopped by:  Irais Olivia RPH           triamcinolone 0.1 % cream   Commonly known as:  KENALOG   Stopped by:  Irais Olivia RPH           UNABLE TO FIND   Stopped by:  Irais Olivia RPH                Where to get your medicines      Some of these will need a paper prescription and others can be bought over the counter.  Ask your nurse if you have questions.     You don't need a prescription for these medications     spironolactone 25 MG tablet                Primary Care Provider Office Phone # Fax #    Mimi Mcguire [phone]       [facility] [address] [facility] [address]        Equal Access to Services     [facility] MALCOLM AH: Lilian campbell Somanuel, waaxda luqadaha, qaybta kaalmada zahira, lyric knapp. So [facility] [phone].    ATENCIÓN: Si nkechi howe  disposición servicios gratuitos de asistencia lingüística. Kevin pitt [phone].    We comply with applicable federal civil rights laws and [place] laws. We do not discriminate on the basis of race, color, national origin, age, disability sex, sexual orientation or gender identity.            Thank you!     Thank you for choosing [facility]  for your care. Our goal is always to provide you with excellent care. Hearing back from our patients is one way we can continue to improve our services. Please take a few minutes to complete the written survey that you may receive in the mail after your visit with us. Thank you!             Your Updated Medication List - Protect others around you: Learn how to safely use, store and throw away your medicines at www.disposemymeds.org.          This list is accurate as of: 7/12/17 11:15 AM.  Always use your most recent med list.                   Brand Name Dispense Instructions for use Diagnosis    ACETAMINOPHEN PO      Take 1,000 mg by mouth every 8 hours as needed for pain        alendronate 70 MG tablet    FOSAMAX    12 tablet    TAKE 1 TABLET BY MOUTH ONCE WEEKLY ON EMPTY STOMACH    Osteopenia       calcium carb 1250 mg (500 mg [place])/vitamin D 200 units 500-200 MG-UNIT per tablet    OSCAL with D     Take 1 tablet by mouth 2 times daily (with meals)        cetirizine 5 MG tablet    zyrTEC    30 tablet    Take 1 tablet (5 mg) by mouth daily    Acute urticaria       clopidogrel 75 MG tablet    PLAVIX    90 tablet    Take 1 tablet (75 mg) by mouth daily    Congestive heart failure, unspecified congestive heart failure chronicity, unspecified congestive heart failure type (H), Lymphedema of both lower extremities       ezetimibe 10 MG tablet    ZETIA    90 tablet    Take 1 tablet (10 mg) by mouth daily    Hyperlipidemia LDL goal <100       metolazone 2.5 MG tablet    ZAROXOLYN    6 tablet    Take one tablet per day for two days only    Acute on chronic  combined systolic and diastolic congestive heart failure (H), Lymphedema of both lower extremities, Right-sided congestive heart failure (H)       metoprolol 25 MG tablet    LOPRESSOR    90 tablet    Take 0.5 tablets (12.5 mg) by mouth 2 times daily        Multi-vitamin Tabs tablet      Take 1 tablet by mouth daily        nitroGLYcerin 0.4 MG sublingual tablet    NITROSTAT    25 tablet    Place 1 tablet (0.4 mg) under the tongue every 5 minutes as needed for chest pain    Unspecified cardiovascular disease       omega 3 1000 MG Caps     90 capsule    Take 1 g by mouth daily    Health Care Home       ondansetron 4 MG ODT tab    ZOFRAN ODT    10 tablet    Take 1-2 tablets (4-8 mg) by mouth every 6 hours as needed for nausea        * order for DME     1 Device    Equipment being ordered: walker with a seat on it Has severe CHF and lower extremity edema.    Acute on chronic diastolic congestive heart failure (H), Lymphedema of both lower extremities       * order for DME     1 Package    Equipment being ordered: Walker with a seat    Acute on chronic diastolic congestive heart failure (H), At risk for falling       * order for DME     1 Device    Equipment being ordered: Hospital Bed 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 -ongoing symptoms of lower extremity edema,shortness of breath,cough sacral pressure sores within last 6 months   An electric hospital bed to allow for increase in head of bed elevation and for ease in wheelchair transfers  Length of need: lifelong NPI - [id number]    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), Lymphedema of both lower extremities, Right-sided congestive heart failure (H), Tricuspid valve insufficiency, unspecified etiology, Congestive heart failure, unspecified congestive heart failure chronicity, unspecified congestive heart failure type (H), History of pressure ulcer       polyethylene glycol powder    MIRALAX/GLYCOLAX     Take 1 capful by mouth  every morning        potassium chloride 10 MEQ tablet    K-TAB,KLOR-CON    90 tablet    Take 1 tablet (10 mEq) by mouth 2 times daily    Shortness of breath on exertion, Essential hypertension       SENNA S 8.6-50 MG per tablet   Generic drug:  senna-docusate      Take 2 tablets by mouth 2 times daily        spironolactone 25 MG tablet    ALDACTONE    60 tablet    Take 1 tablet (25 mg) by mouth 2 times daily    Congestive heart failure, unspecified congestive heart failure chronicity, unspecified congestive heart failure type (H), Lymphedema of both lower extremities       TORSEMIDE PO      Take 20 mg by mouth 2 times daily        TYLENOL PM EXTRA STRENGTH PO      Take 2-3 tablets by mouth nightly as needed        vitamin D 2000 UNITS Caps      Take 1 capsule by mouth daily        warfarin 4 MG tablet    COUMADIN    30 tablet    Take 2mg by mouth Mon & Fri and 4mg all other days or as directed by Anticoagulation Clinic    Congestive heart failure, unspecified congestive heart failure chronicity, unspecified congestive heart failure type (H)       ZZZQUIL PO      Take 50 mg by mouth nightly as needed Alternates with Tylenol PM        * Notice:  This list has 3 medication(s) that are the same as other medications prescribed for you. Read the directions carefully, and ask your doctor or other care provider to review them with you.

## 2017-07-12 NOTE — ED NOTES
"Pt complains of worsening shortness of breath since valve revision on June 1st. Saw cardiologist end of June who suggested pt had \" fluid in left lung\" Pt reports needing to sit up to breath. Pt reports weight gain as well. Daughter is caregiver. 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 Patient placed on EKG monitor, pulse oximeter and blood pressure cuff. Warm blankets given X4.  "

## 2017-07-13 PROBLEM — J90 PLEURAL EFFUSION: Status: ACTIVE | Noted: 2017-01-01

## 2017-07-13 NOTE — NURSING NOTE
"Chief Complaint   Patient presents with     weight       Initial /57 (BP Location: Right arm, Patient Position: Sitting, Cuff Size: Adult Regular)  Pulse 69  Temp 96.5  F (35.8  C) (Tympanic)  Ht 5' 6\" (1.676 m)  Wt 174 lb 14.4 oz (79.3 kg)  BMI 28.23 kg/m2 Estimated body mass index is 28.23 kg/(m^2) as calculated from the following:    Height as of this encounter: 5' 6\" (1.676 m).    Weight as of this encounter: 174 lb 14.4 oz (79.3 kg).  Medication Reconciliation: complete   Margie Abarca LPN    "

## 2017-07-13 NOTE — TELEPHONE ENCOUNTER
clopidogrel (PLAVIX) 75 MG tablet           Last Written Prescription Date: 3/3/17  Last Fill Quantity: 90, # refills: 0    Last Office Visit with G, P or [facility] prescribing provider:  6/27/17   Future Office Visit:    Next 5 appointments (look out 90 days)     Jul 13, 2017  2:30 PM CDT   Office Visit with Mimi Mcguire MD   [facility]    [address] [address]   [phone]                   Lab Results   Component Value Date    WBC 6.2 07/11/2017     Lab Results   Component Value Date    RBC 4.64 07/11/2017     Lab Results   Component Value Date    HGB 12.3 07/11/2017     Lab Results   Component Value Date    HCT 39.6 07/11/2017     No components found for: MCT  Lab Results   Component Value Date    MCV 85 07/11/2017     Lab Results   Component Value Date    MCH 26.5 07/11/2017     Lab Results   Component Value Date    MCHC 31.1 07/11/2017     Lab Results   Component Value Date    RDW 14.9 07/11/2017     Lab Results   Component Value Date     07/11/2017     Lab Results   Component Value Date    AST 31 07/11/2017     Lab Results   Component Value Date    ALT 24 07/11/2017     Creatinine   Date Value Ref Range Status   07/11/2017 0.99 0.52 - 1.04 mg/dL Final   ]

## 2017-07-13 NOTE — MR AVS SNAPSHOT
After Visit Summary   7/13/2017    Natalie Hair    MRN: [id number]           Patient Information     Date Of Birth          8/28/1932        Visit Information        Provider Department      7/13/2017 2:30 PM Mimi Mcguire MD [facility]        Today's Diagnoses     Dementia without behavioral disturbance, unspecified dementia type    -  1    Heart valve replaced        Tricuspid valve insufficiency, unspecified etiology        Chronic diastolic congestive heart failure (H)        Congestive heart failure, unspecified congestive heart failure chronicity, unspecified congestive heart failure type (H)        Lymphedema of both lower extremities        Pleural effusion        Decubitus ulcer of sacral region, unspecified ulcer stage        Dermatitis           Follow-ups after your visit        Additional Services     NEUROLOGY ADULT REFERRAL       Your provider has referred you for the following:   Consult at [facility]: [facility] [phone]   [email or web address]  [facility]: [facility] [phone]   [email or web address]  [facility]: [facility] [phone]   [email or web address]  [facility]: Neurology [facility] [phone]   [email or web address]  General Neurology    Please be aware that coverage of these services is subject to the terms and limitations of your health insurance plan.  Call member services at your health plan with any benefit or coverage questions.      Please bring the following with you to your appointment:    (1) Any X-Rays, CTs or MRIs which have been performed.  Contact the facility where they were done to arrange for  prior to your scheduled appointment.    (2) List of current medications  (3) This referral request   (4) Any documents/labs given to you for this referral                  Your next  10 appointments already scheduled     Jul 17, 2017 10:30 AM CDT   US THORACENTESIS with [place], [place] IMAGING NURSE, [place] RAD   [facility] [place] [facility] ([facility])    [address] [facility] [id number] [phone]           Tell us in advance if there s any chance you may be pregnant.  Bring a list of your medicines to the exam. Include vitamins, minerals and over-the-counter drugs.  If you take blood thinners, you may need to stop taking them a few days before treatment. Talk to your doctor before stopping these medicines. You will need a blood test the morning of your exam.   Stop taking Coumadin (warfarin) 3 days before your exam. Restart the day after your exam.   If you take aspirin, you may need to stop taking it 3 days before your scan.   If you take Plavix, Ticlid, Pletal or Persantine, you may need to stop taking them 5 days before your scan. Please talk to your doctor before stopping these medicines.  IF YOU WILL RECEIVE SEDATION (medicine to help you relax):   See your family doctor for an exam within 30 days of treatment.   Plan for an adult to drive you home and stay with you for at least 6 hours.   Follow the eating and drinking guidelines checked below:   No eating or drinking for 4 hours before your test. You may take medicine with small sips of water.   If you have diabetes:If you take insulin, call your diabetes care team. Do not take diabetes pills on the morning of your test. If you take metformin (Avandamet, Glucophage, Glucovance, Metaglip) and received contrast, wait 48 hours before re-starting this medicine.  Please call the Imaging Department at your exam site with any questions.            Jul 20, 2017  2:00 PM CDT   Cardiac Evaluation with [place] CARDIAC REHAB EVALUATIONS   [facility] ([facility])    [address] [facility] [address] [phone]              Who to contact     Normal or non-critical lab and imaging results  "will be communicated to you by MyChart, letter or phone within 4 business days after the clinic has received the results. If you do not hear from us within 7 days, please contact the clinic through [facility] or phone. If you have a critical or abnormal lab result, we will notify you by phone as soon as possible.  Submit refill requests through [facility] or call your pharmacy and they will forward the refill request to us. Please allow 3 business days for your refill to be completed.          If you need to speak with a  for additional information , please call: [phone]             Additional Information About Your Visit        [facility] Information     [facility] lets you send messages to your doctor, view your test results, renew your prescriptions, schedule appointments and more. To sign up, go to [email or web address] . Click on \"Log in\" on the left side of the screen, which will take you to the Welcome page. Then click on \"Sign up Now\" on the right side of the page.     You will be asked to enter the access code listed below, as well as some personal information. Please follow the directions to create your username and password.     Your access code is: [email or web address]  Expires: 2017  9:28 AM     Your access code will  in 90 days. If you need help or a new code, please call your [place] clinic or [phone].        Care EveryWhere ID     This is your Care EveryWhere ID. This could be used by other organizations to access your [place] medical records  [id number]        Your Vitals Were     Pulse Temperature Height Pulse Oximetry BMI (Body Mass Index)       69 96.5  F (35.8  C) (Tympanic) 5' 6\" (1.676 m) 96% 28.23 kg/m2        Blood Pressure from Last 3 Encounters:   17 121/57   17 106/58   17 118/63    Weight from Last 3 Encounters:   17 174 lb 14.4 oz (79.3 kg)   17 178 lb (80.7 kg)   17 175 lb 12.8 oz (79.7 kg)              We Performed the " Following     Folate     NEUROLOGY ADULT REFERRAL     TSH with free T4 reflex     Vitamin B12          Today's Medication Changes          These changes are accurate as of: 7/13/17  3:38 PM.  If you have any questions, ask your nurse or doctor.               Start taking these medicines.        Dose/Directions    donepezil 5 MG tablet   Commonly known as:  ARICEPT   Used for:  Dementia without behavioral disturbance, unspecified dementia type   Started by:  Mimi Mcguire MD        Dose:  5 mg   Take 1 tablet (5 mg) by mouth At Bedtime   Quantity:  90 tablet   Refills:  0       triamcinolone 0.1 % cream   Commonly known as:  KENALOG   Used for:  Dermatitis   Started by:  Mimi Mcguire MD        Apply sparingly to affected area three times daily for 5-7 days.   Quantity:  30 g   Refills:  0            Where to get your medicines      These medications were sent to [facility] [address] [place] [address] [place] [address] [employer] [facility] [address]     Phone:  [phone]     clopidogrel 75 MG tablet    donepezil 5 MG tablet    triamcinolone 0.1 % cream                Primary Care Provider Office Phone # Fax #    Mimi Mcguire [phone]       [facility] [id number] [place] [address]        Equal Access to Services     JA FOWLER AH: Hadii pily ku hadasho Soomaali, waaxda luqadaha, qaybta kaalmada adeegyada, waxay idiin hayaan federica khtoni knapp. So [facility] [phone].    ATENCIÓN: Si habla español, tiene a simmons disposición servicios gratuitos de asistencia lingüística. Kevin al [phone].    We comply with applicable federal civil rights laws and [place] laws. We do not discriminate on the basis of race, color, national origin, age, disability sex, sexual orientation or gender identity.            Thank you!     Thank you for choosing [facility]  for your care. Our goal is always to provide you  with excellent care. Hearing back from our patients is one way we can continue to improve our services. Please take a few minutes to complete the written survey that you may receive in the mail after your visit with us. Thank you!             Your Updated Medication List - Protect others around you: Learn how to safely use, store and throw away your medicines at www.disposemymeds.org.          This list is accurate as of: 7/13/17  3:38 PM.  Always use your most recent med list.                   Brand Name Dispense Instructions for use Diagnosis    ACETAMINOPHEN PO      Take 1,000 mg by mouth every 8 hours as needed for pain        alendronate 70 MG tablet    FOSAMAX    12 tablet    TAKE 1 TABLET BY MOUTH ONCE WEEKLY ON EMPTY STOMACH    Osteopenia       calcium carb 1250 mg (500 mg [place])/vitamin D 200 units 500-200 MG-UNIT per tablet    OSCAL with D     Take 1 tablet by mouth 2 times daily (with meals)        cetirizine 5 MG tablet    zyrTEC    30 tablet    Take 1 tablet (5 mg) by mouth daily    Acute urticaria       clopidogrel 75 MG tablet    PLAVIX    90 tablet    Take 1 tablet (75 mg) by mouth daily    Congestive heart failure, unspecified congestive heart failure chronicity, unspecified congestive heart failure type (H), Lymphedema of both lower extremities       donepezil 5 MG tablet    ARICEPT    90 tablet    Take 1 tablet (5 mg) by mouth At Bedtime    Dementia without behavioral disturbance, unspecified dementia type       ezetimibe 10 MG tablet    ZETIA    90 tablet    Take 1 tablet (10 mg) by mouth daily    Hyperlipidemia LDL goal <100       metolazone 2.5 MG tablet    ZAROXOLYN    6 tablet    Take one tablet per day for two days only    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), Lymphedema of both lower extremities, Right-sided congestive heart failure (H)       metoprolol 25 MG tablet    LOPRESSOR    90 tablet    Take 0.5 tablets (12.5 mg) by mouth 2 times daily         Multi-vitamin Tabs tablet      Take 1 tablet by mouth daily        nitroGLYcerin 0.4 MG sublingual tablet    NITROSTAT    25 tablet    Place 1 tablet (0.4 mg) under the tongue every 5 minutes as needed for chest pain    Unspecified cardiovascular disease       omega 3 1000 MG Caps     90 capsule    Take 1 g by mouth daily    Health Care Home       ondansetron 4 MG ODT tab    ZOFRAN ODT    10 tablet    Take 1-2 tablets (4-8 mg) by mouth every 6 hours as needed for nausea        * order for DME     1 Device    Equipment being ordered: walker with a seat on it Has severe CHF and lower extremity edema.    Acute on chronic diastolic congestive heart failure (H), Lymphedema of both lower extremities       * order for DME     1 Package    Equipment being ordered: Walker with a seat    Acute on chronic diastolic congestive heart failure (H), At risk for falling       * order for DME     1 Device    Equipment being ordered: Hospital Bed 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 -ongoing symptoms of lower extremity edema,shortness of breath,cough sacral pressure sores within last 6 months   An electric hospital bed to allow for increase in head of bed elevation and for ease in wheelchair transfers  Length of need: lifelong NPI - [id number]    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), Lymphedema of both lower extremities, Right-sided congestive heart failure (H), Tricuspid valve insufficiency, unspecified etiology, Congestive heart failure, unspecified congestive heart failure chronicity, unspecified congestive heart failure type (H), History of pressure ulcer       polyethylene glycol powder    MIRALAX/GLYCOLAX     Take 1 capful by mouth every morning        potassium chloride 10 MEQ tablet    K-TAB,KLOR-CON    90 tablet    Take 1 tablet (10 mEq) by mouth 2 times daily    Shortness of breath on exertion, Essential hypertension       SENNA S 8.6-50 MG per tablet   Generic drug:   senna-docusate      Take 2 tablets by mouth 2 times daily        spironolactone 25 MG tablet    ALDACTONE    60 tablet    Take 1 tablet (25 mg) by mouth 2 times daily    Congestive heart failure, unspecified congestive heart failure chronicity, unspecified congestive heart failure type (H), Lymphedema of both lower extremities       TORSEMIDE PO      Take 20 mg by mouth 2 times daily        triamcinolone 0.1 % cream    KENALOG    30 g    Apply sparingly to affected area three times daily for 5-7 days.    Dermatitis       TYLENOL PM EXTRA STRENGTH PO      Take 2-3 tablets by mouth nightly as needed        vitamin D 2000 UNITS Caps      Take 1 capsule by mouth daily        warfarin 4 MG tablet    COUMADIN    30 tablet    Take 2mg by mouth Mon & Fri and 4mg all other days or as directed by Anticoagulation Clinic    Congestive heart failure, unspecified congestive heart failure chronicity, unspecified congestive heart failure type (H)       ZZZQUIL PO      Take 50 mg by mouth nightly as needed Alternates with Tylenol PM        * Notice:  This list has 3 medication(s) that are the same as other medications prescribed for you. Read the directions carefully, and ask your doctor or other care provider to review them with you.

## 2017-07-13 NOTE — PROGRESS NOTES
SUBJECTIVE:                                                    Natalie Hair is a 84 year old female who presents to clinic today for the following health issues:    Here with her daughter whom she is living with  Recent experimental device placed in heart for severe TR, done at [place], for CHF  Was in ER yesterday   Daughter is worried about - pleural effusion, memory issues, refill of plavix, skin lesion on her back,  And home care.       Seen in [facility] on 7/11/17. Discharged the same day. Patient has fluid in her left lung.   Monday a.m. - appointment to drain the lung effusion  Off warfarin    ER labs :     Component      Latest Ref Rng & Units 7/11/2017   WBC      4.0 - 11.0 10e9/L 6.2   RBC Count      3.8 - 5.2 10e12/L 4.64   Hemoglobin      11.7 - 15.7 g/dL 12.3   Hematocrit      35.0 - 47.0 % 39.6   MCV      78 - 100 fl 85   MCH      26.5 - 33.0 pg 26.5   MCHC      31.5 - 36.5 g/dL 31.1 (L)   RDW      10.0 - 15.0 % 14.9   Platelet Count      150 - 450 10e9/L 250   Diff Method       Automated Method   % Neutrophils      % 63.7   % Lymphocytes      % 16.4   % Monocytes      % 15.3   % Eosinophils      % 3.9   % Basophils      % 0.7   % Immature Granulocytes      % 0.0   Absolute Neutrophil      1.6 - 8.3 10e9/L 3.9   Absolute Lymphocytes      0.8 - 5.3 10e9/L 1.0   Absolute Monocytes      0.0 - 1.3 10e9/L 0.9   Absolute Eosinophils      0.0 - 0.7 10e9/L 0.2   Absolute Basophils      0.0 - 0.2 10e9/L 0.0   Abs Immature Granulocytes      0 - 0.4 10e9/L 0.0   Sodium      133 - 144 mmol/L 137   Potassium      3.4 - 5.3 mmol/L 4.0   Chloride      94 - 109 mmol/L 102   Carbon Dioxide      20 - 32 mmol/L 28   Anion Gap      3 - 14 mmol/L 7   Glucose      70 - 99 mg/dL 94   Urea Nitrogen      7 - 30 mg/dL 26   Creatinine      0.52 - 1.04 mg/dL 0.99   GFR Estimate      >60 mL/min/1.7m2 53 (L)   GFR Estimate If Black      >60 mL/min/1.7m2 64   Calcium      8.5 - 10.1 mg/dL 9.7   Bilirubin Total       0.2 - 1.3 mg/dL 0.8   Albumin      3.4 - 5.0 g/dL 3.7   Protein Total      6.8 - 8.8 g/dL 7.7   Alkaline Phosphatase      40 - 150 U/L 197 (H)   ALT      0 - 50 U/L 24   AST      0 - 45 U/L 31   INR      0.86 - 1.14 2.15 (H)   Lactic Acid      0.7 - 2.1 mmol/L 1.1   Troponin I ES      0.000 - 0.045 ug/L <0.015 . . .       Saw pharm yesterday in [place]  Stopped vit e and zyrtec    Scratching her back, still itchy     Bed sores are closed - no putting anything on them  Sleeping on her side     Daughter wonders if she can do PT and OT right now   Home care is coming to help :  and  and clinic RN   [place] doctor wants her to do cardiac rehab   They will wait for home health care to finish and then start July 20     They want to have new experiences with her :  No tattoos per [place] doctor   Had a manicure  No perms  Went to [employer] for the first time     Sisters want to take her on a road trip next month to [place] to see son  Would like to take a train ride this fall in [place] for her 85th bday     Memory issues continue although daughter feels they have improved a little bit since [facility] discharge:   Wedding ring,wallet,glasses, mail - repetitive questions  Where are we, whose house is this, what city is this   Where are we going - over and over   Scared of youngest son - sometimes doesn't recognize him     Review of systems:  No headache  No chest pain  No n/v   No d/c       Problem list and histories reviewed & adjusted, as indicated.  Additional history: as documented    Patient Active Problem List   Diagnosis     Heart valve replaced     Cardiovascular disease     History of T12 compression fracture     Backache     Vitamin D deficiency     Hyperlipidemia LDL goal <100     Advanced directives, counseling/discussion     Long term current use of anticoagulant therapy     Urinary incontinence     Unstable angina (H)     Benign essential hypertension, BP goal <150/90     Bilateral low back pain  without sciatica     Osteopenia     Atrial fibrillation with RVR (H)     Primary pulmonary hypertension (H)     Lymphedema of both lower extremities     Tricuspid valve insufficiency, unspecified etiology     Near syncope     Chronic diastolic congestive heart failure (H)     History of pressure ulcer     Health Care Home     S/P CABG (coronary artery bypass graft) - 2002     Stented coronary artery - 5/4/2017 at [place]     Decubitus ulcer of sacral region, unspecified ulcer stage     Current Outpatient Prescriptions   Medication     spironolactone (ALDACTONE) 25 MG tablet     calcium carb 1250 mg, 500 mg [place],/vitamin D 200 units (OSCAL WITH D) 500-200 MG-UNIT per tablet     DiphenhydrAMINE HCl, Sleep, (ZZZQUIL PO)     warfarin (COUMADIN) 4 MG tablet     potassium chloride (K-TAB,KLOR-CON) 10 MEQ tablet     TORSEMIDE PO     ezetimibe (ZETIA) 10 MG tablet     Diphenhydramine-APAP, sleep, (TYLENOL PM EXTRA STRENGTH PO)     alendronate (FOSAMAX) 70 MG tablet     metolazone (ZAROXOLYN) 2.5 MG tablet     clopidogrel (PLAVIX) 75 MG tablet     omega 3 1000 MG CAPS     multivitamin, therapeutic with minerals (MULTI-VITAMIN) TABS tablet     metoprolol (LOPRESSOR) 25 MG tablet     ondansetron (ZOFRAN ODT) 4 MG ODT tab     order for DME     cetirizine (ZYRTEC) 5 MG tablet     polyethylene glycol (MIRALAX/GLYCOLAX) powder     Cholecalciferol (VITAMIN D) 2000 UNITS CAPS     senna-docusate (SENNA S) 8.6-50 MG per tablet     order for DME     order for DME     nitroglycerin (NITROSTAT) 0.4 MG SL tablet     ACETAMINOPHEN PO     No current facility-administered medications for this visit.         Allergies   Allergen Reactions     Lorazepam Nausea and Vomiting     Morphine Sulfate Cr [Morphine Sulfate] Nausea and Vomiting     Crestor [Rosuvastatin] Other (See Comments)     Abdominal/Back pain     Lidocaine GI Disturbance     Cozaar [Losartan] Rash     Rash      /57 (BP Location: Right arm, Patient Position: Sitting, Cuff Size:  "Adult Regular)  Pulse 69  Temp 96.5  F (35.8  C) (Tympanic)  Ht 5' 6\" (1.676 m)  Wt 174 lb 14.4 oz (79.3 kg)  SpO2 96%  BMI 28.23 kg/m2   Wt Readings from Last 4 Encounters:   07/13/17 174 lb 14.4 oz (79.3 kg)   07/11/17 178 lb (80.7 kg)   06/27/17 175 lb 12.8 oz (79.7 kg)   05/26/17 177 lb 12.8 oz (80.6 kg)       GENERAL - Pt is alert and oriented in no acute distress.  Affect is appropriate. Good eye contact.  Sits quietly, comfortable, answers appropriately   HEET - Head is normocephalic, atraumatic.    PERRLA,EEMI. Conjunctiva are free of icterus or erythema.    NECK - Neck is supple w/o LA or thyromegaly  RESPIRATORY - Clear to auscultation bilaterally.  No wheezing noted  CV - RRR, no murmurs, rubs, gallops.   EXTREM - 1+ bilateral lower extremity edema   SKIN - on her back there are red lines of excoriation and a single maculopapular raised area of redness measuring about 1.5cm in diameter       Assessment/Plan -    (F03.90) Dementia without behavioral disturbance, unspecified dementia type  (primary encounter diagnosis)  Comment: discussed memory loss. Daughter feels it is very abrupt since surgery and denies any memory loss prior to surgery.  Will check memory labs and consider brain scan. Discussed options for medication including side effects. They are very concerned about side effects of medication. Neuro referral given and recommended due to rapid onset of memory issues.   Plan: donepezil (ARICEPT) 5 MG tablet, NEUROLOGY         ADULT REFERRAL            (Z95.2) Heart valve replaced  Comment: following at [place]. Feeling better overall.   Plan:     (I07.1) Tricuspid valve insufficiency, unspecified etiology  Comment:   Plan:     (I50.32) Chronic diastolic congestive heart failure (H)  Comment:   Plan:     (I50.9) Congestive heart failure, unspecified congestive heart failure chronicity, unspecified congestive heart failure type (H)  Comment: weight is fairly stable. Does have left pleural effusion " that will be drained next week   Plan:     (I89.0) Lymphedema of both lower extremities  Comment: improved overall but still present   Plan:     (J90) Pleural effusion  Comment: appointment to drain it   Plan:     (L89.159) Decubitus ulcer of sacral region, unspecified ulcer stage  Comment: stable at this time   Plan:       (L30.9) Dermatitis  Comment: lesion on back looks irritated. Will try topical steroid cream for a week and continued use of daily emollient.   Plan: triamcinolone (KENALOG) 0.1 % cream              Daughter is fairly stressed but does have a lot of family support  PT, OT , RN are coming to the house so that helps.     TT =  45 minutes, more than half of the time was spent discussing history, care coordination plan, reviewing records, and coordinating care.    ROZINA Mcguire MD

## 2017-07-14 NOTE — TELEPHONE ENCOUNTER
Probably a year since she had a stent placed this spring.     They can confirm with cardiology since she follows with cards at [facility].     nereydab

## 2017-07-14 NOTE — TELEPHONE ENCOUNTER
Patient notified that script was filled. She will check with cardiology to see how long she is to be on this medication.  Amy Gama RN

## 2017-07-17 NOTE — IP AVS SNAPSHOT
[facility]    [address] [facility] [address]    Phone:  [phone]                                       After Visit Summary   7/17/2017    Natalie Hair    MRN: [id number]           After Visit Summary Signature Page     I have received my discharge instructions, and my questions have been answered. I have discussed any challenges I see with this plan with the nurse or doctor.    ..........................................................................................................................................  Patient/Patient Representative Signature      ..........................................................................................................................................  Patient Representative Print Name and Relationship to Patient    ..................................................               ................................................  Date                                            Time    ..........................................................................................................................................  Reviewed by Signature/Title    ...................................................              ..............................................  Date                                                            Time

## 2017-07-17 NOTE — IP AVS SNAPSHOT
MRN:[id number]                      After Visit Summary   7/17/2017    Natalie Hair    MRN: [id number]           Visit Information        Provider Department      7/17/2017 10:30 AM [facility] RAD; [facility] IMAGING NURSE; KAROLINE Garcia Ultrasound           Review of your medicines      UNREVIEWED medicines. Ask your doctor about these medicines        Dose / Directions    ACETAMINOPHEN PO        Dose:  1000 mg   Take 1,000 mg by mouth every 8 hours as needed for pain   Refills:  0       alendronate 70 MG tablet   Commonly known as:  FOSAMAX   Used for:  Osteopenia        TAKE 1 TABLET BY MOUTH ONCE WEEKLY ON EMPTY STOMACH   Quantity:  12 tablet   Refills:  0       calcium carb 1250 mg (500 mg [place])/vitamin D 200 units 500-200 MG-UNIT per tablet   Commonly known as:  OSCAL with D        Dose:  1 tablet   Take 1 tablet by mouth 2 times daily (with meals)   Refills:  0       cetirizine 5 MG tablet   Commonly known as:  zyrTEC   Used for:  Acute urticaria        Dose:  5 mg   Take 1 tablet (5 mg) by mouth daily   Quantity:  30 tablet   Refills:  3       clopidogrel 75 MG tablet   Commonly known as:  PLAVIX   Used for:  Congestive heart failure, unspecified congestive heart failure chronicity, unspecified congestive heart failure type (H), Lymphedema of both lower extremities        Dose:  75 mg   Take 1 tablet (75 mg) by mouth daily   Quantity:  90 tablet   Refills:  0       donepezil 5 MG tablet   Commonly known as:  ARICEPT   Used for:  Dementia without behavioral disturbance, unspecified dementia type        Dose:  5 mg   Take 1 tablet (5 mg) by mouth At Bedtime   Quantity:  90 tablet   Refills:  0       ezetimibe 10 MG tablet   Commonly known as:  ZETIA   Used for:  Hyperlipidemia LDL goal <100        Dose:  10 mg   Take 1 tablet (10 mg) by mouth daily   Quantity:  90 tablet   Refills:  0       metolazone 2.5 MG tablet   Commonly known as:  ZAROXOLYN   Used for:  Acute on chronic combined systolic  and diastolic congestive heart failure (H), Lymphedema of both lower extremities, Right-sided congestive heart failure (H)        Take one tablet per day for two days only   Quantity:  6 tablet   Refills:  0       metoprolol 25 MG tablet   Commonly known as:  LOPRESSOR        Dose:  12.5 mg   Take 0.5 tablets (12.5 mg) by mouth 2 times daily   Quantity:  90 tablet   Refills:  3       Multi-vitamin Tabs tablet        Dose:  1 tablet   Take 1 tablet by mouth daily   Refills:  0       nitroGLYcerin 0.4 MG sublingual tablet   Commonly known as:  NITROSTAT   Used for:  Unspecified cardiovascular disease        Dose:  0.4 mg   Place 1 tablet (0.4 mg) under the tongue every 5 minutes as needed for chest pain   Quantity:  25 tablet   Refills:  1       omega 3 1000 MG Caps   Used for:  Health Care Home        Dose:  1 g   Take 1 g by mouth daily   Quantity:  90 capsule   Refills:  3       ondansetron 4 MG ODT tab   Commonly known as:  ZOFRAN ODT        Dose:  4-8 mg   Take 1-2 tablets (4-8 mg) by mouth every 6 hours as needed for nausea   Quantity:  10 tablet   Refills:  0       polyethylene glycol powder   Commonly known as:  MIRALAX/GLYCOLAX        Dose:  1 capful   Take 1 capful by mouth every morning   Refills:  0       potassium chloride 10 MEQ tablet   Commonly known as:  K-TAB,KLOR-CON   Used for:  Shortness of breath on exertion, Essential hypertension        Dose:  10 mEq   Take 1 tablet (10 mEq) by mouth 2 times daily   Quantity:  90 tablet   Refills:  1       SENNA S 8.6-50 MG per tablet   Generic drug:  senna-docusate        Dose:  2 tablet   Take 2 tablets by mouth 2 times daily   Refills:  0       spironolactone 25 MG tablet   Commonly known as:  ALDACTONE   Used for:  Congestive heart failure, unspecified congestive heart failure chronicity, unspecified congestive heart failure type (H), Lymphedema of both lower extremities        Dose:  25 mg   Take 1 tablet (25 mg) by mouth 2 times daily   Quantity:  60  tablet   Refills:  1       TORSEMIDE PO        Dose:  20 mg   Take 20 mg by mouth 2 times daily   Refills:  0       triamcinolone 0.1 % cream   Commonly known as:  KENALOG   Used for:  Dermatitis        Apply sparingly to affected area three times daily for 5-7 days.   Quantity:  30 g   Refills:  0       TYLENOL PM EXTRA STRENGTH PO        Dose:  2-3 tablet   Take 2-3 tablets by mouth nightly as needed   Refills:  0       vitamin D 2000 UNITS Caps        Dose:  1 capsule   Take 1 capsule by mouth daily   Refills:  0       warfarin 4 MG tablet   Commonly known as:  COUMADIN   Used for:  Congestive heart failure, unspecified congestive heart failure chronicity, unspecified congestive heart failure type (H)        Take 2mg by mouth Mon & Fri and 4mg all other days or as directed by Anticoagulation Clinic   Quantity:  30 tablet   Refills:  1       ZZZQUIL PO        Dose:  50 mg   Take 50 mg by mouth nightly as needed Alternates with Tylenol PM   Refills:  0         CONTINUE these medicines which have NOT CHANGED        Dose / Directions    * order for DME   Used for:  Acute on chronic diastolic congestive heart failure (H), Lymphedema of both lower extremities        Equipment being ordered: walker with a seat on it Has severe CHF and lower extremity edema.   Quantity:  1 Device   Refills:  0       * order for DME   Used for:  Acute on chronic diastolic congestive heart failure (H), At risk for falling        Equipment being ordered: Walker with a seat   Quantity:  1 Package   Refills:  0       * order for DME   Used for:  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), Lymphedema of both lower extremities, Right-sided congestive heart failure (H), Tricuspid valve insufficiency, unspecified etiology, Congestive heart failure, unspecified congestive heart failure chronicity, unspecified congestive heart failure type (H), History of pressure ulcer        Equipment being ordered: Hospital Bed  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 -ongoing symptoms of lower extremity edema,shortness of breath,cough sacral pressure sores within last 6 months   An electric hospital bed to allow for increase in head of bed elevation and for ease in wheelchair transfers  Length of need: lifelong NPI - [id number]   Quantity:  1 Device   Refills:  0       * Notice:  This list has 3 medication(s) that are the same as other medications prescribed for you. Read the directions carefully, and ask your doctor or other care provider to review them with you.             Protect others around you: Learn how to safely use, store and throw away your medicines at www.disposemymeds.org.         Follow-ups after your visit        Your next 10 appointments already scheduled     Jul 17, 2017 11:00 AM CDT   XR CHEST 1 VIEW with [facility]   [facility] ([facility])    [address] [address]   [phone]           Please bring a list of your current medicines to your exam. (Include vitamins, minerals and over-thecounter medicines.) Leave your valuables at home.  Tell your doctor if there is a chance you may be pregnant.  You do not need to do anything special for this exam.            Jul 20, 2017  2:00 PM CDT   Cardiac Evaluation with [facility] CARDIAC REHAB EVALUATIONS   [facility] ([facility])    [address] [address]   [phone]               Care Instructions        Further instructions from your care team                         Radiology  Discharge Instructions for Thoracentesis    You have had a thoracentesis procedure today.  A needle or catheter was inserted into your chest to remove fluid for laboratory studies and/or to remove the excess fluid from your chest.    AFTER YOU ARE HOME:    Rest at home today.    Limit heavy physical activity such as lifting, straining, or exercise for 48 hours.  You may resume normal activity in 24  "hours.    Resume previous diet and medications.    You may remove the bandage in 24 hours.    CALL YOUR PRIMARY PROVIDER IF:    You develop a temperature over 101oF, or redness at the drainage site.    You have any other questions or concerns.    AFTER HOURS CALL [place] [facility] AT [phone]    COME TO EMERGENCY ROOM IF:    You develop chest pain or shortness of breath, heavy bleeding from the thoracentesis site, severe lightheadedness or fainting.  DO NOT DRIVE YOURSELF.    Your ordering physician will contact you with the laboratory results.      ADDITIONAL INSTRUCTIONS:   ***    I have reviewed and understand these instructions.      __________________________________________________  Nataliehugo Hari      __________________________________________________     Nurse/Radiologist Signature      Date: 2017             Additional Information About Your Visit        MyChart Information     [facility] lets you send messages to your doctor, view your test results, renew your prescriptions, schedule appointments and more. To sign up, go to [email or web address] . Click on \"Log in\" on the left side of the screen, which will take you to the Welcome page. Then click on \"Sign up Now\" on the right side of the page.     You will be asked to enter the access code listed below, as well as some personal information. Please follow the directions to create your username and password.     Your access code is: [email or web address]  Expires: 2017  9:28 AM     Your access code will  in 90 days. If you need help or a new code, please call your [place] clinic or [phone].        Care EveryWhere ID     This is your Care EveryWhere ID. This could be used by other organizations to access your [place] medical records  [id number]         Primary Care Provider Office Phone # Fax #    Mimi Mcguire [phone]      Equal Access to Services     [facility] AH: Lilian Monroe, " wadorotheada artemio, qaybta kalen rodriges, lyric munozaafrantz ah. So [facility] [phone].    ATENCIÓN: Si byron morris, tiene a simmons disposición servicios gratuitos de asistencia lingüística. Kevin al [phone].    We comply with applicable federal civil rights laws and [place] laws. We do not discriminate on the basis of race, color, national origin, age, disability sex, sexual orientation or gender identity.            Thank you!     Thank you for choosing [facility] for your care. Our goal is always to provide you with excellent care. Hearing back from our patients is one way we can continue to improve our services. Please take a few minutes to complete the written survey that you may receive in the mail after you visit with us. Thank you!             Medication List: This is a list of all your medications and when to take them. Check marks below indicate your daily home schedule. Keep this list as a reference.      Medications           Morning Afternoon Evening Bedtime As Needed    ACETAMINOPHEN PO   Take 1,000 mg by mouth every 8 hours as needed for pain                                alendronate 70 MG tablet   Commonly known as:  FOSAMAX   TAKE 1 TABLET BY MOUTH ONCE WEEKLY ON EMPTY STOMACH                                calcium carb 1250 mg (500 mg [place])/vitamin D 200 units 500-200 MG-UNIT per tablet   Commonly known as:  OSCAL with D   Take 1 tablet by mouth 2 times daily (with meals)                                cetirizine 5 MG tablet   Commonly known as:  zyrTEC   Take 1 tablet (5 mg) by mouth daily                                clopidogrel 75 MG tablet   Commonly known as:  PLAVIX   Take 1 tablet (75 mg) by mouth daily                                donepezil 5 MG tablet   Commonly known as:  ARICEPT   Take 1 tablet (5 mg) by mouth At Bedtime                                ezetimibe 10 MG tablet   Commonly known as:  ZETIA   Take 1 tablet (10 mg) by mouth daily                                 metolazone 2.5 MG tablet   Commonly known as:  ZAROXOLYN   Take one tablet per day for two days only                                metoprolol 25 MG tablet   Commonly known as:  LOPRESSOR   Take 0.5 tablets (12.5 mg) by mouth 2 times daily                                Multi-vitamin Tabs tablet   Take 1 tablet by mouth daily                                nitroGLYcerin 0.4 MG sublingual tablet   Commonly known as:  NITROSTAT   Place 1 tablet (0.4 mg) under the tongue every 5 minutes as needed for chest pain                                omega 3 1000 MG Caps   Take 1 g by mouth daily                                ondansetron 4 MG ODT tab   Commonly known as:  ZOFRAN ODT   Take 1-2 tablets (4-8 mg) by mouth every 6 hours as needed for nausea                                * order for DME   Equipment being ordered: walker with a seat on it Has severe CHF and lower extremity edema.                                * order for DME   Equipment being ordered: Walker with a seat                                * order for DME   Equipment being ordered: Hospital Bed 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 -ongoing symptoms of lower extremity edema,shortness of breath,cough sacral pressure sores within last 6 months   An electric hospital bed to allow for increase in head of bed elevation and for ease in wheelchair transfers  Length of need: lifelong NPI - [id number]                                polyethylene glycol powder   Commonly known as:  MIRALAX/GLYCOLAX   Take 1 capful by mouth every morning                                potassium chloride 10 MEQ tablet   Commonly known as:  K-TAB,KLOR-CON   Take 1 tablet (10 mEq) by mouth 2 times daily                                SENNA S 8.6-50 MG per tablet   Take 2 tablets by mouth 2 times daily   Generic drug:  senna-docusate                                spironolactone 25 MG tablet   Commonly known as:  ALDACTONE   Take 1 tablet (25 mg) by mouth 2 times daily                                 TORSEMIDE PO   Take 20 mg by mouth 2 times daily                                triamcinolone 0.1 % cream   Commonly known as:  KENALOG   Apply sparingly to affected area three times daily for 5-7 days.                                TYLENOL PM EXTRA STRENGTH PO   Take 2-3 tablets by mouth nightly as needed                                vitamin D 2000 UNITS Caps   Take 1 capsule by mouth daily                                warfarin 4 MG tablet   Commonly known as:  COUMADIN   Take 2mg by mouth Mon & Fri and 4mg all other days or as directed by Anticoagulation Clinic                                ZZZQUIL PO   Take 50 mg by mouth nightly as needed Alternates with Tylenol PM                                * Notice:  This list has 3 medication(s) that are the same as other medications prescribed for you. Read the directions carefully, and ask your doctor or other care provider to review them with you.

## 2017-07-17 NOTE — PROGRESS NOTES
Left sided thoracentesis performed by radiologist.    1400 ml of clear trixie fluid removed. Pressure held at site after catheter removed.  Band aid applied. No bleeding noted. Post procedure CXR performed and read by radiologist.

## 2017-07-17 NOTE — PROGRESS NOTES
RADIOLOGY PROCEDURE NOTE  Patient name: Natalie Hair  MRN: [id number]  : 1932    Pre-procedure diagnosis: Left pleural effusion.  Post-procedure diagnosis: Same    Procedure Date/Time: 2017  11:12 AM  Procedure: US guided thoracentesis.  Estimated blood loss: None  Specimen(s) collected:  1.4 L serosanguinous fluid.  The patient tolerated the procedure well with no immediate complications.    See imaging dictation for procedural details.    Provider name: Bill Marion  Assistant(s):None

## 2017-07-17 NOTE — DISCHARGE INSTRUCTIONS
Radiology  Discharge Instructions for Thoracentesis    You have had a thoracentesis procedure today.  A needle or catheter was inserted into your chest to remove fluid for laboratory studies and/or to remove the excess fluid from your chest.    AFTER YOU ARE HOME:    Rest at home today.    Limit heavy physical activity such as lifting, straining, or exercise for 48 hours.  You may resume normal activity in 24 hours.    Resume previous diet and medications.    You may remove the bandage in 24 hours.    CALL YOUR PRIMARY PROVIDER IF:    You develop a temperature over 101oF, or redness at the drainage site.    You have any other questions or concerns.    AFTER HOURS CALL [facility] AT [phone]    COME TO EMERGENCY ROOM IF:    You develop chest pain or shortness of breath, heavy bleeding from the thoracentesis site, severe lightheadedness or fainting.  DO NOT DRIVE YOURSELF.    Your ordering physician will contact you with the laboratory results.      ADDITIONAL INSTRUCTIONS:   ***    I have reviewed and understand these instructions.      __________________________________________________  Natalie Hair      __________________________________________________     Nurse/Radiologist Signature      Date: 7/17/2017

## 2017-07-18 NOTE — PROGRESS NOTES
ANTICOAGULATION FOLLOW-UP CLINIC VISIT    Patient Name:  Natalie Hair  Date:  7/18/2017  Contact Type:  Telephone/ spoke with daughter, Emely. Also, left detailed VM with Felicia at  Home Care per her instructions earlier.    SUBJECTIVE:     Patient Findings     Positives Change in medications (aricept added and steriod cream for dermatitis), Dental/Other procedures (had thoracentesis 7-17), Intentional hold of therapy (7-12 to 7-16)    Comments Writer spoke with Emely, patient's daughter, and verified that Dr. Tan at [facility] okayed holding warfarin for 5 days without bridging. He is cardiologist at [facility]. Patient will now be resuming care at  cardiology, though.            OBJECTIVE    INR   Date Value Ref Range Status   07/17/2017 1.23 (H) 0.86 - 1.14 Final       ASSESSMENT / PLAN  INR assessment SUB recent hold   Recheck INR In: 3 DAYS    INR Location [facility] INR      Anticoagulation Summary as of 7/18/2017     INR goal 2.5-3.5   Today's INR    Maintenance plan 2 mg (4 mg x 0.5) on Mon, Fri; 4 mg (4 mg x 1) all other days   Full instructions 7/18: 8 mg; Otherwise 2 mg on Mon, Fri; 4 mg all other days   Weekly total 24 mg   Plan last modified Susan Caruso, RN (6/29/2017)   Next INR check 7/21/2017   Priority INR   Target end date Indefinite    Indications   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   Home Care as of 7-18-17.      Anticoagulation Care Providers     Provider Role Specialty Phone number    Mimi Mcguire MD Responsible Family Practice [phone]            See the Encounter Report to view Anticoagulation Flowsheet and Dosing Calendar (Go to Encounters tab in chart review, and find the  Anticoagulation Therapy Visit)        Brianna Morley RN

## 2017-07-18 NOTE — TELEPHONE ENCOUNTER
Felicia called requesting to speak with Dr. Mcguire. (Mimi Mcguire MD)  Reason for call:  Patient reporting a symptom    Symptom or request: 1)  B/P running Low 100/60 change medication?  2) Pt was taken off Coumadin for 5 days for procedure. Should she go back on it? Felicia can do a lab draw today if needed. Please Advise.    Phone Number patient can be reached at:  Other phone number:  [phone]*    Best Time:  Any Time      Can we leave a detailed message on this number:  YES    Call taken on 7/18/2017 at 12:09 PM by Nila Hooper

## 2017-07-18 NOTE — TELEPHONE ENCOUNTER
Reason for Call:  Other call back    Detailed comments: Brionna from  [facility] LEFT MESSAGE:    1)  Daughter of Katerins is concerned about her diuretic - lasix.  States that it was prescribed for her at [facility] and she takes 20mg - 1 tablet twice daily.  Lasix.  She has no refills left on it and doesn't feel she can be without it.  [facility] pharmacy.  2)  Brionna states that Natalie is doing so much better after she had the thoracentesis done on 7/17/17.  Air exchange is so much better - a few crackles she hears but not to bad.  Her oxygen is at 98% on room air.  3)  Blood pressure is still low 90/55.  Wondering if her medication needs to be reassess?  4)  INR was 1.4 today.  Daughter gave her 4mg of coumadin last evening as she didn't have orders as to what dosing she was suppose to take.  Waiting to hear back from coumadin clinic.    Please review all areas and advise.  Thank you..Claire Carroll    Phone Number Patient can be reached at: Other phone number:  Confidential voice mail [phone].  Daughter could be called also - Digna @ [phone].     Best Time: did not specify in message    Can we leave a detailed message on this number? YES    Call taken on 7/18/2017 at 2:09 PM by Claire Carroll

## 2017-07-18 NOTE — MR AVS SNAPSHOT
Natalie PATRICIA Hair   7/18/2017   Anticoagulation Therapy Visit    Description:  84 year old female   Provider:  Brianna Morley, RN   Department:  [facility] An[facility]           INR as of 7/18/2017     Today's INR 1.4!      Anticoagulation Summary as of 7/18/2017     INR goal 2.5-3.5   Today's INR 1.4!   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]  Long term current use of anticoagulant therapy [Z79.01]         July 2017 Details    Sun Mon Tue Wed Thu Fri Sat           1                 2               3               4               5               6               7               8                 9               10               11               12               13               14               15                 16               17               18      8 mg   See details      19      4 mg         20      4 mg         21            22                 23               24               25               26               27               28               29                 30               31                     Date Details   07/18 This INR check       Date of next INR:  7/21/2017         How to take your warfarin dose     To take:  2 mg Take 0.5 of a 4 mg tablet.    To take:  4 mg Take 1 of the 4 mg tablets.    To take:  8 mg Take 2 of the 4 mg tablets.

## 2017-07-18 NOTE — TELEPHONE ENCOUNTER
Left message on personal answering machine for Brionna  for patient to call the clinic RN.  Amy Gama RN

## 2017-07-19 NOTE — TELEPHONE ENCOUNTER
Please have her keep the appointment with Dr. Mcguire on Friday. She can have her lungs listened to , see how the edema is and determine whether she will tolerate a diuretic. I do not want to give her any more based on her current blood pressure. Ashley Schmitt M.D.;

## 2017-07-19 NOTE — TELEPHONE ENCOUNTER
Dr. Mcguire, please see message below, have left a message for Brionna to call back, also has another encounter open from 7-18-17,  coumadin has been addressed by anticoagulation dept per daughter Emely and will have INR redone this Friday, question is the hypotension. Patient is not dizzy, not lightheaded, mildly short of breath. Has appointment this Friday with PCP.  Please advise.  CHU Saba

## 2017-07-19 NOTE — TELEPHONE ENCOUNTER
Patient's daughter Emely called today and is stating her mother lives with her and patient is not dizzy or lightheaded, very mildly short of breath, and not coughing and wants to have Dr. Mcguire listen to her lungs after having the thoracentesis on 7-17-17. Emely has been contacted from the anticoagulation clinic and is following correct coumadin dosage per chart review, will have INR done this Friday. Emely is requesting Torsemide to be prescribed for patient, it is patient reported in chart.  Below states it is Lasix that daughter is wondering about. Daughter confirmed it is Torsemide but will do what Dr. Mcguire wants her to take. Emely has requested to cancel her appointment on Friday at 2:30 as she has her questions about her Echo answered and requests the appointment be switched so Dr. Mcguire can listen to patient's lungs, CSS switched the appointment for patient this Friday. Have left message for [facility] nurse to call back. Blood pressure has not been checked today, see result below for last check.  Routing to provider to review for hypotension issues below and if ok to start diuretic.  CHU Saba

## 2017-07-21 NOTE — MR AVS SNAPSHOT
"              After Visit Summary   7/21/2017    Natalie Hair    MRN: [id number]           Patient Information     Date Of Birth          8/28/1932        Visit Information        Provider Department      7/21/2017 2:30 PM Mimi Mcguire MD [facility]        Today's Diagnoses     Lymphedema of both lower extremities    -  1    Pleural effusion        Tricuspid valve insufficiency, unspecified etiology        Memory loss           Follow-ups after your visit        Your next 10 appointments already scheduled     Aug 09, 2017  1:00 PM CDT   Return Visit with SUSANA Todd CNP   [facility])    [address] [address] [phone]              Who to contact     Normal or non-critical lab and imaging results will be communicated to you by vendome 1699hart, letter or phone within 4 business days after the clinic has received the results. If you do not hear from us within 7 days, please contact the clinic through MyChart or phone. If you have a critical or abnormal lab result, we will notify you by phone as soon as possible.  Submit refill requests through [employer] or call your pharmacy and they will forward the refill request to us. Please allow 3 business days for your refill to be completed.          If you need to speak with a  for additional information , please call: [phone]             Additional Information About Your Visit        [employer] Information     [employer] lets you send messages to your doctor, view your test results, renew your prescriptions, schedule appointments and more. To sign up, go to [email or web address] . Click on \"Log in\" on the left side of the screen, which will take you to the Welcome page. Then click on \"Sign up Now\" on the right side of the page.     You will be asked to enter the access code listed below, as well as some personal information. Please " "follow the directions to create your username and password.     Your access code is: [email or web address]  Expires: 2017  9:28 AM     Your access code will  in 90 days. If you need help or a new code, please call your [facility] clinic or [phone].        Care EveryWhere ID     This is your Care EveryWhere ID. This could be used by other organizations to access your [facility] medical records  [id number]        Your Vitals Were     Pulse Temperature Height Pulse Oximetry BMI (Body Mass Index)       69 96.3  F (35.7  C) (Tympanic) 5' 6\" (1.676 m) 97% 27.55 kg/m2        Blood Pressure from Last 3 Encounters:   17 124/57   17 121/57   17 106/58    Weight from Last 3 Encounters:   17 170 lb 11.2 oz (77.4 kg)   17 174 lb 14.4 oz (79.3 kg)   17 178 lb (80.7 kg)              We Performed the Following     Basic metabolic panel          Today's Medication Changes          These changes are accurate as of: 17 11:59 PM.  If you have any questions, ask your nurse or doctor.               These medicines have changed or have updated prescriptions.        Dose/Directions    * TORSEMIDE PO   This may have changed:  Another medication with the same name was added. Make sure you understand how and when to take each.   Changed by:  Mimi Mcguire MD        Dose:  20 mg   Take 20 mg by mouth 2 times daily   Refills:  0       * torsemide 20 MG tablet   Commonly known as:  DEMADEX   This may have changed:  You were already taking a medication with the same name, and this prescription was added. Make sure you understand how and when to take each.   Used for:  Lymphedema of both lower extremities, Pleural effusion   Changed by:  Mimi Mcguire MD        Dose:  20 mg   Take 1 tablet (20 mg) by mouth 2 times daily   Quantity:  60 tablet   Refills:  1       * Notice:  This list has 2 medication(s) that are the same as other medications prescribed for you. Read the directions " carefully, and ask your doctor or other care provider to review them with you.      Stop taking these medicines if you haven't already. Please contact your care team if you have questions.     metolazone 2.5 MG tablet   Commonly known as:  ZAROXOLYN   Stopped by:  Mimi Mcguire MD                Where to get your medicines      [facility] [address] [place] [address] [address] AT [place] [address]  [address] [facility] [facility] [address]     Phone:  [phone]     torsemide 20 MG tablet                Primary Care Provider Office Phone # Fax #    Mimi Mcguire [phone]       [facility] [address] [facility] [address]        Equal Access to Services     [facility] Center[facility] AH: Hadii aad ku hadasho Soomaali, waaxda luqadaha, qaybta kaalmada adeegyada, waxay scottin hayjoycen federica sanchez . So [facility] [phone].    ATENCIÓN: Si habla español, tiene a simmons disposición servicios gratuitos de asistencia lingüística. Kevin al [phone].    We comply with applicable federal civil rights laws and [place] laws. We do not discriminate on the basis of race, color, national origin, age, disability sex, sexual orientation or gender identity.            Thank you!     Thank you for choosing [facility]  for your care. Our goal is always to provide you with excellent care. Hearing back from our patients is one way we can continue to improve our services. Please take a few minutes to complete the written survey that you may receive in the mail after your visit with us. Thank you!             Your Updated Medication List - Protect others around you: Learn how to safely use, store and throw away your medicines at www.disposemymeds.org.          This list is accurate as of: 7/21/17 11:59 PM.  Always use your most recent med list.                   Brand Name Dispense Instructions for use Diagnosis    ACETAMINOPHEN PO       Take 1,000 mg by mouth every 8 hours as needed for pain        alendronate 70 MG tablet    FOSAMAX    12 tablet    TAKE 1 TABLET BY MOUTH ONCE WEEKLY ON EMPTY STOMACH    Osteopenia       calcium carb 1250 mg (500 mg Kotlik)/vitamin D 200 units 500-200 MG-UNIT per tablet    OSCAL with D     Take 1 tablet by mouth 2 times daily (with meals)        cetirizine 5 MG tablet    zyrTEC    30 tablet    Take 1 tablet (5 mg) by mouth daily    Acute urticaria       clopidogrel 75 MG tablet    PLAVIX    90 tablet    Take 1 tablet (75 mg) by mouth daily    Congestive heart failure, unspecified congestive heart failure chronicity, unspecified congestive heart failure type (H), Lymphedema of both lower extremities       donepezil 5 MG tablet    ARICEPT    90 tablet    Take 1 tablet (5 mg) by mouth At Bedtime    Dementia without behavioral disturbance, unspecified dementia type       ezetimibe 10 MG tablet    ZETIA    90 tablet    Take 1 tablet (10 mg) by mouth daily    Hyperlipidemia LDL goal <100       metoprolol 25 MG tablet    LOPRESSOR    90 tablet    Take 0.5 tablets (12.5 mg) by mouth 2 times daily        Multi-vitamin Tabs tablet      Take 1 tablet by mouth daily        nitroGLYcerin 0.4 MG sublingual tablet    NITROSTAT    25 tablet    Place 1 tablet (0.4 mg) under the tongue every 5 minutes as needed for chest pain    Unspecified cardiovascular disease       omega 3 1000 MG Caps     90 capsule    Take 1 g by mouth daily    Health Care Home       ondansetron 4 MG ODT tab    ZOFRAN ODT    10 tablet    Take 1-2 tablets (4-8 mg) by mouth every 6 hours as needed for nausea        * order for DME     1 Device    Equipment being ordered: walker with a seat on it Has severe CHF and lower extremity edema.    Acute on chronic diastolic congestive heart failure (H), Lymphedema of both lower extremities       * order for DME     1 Package    Equipment being ordered: Walker with a seat    Acute on chronic diastolic congestive heart  failure (H), At risk for falling       * order for DME     1 Device    Equipment being ordered: Hospital Bed 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 -ongoing symptoms of lower extremity edema,shortness of breath,cough sacral pressure sores within last 6 months   An electric hospital bed to allow for increase in head of bed elevation and for ease in wheelchair transfers  Length of need: lifelong NPI - [id number]    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), Lymphedema of both lower extremities, Right-sided congestive heart failure (H), Tricuspid valve insufficiency, unspecified etiology, Congestive heart failure, unspecified congestive heart failure chronicity, unspecified congestive heart failure type (H), History of pressure ulcer       polyethylene glycol powder    MIRALAX/GLYCOLAX     Take 1 capful by mouth every morning        potassium chloride 10 MEQ tablet    K-TAB,KLOR-CON    90 tablet    Take 1 tablet (10 mEq) by mouth 2 times daily    Shortness of breath on exertion, Essential hypertension       SENNA S 8.6-50 MG per tablet   Generic drug:  senna-docusate      Take 2 tablets by mouth 2 times daily        spironolactone 25 MG tablet    ALDACTONE    60 tablet    Take 1 tablet (25 mg) by mouth 2 times daily    Congestive heart failure, unspecified congestive heart failure chronicity, unspecified congestive heart failure type (H), Lymphedema of both lower extremities       * TORSEMIDE PO      Take 20 mg by mouth 2 times daily        * torsemide 20 MG tablet    DEMADEX    60 tablet    Take 1 tablet (20 mg) by mouth 2 times daily    Lymphedema of both lower extremities, Pleural effusion       triamcinolone 0.1 % cream    KENALOG    30 g    Apply sparingly to affected area three times daily for 5-7 days.    Dermatitis       TYLENOL PM EXTRA STRENGTH PO      Take 2-3 tablets by mouth nightly as needed        vitamin D 2000 UNITS Caps      Take 1 capsule by mouth daily         warfarin 4 MG tablet    COUMADIN    30 tablet    Take 2mg by mouth Mon & Fri and 4mg all other days or as directed by Anticoagulation Clinic    Congestive heart failure, unspecified congestive heart failure chronicity, unspecified congestive heart failure type (H)       ZZZQUIL PO      Take 50 mg by mouth nightly as needed Alternates with Tylenol PM        * Notice:  This list has 5 medication(s) that are the same as other medications prescribed for you. Read the directions carefully, and ask your doctor or other care provider to review them with you.

## 2017-07-21 NOTE — PROGRESS NOTES
SUBJECTIVE:                                                    Natalie Hair is a 84 year old female who presents to clinic today for the following health issues:      Assess  lungs - had paracentesis on Monday   They removed 1.5 liters of fluid   Had a lot of coughing after the procedure  It lasted for a day   She is feeling better now   F/u cxr - near complete resolution of pleural effusion     Daughter wonders if it is going to reaccumulate.     Discuss water pill - on bumex 2mg bid  Then put on torsemide 20mg bid   Will run out of meds this week       Review of systems:  No cp  Some shortness of breath - not worsened  No cough/wheeze  No f/c   Appetite is very low - not new  Memory seems a little better overall         Problem list and histories reviewed & adjusted, as indicated.  Additional history: as documented      Patient Active Problem List   Diagnosis     Heart valve replaced     Cardiovascular disease     History of T12 compression fracture     Backache     Vitamin D deficiency     Hyperlipidemia LDL goal <100     Advanced directives, counseling/discussion     Long term current use of anticoagulant therapy     Urinary incontinence     Unstable angina (H)     Benign essential hypertension, BP goal <150/90     Bilateral low back pain without sciatica     Osteopenia     Atrial fibrillation with RVR (H)     Primary pulmonary hypertension (H)     Lymphedema of both lower extremities     Tricuspid valve insufficiency, unspecified etiology     Near syncope     Chronic diastolic congestive heart failure (H)     History of pressure ulcer     Health Care Home     S/P CABG (coronary artery bypass graft) - 2002     Stented coronary artery - 5/4/2017 at [place]     Decubitus ulcer of sacral region, unspecified ulcer stage     Pleural effusion     Current Outpatient Prescriptions   Medication     donepezil (ARICEPT) 5 MG tablet     clopidogrel (PLAVIX) 75 MG tablet     spironolactone (ALDACTONE) 25 MG tablet      "calcium carb 1250 mg, 500 mg [place],/vitamin D 200 units (OSCAL WITH D) 500-200 MG-UNIT per tablet     DiphenhydrAMINE HCl, Sleep, (ZZZQUIL PO)     warfarin (COUMADIN) 4 MG tablet     potassium chloride (K-TAB,KLOR-CON) 10 MEQ tablet     TORSEMIDE PO     ezetimibe (ZETIA) 10 MG tablet     Diphenhydramine-APAP, sleep, (TYLENOL PM EXTRA STRENGTH PO)     alendronate (FOSAMAX) 70 MG tablet     omega 3 1000 MG CAPS     multivitamin, therapeutic with minerals (MULTI-VITAMIN) TABS tablet     metoprolol (LOPRESSOR) 25 MG tablet     ondansetron (ZOFRAN ODT) 4 MG ODT tab     order for DME     cetirizine (ZYRTEC) 5 MG tablet     polyethylene glycol (MIRALAX/GLYCOLAX) powder     Cholecalciferol (VITAMIN D) 2000 UNITS CAPS     senna-docusate (SENNA S) 8.6-50 MG per tablet     order for DME     nitroglycerin (NITROSTAT) 0.4 MG SL tablet     ACETAMINOPHEN PO     triamcinolone (KENALOG) 0.1 % cream     metolazone (ZAROXOLYN) 2.5 MG tablet     order for DME     No current facility-administered medications for this visit.         Allergies   Allergen Reactions     Lorazepam Nausea and Vomiting     Morphine Sulfate Cr [Morphine Sulfate] Nausea and Vomiting     Crestor [Rosuvastatin] Other (See Comments)     Abdominal/Back pain     Lidocaine GI Disturbance     Cozaar [Losartan] Rash     Rash      /57 (BP Location: Right arm, Patient Position: Sitting, Cuff Size: Adult Regular)  Pulse 69  Temp 96.3  F (35.7  C) (Tympanic)  Ht 5' 6\" (1.676 m)  Wt 170 lb 11.2 oz (77.4 kg)  SpO2 97%  BMI 27.55 kg/m2  GENERAL - Pt is alert and oriented in no acute distress.  Affect is appropriate. Good eye contact.  HEET - Head is normocephalic, atraumatic.    PERRLA,EEMI. Conjunctiva are free of icterus or erythema.    NECK - Neck is supple w/o LA or thyromegaly  RESPIRATORY - Clear to auscultation bilaterally.  No wheezing noted  CV - RRR, no murmurs, rubs, gallops.   Extrems - 2+ lower extremity edema bilaterally       Assessment/Plan " -  (I89.0) Lymphedema of both lower extremities  (primary encounter diagnosis)  Comment: I will refill the torsemide for her. Check bmp today   Plan: Basic metabolic panel, torsemide (DEMADEX) 20         MG tablet            (J90) Pleural effusion  Comment: We discussed this at length. It could reaccumulate, it is hard to say. We just need to monitor her symptoms and repeat cxr if she gets short of breath.   Plan: Basic metabolic panel, torsemide (DEMADEX) 20         MG tablet            (I07.1) Tricuspid valve insufficiency, unspecified etiology  Comment: per [place]  Plan:       (R41.3) Memory loss  Comment: They are hesitant to start memory assistance drugs after discussion of risks/side effects/benefits. They both report today that her memory seems to be a bit better.    Plan:         ROZINA Mcguire MD

## 2017-07-21 NOTE — PROGRESS NOTES
ANTICOAGULATION FOLLOW-UP CLINIC VISIT    Patient Name:  Natalie Hair  Date:  7/21/2017  Contact Type:  Telephone/ CHU Duarte, [phone], [facility]    SUBJECTIVE:     Patient Findings     Positives Intentional hold of therapy (7/12 - 7/16), No Problem Findings (none reported)           OBJECTIVE    INR   Date Value Ref Range Status   07/21/2017 1.7  Final       ASSESSMENT / PLAN  INR assessment SUB intentional hold   Recheck INR In: 3 DAYS    INR Location Homecare INR [facility]     Anticoagulation Summary as of 7/21/2017     INR goal 2.5-3.5   Today's INR 1.7!   Maintenance plan 2 mg (4 mg x 0.5) on Mon, Fri; 4 mg (4 mg x 1) all other days   Full instructions 7/21: 4 mg; Otherwise 2 mg on Mon, Fri; 4 mg all other days   Weekly total 24 mg   Plan last modified Susan Caruso RN (6/29/2017)   Next INR check 7/24/2017   Priority INR   Target end date Indefinite    Indications   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]  Long term current use of anticoagulant therapy [Z79.01]         Anticoagulation Episode Summary     INR check location     Preferred lab     Send INR reminders to [place] PHONE [facility] POOL    Comments * Patient will be moving to [place], PLEASE CALL REENA WITH NEW DOSING INSTRUCTIONS -[phone].   Home Care as of 7-18-17.      Anticoagulation Care Providers     Provider Role Specialty Phone number    Mimi Mcguire MD [facility] [phone]            See the Encounter Report to view Anticoagulation Flowsheet and Dosing Calendar (Go to Encounters tab in chart review, and find the Anticoagulation Therapy Visit)    Susan Caruso RN

## 2017-07-21 NOTE — NURSING NOTE
"Chief Complaint   Patient presents with     Lungs       Initial /57 (BP Location: Right arm, Patient Position: Sitting, Cuff Size: Adult Regular)  Pulse 69  Temp 96.3  F (35.7  C) (Tympanic)  Ht 5' 6\" (1.676 m)  Wt 170 lb 11.2 oz (77.4 kg)  SpO2 97%  BMI 27.55 kg/m2 Estimated body mass index is 27.55 kg/(m^2) as calculated from the following:    Height as of this encounter: 5' 6\" (1.676 m).    Weight as of this encounter: 170 lb 11.2 oz (77.4 kg).  Medication Reconciliation: complete   Margie Abarca LPN    "

## 2017-07-21 NOTE — MR AVS SNAPSHOT
Natalie Hair   7/21/2017   Anticoagulation Therapy Visit    Description:  84 year old female   Provider:  Susan Caruso, RN   Department:  [facility]           INR as of 7/21/2017     Today's INR 1.7!      Anticoagulation Summary as of 7/21/2017     INR goal 2.5-3.5   Today's INR 1.7!   Full instructions 7/21: 4 mg; Otherwise 2 mg on Mon, Fri; 4 mg all other days   Next INR check 7/24/2017    Indications   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]  Long term current use of anticoagulant therapy [Z79.01]         Description     4mg Fri, Sat, Sun, recheck INR Monday 7/24/17.      July 2017 Details    Sun Mon Tue Wed Thu Fri Sat           1                 2               3               4               5               6               7               8                 9               10               11               12               13               14               15                 16               17               18               19               20               21      4 mg   See details      22      4 mg           23      4 mg         24            25               26               27               28               29                 30               31                     Date Details   07/21 This INR check       Date of next INR:  7/24/2017         How to take your warfarin dose     To take:  2 mg Take 0.5 of a 4 mg tablet.    To take:  4 mg Take 1 of the 4 mg tablets.

## 2017-07-24 NOTE — PROGRESS NOTES
ANTICOAGULATION FOLLOW-UP CLINIC VISIT    Patient Name:  Natalie Hair  Date:  7/24/2017  Contact Type:  Telephone/ INR resulted from Homecare meter, called to [facility] by CHU Duarte, [facility]. New dosing instructions were given to daughter Digna who verbalized understanding and wrote down the information. A detailed message was also left on VM of  CHU Duarte, [facility].    SUBJECTIVE:     Patient Findings     Positives Change in diet/appetite (daughter reports that her diet is stable), Activity level change (activity is slowly increasing but is still fairly minimal)           OBJECTIVE    INR   Date Value Ref Range Status   07/24/2017 2.2  Final       ASSESSMENT / PLAN  INR assessment SUB    Recheck INR In: 3 DAYS    INR Location Homecare INR      Anticoagulation Summary as of 7/24/2017     INR goal 2.5-3.5   Today's INR 2.2!   Maintenance plan 2 mg (4 mg x 0.5) on Mon, Fri; 4 mg (4 mg x 1) all other days   Full instructions 7/24: 6 mg; 7/26: 6 mg; 7/27: 6 mg; Otherwise 2 mg on Mon, Fri; 4 mg all other days   Weekly total 24 mg   Plan last modified Susan Caruso RN (6/29/2017)   Next INR check 7/28/2017   Priority INR   Target end date Indefinite    Indications   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]  Long term current use of anticoagulant therapy [Z79.01]         Anticoagulation Episode Summary     INR check location     Preferred lab     Send INR reminders to [place] PHONE [facility] POOL    Comments * Patient will be moving to [place], PLEASE CALL REENA WITH NEW DOSING INSTRUCTIONS [phone].   Home Care as of 7-18-17.      Anticoagulation Care Providers     Provider Role Specialty Phone number    Mimi Mcguire MD [facility] [phone]            See the Encounter Report to view Anticoagulation Flowsheet and Dosing Calendar (Go to Encounters tab in chart review, and find the Anticoagulation Therapy Visit)        Polina Quiroz RN

## 2017-07-24 NOTE — MR AVS SNAPSHOT
Natalie Hair   7/24/2017   Anticoagulation Therapy Visit    Description:  84 year old female   Provider:  Polina Quiroz RN   Department:  [facility]           INR as of 7/24/2017     Today's INR 2.2!      Anticoagulation Summary as of 7/24/2017     INR goal 2.5-3.5   Today's INR 2.2!   Full instructions 7/24: 6 mg; 7/26: 6 mg; 7/27: 6 mg; Otherwise 2 mg on Mon, Fri; 4 mg all other days   Next INR check 7/28/2017    Indications   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]  Long term current use of anticoagulant therapy [Z79.01]         Description     Recheck INR Fri, by Home care RN  Take warfarin 4 mg Tues, 6 mg Mon, Wed, Fri, ( this will be 34 mg, a 6% increase over the last 7 days)      July 2017 Details    Sun Mon Tue Wed Thu Fri Sat           1                 2               3               4               5               6               7               8                 9               10               11               12               13               14               15                 16               17               18               19               20               21               22                 23               24      6 mg   See details      25      4 mg         26      6 mg         27      6 mg         28            29                 30               31                     Date Details   07/24 This INR check       Date of next INR:  7/28/2017         How to take your warfarin dose     To take:  2 mg Take 0.5 of a 4 mg tablet.    To take:  4 mg Take 1 of the 4 mg tablets.    To take:  6 mg Take 1.5 of the 4 mg tablets.

## 2017-07-26 NOTE — PROGRESS NOTES
[facility] now requests orders and shares plan of care/discharge summaries for some patients through [employer].  Please REPLY TO THIS MESSAGE in order to give authorization for orders when needed.  This is considered a verbal order, you will still receive a faxed copy of orders for signature.  Thank you for your assistance in improving collaboration for our patients.    ORDER    OT treatment for strengthening, adaptive equipment and self cares.  The plan will also include falls prevention plan, monitor and treat pain, monitor skin integrity, continence management,  monitor for symptoms of heart failure and to achieve the following goals.  Pt will demonstrate safety with tub/shower transfer using appropriate equipment minimal assist.  Pt will increase strength/functional endurance for transfers during personal cares with SBA   Pt will complete 10 minutes of continuous activity free from SOB and saturation levels at 90 percent or greater.    2w2    Olimpia Garcia OTR/L

## 2017-07-26 NOTE — ED NOTES
Pt placed on 2L of O2 via NC per cardiologist request at [facility]. Family at bedside.  Aware of plan to transfer to [place].

## 2017-07-26 NOTE — ED PROVIDER NOTES
"  History     Chief Complaint   Patient presents with     Cough     Nausea & Vomiting     Nausea and vomiting this morning.  Hasn't kept meds or food down today.  Coughing frequently and that is possibly making her vomit. Pt still having nausea.  Pt here last week with thoracentesis.  Pt feeling slightly SOA, a little worse than usual.     Shortness of Breath     HPI  Natalie Hair is a 84 year old female who has a history of triple bypass with aortic valve replacement 2002, tricuspid valve insufficiency s/p replacement June 2017, hypertension, atrial fibrillation, pulmonary hypertension, and pleural effusion s/p thoracentesis 07/17/2017 who presents to the ED for evaluation of shortness of breath, cough, nausea, and vomiting.     Patient has a history of severe tricuspid regurgitation and underwent FORMA device placement on 6/1/17 at [facility]. The procedure was without complication and the patient's chronic lymphedema and shortness of breath have steadily improved since then. She followed up at [facility] 6/28 at which time cardiac workup was unremarkable, however they were told there was some fluid in the left lower lung and to \"keep an eye on it.\" Patient was then seen on 07/11/2017 for shortness of breath. X-ray at this time showed worsening left pleural effusion, impression below. Patient was on coumadin at this time, and was unable to get a thoracentesis in the ED. Patient underwent thoracentesis on 07/17/2017. Patient was also seen by her PCP  Dr. Mcguire most recently on 07/21/2017. At this visit, patient's torsemide prescription was refilled for her lymphedema. Dr. Mcguire's plan for further care of her pleural effusion was to watch carefully, monitor her, and repeat chest x-ray if she gets increasing shortness of breath. Patient's most recent INR was on 07/24/2017 measured at 2.2 with goal between 2.5-3.5.    Today, patient presents to the ED with her daughter and son who assist in her history. " Patient's daughter reports that starting this morning she has been coughing to the point of emesis, and she increasingly weak and short of breath. Patient's emeses have always been after coughing, she has had no spontaneous emeses. Daughter reports that patient did not eat breakfast, and she was unable to keep her morning medications down. Patient has also been short of breath and weak. Daughter states that her mother is usually able to walk with a walker. She will have some fatigue with this, but she is able to walk up to 150 ft. Today, she was unable to walk much at all even with her walker. Patient notes that she thought that her breathing would be improved after thoracentesis, but it is the same. Family notes that patient becomes increasingly confused with her increasing fatigue. Patient's daughter notes that last night, patient was asking to have the fan on her which is unusual. Here in the ED, patient is reportedly chilled. Patients body temperature was measured this morning at 97 degrees, and it was measured at 98.1 degrees in the ED. Patient had a bowel movement this morning which was normal. Family reports that patients leg swelling is better. Patient has bed sores on her buttock that cause her some pain, but she reports not other pains. Patient has never stopped anticoagulation therapy in her recent visits. She reports she has not been on metolazone in over 2 months. She continues to take torsemide and spironolactone. She has no history of abdominal surgery. She denies fever, abdominal pain, chest pain, increased leg swelling, black tarry stools, dysuria, trouble urinating, headache.     Previous Records Reviewed   CHEST TWO VIEW   7/11/2017 8:12 PM     FINDINGS: Cardiomegaly. Previous sternotomy with valve replacement.  Pacer with 2 leads in place. Moderate-sized left pleural effusion,  much increased from 11/30/2016. Right lung clear.  IMPRESSION: Moderate-sized left pleural effusion.     JOESPH  MD NEREYDA    I have reviewed the Medications, Allergies, Past Medical and Surgical History, and Social History in the Epic system.    Allergies:   Allergies   Allergen Reactions     Lorazepam Nausea and Vomiting     Morphine Sulfate Cr [Morphine Sulfate] Nausea and Vomiting     Crestor [Rosuvastatin] Other (See Comments)     Abdominal/Back pain     Lidocaine GI Disturbance     Cozaar [Losartan] Rash     Rash          No current facility-administered medications on file prior to encounter.   Current Outpatient Prescriptions on File Prior to Encounter:  warfarin (COUMADIN) 4 MG tablet as directed by Anticoagulation Clinic, currently establishing a maintenance dose   torsemide (DEMADEX) 20 MG tablet Take 1 tablet (20 mg) by mouth 2 times daily   clopidogrel (PLAVIX) 75 MG tablet Take 1 tablet (75 mg) by mouth daily   triamcinolone (KENALOG) 0.1 % cream Apply sparingly to affected area three times daily for 5-7 days.   spironolactone (ALDACTONE) 25 MG tablet Take 1 tablet (25 mg) by mouth 2 times daily   calcium carb 1250 mg, 500 mg [place],/vitamin D 200 units (OSCAL WITH D) 500-200 MG-UNIT per tablet Take 1 tablet by mouth 2 times daily (with meals)   DiphenhydrAMINE HCl, Sleep, (ZZZQUIL PO) Take 50 mg by mouth nightly as needed Alternates with Tylenol PM   potassium chloride (K-TAB,KLOR-CON) 10 MEQ tablet Take 1 tablet (10 mEq) by mouth 2 times daily   ezetimibe (ZETIA) 10 MG tablet Take 1 tablet (10 mg) by mouth daily   Diphenhydramine-APAP, sleep, (TYLENOL PM EXTRA STRENGTH PO) Take 2-3 tablets by mouth nightly as needed    omega 3 1000 MG CAPS Take 1 g by mouth daily   multivitamin, therapeutic with minerals (MULTI-VITAMIN) TABS tablet Take 1 tablet by mouth daily   metoprolol (LOPRESSOR) 25 MG tablet Take 0.5 tablets (12.5 mg) by mouth 2 times daily   order for DME Equipment being ordered: Hospital Bed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 -ongoing symptoms of lower extremity edema,shortness of  breath,coughsacral pressure sores within last 6 months  An electric hospital bed to allow for increase in head of bed elevation and for ease in wheelchair transfers Length of need: lifelongNPI - [id number]   cetirizine (ZYRTEC) 5 MG tablet Take 1 tablet (5 mg) by mouth daily   polyethylene glycol (MIRALAX/GLYCOLAX) powder Take 1 capful by mouth every morning Takes 3/4 of a capful   Cholecalciferol (VITAMIN D) 2000 UNITS CAPS Take 1 capsule by mouth daily   senna-docusate (SENNA S) 8.6-50 MG per tablet Take 2 tablets by mouth 2 times daily   order for DME Equipment being ordered: Walker with a seat   ACETAMINOPHEN PO Take 1,000 mg by mouth every 8 hours as needed for pain    donepezil (ARICEPT) 5 MG tablet Take 1 tablet (5 mg) by mouth At Bedtime   [DISCONTINUED] TORSEMIDE PO Take 20 mg by mouth 2 times daily   alendronate (FOSAMAX) 70 MG tablet TAKE 1 TABLET BY MOUTH ONCE WEEKLY ON EMPTY STOMACH   ondansetron (ZOFRAN ODT) 4 MG ODT tab Take 1-2 tablets (4-8 mg) by mouth every 6 hours as needed for nausea   nitroglycerin (NITROSTAT) 0.4 MG SL tablet Place 1 tablet (0.4 mg) under the tongue every 5 minutes as needed for chest pain       Patient Active Problem List   Diagnosis     Heart valve replaced     Cardiovascular disease     History of T12 compression fracture     Backache     Vitamin D deficiency     Hyperlipidemia LDL goal <100     Advanced directives, counseling/discussion     Long term current use of anticoagulant therapy     Urinary incontinence     Unstable angina (H)     Benign essential hypertension, BP goal <150/90     Bilateral low back pain without sciatica     Osteopenia     Atrial fibrillation with RVR (H)     Primary pulmonary hypertension (H)     Lymphedema of both lower extremities     Tricuspid valve insufficiency, unspecified etiology     Near syncope     Chronic diastolic congestive heart failure (H)     History of pressure ulcer     Health Care Home     S/P CABG (coronary artery bypass graft)  "- 2002     Stented coronary artery - 5/4/2017 at [place]     Decubitus ulcer of sacral region, unspecified ulcer stage     Pleural effusion     Memory loss       Past Surgical History:   Procedure Laterality Date     ARTHROPLASTY KNEE BILATERAL       SURGICAL HISTORY OF -   12/2002    Triple bypass w/valve replacement - aortic       Social History   Substance Use Topics     Smoking status: Former Smoker     Types: Cigarettes     Smokeless tobacco: Never Used      Comment: 40 years ago     Alcohol use No       Most Recent Immunizations   Administered Date(s) Administered     Influenza (High Dose) 3 valent vaccine 12/06/2016     Pneumococcal (PCV 13) 12/06/2016     TDAP Vaccine (Adacel) 12/06/2016       BMI: Estimated body mass index is 27.55 kg/(m^2) as calculated from the following:    Height as of 7/21/17: 1.676 m (5' 6\").    Weight as of 7/21/17: 77.4 kg (170 lb 11.2 oz).      Review of Systems     All other systems are reviewed and are negative.    Physical Exam   BP: 157/56  Heart Rate: 71  Temp: 98.1  F (36.7  C)  Resp: 24  SpO2: 96 %  Physical Exam     Nursing note and vitals were reviewed.  Constitutional: Somnolent but arousable, acute and chronically ill appearing 84-year-old who does not appear to be uncomfortable but lies in bed panting and is able to offer paucity of information and poorly cooperative with examination  HEENT: PERRLA. EOMI. Oropharynx normal.    Neck: Freely mobile. No meningismus  Cardiovascular: Cardiac examination reveals normal heart rate and regular rhythm with crisp mechanical valve sounds heard throughout the precordium.  Pulmonary/Chest: Breathing is moderately tachypneic.  Breath sounds are decreased in the left base with rales heard in the left base..  There no retractions, wheezes, or rhonchi.  Abdomen: Soft, mild tenderness in the right upper quadrant without rebound or guarding, somewhat increased with deep inspiration. No HSM or masses.  Musculoskeletal: Extremities are warm " and well-perfused and without edema  Neurological: Alert, oriented, thought content logical, coherent   Skin: Warm, dry, no rashes.  There is a grade 2 decubitus ulcer on the buttocks on both left and right 8-10 cm in diameter without secondary cellulitis  Psychiatric: Affect restricted.      ED Course     ED Course     Procedures             EKG Interpretation:      Interpreted by Fadi Griggs  Time reviewed: 14:50  Symptoms at time of EKG: None   Rhythm: paced  Rate: 70  Axis: Other (NA)  Ectopy: None  Conduction: Paced rhythm  ST Segments/ T Waves: Non-specific ST-T wave changes  Q Waves: NA  Comparison to prior: Unchanged from 7/11/17    Clinical Impression: paced rhythm    Critical Care time:  none            Results for orders placed or performed during the hospital encounter of 07/26/17 (from the past 24 hour(s))   CBC with platelets differential   Result Value Ref Range    WBC 14.1 (H) 4.0 - 11.0 10e9/L    RBC Count 4.92 3.8 - 5.2 10e12/L    Hemoglobin 12.9 11.7 - 15.7 g/dL    Hematocrit 41.1 35.0 - 47.0 %    MCV 84 78 - 100 fl    MCH 26.2 (L) 26.5 - 33.0 pg    MCHC 31.4 (L) 31.5 - 36.5 g/dL    RDW 15.3 (H) 10.0 - 15.0 %    Platelet Count 243 150 - 450 10e9/L    Diff Method Automated Method     % Neutrophils 83.6 %    % Lymphocytes 4.4 %    % Monocytes 10.7 %    % Eosinophils 0.7 %    % Basophils 0.3 %    % Immature Granulocytes 0.3 %    Absolute Neutrophil 11.8 (H) 1.6 - 8.3 10e9/L    Absolute Lymphocytes 0.6 (L) 0.8 - 5.3 10e9/L    Absolute Monocytes 1.5 (H) 0.0 - 1.3 10e9/L    Absolute Eosinophils 0.1 0.0 - 0.7 10e9/L    Absolute Basophils 0.0 0.0 - 0.2 10e9/L    Abs Immature Granulocytes 0.0 0 - 0.4 10e9/L   INR   Result Value Ref Range    INR 2.33 (H) 0.86 - 1.14   Comprehensive metabolic panel   Result Value Ref Range    Sodium 139 133 - 144 mmol/L    Potassium 4.3 3.4 - 5.3 mmol/L    Chloride 103 94 - 109 mmol/L    Carbon Dioxide 26 20 - 32 mmol/L    Anion Gap 10 3 - 14 mmol/L    Glucose 119 (H) 70 -  99 mg/dL    Urea Nitrogen 20 7 - 30 mg/dL    Creatinine 1.18 (H) 0.52 - 1.04 mg/dL    GFR Estimate 44 (L) >60 mL/min/1.7m2    GFR Estimate If Black 53 (L) >60 mL/min/1.7m2    Calcium 10.3 (H) 8.5 - 10.1 mg/dL    Bilirubin Total 1.1 0.2 - 1.3 mg/dL    Albumin 3.6 3.4 - 5.0 g/dL    Protein Total 7.7 6.8 - 8.8 g/dL    Alkaline Phosphatase 184 (H) 40 - 150 U/L    ALT 17 0 - 50 U/L    AST 25 0 - 45 U/L   Lipase   Result Value Ref Range    Lipase 60 (L) 73 - 393 U/L   Troponin I   Result Value Ref Range    Troponin I ES  0.000 - 0.045 ug/L     <0.015  The 99th percentile for upper reference range is 0.045 ug/L.  Troponin values in   the range of 0.045 - 0.120 ug/L may be associated with risks of adverse   clinical events.     Lactic acid whole blood   Result Value Ref Range    Lactic Acid 1.7 0.7 - 2.1 mmol/L   Nt probnp inpatient (BNP)   Result Value Ref Range    N-Terminal Pro BNP Inpatient 1424 0 - 1800 pg/mL   XR Chest 2 Views    Narrative    CHEST TWO VIEWS   7/26/2017 4:36 PM     INDICATION: Cough.    COMPARISON: 7/17/2017.      Impression    IMPRESSION: New moderate left lower lung opacity which is probably due  to a combination of pleural effusion and associated atelectasis and/or  infiltrate. Cardiomegaly with mild pulmonary vascular congestion.  Cardiac pacer. Diffuse degenerative changes thoracic spine.   CT Chest w Contrast    Narrative    CT CHEST WITH CONTRAST 7/26/2017 5:39 PM     HISTORY: Recurrent pleural effusion    TECHNIQUE: Volumetric acquisition of the chest  after the  administration of 80 mL Isovue-370 IV contrast. Radiation dose for  this scan was reduced using automated exposure control, adjustment of  the mA and/or kV according to patient size, or iterative  reconstruction technique.     COMPARISON: None.    FINDINGS: No visualized pulmonary embolism. Moderate-sized left  pleural effusion with associated consolidation and volume loss at the  left lung base which is likely atelectasis. Mild  patchy interstitial  opacities in both perihilar regions and in the right lower lung which  could be due to pneumonia. Sternotomy, cardiomegaly and coronary  artery calcifications. Mitral annular calcification. Particular  prominence of right heart chambers and prominent contrast reflux into  the IVC which is dilated. Small esophageal hiatal hernia. 0.7 cm  groundglass density nodule in the right lower lung (series 3, image  35). Atheromatous calcification of the thoracic aorta which is of  normal caliber.    Diffuse degenerative changes throughout the thoracic spine. Moderate  compression of the T12 vertebral body, likely chronic.      Impression    IMPRESSION:  1. No visualized pulmonary embolism.  2. Findings suspicious for right heart failure. Moderate left pleural  effusion which is potentially related to this.  3. Mild patchy perihilar interstitial opacities which could be due to  pneumonia or edema.  4. Groundglass nodular density right lower lung measures 0.7 cm,  indeterminate. See below.    Recommendations for an incidental lung nodule = or > 6mm to 8mm:    Low risk patients: Initial follow-up CT at 6-12 months, then  consider CT at 18-24 months if no change.    High risk patients: Initial follow-up CT at 6-12 months, then CT at  18-24 months if no change.    *Low Risk: Minimal or absent history of smoking or other known risk  factors.  *Nonsolid (ground-glass) or partly solid nodules may require longer  follow-up to exclude indolent adenocarcinoma.  *Recommendations based on Guidelines for the Management of Incidental  Pulmonary Nodules Detected at CT: From the Fleischner Society 2017,  Radiology 2017.      UA reflex to Microscopic   Result Value Ref Range    Color Urine Yellow     Appearance Urine Clear     Glucose Urine Negative NEG mg/dL    Bilirubin Urine Negative NEG    Ketones Urine Negative NEG mg/dL    Specific Gravity Urine 1.015 1.003 - 1.035    Blood Urine Negative NEG    pH Urine 7.5 (H) 5.0 -  7.0 pH    Protein Albumin Urine Negative NEG mg/dL    Urobilinogen mg/dL Normal 0.0 - 2.0 mg/dL    Nitrite Urine Negative NEG    Leukocyte Esterase Urine Negative NEG    Source Catheterized Urine    US Abdomen Limited    Narrative    RIGHT UPPER QUADRANT ULTRASOUND 7/26/2017 8:03 PM    HISTORY: Right upper quadrant pain    COMPARISON: None.    FINDINGS:    Gallbladder: There are multiple mobile shadowing hyperdense gallstones  within the gallbladder lumen. No abnormal gallbladder wall thickness.  Sonographic Solano sign was negative.    Bile ducts: CHD is normal diameter. No intrahepatic biliary  dilatation.    Liver: Normal.     Pancreas: Completely obscured by gas.    Right kidney: Normal.      Impression    IMPRESSION:  Cholelithiasis without sonographic evidence of  cholecystitis. Otherwise unremarkable right upper quadrant ultrasound.         Medications   iopamidol (ISOVUE-370) solution 80 mL (80 mLs Intravenous Given 7/26/17 1723)   sodium chloride 0.9 % for CT scan flush dose 80 mL (80 mLs As instructed Given 7/26/17 1723)   furosemide (LASIX) injection 40 mg (40 mg Intravenous Given 7/26/17 1843)       15:18 Patient assessed. Course of care outlined.     18:38 Phone conversation with Dr. Brittany Rodriguez with [facility].     Assessments & Plan (with Medical Decision Making)     84-year-old female with complex past medical history presented with generalized weakness and increased dyspnea.  She's had prior aortic valve replacement in 2002 and is on anticoagulant therapy for a mechanical valve.  She had FORMA device placed transit catheter at [facility] on June 1 for severe tricuspid regurgitation and an follow-up had improvement in her symptoms.  However she presented on July 11 to our ED with dyspnea and was discovered to have a new left pleural effusion.  After holding of her warfarin, she underwent ultrasound-guided paracentesis with resolution of the effusion.  She presented again today with again  weakness and dyspnea.  Workup showed recurrence of the left pleural effusion.  On CT scan she had evidence of severe right-sided heart failure with dilated right-sided chambers and reflux of contrast into the IVC.  Given her recent procedure performed at [place], recurrent pleural effusion, likely due to right-sided heart failure, and comorbidities, commended transfer to specialty care.  She and her family would prefer to go back to [place] with this procedure was initially performed.  I spoke to Dr. Brittany Rodriguez, cardiology, who agreed to accept her in transfer.  She received a dose of Lasix 40 mg IV in the emergency department.    On physical examination she did have some tenderness in the right upper quadrant and I considered the possibility of occult cholecystitis.  She did have an elevated WBC and very slight elevation of the alkaline phosphatase but no elevation of the liver transaminases.  Ultrasound showed multiple gallstones and borderline thickness of the gallbladder wall without pericholecystic fluid.  I think it is uncertain at this time if gallbladder disease is causing her vomiting but I have notified Dr. Rodriguez so they can follow up appropriately during her hospitalization.    I have reviewed the nursing notes.    I have reviewed the findings, diagnosis, plan and need for follow up with the patient.       Current Discharge Medication List          Final diagnoses:   Recurrent pleural effusion on left   Acute on chronic systolic congestive heart failure (H)   Non-intractable vomiting with nausea, unspecified vomiting type   Cholelithiasis without cholangitis     This document serves as a record of the services and decisions personally performed and made by Fadi Griggs MD. It was created on HIS/HER behalf by   Olga Lynne, a trained medical scribe. The creation of this document is based the provider's statements to the medical scribe.  Olga Lynne 3:17 PM 7/26/2017    Provider:   The  information in this document, created by the medical scribe for me, accurately reflects the services I personally performed and the decisions made by me. I have reviewed and approved this document for accuracy prior to leaving the patient care area.  Fadi Griggs MD 3:17 PM 7/26/2017 7/26/2017   [facility] EMERGENCY DEPARTMENT     Fadi Griggs MD  07/26/17 2021

## 2017-07-26 NOTE — ED NOTES
"MD and RN at bedside. Pt comes in with daughter (Caregiver) and son for concerns of coughing, vomiting, and general decline. S/p triscupid valve replacement June 1st, pleural effusion with drainage. Multiple ED visits and PMD visits. However this am was coughing to the point of emesis multiple times. Daughter also feels her breathing is worse lately. Pt denies that it is \" any worse or any better\" Pt has not been ambulatory today. Pedal edema on BLE, crackles on left side. Recheck of temp axillary is 97.1.  "

## 2017-07-27 NOTE — PROGRESS NOTES
Clinic Care Coordination Contact  Care Coordination Transition Communication    Referral Source: ED Follow-Up    Clinical Data: Patient was seen at [facility] ED on 7-26/17 with recurrence of the left pleural effusion.  On CT scan she had evidence of severe right-sided heart failure with dilated right-sided chambers and reflux of contrast into the IVC.  As pt had previous surgical interventions at [facility] [place], pt and family requested to return there for cares.    Transition to Facility:              Facility Name: [facility]              Contact name and phone number/fax: NA    Plan: RN/SW Care Coordinator will await notification from facility staff informing and/or [facility] staff of notification of return for cares status.      Jessie Gan  Social Work Care Coordinator  [facility]  [phone]

## 2017-07-31 NOTE — MR AVS SNAPSHOT
Natalie Hair   7/31/2017   Anticoagulation Therapy Visit    Description:  84 year old female   Provider:  Polina Quiroz, RN   Department:  [facility]           INR as of 7/31/2017     Today's INR No new INR was available at the time of this encounter.      Anticoagulation Summary as of 7/31/2017     INR goal 2.5-3.5   Today's INR No new INR was available at the time of this encounter.   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]  Long term current use of anticoagulant therapy [Z79.01]         Description     Recheck INR Wed, when Home care RN is expected  Continue warfarin 4 mg Mon, Fri, 6 mg rest of week       July 2017 Details    Sun Mon Tue Wed Thu Fri Sat           1                 2               3               4               5               6               7               8                 9               10               11               12               13               14               15                 16               17               18               19               20               21               22                 23               24               25               26               27               28               29                 30               31      4 mg   See details            Date Details   07/31 This INR check               How to take your warfarin dose     To take:  4 mg Take 1 of the 4 mg tablets.           August 2017 Details    Sun Mon Tue Wed Thu Fri Sat       1      6 mg         2            3               4               5                 6               7               8               9               10               11               12                 13               14               15               16               17               18               19                 20               21               22               23               24               25               26                  27               28               29               30               31                  Date Details   No additional details    Date of next INR:  8/2/2017         How to take your warfarin dose     To take:  6 mg Take 1.5 of the 4 mg tablets.

## 2017-07-31 NOTE — MR AVS SNAPSHOT
After Visit Summary   7/31/2017    Natalie Hair    MRN: [id number]           Patient Information     Date Of Birth          8/28/1932        Visit Information        Provider Department      7/31/2017 10:40 AM Leilani Whitman PA-C [facility]        Today's Diagnoses     Bad odor of urine    -  1    Nonspecific finding on examination of urine        Acute cystitis without hematuria          Care Instructions    Schedule a follow up with me (or another provider) in 1 week, sooner if needed    Continue with current care regimen for pressure sores  - If they seem to worsen, let me know. Next step would be wound consult      Work on getting a little protein in your diet  - goal: 1/2 - 1 boost or ensure drink/day      Take Bactrim for presumed UTI  - let the INR nurse know that you are starting a new medication as it can affect the INR value              Follow-ups after your visit        Your next 10 appointments already scheduled     Aug 04, 2017  1:00 PM CDT   SHORT with Ashley Schmitt MD   [facility] ([facility]    [address] [address] [phone]            Aug 09, 2017  1:00 PM CDT   Return Visit with SUSANA Todd CNP   [facility] [place] ([facility])    [address] [address] [phone]              Who to contact     Normal or non-critical lab and imaging results will be communicated to you by MyChart, letter or phone within 4 business days after the clinic has received the results. If you do not hear from us within 7 days, please contact the clinic through MyChart or phone. If you have a critical or abnormal lab result, we will notify you by phone as soon as possible.  Submit refill requests through [place] or call your pharmacy and they will forward the refill request to us. Please allow 3 business days for your refill to be  "completed.          If you need to speak with a  for additional information , please call: [phone]             Additional Information About Your Visit        [place] [employer] The Member Information     [facility] lets you send messages to your doctor, view your test results, renew your prescriptions, schedule appointments and more. To sign up, go to [email or web address] . Click on \"Log in\" on the left side of the screen, which will take you to the Welcome page. Then click on \"Sign up Now\" on the right side of the page.     You will be asked to enter the access code listed below, as well as some personal information. Please follow the directions to create your username and password.     Your access code is: [email or web address]  Expires: 2017  9:28 AM     Your access code will  in 90 days. If you need help or a new code, please call your [facility] clinic or [phone].        Care EveryWhere ID     This is your Care EveryWhere ID. This could be used by other organizations to access your [facility] medical records  [id number]        Your Vitals Were     Pulse Temperature Height Pulse Oximetry          70 97.9  F (36.6  C) (Tympanic) 5' 6\" (1.676 m) 97%         Blood Pressure from Last 3 Encounters:   17 128/76   17 126/48   17 124/57    Weight from Last 3 Encounters:   17 170 lb 11.2 oz (77.4 kg)   17 174 lb 14.4 oz (79.3 kg)   17 178 lb (80.7 kg)              We Performed the Following     UA with Microscopic reflex to Culture     Urine Culture Aerobic Bacterial          Today's Medication Changes          These changes are accurate as of: 17 12:04 PM.  If you have any questions, ask your nurse or doctor.               Start taking these medicines.        Dose/Directions    sulfamethoxazole-trimethoprim 800-160 MG per tablet   Commonly known as:  BACTRIM DS/SEPTRA DS   Used for:  Acute cystitis without hematuria   Started by:  Leilani Whitman PA-C        " Dose:  1 tablet   Take 1 tablet by mouth 2 times daily for 5 days   Quantity:  10 tablet   Refills:  0            Where to get your medicines      These medications were sent to [facility] [address] [place] [address] [address] AT [place] [address]  [address] [place] [address]     Phone:  [phone]     sulfamethoxazole-trimethoprim 800-160 MG per tablet                Primary Care Provider Office Phone # Fax #    Mimi Mcguire [phone]       [facility] [address] [address] [facility] [address]        Equal Access to Services     [facility] MALCOLM AH: Hadii pily almaguer hadasho Soomaali, waaxda luqadaha, qaybta kaalmada adeegyada, lyric knapp. So [facility] [phone].    ATENCIÓN: Si habla español, tiene a simmons disposición servicios gratuitos de asistencia lingüística. [facility] [phone].    We comply with applicable federal civil rights laws and [place] laws. We do not discriminate on the basis of race, color, national origin, age, disability sex, sexual orientation or gender identity.            Thank you!     Thank you for choosing [facility]  for your care. Our goal is always to provide you with excellent care. Hearing back from our patients is one way we can continue to improve our services. Please take a few minutes to complete the written survey that you may receive in the mail after your visit with us. Thank you!             Your Updated Medication List - Protect others around you: Learn how to safely use, store and throw away your medicines at www.disposemymeds.org.          This list is accurate as of: 7/31/17 12:04 PM.  Always use your most recent med list.                   Brand Name Dispense Instructions for use Diagnosis    ACETAMINOPHEN PO      Take 1,000 mg by mouth every 8 hours as needed for pain        alendronate 70 MG tablet    FOSAMAX    12 tablet    TAKE 1 TABLET BY MOUTH ONCE WEEKLY ON EMPTY  STOMACH    Osteopenia       calcium carb 1250 mg (500 mg [place])/vitamin D 200 units 500-200 MG-UNIT per tablet    OSCAL with D     Take 1 tablet by mouth 2 times daily (with meals)        cetirizine 5 MG tablet    zyrTEC    30 tablet    Take 1 tablet (5 mg) by mouth daily    Acute urticaria       clopidogrel 75 MG tablet    PLAVIX    90 tablet    Take 1 tablet (75 mg) by mouth daily    Congestive heart failure, unspecified congestive heart failure chronicity, unspecified congestive heart failure type (H), Lymphedema of both lower extremities       donepezil 5 MG tablet    ARICEPT    90 tablet    Take 1 tablet (5 mg) by mouth At Bedtime    Dementia without behavioral disturbance, unspecified dementia type       ezetimibe 10 MG tablet    ZETIA    90 tablet    Take 1 tablet (10 mg) by mouth daily    Hyperlipidemia LDL goal <100       metoprolol 25 MG tablet    LOPRESSOR    90 tablet    Take 0.5 tablets (12.5 mg) by mouth 2 times daily        Multi-vitamin Tabs tablet      Take 1 tablet by mouth daily        nitroGLYcerin 0.4 MG sublingual tablet    NITROSTAT    25 tablet    Place 1 tablet (0.4 mg) under the tongue every 5 minutes as needed for chest pain    Unspecified cardiovascular disease       omega 3 1000 MG Caps     90 capsule    Take 1 g by mouth daily    Health Care Home       ondansetron 4 MG ODT tab    ZOFRAN ODT    10 tablet    Take 1-2 tablets (4-8 mg) by mouth every 6 hours as needed for nausea        * order for DME     1 Package    Equipment being ordered: Walker with a seat    Acute on chronic diastolic congestive heart failure (H), At risk for falling       * order for DME     1 Device    Equipment being ordered: Hospital Bed 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 -ongoing symptoms of lower extremity edema,shortness of breath,cough sacral pressure sores within last 6 months   An electric hospital bed to allow for increase in head of bed elevation and for ease in wheelchair transfers  Length of need:  lifelong NPI - [id number]    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), Lymphedema of both lower extremities, Right-sided congestive heart failure (H), Tricuspid valve insufficiency, unspecified etiology, Congestive heart failure, unspecified congestive heart failure chronicity, unspecified congestive heart failure type (H), History of pressure ulcer       polyethylene glycol powder    MIRALAX/GLYCOLAX     Take 1 capful by mouth every morning Takes 3/4 of a capful        potassium chloride 10 MEQ tablet    K-TAB,KLOR-CON    90 tablet    Take 1 tablet (10 mEq) by mouth 2 times daily    Shortness of breath on exertion, Essential hypertension       SENNA S 8.6-50 MG per tablet   Generic drug:  senna-docusate      Take 2 tablets by mouth 2 times daily        silver sulfADIAZINE 1 % cream    SILVADENE     Apply topically daily as needed for other (bed sores)        spironolactone 25 MG tablet    ALDACTONE    60 tablet    Take 1 tablet (25 mg) by mouth 2 times daily    Congestive heart failure, unspecified congestive heart failure chronicity, unspecified congestive heart failure type (H), Lymphedema of both lower extremities       sulfamethoxazole-trimethoprim 800-160 MG per tablet    BACTRIM DS/SEPTRA DS    10 tablet    Take 1 tablet by mouth 2 times daily for 5 days    Acute cystitis without hematuria       torsemide 20 MG tablet    DEMADEX    60 tablet    Take 1 tablet (20 mg) by mouth 2 times daily    Lymphedema of both lower extremities, Pleural effusion       triamcinolone 0.1 % cream    KENALOG    30 g    Apply sparingly to affected area three times daily for 5-7 days.    Dermatitis       TYLENOL PM EXTRA STRENGTH PO      Take 2-3 tablets by mouth nightly as needed        vitamin D 2000 UNITS Caps      Take 1 capsule by mouth daily        warfarin 4 MG tablet    COUMADIN    30 tablet    as directed by Anticoagulation Clinic, currently establishing a maintenance dose    Congestive  heart failure, unspecified congestive heart failure chronicity, unspecified congestive heart failure type (H)       ZZZQUIL PO      Take 50 mg by mouth nightly as needed Alternates with Tylenol PM        * Notice:  This list has 2 medication(s) that are the same as other medications prescribed for you. Read the directions carefully, and ask your doctor or other care provider to review them with you.

## 2017-07-31 NOTE — NURSING NOTE
"No chief complaint on file.      Initial /76 (BP Location: Right arm, Patient Position: Chair, Cuff Size: Adult Regular)  Pulse 70  Temp 97.9  F (36.6  C) (Tympanic)  Ht 5' 6\" (1.676 m)  SpO2 97% Estimated body mass index is 27.55 kg/(m^2) as calculated from the following:    Height as of 7/21/17: 5' 6\" (1.676 m).    Weight as of 7/21/17: 170 lb 11.2 oz (77.4 kg).  Medication Reconciliation: complete     Asad Escobar CMA    "

## 2017-07-31 NOTE — PROGRESS NOTES
SUBJECTIVE:                                                    Natalie Hair is a 84 year old female who presents to clinic today for the following health issues:          Hospital Follow-up Visit:    Hospital/Nursing Home/IP Rehab Facility: [facility]   Date of Admission: 7/26/17  Date of Discharge: 7/28/17  Reason(s) for Admission: Nausea, Vomiting, SOB, Gall Stones      She was nauseated during the night last night, no vomiting anymore. Very tired. Daughter wants her left lung checked. She also has a strong urine odor per daughter, no pain or frequency.          Problems taking medications regularly:  None       Medication changes since discharge: None       Problems adhering to non-medication therapy:  None    Summary of hospitalization:  [facility] discharge summary reviewed  Diagnostic Tests/Treatments reviewed.  Follow up needed: INR check with home care nurse on 8/1/17; follow up cholelithiasis  Other Healthcare Providers Involved in Patient s Care:         Homecare and Physical Therapy  Update since discharge: improved.     Post Discharge Medication Reconciliation: discharge medications reconciled, continue medications without change.  Plan of care communicated with patient and caregiver     Coding guidelines for this visit:  Type of Medical   Decision Making Face-to-Face Visit       within 7 Days of discharge Face-to-Face Visit        within 14 days of discharge   Moderate Complexity [id number]   High Complexity [id number]            Past medical hx significant for severe tricuspid regurgitation S/p FORMA device on 6/1/17  D/c'd to skilled nursing facility on 6/9/17; returned to daughters home (caregiver) 3 weeks later  Admitted 7/26/17 from ED with worsening fatigue, dry cough, vomiting  TTE stable  IV lasix given with some improvement  D/c'd 7/28/17 back to her daughters home    Improved from discharge  Still weak and more easily fatigued  No cough  Had some nausea last night but better  today  Was able to eat a small breakfast this morning    Concerns daughter (caregiver) has:     1. Lack of appetite - not new  - patient only likes fish and oatmeal. Will also eat pickles and peanut butter  - says she has no interest in food; nothing tastes good  - denies any abdominal pain with eating  - daughter has tried various protein shakes but she doesn't like them    2. Urinary odor  - urine smells stronger normal    3. Memory issues  - unchanged since her surgery on 6/1/17  - has not been as bad since discharge    4. Pressure wounds  - developed pressure sores after her hospitalization in June  - improved, now starting to get some small breaks in the skin again  - not as bad as in June  - daughter has been applying iodosorb iodine gel and hydrogel wound dressing and using mepilex dressings that she had leftover  - wondering if she can get more mepilex dressings - online it would cose $12/bandage    5. Lungs   - cough improved  - patient denies feeling SOB  - daughter wants lungs listened to    6. Previous homecare orders d/c'd with recent admission. Needs new orders          Problem list and histories reviewed & adjusted, as indicated.  Additional history: as documented    Current Outpatient Prescriptions   Medication Sig Dispense Refill     silver sulfADIAZINE (SILVADENE) 1 % cream Apply topically daily as needed for other (bed sores)       warfarin (COUMADIN) 4 MG tablet as directed by Anticoagulation Clinic, currently establishing a maintenance dose 30 tablet 1     torsemide (DEMADEX) 20 MG tablet Take 1 tablet (20 mg) by mouth 2 times daily 60 tablet 1     donepezil (ARICEPT) 5 MG tablet Take 1 tablet (5 mg) by mouth At Bedtime 90 tablet 0     clopidogrel (PLAVIX) 75 MG tablet Take 1 tablet (75 mg) by mouth daily 90 tablet 0     triamcinolone (KENALOG) 0.1 % cream Apply sparingly to affected area three times daily for 5-7 days. 30 g 0     spironolactone (ALDACTONE) 25 MG tablet Take 1 tablet (25 mg) by  mouth 2 times daily 60 tablet 1     calcium carb 1250 mg, 500 mg [place],/vitamin D 200 units (OSCAL WITH D) 500-200 MG-UNIT per tablet Take 1 tablet by mouth 2 times daily (with meals)       DiphenhydrAMINE HCl, Sleep, (ZZZQUIL PO) Take 50 mg by mouth nightly as needed Alternates with Tylenol PM       potassium chloride (K-TAB,KLOR-CON) 10 MEQ tablet Take 1 tablet (10 mEq) by mouth 2 times daily 90 tablet 1     ezetimibe (ZETIA) 10 MG tablet Take 1 tablet (10 mg) by mouth daily 90 tablet 0     Diphenhydramine-APAP, sleep, (TYLENOL PM EXTRA STRENGTH PO) Take 2-3 tablets by mouth nightly as needed        alendronate (FOSAMAX) 70 MG tablet TAKE 1 TABLET BY MOUTH ONCE WEEKLY ON EMPTY STOMACH 12 tablet 0     omega 3 1000 MG CAPS Take 1 g by mouth daily 90 capsule 3     multivitamin, therapeutic with minerals (MULTI-VITAMIN) TABS tablet Take 1 tablet by mouth daily       metoprolol (LOPRESSOR) 25 MG tablet Take 0.5 tablets (12.5 mg) by mouth 2 times daily 90 tablet 3     ondansetron (ZOFRAN ODT) 4 MG ODT tab Take 1-2 tablets (4-8 mg) by mouth every 6 hours as needed for nausea 10 tablet 0     order for DME Equipment being ordered: Hospital Bed  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 -ongoing symptoms of lower extremity edema,shortness of breath,cough  sacral pressure sores within last 6 months    An electric hospital bed to allow for increase in head of bed elevation and for ease in wheelchair transfers   Length of need: lifelong  NPI - [id number] 1 Device 0     cetirizine (ZYRTEC) 5 MG tablet Take 1 tablet (5 mg) by mouth daily 30 tablet 3     polyethylene glycol (MIRALAX/GLYCOLAX) powder Take 1 capful by mouth every morning Takes 3/4 of a capful       Cholecalciferol (VITAMIN D) 2000 UNITS CAPS Take 1 capsule by mouth daily       senna-docusate (SENNA S) 8.6-50 MG per tablet Take 2 tablets by mouth 2 times daily       order for DME Equipment being ordered: Walker with a seat 1 Package 0     nitroglycerin (NITROSTAT)  "0.4 MG SL tablet Place 1 tablet (0.4 mg) under the tongue every 5 minutes as needed for chest pain 25 tablet 1     ACETAMINOPHEN PO Take 1,000 mg by mouth every 8 hours as needed for pain        order for DME Mepilex sacral dressings 10 Units 3     warfarin (COUMADIN) 4 MG tablet as directed by the Anticoagulation Clinic, current dose 4 mg Mon, Fri, 6 mg rest of week 30 tablet 2     Allergies   Allergen Reactions     Lorazepam Nausea and Vomiting     Morphine Sulfate Cr [Morphine Sulfate] Nausea and Vomiting     Crestor [Rosuvastatin] Other (See Comments)     Abdominal/Back pain     Lidocaine GI Disturbance     Cozaar [Losartan] Rash     Rash        Reviewed and updated as needed this visit by clinical staff  Tobacco  Allergies  Meds  Med Hx  Surg Hx  Fam Hx  Soc Hx      Reviewed and updated as needed this visit by Provider         ROS:  Remainder of ROS obtained and found to be negative other than that which was documented above      OBJECTIVE:     /76 (BP Location: Right arm, Patient Position: Chair, Cuff Size: Adult Regular)  Pulse 70  Temp 97.9  F (36.6  C) (Tympanic)  Ht 5' 6\" (1.676 m)  SpO2 97%  There is no height or weight on file to calculate BMI.  GENERAL: healthy, alert and no distress  EYES: Eyes grossly normal to inspection  HENT: ear canals and TM's normal, nose and mouth without ulcers or lesions  RESP: Lungs clear in upper fields, diminished on left lower side slightly but no crackles or wheezes appreciated  CV: regular rates and rhythm and normal S1 S2, no S3 or S4  ABDOMEN: soft, nontender and bowel sounds normal  SKIN: small superficial tear in skin over right butt cheek    Diagnostic Test Results:  UA RESULTS:  Recent Labs   Lab Test  07/31/17   1138   COLOR  Yellow   APPEARANCE  Cloudy   URINEGLC  Negative   URINEBILI  Negative   URINEKETONE  Negative   SG  1.015   UBLD  Large*   URINEPH  7.0   PROTEIN  Trace*   UROBILINOGEN  0.2   NITRITE  Positive*   LEUKEST  Large*   RBCU  " "5-10*   WBCU  25-50*         ASSESSMENT/PLAN:     (N30.00) Acute cystitis without hematuria  (primary encounter diagnosis)  Comment: UA suggestive of infection. Start keflex today while awaiting culture  Plan: cephALEXin (KEFLEX) 500 MG capsule,         DISCONTINUED: sulfamethoxazole-trimethoprim         (BACTRIM DS/SEPTRA DS) 800-160 MG per tablet            (L89.159) Decubitus ulcer of sacral region, unspecified ulcer stage  Comment: phone consult with wound nurse. Medicare does not cover mepiliex dressings ($15/dressing) unless there is \"copious amounts of drainage\" from the wound. However, she also stated these dressings were meant to be left in place for up to 1 week if possible  Plan: Continue dressings. Keep mepilex in place. Follow up next week - homecare RN to also monitor. Wound consult if worsening     (I48.91) Atrial fibrillation with RVR (H)  Comment: on anticoagulation  Plan: followed by INR clinic    (I07.1) Tricuspid valve insufficiency, unspecified etiology  Comment:   Plan: stable    (R82.90) Bad odor of urine  Comment: treating UTI as noted above  Plan: UA with Microscopic reflex to Culture            (R82.90) Nonspecific finding on examination of urine  Comment:   Plan: Urine Culture Aerobic Bacterial            (R41.3) Memory loss  Comment: stable to improved per daughter  Plan: continue to monitor    (R63.0) Decreased appetite  Comment: lack of appetite/food doesn't taste   Plan: set small goals - work on high caloric higher protein foods when she does eat.   - try to get 1 ensure or protein shake/day  - work on small snacks vs large meals as she will likely not be able to tolerate large quantities of food at once    Follow up in 1 week, sooner if needed    Leilani Whitman PA-C  [facility]    "

## 2017-07-31 NOTE — PATIENT INSTRUCTIONS
Schedule a follow up with me (or another provider) in 1 week, sooner if needed    Continue with current care regimen for pressure sores  - If they seem to worsen, let me know. Next step would be wound consult      Work on getting a little protein in your diet  - goal: 1/2 - 1 boost or ensure drink/day

## 2017-07-31 NOTE — PROGRESS NOTES
ANTICOAGULATION FOLLOW-UP CLINIC VISIT    Patient Name:  Natalie Hair  Date:  7/31/2017  Contact Type:  Telephone/ Daughter Digna called and was given new dosing and recheck instructions, verbalized understanding    SUBJECTIVE:     Patient Findings     Positives Other complaints (pt was seen in ED on 7/26 and was sent by ambulance to [place], she was admitted ther 7/26-7/29.), Antibiotic use or infection (starting  Bactrim today for UTI)    Comments Daughter reports Susy is doing much better today             OBJECTIVE    INR   Date Value Ref Range Status   07/26/2017 2.33 (H) 0.86 - 1.14 Final       ASSESSMENT / PLAN  No question data found.  Anticoagulation Summary as of 7/31/2017     INR goal 2.5-3.5   Today's INR No new INR was available at the time of this encounter.   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  [facility] as of 7-18-17.      Anticoagulation Care Providers     Provider Role Specialty Phone number    Mimi Mcguire MD [facility] [phone]            See the Encounter Report to view Anticoagulation Flowsheet and Dosing Calendar (Go to Encounters tab in chart review, and find the Anticoagulation Therapy Visit)        Polina Quiroz, RN

## 2017-08-02 NOTE — Clinical Note
Waiting for [facility] to call back to confirm she received my message / it works for her to check Natalie's INR on Monday.

## 2017-08-02 NOTE — PROGRESS NOTES
Clinic Care Coordination Contact  Care Team Conversations    HIRAL received phone call from [facility] RN, Cyrus, asking this writer to assist with pt.  Per Cyrus, pt and daughter Digna were at pt's INR draw today and Digna appeared very overwhelmed and voiced concerns to Cyrus about her mom's cares:   1.   Home Care had not re-established cares since pt's discharge from [place].  Per Cyrus, pt needs new Home Care orders sent to [facility] in order to be seen again.  2.  Pt's insurance has been inactivated  3.  Mepalex dressing for pt's coccyx is $10.50 per dressing and they are privately paying for this; wondered if pt's insurance will cover it?  4.  Digna wants to be pt's PCA--took online classes and passed, no one will hire her.    HIRAL informed Cyrus that this writer will call and speak with Digna about above.      HIRAL placed call to [facility], [phone], learned that pt has a RNCase Manager, Brionna WELSH, and requested that she be paged to my phone number.      HIRAL spoke with Patient , Anam at [phone], and she ran the pt's insurance, noting that pt has [employer] as her Medicare program.  No issues reported.      HIRAL placed call and spoke with Digna.  HIRAL addressed the items above and explained that this writer has contacted the [place] [facility] and asked for new Home Care orders to be written and sent to [facility].  HIRAL also provided education on the coverage of dressing supplies.  Unfortunately, many supplies are not covered by Medicare, but asked for a prescription to see if perhaps our pharmacy may be able to bring some savings to the pt.      HIRAL informed Digna that pt's insurance is reporting as active with our system.  Digna was grateful and appreciated hearing this information.    HIRAL and Digna discussed PCA services and how a person applies for PCA coverage.  Per Digna, a  from [facility], Gisela, was at the home recently and told Digna that her mom would qualify for assistance  based on her mom's income and care needs.  Digna retired from her job in October 2016 to solely provide cares to her mom so they discussed that perhaps Digna could be hired to be her mom's PCA provider.  SW explained to Digna that in order for her mom to get a PCA, her mom needs a [place] Assessment, which is completed by [facility] Human Services staff, as they will determine pt's eligibility for all care needs.  If pt is found to be eligible for PCA services, then Digna would need to be hired from an authorized PCA agency that [facility] works with.    Pt is scheduled for a [place] on 8/3/17 at 2PM, so this writer provided Digna with a list of questions that she can ask the .  The assessment will also assist pt with [place] application as well, which will also bring more resources into the pt's environment.    Pt has follow up appt with Leilani Whitman on 8/7/17.    Jessei Gan  Social Work Care Coordinator  [facility]  [phone]

## 2017-08-02 NOTE — MR AVS SNAPSHOT
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  Please call [phone] with any problems or questions regarding your therapy          August 2017 Details    Sun Mon Tue Wed Thu Fri Sat       1               2      4 mg   See details      3      6 mg         4      4 mg         5      6 mg           6      4 mg         7            8               9               10               11               12                 13               14               15               16               17               18               19                 20               21               22               23               24               25               26                 27               28               29               30               31                  Date Details   08/02 This INR check       Date of next INR:  8/7/2017         How to take your warfarin dose     To take:  4 mg Take 1 of the 4 mg tablets.    To take:  6 mg Take 1.5 of the 4 mg tablets.

## 2017-08-02 NOTE — TELEPHONE ENCOUNTER
Please place new Home Care Orders for:  RN, PT/OT, HHA and SW to assist with community resources.  Pt was just seen in clinic on 7/31/17.  [facility] at [phone] will not see the pt until new orders are placed.  STAT.    Pt's daughter has been purchasing Mepaplex dressings for pt's coccyx wound as insurance will not cover.  Please write prescription for similar dressing supplies so that insurance will cover, if possible.

## 2017-08-02 NOTE — PROGRESS NOTES
"  ANTICOAGULATION FOLLOW-UP CLINIC VISIT    Patient Name:  Natalie Hair  Date:  8/2/2017  Contact Type:  Face to Face, accompanied by patient's daughter Emely.     SUBJECTIVE:     Patient Findings     Positives Antibiotic use or infection (Cipro for UTI)    Comments Patient was discharged back to home on Friday last week. She was supposed to have home care visit today at 10am per Emely's (patient's daughter) report. She waited until 11am and called the home care nurse. She stated she was not being seen today because she had not be \"readmitted\" to home care since she was discharged from the hospital. Emely mentioned they needed a whole new order because of the hospitalization. Writer called and left a voicemail for [facility] [phone] to request that patient's INR be rechecked on Monday. The new orders should be taken care of by that point.Writer requested she call [facility] back.     Emely is also trying to get a job through a PCA company since she is already caring for her mother 24 hours per day. She retired from her job so she could take care of her. All of the care services that she has contacted so far are for private pay only, no insurance coverage. Per LIBIA Roman she should call the county she resides in as they have a list of all the authorized PCA companies/services. Emely will need to be hired at one of those, trained on site even though she received her PCA certification online.     Patient is having troubles with a bed sore on her coccyx area. While she was in the hospital she was given a sacral mepilex to use. She was only given 2 upon discharge and her insurance will not cover it. Additionally, according to Emely, patient's insurance is currently inactive.     LIBIA Roman will speak with one of the providers at [facility] to have her home care re-ordered immediately as well as request that a prescription for the Mepilex or a similar product be ordered at her " appointment on Monday so her insurance will cover it. She will also reach out to one of the Patient Financial Representatives to help sort out the insurance issue.     Patient had 32mg in the previous 7 days, will increase dose to 34mg by the next INR check on Monday (~6% increase) -- her usual maintenance dose is 38mg per week, which is 19% more than 32mg she has currently taken.              OBJECTIVE    INR Protime   Date Value Ref Range Status   08/02/2017 2.8 (A) 0.86 - 1.14 Final       ASSESSMENT / PLAN  INR assessment THER    Recheck INR In: 5 DAYS    INR Location Clinic      Anticoagulation Summary as of 8/2/2017     INR goal 2.5-3.5   Today's INR 2.8   Maintenance plan 4 mg (4 mg x 1) on Mon, Fri; 6 mg (4 mg x 1.5) all other days   Full instructions 8/2: 4 mg; 8/6: 4 mg; Otherwise 4 mg on Mon, Fri; 6 mg all other days   Weekly total 38 mg   Plan last modified Polina Quiroz RN (7/31/2017)   Next INR check 8/7/2017   Priority INR   Target end date Indefinite    Indications   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  [facility] as of 7-18-17, Felicia [phone]      Anticoagulation Care Providers     Provider Role Specialty Phone number    Mimi Mcguire MD [facility] [phone]            See the Encounter Report to view Anticoagulation Flowsheet and Dosing Calendar (Go to Encounters tab in chart review, and find the Anticoagulation Therapy Visit)        Christa Guzman RN

## 2017-08-02 NOTE — TELEPHONE ENCOUNTER
Can pt also get her INR drawn when she is in clinic on 8/7/17 at 10:40 AM when she is seeing Leilani Whitman?  I don't know how to add the lab, sorry.  Jessie Gan, Clinic Care Coordination-Social Work  [phone]

## 2017-08-02 NOTE — TELEPHONE ENCOUNTER
Reason for Call: Request for an order or referral:    Order or referral being requested: Suzette from  [facility] requesting new orders for Natalie.  She states that she just found out from Susy's daughter that she was in [facility] on 7/26/17 and was discharged without [facility] orders.  They need:  Resume [facility] Orders.  Thank you..Claire Carroll      Date needed: as soon as possible    Has the patient been seen by the PCP for this problem? YES    Additional comments: none    Phone number Patient can be reached at:  Other phone number:  Suzette - [email or web address]  [phone]    Best Time:  anytime    Can we leave a detailed message on this number?  YES    Call taken on 8/2/2017 at 2:24 PM by Claire Carroll

## 2017-08-03 NOTE — PROGRESS NOTES
"Home care order was to be placed but already done by another provider on 7/2/17  DME for mepilex dressings ordered  I had discussed with wound care nurse who said these dressings are expensive and often not covered by medicare unless there is documentation of \"excessive or copious amounts of drainage\" from wounds which patient does not currently have.   She informed me that the dressings are meant to be left in place for up to a week and NOT changed daily so they can last much longer    Leilani  "

## 2017-08-05 NOTE — TELEPHONE ENCOUNTER
Clinic Action Needed:Yes, Please call Felicia SAGE, [facility] [phone]    Reason for Call:  1.Reporting patient was discharged from [place] 8/4/17. Requesting to restart Home Care orders.     Skilled Nurse 1 time a week for 1 week     2 times a week for 3 weeks    3 prn visits    OT and PT (have active orders)    2. Reporting left lung remains diminished     3. Stage 1 pressure sore that is being treated with barrier ointment.    Routed to:Dr Mcguire Nurse Aidan Arroyo, RN  [facility]

## 2017-08-05 NOTE — TELEPHONE ENCOUNTER
Clinic Action Needed:Yes, Please call Felicia SAGE, [facility] [phone]    Reason for Call:  1.) Reporting patient was discharged from [place] 8/4/17. Requesting to restart Home Care orders.       Skilled Nurse 1 time a week for 1 week       2 times a week for 3 weeks       3 prn visits       OT and PT (have active orders)    2.) Reporting left lung remains diminished     3.) Stage 1 pressure sore that is being treated with barrier ointment.    Routed to:Dr Mcguire Nurse Aidan Arroyo, RN  [facility]

## 2017-08-07 NOTE — MR AVS SNAPSHOT
Natalie Hair   8/7/2017   Anticoagulation Therapy Visit    Description:  84 year old female   Provider:  Christa Guzman, RN   Department:  [facility]           INR as of 8/7/2017     Today's INR No new INR was available at the time of this encounter.      Anticoagulation Summary as of 8/7/2017     INR goal 2.5-3.5   Today's INR No new INR was available at the time of this encounter.   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]  Long term current use of anticoagulant therapy [Z79.01]         August 2017 Details    Sun Mon Tue Wed Thu Fri Sat       1               2               3               4               5                 6               7      4 mg   See details      8            9               10               11               12                 13               14               15               16               17               18               19                 20               21               22               23               24               25               26                 27               28               29               30               31                  Date Details   08/07 This INR check       Date of next INR:  8/8/2017         How to take your warfarin dose     To take:  4 mg Take 1 of the 4 mg tablets.    To take:  6 mg Take 1.5 of the 4 mg tablets.

## 2017-08-07 NOTE — NURSING NOTE
"Chief Complaint   Patient presents with     Follow Up       Initial /72 (BP Location: Right arm, Patient Position: Chair, Cuff Size: Adult Regular)  Pulse 70  Temp 97.9  F (36.6  C) (Tympanic)  Ht 5' 6\" (1.676 m)  Wt 164 lb (74.4 kg)  SpO2 94%  BMI 26.47 kg/m2 Estimated body mass index is 26.47 kg/(m^2) as calculated from the following:    Height as of this encounter: 5' 6\" (1.676 m).    Weight as of this encounter: 164 lb (74.4 kg).  Medication Reconciliation: complete     Asad Escobar CMA    "

## 2017-08-07 NOTE — PROGRESS NOTES
SUBJECTIVE:                                                    Natalie Hair is a 84 year old female who presents to clinic today for the following health issues:      Patient is here today to follow up from her visit on 7/31/17. Patient is feeling a lot better. Would like her lungs checked today as well.     Doing better     Energy up slightly  Appetite comes and goes - not much of an appetite yesterday but ate well this morning  Only 1 episode of nausea in the last week    Stools a little hard - daughter using miralax. Gave her 3/4 of a capful instead of the regular 1/2 this morning    Urinary odor improved    Pressure sores improving    Home care started - RN was out on Saturday  PT/OT will be starting this week    They have a cardiology appt scheduled this Wed    There are 2 trips that she would like to go on coming up  - for her birthday she wants to go on a train ride so they will be going along the [employer] for a 2-3 hour trip    - in Sept, she is going out to [place] to see her son whose health is failing. She will be driving (her other daughter is driving her)    Problem list and histories reviewed & adjusted, as indicated.  Additional history: as documented    Current Outpatient Prescriptions   Medication Sig Dispense Refill     order for DME Mepilex sacral dressings 10 Units 3     warfarin (COUMADIN) 4 MG tablet as directed by the Anticoagulation Clinic, current dose 4 mg Mon, Fri, 6 mg rest of week 30 tablet 2     silver sulfADIAZINE (SILVADENE) 1 % cream Apply topically daily as needed for other (bed sores)       warfarin (COUMADIN) 4 MG tablet as directed by Anticoagulation Clinic, currently establishing a maintenance dose 30 tablet 1     torsemide (DEMADEX) 20 MG tablet Take 1 tablet (20 mg) by mouth 2 times daily 60 tablet 1     donepezil (ARICEPT) 5 MG tablet Take 1 tablet (5 mg) by mouth At Bedtime 90 tablet 0     clopidogrel (PLAVIX) 75 MG tablet Take 1 tablet (75 mg) by mouth daily 90  tablet 0     triamcinolone (KENALOG) 0.1 % cream Apply sparingly to affected area three times daily for 5-7 days. 30 g 0     spironolactone (ALDACTONE) 25 MG tablet Take 1 tablet (25 mg) by mouth 2 times daily 60 tablet 1     calcium carb 1250 mg, 500 mg [place],/vitamin D 200 units (OSCAL WITH D) 500-200 MG-UNIT per tablet Take 1 tablet by mouth 2 times daily (with meals)       DiphenhydrAMINE HCl, Sleep, (ZZZQUIL PO) Take 50 mg by mouth nightly as needed Alternates with Tylenol PM       potassium chloride (K-TAB,KLOR-CON) 10 MEQ tablet Take 1 tablet (10 mEq) by mouth 2 times daily 90 tablet 1     ezetimibe (ZETIA) 10 MG tablet Take 1 tablet (10 mg) by mouth daily 90 tablet 0     Diphenhydramine-APAP, sleep, (TYLENOL PM EXTRA STRENGTH PO) Take 2-3 tablets by mouth nightly as needed        alendronate (FOSAMAX) 70 MG tablet TAKE 1 TABLET BY MOUTH ONCE WEEKLY ON EMPTY STOMACH 12 tablet 0     omega 3 1000 MG CAPS Take 1 g by mouth daily 90 capsule 3     multivitamin, therapeutic with minerals (MULTI-VITAMIN) TABS tablet Take 1 tablet by mouth daily       metoprolol (LOPRESSOR) 25 MG tablet Take 0.5 tablets (12.5 mg) by mouth 2 times daily 90 tablet 3     ondansetron (ZOFRAN ODT) 4 MG ODT tab Take 1-2 tablets (4-8 mg) by mouth every 6 hours as needed for nausea 10 tablet 0     order for DME Equipment being ordered: Hospital Bed  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 -ongoing symptoms of lower extremity edema,shortness of breath,cough  sacral pressure sores within last 6 months    An electric hospital bed to allow for increase in head of bed elevation and for ease in wheelchair transfers   Length of need: lifelong  NPI - [id number] 1 Device 0     cetirizine (ZYRTEC) 5 MG tablet Take 1 tablet (5 mg) by mouth daily 30 tablet 3     polyethylene glycol (MIRALAX/GLYCOLAX) powder Take 1 capful by mouth every morning Takes 3/4 of a capful       Cholecalciferol (VITAMIN D) 2000 UNITS CAPS Take 1 capsule by mouth daily        "senna-docusate (SENNA S) 8.6-50 MG per tablet Take 2 tablets by mouth 2 times daily       order for DME Equipment being ordered: Walker with a seat 1 Package 0     nitroglycerin (NITROSTAT) 0.4 MG SL tablet Place 1 tablet (0.4 mg) under the tongue every 5 minutes as needed for chest pain 25 tablet 1     ACETAMINOPHEN PO Take 1,000 mg by mouth every 8 hours as needed for pain        Allergies   Allergen Reactions     Lorazepam Nausea and Vomiting     Morphine Sulfate Cr [Morphine Sulfate] Nausea and Vomiting     Crestor [Rosuvastatin] Other (See Comments)     Abdominal/Back pain     Lidocaine GI Disturbance     Cozaar [Losartan] Rash     Rash        Reviewed and updated as needed this visit by clinical staff       Reviewed and updated as needed this visit by Provider         ROS:  Remainder of ROS obtained and found to be negative other than that which was documented above      OBJECTIVE:     /72 (BP Location: Right arm, Patient Position: Chair, Cuff Size: Adult Regular)  Pulse 70  Temp 97.9  F (36.6  C) (Tympanic)  Ht 5' 6\" (1.676 m)  Wt 164 lb (74.4 kg)  SpO2 94%  BMI 26.47 kg/m2  Body mass index is 26.47 kg/(m^2).  GENERAL: healthy, alert and no distress  RESP: diminished in left lower lung, otherwise good breath sounds througout. No wheezes  CV: regular rates and rhythm, normal S1 S2, no S3 or S4 and no murmur, click or rub  ABDOMEN: soft, nontender and bowel sounds normal    Diagnostic Test Results:  none     ASSESSMENT/PLAN:       ICD-10-CM    1. Pleural effusion J90    2. Lymphedema of both lower extremities I89.0    3. Chronic diastolic congestive heart failure (H) I50.32    4. Decubitus ulcer of sacral region, unspecified ulcer stage L89.159    5. Long term current use of anticoagulant therapy Z79.01      1 week follow up after discharge follow up   Doing well - looking better. Color improved. Appears more comfortable, breathing better at rest  Alert and coherent throughout exam - well aware of " surroundings and an active part of the conversation.     Has appt with cardiology on Wed  Has RN, PT/OT coming to house  Social work also involved  INR clinic managing coumadin    Follow up if urinary odor returns - given hat and sterile cup to collect at home if needed    Leilani Whitman PA-C  [facility]

## 2017-08-07 NOTE — TELEPHONE ENCOUNTER
Per Leilani's instructions; [facility] notified that it is OK to restart Homecare.  Anam Dejesus RN

## 2017-08-07 NOTE — MR AVS SNAPSHOT
"              After Visit Summary   8/7/2017    Natalie Hair    MRN: [id number]           Patient Information     Date Of Birth          8/28/1932        Visit Information        Provider Department      8/7/2017 10:40 AM Leilani Whitman PA-C [facility] [place]        Care Instructions    If the urine odor gets strong again over the next week, please collect another sample and return to lab          Follow-ups after your visit        Your next 10 appointments already scheduled     Aug 09, 2017  9:30 AM CDT   Return Visit with Emelyn Bingham MD   [facility] PHYSICIAN HEART AT [facility] ([facility])    [address] [place] [id number] [phone]              Who to contact     Normal or non-critical lab and imaging results will be communicated to you by LikeBrighthart, letter or phone within 4 business days after the clinic has received the results. If you do not hear from us within 7 days, please contact the clinic through MyChart or phone. If you have a critical or abnormal lab result, we will notify you by phone as soon as possible.  Submit refill requests through [employer] or call your pharmacy and they will forward the refill request to us. Please allow 3 business days for your refill to be completed.          If you need to speak with a  for additional information , please call: [phone]             Additional Information About Your Visit        [employer] Information     [employer] lets you send messages to your doctor, view your test results, renew your prescriptions, schedule appointments and more. To sign up, go to [email or web address] . Click on \"Log in\" on the left side of the screen, which will take you to the Welcome page. Then click on \"Sign up Now\" on the right side of the page.     You will be asked to enter the access code listed below, as well as some personal information. Please follow the directions to create your " "username and password.     Your access code is: [email or web address]  Expires: 2017  9:28 AM     Your access code will  in 90 days. If you need help or a new code, please call your [place] clinic or [phone].        Care EveryWhere ID     This is your Care EveryWhere ID. This could be used by other organizations to access your [place] medical records  [id number]        Your Vitals Were     Pulse Temperature Height Pulse Oximetry BMI (Body Mass Index)       70 97.9  F (36.6  C) (Tympanic) 5' 6\" (1.676 m) 94% 26.47 kg/m2        Blood Pressure from Last 3 Encounters:   17 124/72   17 128/76   17 126/48    Weight from Last 3 Encounters:   17 164 lb (74.4 kg)   17 170 lb 11.2 oz (77.4 kg)   17 174 lb 14.4 oz (79.3 kg)              Today, you had the following     No orders found for display       Primary Care Provider Office Phone # Fax #    Mimi Mcguire [phone]       [facility] [address] [address] [address]        Equal Access to Services     JA FOWLER AH: Hadii aad ku hadasho Soomaali, waaxda luqadaha, qaybta kaalmada adeegyada, waxay idiin hayaan federica knapp. So [facility] [phone].    ATENCIÓN: Si habla español, tiene a simmons disposición servicios gratuitos de asistencia lingüística. Llame al [phone].    We comply with applicable federal civil rights laws and [place] laws. We do not discriminate on the basis of race, color, national origin, age, disability sex, sexual orientation or gender identity.            Thank you!     Thank you for choosing [facility]  for your care. Our goal is always to provide you with excellent care. Hearing back from our patients is one way we can continue to improve our services. Please take a few minutes to complete the written survey that you may receive in the mail after your visit with us. Thank you!             Your Updated Medication List - Protect others around " you: Learn how to safely use, store and throw away your medicines at www.disposemymeds.org.          This list is accurate as of: 8/7/17 11:01 AM.  Always use your most recent med list.                   Brand Name Dispense Instructions for use Diagnosis    ACETAMINOPHEN PO      Take 1,000 mg by mouth every 8 hours as needed for pain        alendronate 70 MG tablet    FOSAMAX    12 tablet    TAKE 1 TABLET BY MOUTH ONCE WEEKLY ON EMPTY STOMACH    Osteopenia       calcium carb 1250 mg (500 mg Nikolai)/vitamin D 200 units 500-200 MG-UNIT per tablet    OSCAL with D     Take 1 tablet by mouth 2 times daily (with meals)        cetirizine 5 MG tablet    zyrTEC    30 tablet    Take 1 tablet (5 mg) by mouth daily    Acute urticaria       clopidogrel 75 MG tablet    PLAVIX    90 tablet    Take 1 tablet (75 mg) by mouth daily    Congestive heart failure, unspecified congestive heart failure chronicity, unspecified congestive heart failure type (H), Lymphedema of both lower extremities       donepezil 5 MG tablet    ARICEPT    90 tablet    Take 1 tablet (5 mg) by mouth At Bedtime    Dementia without behavioral disturbance, unspecified dementia type       ezetimibe 10 MG tablet    ZETIA    90 tablet    Take 1 tablet (10 mg) by mouth daily    Hyperlipidemia LDL goal <100       metoprolol 25 MG tablet    LOPRESSOR    90 tablet    Take 0.5 tablets (12.5 mg) by mouth 2 times daily        Multi-vitamin Tabs tablet      Take 1 tablet by mouth daily        nitroGLYcerin 0.4 MG sublingual tablet    NITROSTAT    25 tablet    Place 1 tablet (0.4 mg) under the tongue every 5 minutes as needed for chest pain    Unspecified cardiovascular disease       omega 3 1000 MG Caps     90 capsule    Take 1 g by mouth daily    Health Care Home       ondansetron 4 MG ODT tab    ZOFRAN ODT    10 tablet    Take 1-2 tablets (4-8 mg) by mouth every 6 hours as needed for nausea        * order for DME     1 Package    Equipment being ordered: Walker with a seat     Acute on chronic diastolic congestive heart failure (H), At risk for falling       * order for DME     1 Device    Equipment being ordered: Hospital Bed 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 -ongoing symptoms of lower extremity edema,shortness of breath,cough sacral pressure sores within last 6 months   An electric hospital bed to allow for increase in head of bed elevation and for ease in wheelchair transfers  Length of need: lifelong NPI - [id number]    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), Lymphedema of both lower extremities, Right-sided congestive heart failure (H), Tricuspid valve insufficiency, unspecified etiology, Congestive heart failure, unspecified congestive heart failure chronicity, unspecified congestive heart failure type (H), History of pressure ulcer       * order for DME     10 Units    Mepilex sacral dressings    Decubitus ulcer of sacral region, unspecified ulcer stage       polyethylene glycol powder    MIRALAX/GLYCOLAX     Take 1 capful by mouth every morning Takes 3/4 of a capful        potassium chloride 10 MEQ tablet    K-TAB,KLOR-CON    90 tablet    Take 1 tablet (10 mEq) by mouth 2 times daily    Shortness of breath on exertion, Essential hypertension       SENNA S 8.6-50 MG per tablet   Generic drug:  senna-docusate      Take 2 tablets by mouth 2 times daily        silver sulfADIAZINE 1 % cream    SILVADENE     Apply topically daily as needed for other (bed sores)        spironolactone 25 MG tablet    ALDACTONE    60 tablet    Take 1 tablet (25 mg) by mouth 2 times daily    Congestive heart failure, unspecified congestive heart failure chronicity, unspecified congestive heart failure type (H), Lymphedema of both lower extremities       torsemide 20 MG tablet    DEMADEX    60 tablet    Take 1 tablet (20 mg) by mouth 2 times daily    Lymphedema of both lower extremities, Pleural effusion       triamcinolone 0.1 % cream    KENALOG    30 g     Apply sparingly to affected area three times daily for 5-7 days.    Dermatitis       TYLENOL PM EXTRA STRENGTH PO      Take 2-3 tablets by mouth nightly as needed        vitamin D 2000 UNITS Caps      Take 1 capsule by mouth daily        * warfarin 4 MG tablet    COUMADIN    30 tablet    as directed by Anticoagulation Clinic, currently establishing a maintenance dose    Congestive heart failure, unspecified congestive heart failure chronicity, unspecified congestive heart failure type (H)       * warfarin 4 MG tablet    COUMADIN    30 tablet    as directed by the Anticoagulation Clinic, current dose 4 mg Mon, Fri, 6 mg rest of week    Congestive heart failure, unspecified congestive heart failure chronicity, unspecified congestive heart failure type (H)       ZZZQUIL PO      Take 50 mg by mouth nightly as needed Alternates with Tylenol PM        * Notice:  This list has 5 medication(s) that are the same as other medications prescribed for you. Read the directions carefully, and ask your doctor or other care provider to review them with you.

## 2017-08-07 NOTE — PATIENT INSTRUCTIONS
If the urine odor gets strong again over the next week, please collect another sample and return to lab

## 2017-08-07 NOTE — PROGRESS NOTES
ANTICOAGULATION FOLLOW-UP CLINIC VISIT    Patient Name:  Natalie Hair  Date:  8/7/2017  Contact Type:  Telephone/ Emely, patient's daughter    SUBJECTIVE:     Patient Findings     Positives No Problem Findings    Comments Patient was supposed to have her INR drawn at her clinic appointment today but she forgot, she will have home care do it tomorrow.            OBJECTIVE    INR Protime   Date Value Ref Range Status   08/02/2017 2.8 (A) 0.86 - 1.14 Final       ASSESSMENT / PLAN  No question data found.  Anticoagulation Summary as of 8/7/2017     INR goal 2.5-3.5   Today's INR No new INR was available at the time of this encounter.   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  [facility] as of 7-18-17, Felicia [phone]      Anticoagulation Care Providers     Provider Role Specialty Phone number    Mimi Mcguire MD [facility] [phone]            See the Encounter Report to view Anticoagulation Flowsheet and Dosing Calendar (Go to Encounters tab in chart review, and find the Anticoagulation Therapy Visit)        Christa Guzman, CHU

## 2017-08-08 NOTE — MR AVS SNAPSHOT
Natalie PATRICIA Hair   8/8/2017   Anticoagulation Therapy Visit    Description:  84 year old female   Provider:  Brianna Morley, RN   Department:  [place]           INR as of 8/8/2017     Today's INR 3.6!      Anticoagulation Summary as of 8/8/2017     INR goal 2.5-3.5   Today's INR 3.6!   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]  Long term current use of anticoagulant therapy [Z79.01]         August 2017 Details    Sun Mon Tue Wed Thu Fri Sat       1               2               3               4               5                 6               7               8      4 mg   See details      9      6 mg         10      6 mg         11      4 mg         12      6 mg           13      6 mg         14      4 mg         15            16               17               18               19                 20               21               22               23               24               25               26                 27               28               29               30               31                  Date Details   08/08 This INR check       Date of next INR:  8/15/2017         How to take your warfarin dose     To take:  4 mg Take 1 of the 4 mg tablets.    To take:  6 mg Take 1.5 of the 4 mg tablets.

## 2017-08-08 NOTE — PROGRESS NOTES
ANTICOAGULATION FOLLOW-UP CLINIC VISIT    Patient Name:  Natalie Hair  Date:  8/8/2017  Contact Type:  Telephone/ Felicia with  Home Care    SUBJECTIVE:     Patient Findings     Positives Other complaints (possible UTI)    Comments Patient saw Leilanibev Whitman yesterday. There is concern for UTI again (she had one while receiving care at [facility] recently). She is to get a urine sample if the odor becomes strong. No med changes while at [facility]. Patient will have 36 mg by next INR. May need a maintenance dose reduction if still elevated at that time.           OBJECTIVE    INR   Date Value Ref Range Status   08/08/2017 3.6  Final       ASSESSMENT / PLAN  INR assessment THER    Recheck INR In: 1 WEEK    INR Location Homecare INR      Anticoagulation Summary as of 8/8/2017     INR goal 2.5-3.5   Today's INR 3.6!   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  [facility] as of 7-18-17, Felicia [phone]      Anticoagulation Care Providers     Provider Role Specialty Phone number    Mimi Mcguire MD [facility] [phone]            See the Encounter Report to view Anticoagulation Flowsheet and Dosing Calendar (Go to Encounters tab in chart review, and find the Anticoagulation Therapy Visit)        Brianna Morley RN

## 2017-08-10 NOTE — TELEPHONE ENCOUNTER
Reason for Call: Request for an order or referral:    Order or referral being requested: Vianey from [facility] calling for orders:  PT evaluation has been completed.  They need orders to continue PT 2 x a week for 2 weeks.  They will be working on strengthening, indurance, transfers, ambulation and balance.      Date needed: as soon as possible    Has the patient been seen by the PCP for this problem? YES    Additional comments: none    Phone number Patient can be reached at:  Other phone number:   [phone]  Vianey from [facility]    Best Time:  Any time     Can we leave a detailed message on this number?  YES  -  VM is confidential.      Call taken on 8/10/2017 at 2:50 PM by Claire Carroll

## 2017-08-11 NOTE — PROGRESS NOTES
[facility] now requests orders and shares plan of care/discharge summaries for some patients through [employer].  Please REPLY TO THIS MESSAGE in order to give authorization for orders when needed.  This is considered a verbal order, you will still receive a faxed copy of orders for signature.  Thank you for your assistance in improving collaboration for our patients.    ORDER  2w2  Occupational Therapy treatment to begin week of 8-13-17 for strengthening, and self cares.   The plan will also include falls prevention plan, monitor and treat pain, monitor skin integrity, continence management, monitor for s/s of depression, monitor for symptoms of heart failure and to achieve the following goals.    Pt will demonstrate safety with tub/shower transfer using appropriate equipment minimal assist.  Pt will increase strength/functional endurance for transfers during personal cares with SBA   Pt will complete 10 minutes of functional activity free from SOB and saturation levels at 90 percent or greater.   Via epic Dr Mcguire/Olimpia Garcia, OTR/L

## 2017-08-11 NOTE — TELEPHONE ENCOUNTER
I thought we had the orders in place - can they take a verbal order to continue or do I need to put in a new order and would it be through the home care referral order (which was placed already)?   Leilani

## 2017-08-11 NOTE — TELEPHONE ENCOUNTER
Left message on personal answering machine for Vianey to call the clinic RN.  Amy Gama RN    Your provider has referred you to: [facility]: [facility] [phone]  OK to resume home care orders.

## 2017-08-13 NOTE — PROGRESS NOTES
Dr. Mcguire is out of the office   I have been seeing Natalie Greer  I give my verbal consent to the orders/instructions outlined below  Leilani

## 2017-08-14 NOTE — TELEPHONE ENCOUNTER
[facility] called back and they will send over a request for signature to fax back.  She states that they always have to call when orders  to get new orders placed.  Thank you..Claire Carroll

## 2017-08-15 NOTE — PROGRESS NOTES
ANTICOAGULATION FOLLOW-UP CLINIC VISIT    Patient Name:  Natalie Hair  Date:  8/15/2017  Contact Type:  Telephone/ Brionna [facility] voicemail msg left on secure line    SUBJECTIVE:     Patient Findings     Comments Brionna left voicemail message and when writer tried several times to reach her in person to assess her pt further; however, writer only got voicemail each time. Writer finally left a detailed message to have pt hold warfarin for two days take 6mg Thursday and have her INR recheck in Friday, August 18th. Writer asked Brionna to relay to the patient to have a large serving of Vit K rich food today as well as speak to the pt about bleed risk.           OBJECTIVE    INR   Date Value Ref Range Status   08/15/2017 5.3  Final       ASSESSMENT / PLAN  No question data found.  Anticoagulation Summary as of 8/15/2017     INR goal 2.5-3.5   Today's INR 5.3!   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  [facility] as of 7-18-17, Felicia [phone]      Anticoagulation Care Providers     Provider Role Specialty Phone number    Mimi Mcguire MD [facility] [phone]            See the Encounter Report to view Anticoagulation Flowsheet and Dosing Calendar (Go to Encounters tab in chart review, and find the Anticoagulation Therapy Visit)        Letitia Aquino RN

## 2017-08-15 NOTE — MR AVS SNAPSHOT
Natalie Hair   8/15/2017   Anticoagulation Therapy Visit    Description:  84 year old female   Provider:  Letitia Aquino, RN   Department:  [place] [facility]           INR as of 8/15/2017     Today's INR 5.3!      Anticoagulation Summary as of 8/15/2017     INR goal 2.5-3.5   Today's INR 5.3!   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      August 2017 Details    Sun Mon Tue Wed Thu Fri Sat       1               2               3               4               5                 6               7               8               9               10               11               12                 13               14               15      Hold   See details      16      Hold         17      6 mg         18            19                 20               21               22               23               24               25               26                 27               28               29               30               31                  Date Details   08/15 This INR check       Date of next INR:  8/18/2017         How to take your warfarin dose     To take:  4 mg Take 1 of the 4 mg tablets.    To take:  6 mg Take 1.5 of the 4 mg tablets.    Hold Do not take your warfarin dose. See the Details table to the right for additional instructions.

## 2017-08-18 NOTE — PROGRESS NOTES
ANTICOAGULATION FOLLOW-UP CLINIC VISIT    Patient Name:  Natalie Hair  Date:  8/18/2017  Contact Type:  Telephone/ Felicia with  Home Care    SUBJECTIVE:     Patient Findings     Positives Other complaints (had an arm abrasion on 8-17 that bled for 2 hours. Was covered by bandage during home visit today.), Intentional hold of therapy (8-15 and 8-16)    Comments Writer spoke with Felicia at  Home Care. She states [facility] was informed that INR was elevated on 8-15 due to a missed dose and then subsequent med error. When writer asked if Felicia knew which day was missed and what warfarin dosing was taken after that, Felicia replied that she was driving and did not have access to that information. Writer does not see any notes regarding this information in EPIC. Will continue with maintenance dosing at this point and recheck on 8-22 when home care is there next.           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   [facility] as of 7-18-17, Felicia [phone]      Anticoagulation Care Providers     Provider Role Specialty Phone number    Mimi Mcguire MD [facility] [phone]            See  the Encounter Report to view Anticoagulation Flowsheet and Dosing Calendar (Go to Encounters tab in chart review, and find the Anticoagulation Therapy Visit)        Brianna Morley RN

## 2017-08-18 NOTE — MR AVS SNAPSHOT
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]  Long term current use of anticoagulant therapy [Z79.01]         August 2017 Details    Sun Mon Tue Wed Thu Fri Sat       1               2               3               4               5                 6               7               8               9               10               11               12                 13               14               15               16               17               18      4 mg   See details      19      6 mg           20      6 mg         21      4 mg         22            23               24               25               26                 27               28               29               30               31                  Date Details   08/18 This INR check       Date of next INR:  8/22/2017         How to take your warfarin dose     To take:  4 mg Take 1 of the 4 mg tablets.    To take:  6 mg Take 1.5 of the 4 mg tablets.

## 2017-08-21 NOTE — TELEPHONE ENCOUNTER
"Emely, daughter,  reports \"Her urine is so strong smelling; it is really dark color --almost a yellowish orange. Denies fever. \"I have a hat and specimen jar that Leilani gave me. My  is on his way now to bring the sample to lab\" Emely said that her Mom does not have any pain. She also reports that \"her edema is not too bad.\" UA/UC ordered.  Anam Dejesus RN    "

## 2017-08-21 NOTE — TELEPHONE ENCOUNTER
Patient's son-in-law, Salas, who brought the urine sample in; notified of Lavern's instructions as noted below.  Anam Dejesus RN

## 2017-08-21 NOTE — TELEPHONE ENCOUNTER
Looks like UTI. I sent in cipro - this looked like it would work on last UTI/bacteria and she had recent infection a few weeks ago so we should switch antibiotics.  I called anticoagulation clinic to notify them as her INR has been high. She has INR check tomorrow.  Follow up if symptoms worsen or not improving/.  Note: CrCl >40  Earlene Andres PA-C

## 2017-08-21 NOTE — TELEPHONE ENCOUNTER
Reason for call:  Patient reporting a symptom    Symptom or request: Digna Gonzalez is calling stating that her Mom may have a UTI.  At last appointment she was given a hat and a sterile speciman.  Would like to bring in sample, which was just taken this morning.  I spoke with RN @ [facility] and she said it was OK to drop off sample.  Please review and place orders.  Thank you..Claire Carroll    Duration (how long have symptoms been present): did not specify    Have you been treated for this before? Yes on 7/31/17.      Additional comments: none    Phone Number patient can be reached at:  Digna Gonzalez - [phone]  Best Time:  Any time    Can we leave a detailed message on this number:  YES    Call taken on 8/21/2017 at 8:29 AM by Claire Carroll

## 2017-08-22 NOTE — MR AVS SNAPSHOT
Natalie Hair   8/22/2017   Anticoagulation Therapy Visit    Description:  84 year old female   Provider:  Brianna Morley, RN   Department:  [facility]           INR as of 8/22/2017     Today's INR 4.4!      Anticoagulation Summary as of 8/22/2017     INR goal 2.5-3.5   Today's INR 4.4!   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]  Long term current use of anticoagulant therapy [Z79.01]         August 2017 Details    Sun Mon Tue Wed Thu Fri Sat       1               2               3               4               5                 6               7               8               9               10               11               12                 13               14               15               16               17               18               19                 20               21               22      Hold   See details      23      4 mg         24      6 mg         25      4 mg         26      6 mg           27      6 mg         28      4 mg         29            30               31                  Date Details   08/22 This INR check       Date of next INR:  8/29/2017         How to take your warfarin dose     To take:  4 mg Take 1 of the 4 mg tablets.    To take:  6 mg Take 1.5 of the 4 mg tablets.    Hold Do not take your warfarin dose. See the Details table to the right for additional instructions.

## 2017-08-22 NOTE — PROGRESS NOTES
Clinic Care Coordination Contact  [facility]/Voicemail    Referral Source: Care Team  Clinical Data: Care Coordinator Outreach  Outreach attempted x 1.  Left message on voicemail with call back information and requested return call.  Plan: Care Coordinator mailed out care coordination introduction letter on 6/30/17. Care Coordinator will try to reach patient again in 5-20 business days.    Jessie Gan  Social Work Care Coordinator  [facility]  [phone]

## 2017-08-22 NOTE — PROGRESS NOTES
ANTICOAGULATION FOLLOW-UP CLINIC VISIT    Patient Name:  Natalie Hair  Date:  8/22/2017  Contact Type:  Telephone/ spoke with Felicia at  Home Care    SUBJECTIVE:     Patient Findings     Positives Change in medications (resumed aricept on 8-20-17), Antibiotic use or infection (on cipro for UTI. started 1.5 days ago and has 3 doses left (3 day course)), Intentional hold of therapy (8-15 and 8-16)    Comments No bleeding concerns.           OBJECTIVE    INR   Date Value Ref Range Status   08/22/2017 4.4  Final       ASSESSMENT / PLAN  INR assessment SUPRA    Recheck INR In: 1 WEEK    INR Location [facility] INR      Anticoagulation Summary as of 8/22/2017     INR goal 2.5-3.5   Today's INR 4.4!   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  [facility] as of 7-18-17, Felicia [phone]      Anticoagulation Care Providers     Provider Role Specialty Phone number    Mimi Mcguire MD [facility] [phone]            See the Encounter Report to view Anticoagulation Flowsheet and Dosing Calendar (Go to Encounters tab in chart review, and find the Anticoagulation Therapy Visit)        Brianna Morley RN

## 2017-08-25 NOTE — PROGRESS NOTES
[facility] now requests orders and shares plan of care/discharge summaries for some patients through [employer].  Please REPLY TO THIS MESSAGE in order to give authorization for orders when needed.  This is considered a verbal order, you will still receive a faxed copy of orders for signature.  Thank you for your assistance in improving collaboration for our patients.    ORDER SN VISIT 1 x week x 9, 4 prn visits.  OT eval and treat.      MD SUMMARY RECERT  BACKGROUND   Patient has a medical history including Congetive heart failure, afib, memory loss, essential HTN, CAD, therapeutic drug monitoring, long term use of anticoagulant, Hx of CABG, hx  Falls. Skin Breakdown.  ANALYSIS   Patient requires skilled intervention to prevent complications and provide assessment and teaching related to this significant change to her health status and medical frailty. Patient has been prescribed Cipro for recent UTI and daughter has started medication Donepezil . She has held it for several weeks due to diarrhea concerns and patient having a Stage 1 pressure ulcer to sacrum  and redness and to inner gluteal folds.   Daughter , Emely, has been approved for PCA but has not been payed yet.  She is the sole caregiver and this gets very taxing for her as she has her own health concerns.  She worries about making an error. She has had an occasional missed dose with  the medications  and tried to redose the Coumadin herself. Teaching was given to her ,when this was brought to this nurses attention , to not do that and to just keep the Coumadin schedule as prescribed . Emely verb understanding  Resp status has stabilized . Right lower lobe remains  significantly decreased vs Left lung.All though able to ausculate improved airflow in upper right lobe and oximetry readings are mid to high 90s now. Patient speaking better in full sentences .   Patient has increased forgetfullness and this worries her daughter, Emely.  Teaching given on Dementia . Cognitive eval by OT .  Recommend continuation of skilled nursing for teaching and assessment for complex healthcare needs, new  and changing medication regime , safety evaluations , skin assessment  and cares. OT for cognitive needs and adaptive equipment.   Estimated length of care approx 4 to 6 weeks.  Thank You

## 2017-08-29 NOTE — MR AVS SNAPSHOT
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 Writer will have her take 2mg Tue/Thur and 4mg the rest of the days of the week for a total of 24mg.        August 2017 Details    Sun Mon Tue Wed Thu Fri Sat       1               2               3               4               5                 6               7               8               9               10               11               12                 13               14               15               16               17               18               19                 20               21               22               23               24               25               26                 27               28               29      2 mg   See details      30      4 mg         31      2 mg            Date Details   08/29 This INR check   Take 2 mg (4 mg tablets x 0.5)   pt                How to take your warfarin dose     To take:  2 mg Take 0.5 of a 4 mg tablet.    To take:  4 mg Take 1 of the 4 mg tablets.           September 2017 Details    Sun Mon Tue Wed Thu Fri Sat          1      4 mg         2      4 mg           3      4 mg         4      4 mg         5            6               7               8               9                 10               11               12               13               14               15               16                 17                18               19               20               21               22               23                 24               25               26               27               28               29               30                Date Details   No additional details    Date of next INR:  9/5/2017         How to take your warfarin dose     To take:  4 mg Take 1 of the 4 mg tablets.

## 2017-08-29 NOTE — PROGRESS NOTES
ANTICOAGULATION FOLLOW-UP CLINIC VISIT    Patient Name:  Natalie Hair  Date:  8/29/2017  Contact Type:  Telephone/ Felicia [facility]    SUBJECTIVE:     Patient Findings     Comments Felicia states pt had dried blood on her sheets on Sunday her her daughter Emely, so she held pt's dose last evening. Pt will have a serving of broccoli today.           OBJECTIVE    INR   Date Value Ref Range Status   08/29/2017 3.3  Final       ASSESSMENT / PLAN  No question data found.  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  [facility] as of 7-18-17, Felicia [phone]      Anticoagulation Care Providers     Provider Role Specialty Phone number    Mimi Mcguire MD [facility] [phone]            See the Encounter Report to view Anticoagulation Flowsheet and Dosing Calendar (Go to Encounters tab in chart review, and find the Anticoagulation Therapy Visit)        Letitia Aquino RN

## 2017-09-01 NOTE — TELEPHONE ENCOUNTER
Spoke with daughter Digna. Patient had PT this morning and is having a hard time breathing. Patient can only speak 2 words at a time before having to take a breath. Her memory is bad and she is very confused. She had a 2# weight gain the last few days. Urine has a slight odor to it. Advised that she be seen in the ED due to shortness of breath. Daughter agreed and she will bring her there.   Amy Gama RN

## 2017-09-01 NOTE — ED NOTES
Pt is a/o x 4, unable to complete sentences due to SOB, LS: diminished LLL otherwise clear, eating and drinking normal. No UTI s/sx other then the confusion, which is her normal with infections. Digna castro at bedside who takes care of her and pt lives with her in her home

## 2017-09-01 NOTE — ED PROVIDER NOTES
History     Chief Complaint   Patient presents with     Shortness of Breath     started to get worse yesterday and today is worse then yesterday      Altered Mental Status     seems worse today, which is normal if she has a UTI and her dtr Digna thinks she has a UTI      HPI   Hx per patient and her daughter.  History is primarily from her daughter who is her care provider (patient lives with her daughter).  Natalie Hair is a 85 year old female with 2 days of SOB and MOISE and increased confusion.  Symptoms are consistent with recurrent left pleural effusion, or an infectious process such as a urinary tract infection. She is improved in the day and confusion is worse in the evening and at night.  She has had no other recent infectious signs or symptoms, fall, head injury or trauma.  No fever, chills or vomiting.  No headache or neck pain or neck stiffness.  No rashes or wounds or lesions.  No abdominal pain or diarrhea.  She is chronically anticoagulated due to atrial fibrillation and valve replacement. Two days ago INR was > 3 and they were instructed on decreasing Coumadin dosing, s/p mechanical valve replacement and goal INR = 2.5-3.5.  No other acute complaints or concerns.  Left pleural effusion was felt to be postoperative  after valve procedure at [facility], drained in July, approximately 6 weeks ago.    [facility] Summary 7/28/17:  Acute on chronic right ventricular systolic heart failure  Severe tricuspid regurgitation, status post FORMA device 6/1/17  Coronary artery disease, status post drug-eluting stent to the left main and proximal LAD/proximal left circumflex bifurcation in the setting of prior coronary artery bypass grafting  Atrial fibrillation, status post AV nehemias ablation and permanent pacemaker placement  Mechanical aortic valve replacement    Previous Records Reviewed: Large left plural effusion drained ~ 6 weeks ago with near complete resolution following thoracentesis.    US  THORACENTESIS 7/17/2017 10:59 AM  PROCEDURE: Risks and benefits of the procedure are discussed with the  patient. Under sonographic guidance and utilizing 8 mL of 1% lidocaine  as local anesthetic, a needle is inserted into the posterior left  hemithorax. This is followed by a standard 8 Persian catheter. 1,400 mL  of serosanguineous fluid is evacuated. The patient tolerated the procedure well.     IMPRESSION: Technically uneventful ultrasound-guided thoracentesis.     XR CHEST 1 VW 7/17/2017 11:03 AM  COMPARISON: 7/11/2017  HISTORY: Thoracentesis.  IMPRESSION: Near-complete resolution of left pleural effusion following thoracentesis. No pneumothorax. Mitral annular calcifications are seen. Right lung is clear.      I have reviewed the Medications, Allergies, Past Medical and Surgical History, and Social History in the Epic system.  Patient Active Problem List   Diagnosis     Heart valve replaced     Cardiovascular disease     History of T12 compression fracture     Backache     Vitamin D deficiency     Hyperlipidemia LDL goal <100     Advanced directives, counseling/discussion     Long term current use of anticoagulant therapy     Urinary incontinence     Unstable angina (H)     Benign essential hypertension, BP goal <150/90     Bilateral low back pain without sciatica     Osteopenia     Atrial fibrillation with RVR (H)     Primary pulmonary hypertension (H)     Lymphedema of both lower extremities     Tricuspid valve insufficiency, unspecified etiology     Near syncope     Chronic diastolic congestive heart failure (H)     History of pressure ulcer     Health Care Home     S/P CABG (coronary artery bypass graft) - 2002     Stented coronary artery - 5/4/2017 at [facility]     Decubitus ulcer of sacral region, unspecified ulcer stage     Pleural effusion     Memory loss     Past Medical History:   Diagnosis Date     Backache      H/O aortic valve replacement in 2002    maintained on chronic anticoagulation with warfarin      Hyperlipidemia LDL goal < 100      Hypertension      T12 compression fracture (H)      Unspecified cardiovascular disease     hx of CAD with 3V CABG and AV replacement in 2002     Urinary incontinence      Vitamin D deficiency      Past Surgical History:   Procedure Laterality Date     ARTHROPLASTY KNEE BILATERAL       SURGICAL HISTORY OF -   12/2002    Triple bypass w/valve replacement - aortic     No current facility-administered medications for this encounter.      Current Outpatient Prescriptions   Medication     amoxicillin-clavulanate (AUGMENTIN) 875-125 MG per tablet     silver sulfADIAZINE (SILVADENE) 1 % cream     warfarin (COUMADIN) 4 MG tablet     torsemide (DEMADEX) 20 MG tablet     clopidogrel (PLAVIX) 75 MG tablet     spironolactone (ALDACTONE) 25 MG tablet     calcium carb 1250 mg, 500 mg [place],/vitamin D 200 units (OSCAL WITH D) 500-200 MG-UNIT per tablet     DiphenhydrAMINE HCl, Sleep, (ZZZQUIL PO)     potassium chloride (K-TAB,KLOR-CON) 10 MEQ tablet     ezetimibe (ZETIA) 10 MG tablet     multivitamin, therapeutic with minerals (MULTI-VITAMIN) TABS tablet     metoprolol (LOPRESSOR) 25 MG tablet     Cholecalciferol (VITAMIN D) 2000 UNITS CAPS     senna-docusate (SENNA S) 8.6-50 MG per tablet     ACETAMINOPHEN PO     ciprofloxacin (CIPRO) 250 MG tablet     order for DME     warfarin (COUMADIN) 4 MG tablet     donepezil (ARICEPT) 5 MG tablet     triamcinolone (KENALOG) 0.1 % cream     Diphenhydramine-APAP, sleep, (TYLENOL PM EXTRA STRENGTH PO)     alendronate (FOSAMAX) 70 MG tablet     omega 3 1000 MG CAPS     ondansetron (ZOFRAN ODT) 4 MG ODT tab     order for DME     cetirizine (ZYRTEC) 5 MG tablet     polyethylene glycol (MIRALAX/GLYCOLAX) powder     order for DME     nitroglycerin (NITROSTAT) 0.4 MG SL tablet     Allergies   Allergen Reactions     Lorazepam Nausea and Vomiting     Morphine Sulfate Cr [Morphine Sulfate] Nausea and Vomiting     Crestor [Rosuvastatin] Other (See Comments)      Abdominal/Back pain     Lidocaine GI Disturbance     Cozaar [Losartan] Rash     Rash      Social History   Substance Use Topics     Smoking status: Former Smoker     Types: Cigarettes     Smokeless tobacco: Never Used      Comment: 40 years ago     Alcohol use No     Family History   Problem Relation Age of Onset     HEART DISEASE Mother      Lipids Son      HEART DISEASE Son      Lipids Daughter        Review of Systems  As mentioned above in the history present illness.  All other systems were reviewed and are negative.    Physical Exam      Physical Exam   Constitutional: She appears well-developed and well-nourished. No distress.   HENT:   Head: Normocephalic and atraumatic. Head is without raccoon's eyes, without Gurrola's sign, without abrasion and without contusion.   Mouth/Throat: Oropharynx is clear and moist.   Eyes: Conjunctivae and EOM are normal. Pupils are equal, round, and reactive to light. No scleral icterus.   Neck: Normal range of motion and full passive range of motion without pain. Neck supple. No JVD present. No rigidity. No tracheal deviation and normal range of motion present. No thyromegaly present.   Cardiovascular: Normal rate and regular rhythm.  Exam reveals no gallop and no friction rub.    Murmur heard.  Click of prosthetic valve is present.   Pulmonary/Chest: Effort normal and breath sounds normal. No stridor. No respiratory distress. She has no wheezes. She has no rales.   Abdominal: Soft. Bowel sounds are normal. She exhibits no distension. There is no tenderness.   Musculoskeletal: Normal range of motion. She exhibits no edema (no pitting edema) or tenderness.   Neurological: She is alert. No cranial nerve deficit (2-12 intact). She exhibits normal muscle tone. Coordination normal.   Oriented to person and place.   Skin: Skin is warm and dry. No rash noted. She is not diaphoretic. No erythema. No pallor.   Psychiatric: Her behavior is normal.   Flat affect.   Nursing note and vitals  reviewed.      ED Course     ED Course     Procedures             EKG Interpretation:      Interpreted by Eric Paulino MD  Time reviewed: Upon completion  Symptoms at time of EKG: Confusion  Rhythm: Ventricular pacing  Rate: normal  Axis: normal  Ectopy: none  Conduction: normal  ST Segments/ T Waves: Ventricular pacing  Q Waves: none  Comparison to prior: Unchanged from 7/26/17  Clinical Impression: Ventricular pacing, no acute change versus previous    Results for orders placed or performed during the hospital encounter of 09/01/17   Chest XR,  PA & LAT    Narrative    CHEST TWO VIEWS 9/1/2017 12:03 PM     HISTORY: Short of air.    COMPARISON: 7/26/2017.      Impression    IMPRESSION: A single lead cardiac device is seen. There is an  additional lead with its tip projecting in the right ventricle which  is not connected to the current cardiac device and is likely left over  from a previous device. This is unchanged. Previously seen  retrocardiac opacity has moderately improved but some opacity remains  consistent with effusion/infiltrate/atelectasis. The remainder of the  lungs are clear of infiltrates. Probable mild vascular congestion  again noted. Prior median sternotomy and valve replacement and mild  cardiomegaly without change. Marked compression deformity of a single  lower thoracic vertebral body without change.    GOYO NAVARRO MD   CBC with platelets differential   Result Value Ref Range    WBC 8.1 4.0 - 11.0 10e9/L    RBC Count 4.96 3.8 - 5.2 10e12/L    Hemoglobin 13.0 11.7 - 15.7 g/dL    Hematocrit 40.8 35.0 - 47.0 %    MCV 82 78 - 100 fl    MCH 26.2 (L) 26.5 - 33.0 pg    MCHC 31.9 31.5 - 36.5 g/dL    RDW 17.9 (H) 10.0 - 15.0 %    Platelet Count 222 150 - 450 10e9/L    Diff Method Automated Method     % Neutrophils 66.2 %    % Lymphocytes 12.6 %    % Monocytes 12.9 %    % Eosinophils 7.3 %    % Basophils 0.6 %    % Immature Granulocytes 0.4 %    Absolute Neutrophil 5.4 1.6 - 8.3 10e9/L     Absolute Lymphocytes 1.0 0.8 - 5.3 10e9/L    Absolute Monocytes 1.0 0.0 - 1.3 10e9/L    Absolute Eosinophils 0.6 0.0 - 0.7 10e9/L    Absolute Basophils 0.1 0.0 - 0.2 10e9/L    Abs Immature Granulocytes 0.0 0 - 0.4 10e9/L   Comprehensive metabolic panel   Result Value Ref Range    Sodium 137 133 - 144 mmol/L    Potassium 4.1 3.4 - 5.3 mmol/L    Chloride 103 94 - 109 mmol/L    Carbon Dioxide 29 20 - 32 mmol/L    Anion Gap 5 3 - 14 mmol/L    Glucose 155 (H) 70 - 99 mg/dL    Urea Nitrogen 21 7 - 30 mg/dL    Creatinine 1.14 (H) 0.52 - 1.04 mg/dL    GFR Estimate 45 (L) >60 mL/min/1.7m2    GFR Estimate If Black 55 (L) >60 mL/min/1.7m2    Calcium 10.0 8.5 - 10.1 mg/dL    Bilirubin Total 0.8 0.2 - 1.3 mg/dL    Albumin 3.2 (L) 3.4 - 5.0 g/dL    Protein Total 7.6 6.8 - 8.8 g/dL    Alkaline Phosphatase 267 (H) 40 - 150 U/L    ALT 26 0 - 50 U/L    AST 28 0 - 45 U/L   Troponin I   Result Value Ref Range    Troponin I ES <0.015 0.000 - 0.045 ug/L   Blood gas venous   Result Value Ref Range    Ph Venous 7.44 (H) 7.32 - 7.43 pH    PCO2 Venous 41 40 - 50 mm Hg    PO2 Venous 33 25 - 47 mm Hg    Bicarbonate Venous 28 21 - 28 mmol/L    Base Excess Venous 3.6 mmol/L   NT pro BNP   Result Value Ref Range    N-Terminal Pro BNP Inpatient 1633 0 - 1800 pg/mL   UA with Microscopic   Result Value Ref Range    Color Urine Yellow     Appearance Urine Clear     Glucose Urine Negative NEG^Negative mg/dL    Bilirubin Urine Negative NEG^Negative    Ketones Urine Negative NEG^Negative mg/dL    Specific Gravity Urine 1.008 1.003 - 1.035    Blood Urine Negative NEG^Negative    pH Urine 7.0 5.0 - 7.0 pH    Protein Albumin Urine Negative NEG^Negative mg/dL    Urobilinogen mg/dL Normal 0.0 - 2.0 mg/dL    Nitrite Urine Negative NEG^Negative    Leukocyte Esterase Urine Trace (A) NEG^Negative    Source Midstream Urine     WBC Urine 3 (H) 0 - 2 /HPF    RBC Urine 1 0 - 2 /HPF    Squamous Epithelial /HPF Urine 2 (H) 0 - 1 /HPF    Hyaline Casts 9 (H) 0 - 2 /LPF    INR   Result Value Ref Range    INR 1.76 (H) 0.86 - 1.14   Urine Culture   Result Value Ref Range    Specimen Description Midstream Urine     Special Requests Specimen received in preservative     Culture Micro PENDING           12:36 PM - labs and x-ray completed, awaiting UA result.    Review of laboratory results with family and discussed the differential diagnosis.  They're comfortable with the evaluation thus far and are comfortable with discharge home with antibiotic therapy for possible UTI and careful monitoring and clinic follow-up early next week (today is Friday).    Reviewed case with the patient's primary care provider Dr. Mimi Mcguire.  An appointment was made for the patient follow-up in clinic next week.      Assessments & Plan (with Medical Decision Making)   85-year-old female who has developed confusion and shortness of breath in the past 2 days and was found to have possible UTI and recurrence of a left pleural effusion which developed postoperatively after a heart valve procedure performed at [facility].  No respiratory distress or hypoxia, but she has a recurrent left pleural effusion which is small and significantly less prominent than when she had a left thoracentesis of 1400 mL of serosanguineous fluid approximately 6 weeks ago.  Unclear if this is a cause of her change in mental status.  History of severe tricuspid regurgitation, status post FORMA device 6/1/17 at the [facility].  Daughter was also concerned about a UTI and urinalysis showed 3 WBC but no nitrite or LCE.  There were 2 squamous epithelial cells.  Urine culture pending. Will treat for possible UTI with Augmentin which could also provide coverage for possible infectious pneumonia within the left basilar opacity on chest x-ray, within the recurrent effusion.  No fever, normal WBC.  No other infectious source was identified. Doubt PE/DVT, CT scan of the chest was deferred. Doubt acute stroke, CVA or ICH, CT of the head  deferred. On Coumadin due to mechanical aortic valve replacement with goal INR 2.5-3.5.  INR today is subtherapeutic at 1.76. Patient and daughter were instructed to take an extra 50% dose today and contact the anticoagulation clinic later today for further directions regarding dosing and to recheck INR early next week.  A primary care clinic appointment was made further follow-up next week.  I consulted with her primary care provider.  I ordered an outpatient thoracentesis of the left pleural effusion with plan for her to discuss this in primary care clinic prior to scheduling this and to recheck in primary care clinic next week and receive directions regarding withholding Coumadin prior to  scheduling this procedure and prior to having this procedure performed.  Patient and her daughter were provided instructions for supportive care and will return as needed for worsened condition or worsening symptoms, or new problems or concerns.     I have reviewed the nursing notes.    I have reviewed the findings, diagnosis, plan and need for follow up with the patient.    Discharge Medication List as of 9/1/2017  1:57 PM      START taking these medications    Details   amoxicillin-clavulanate (AUGMENTIN) 875-125 MG per tablet Take 1 tablet by mouth 2 times daily for 10 days, Disp-20 tablet, R-0, E-Prescribe             Final diagnoses:   Altered mental status, unspecified altered mental status type - Increased confusion   Urinary tract infection, site unspecified   Pleural effusion, left - Recurrent       9/1/2017   [facility] EMERGENCY DEPARTMENT     Eric Paulino MD  09/01/17 1119

## 2017-09-01 NOTE — ED AVS SNAPSHOT
[facility] Emergency Department    [address] [address]    Phone:  [phone]    Fax:  [phone]                                       Natalie Hair   MRN: [id number]    Department:  [facility] Emergency Department   Date of Visit:  9/1/2017           After Visit Summary Signature Page     I have received my discharge instructions, and my questions have been answered. I have discussed any challenges I see with this plan with the nurse or doctor.    ..........................................................................................................................................  Patient/Patient Representative Signature      ..........................................................................................................................................  Patient Representative Print Name and Relationship to Patient    ..................................................               ................................................  Date                                            Time    ..........................................................................................................................................  Reviewed by Signature/Title    ...................................................              ..............................................  Date                                                            Time

## 2017-09-01 NOTE — ED NOTES
dtr states her SOB seems worse the past 2 days, RR is 28 and SOB is worse with activity. Also concerned about a UTI

## 2017-09-01 NOTE — ED AVS SNAPSHOT
[facility] Emergency Department    [address] [place] [address]    Phone:  [phone]    Fax:  [phone]                                       Natalie Hair   MRN: [id number]    Department:  [facility] Emergency Department   Date of Visit:  9/1/2017           Patient Information     Date Of Birth          8/28/1932        Your diagnoses for this visit were:     Altered mental status, unspecified altered mental status type Increased confusion    Urinary tract infection, site unspecified     Pleural effusion, left Chronic       You were seen by Eric Paulino MD.      Follow-up Information     Follow up with Mimi Mcguire MD In 4 days.    Specialty:  Family Practice    Why:  Tuesday 9/5/17    Contact information:    [address] [place] [address]  [phone]          Discharge Instructions         Altered Level of Consciousness  Level of consciousness (LOC) is a measure of a person s ability to interact with other people and to react to the surroundings. A person with an altered level of consciousness may not respond to touch or voices. He or she may look vacant or blank and may not make eye contact with others. He or she may be limp and may not move for a long time or show little interest in moving. He or she may also be confused.  There are many causes of altered LOC. They include low blood sugar, infection, medicines, injuries, or other medical problems.  Altered LOC is a medical emergency. The healthcare provider will do tests to help find the cause. These may include blood tests and imaging tests. The person is treated so breathing and heart rate are stable. An intravenous (IV) line may be put into a vein in the arm or hand to give medicines. Once the cause of altered LOC is found, the goal is to treat the cause.  In almost all cases, the person will be admitted to the hospital for observation.  Home care  When your loved one is released from the hospital, you  will be given guidelines for caring for him or her. In general:    Follow the healthcare provider's instructions for giving any prescribed medicines to your child.    Stay with your loved one or have another responsible adult look after him or her. Watch carefully for any return of symptoms or changes in behavior.    If the person has diabetes, make sure that any approved medicines are given on time and as prescribed.  Follow-up care  Follow up with the healthcare provider or our staff.  When to seek medical advice  Call the healthcare provider right away for any of the following:    Symptoms of altered LOC return    New symptoms appear  Date Last Reviewed: 8/23/2015 2000-2017 [employer]. [address] [place] [address], [place] [address]. All rights reserved. This information is not intended as a substitute for professional medical care. Always follow your healthcare professional's instructions.          Confusion  Confusion is a change in a person s ability to think clearly. There may be trouble recognizing familiar people and places or knowing what day it is. Memory, judgment, and decision-making may also be affected. In severe cases, the person may have limited or no response to being spoken to.  Confusion is usually a sign of an underlying problem. It may occur suddenly. Or it may develop gradually over time. Causes of confusion include brain injury, medicines, alcohol, withdrawal from certain medicines or illegal drugs, and infection. Heart attack and stroke may cause it. Confusion can also be a sign of dementia or a mental illness.  Treatment will depend on the cause of the problem. If the issue is a medicine, stopping the medicine may help. The healthcare provider will perform an evaluation and certain tests may be done. Follow up with the healthcare provider for the results.  Home care    Be sure someone is with the confused person at all times. He or she should not be left alone or  unsupervised.    Tell the healthcare provider about all medicines that the person takes. These include prescription, over-the-counter, herbs, and supplements.    Dehydration can increase confusion. Ask the healthcare provider how much fluid the person should be drinking. Offer liquids and ensure that they are taken.    Keep all medicines in a secure place under the caregiver s control. To prevent overdose, a confused person should take medicines only under the supervision of a caregiver.    To help a person with confusion:    Establish a daily routine. Change can be a source of stress for someone with confusion. Make and keep a time schedule for common tasks such as bathing, dressing, taking medicines, meals, going for walks, shopping, naps and bed time.    Speak slowly and clearly with a gentle tone of voice. Use short simple words and sentences. Ask one question at a time. Do not interrupt, criticize or argue. Be calm and supportive. Use friendly facial expressions. Use pointing and touching to help communicate. If there has been loss of long-term memory, do not ask questions about past events. This would only cause frustration for the person.    Use lists, signs, family photos, clocks and calendars as memory aids. Label cabinets and drawers. Try to distract, not confront, the patient. When he/she becomes frustrated or upset, redirect his/her attention to eating or some other activity of interest.    If this proves to be due to a permanent condition, talk to the healthcare provider or a  about getting a Power of  for healthcare and for financial decisions. It is best to do this while the person can still sign legal documents and make his or her own decisions. Otherwise, a court order will be required.  Follow-up care  Follow up with the person's healthcare provider or as advised for further testing or changes in medical care.  When to seek medical advice  Call the healthcare provider for any of the  following:    Frequent falling    Refusal to eat or drink    Violent behavior or behavior too difficult to manage at home    New hallucinations or delusions    Increased drowsiness    Complaints of headache or numbness or weakness of the face, arm or leg    Nausea or vomiting    Slurred speech or trouble speaking, walking, or seeing    Fainting spell, dizziness or seizure    Unexplained fever over 100.4  F (38.0  C) or as directed by the healthcare provider  Date Last Reviewed: 8/23/2015 2000-2017 [employer]. [address] [place] [address]. All rights reserved. This information is not intended as a substitute for professional medical care. Always follow your healthcare professional's instructions.          Pleural Effusion    The pleura is a smooth double membrane that surrounds the lungs. It separates the lungs from the chest wall. One side of the pleura attaches to the lung. The other side attaches to the chest wall. This membrane makes it easier for the chest to inflate and deflate as you breathe without rubbing against the ribs. You normally have a small amount of lubricating fluid (pleural fluid) between the pleural membranes.  A pleural effusion is when too much fluid collects in the space between the two pleural membranes (pleural space). As the amount of fluid increases, it begins to press on the lung. This makes it harder to take a full breath.  There are two types of pleural effusion:    Inflammatory. This is caused by a lung disease like pneumonia or lung cancer, which irritates the pleura.    Noninflammatory. This is caused by abnormal fluid pressures inside the lungs. The pressure can be caused by congestive heart failure (CHF). In CHF, extra fluid collects inside the lung tissues because of a weakened heart muscle. This extra fluid then leaks into the pleural space.  Pleural effusion may cause any of these symptoms:    Shortness of breath    Rapid breathing    Cough or  hiccups    Sharp chest pain that hurts more with coughing or deep breathing  A small pleural effusion may cause no symptoms at all.  Treatment will be directed at the cause of the pleural effusion. If you are having a lot of trouble breathing, a thoracentesis procedure may be done to remove the fluid from the pleural space. This involves placing a needle or tube (catheter) through the chest wall into the pleural space. This usually gives relief right away. But the fluid may gradually return.  Home care  Follow these guidelines when caring for yourself at home:    Rest until you feel better. Exerting yourself may make your symptoms worse.    Your healthcare provider may have prescribed medicines to treat the underlying cause of the pleural effusion. Take these exactly as directed.  Follow-up care  Follow up with your healthcare provider, or as advised.  When to seek medical advice  Call your healthcare provider right away if any of these occur:    Fever of 100.4 F (38 C) or higher  Call 911 or get immediate medical care  Contact emergency services right away if any of the following occur:    Shortness of breath gets worse    Chest pain gets worse    Coughing up blood    Weakness, dizziness, or fainting  Date Last Reviewed: 9/13/2015 2000-2017 [employer]. [address] [place] [address]. All rights reserved. This information is not intended as a substitute for professional medical care. Always follow your healthcare professional's instructions.          Future Appointments        Provider Department Dept Phone Center    9/8/2017 11:00 AM Emelyn Bingham MD [facility] PHYSICIAN HEART AT [facility] [phone] [facility]      24 Hour Appointment Hotline       To make an appointment at any [facility], call [phone] ([phone]). If you don't have a family doctor or clinic, we will help you find one. [facility] are conveniently located to serve  the needs of you and your family.             Review of your medicines      START taking        Dose / Directions Last dose taken    amoxicillin-clavulanate 875-125 MG per tablet   Commonly known as:  AUGMENTIN   Dose:  1 tablet   Quantity:  20 tablet        Take 1 tablet by mouth 2 times daily for 10 days   Refills:  0          Our records show that you are taking the medicines listed below. If these are incorrect, please call your family doctor or clinic.        Dose / Directions Last dose taken    ACETAMINOPHEN PO   Dose:  1000 mg        Take 1,000 mg by mouth every 8 hours as needed for pain   Refills:  0        alendronate 70 MG tablet   Commonly known as:  FOSAMAX   Quantity:  12 tablet        TAKE 1 TABLET BY MOUTH ONCE WEEKLY ON EMPTY STOMACH   Refills:  0        calcium carb 1250 mg (500 mg [place])/vitamin D 200 units 500-200 MG-UNIT per tablet   Commonly known as:  OSCAL with D   Dose:  1 tablet        Take 1 tablet by mouth 2 times daily (with meals)   Refills:  0        cetirizine 5 MG tablet   Commonly known as:  zyrTEC   Dose:  5 mg   Quantity:  30 tablet        Take 1 tablet (5 mg) by mouth daily   Refills:  3        ciprofloxacin 250 MG tablet   Commonly known as:  CIPRO   Dose:  250 mg   Quantity:  6 tablet        Take 1 tablet (250 mg) by mouth 2 times daily   Refills:  0        clopidogrel 75 MG tablet   Commonly known as:  PLAVIX   Dose:  75 mg   Quantity:  90 tablet        Take 1 tablet (75 mg) by mouth daily   Refills:  0        donepezil 5 MG tablet   Commonly known as:  ARICEPT   Dose:  5 mg   Quantity:  90 tablet        Take 1 tablet (5 mg) by mouth At Bedtime   Refills:  0        ezetimibe 10 MG tablet   Commonly known as:  ZETIA   Dose:  10 mg   Quantity:  90 tablet        Take 1 tablet (10 mg) by mouth daily   Refills:  0        metoprolol 25 MG tablet   Commonly known as:  LOPRESSOR   Dose:  12.5 mg   Quantity:  90 tablet        Take 0.5 tablets (12.5 mg) by mouth 2 times daily   Refills:   3        Multi-vitamin Tabs tablet   Dose:  1 tablet        Take 1 tablet by mouth daily   Refills:  0        nitroGLYcerin 0.4 MG sublingual tablet   Commonly known as:  NITROSTAT   Dose:  0.4 mg   Quantity:  25 tablet        Place 1 tablet (0.4 mg) under the tongue every 5 minutes as needed for chest pain   Refills:  1        omega 3 1000 MG Caps   Dose:  1 g   Quantity:  90 capsule        Take 1 g by mouth daily   Refills:  3        ondansetron 4 MG ODT tab   Commonly known as:  ZOFRAN ODT   Dose:  4-8 mg   Quantity:  10 tablet        Take 1-2 tablets (4-8 mg) by mouth every 6 hours as needed for nausea   Refills:  0        * order for DME   Quantity:  1 Package        Equipment being ordered: Walker with a seat   Refills:  0        * order for DME   Quantity:  1 Device        Equipment being ordered: Hospital Bed 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 -ongoing symptoms of lower extremity edema,shortness of breath,cough sacral pressure sores within last 6 months   An electric hospital bed to allow for increase in head of bed elevation and for ease in wheelchair transfers  Length of need: lifelong NPI - [id number]   Refills:  0        * order for DME   Quantity:  10 Units        Mepilex sacral dressings   Refills:  3        polyethylene glycol powder   Commonly known as:  MIRALAX/GLYCOLAX   Dose:  1 capful        Take 1 capful by mouth every morning Takes 3/4 of a capful   Refills:  0        potassium chloride 10 MEQ tablet   Commonly known as:  K-TAB,KLOR-CON   Dose:  10 mEq   Quantity:  90 tablet        Take 1 tablet (10 mEq) by mouth 2 times daily   Refills:  1        SENNA S 8.6-50 MG per tablet   Dose:  2 tablet   Generic drug:  senna-docusate        Take 2 tablets by mouth 2 times daily   Refills:  0        silver sulfADIAZINE 1 % cream   Commonly known as:  SILVADENE        Apply topically daily as needed for other (bed sores)   Refills:  0        spironolactone 25 MG tablet   Commonly known as:   ALDACTONE   Dose:  25 mg   Quantity:  60 tablet        Take 1 tablet (25 mg) by mouth 2 times daily   Refills:  1        torsemide 20 MG tablet   Commonly known as:  DEMADEX   Dose:  20 mg   Quantity:  60 tablet        Take 1 tablet (20 mg) by mouth 2 times daily   Refills:  1        triamcinolone 0.1 % cream   Commonly known as:  KENALOG   Quantity:  30 g        Apply sparingly to affected area three times daily for 5-7 days.   Refills:  0        TYLENOL PM EXTRA STRENGTH PO   Dose:  2-3 tablet        Take 2-3 tablets by mouth nightly as needed   Refills:  0        vitamin D 2000 UNITS Caps   Dose:  1 capsule        Take 1 capsule by mouth daily   Refills:  0        * warfarin 4 MG tablet   Commonly known as:  COUMADIN   Quantity:  30 tablet        as directed by Anticoagulation Clinic, currently establishing a maintenance dose   Refills:  1        * warfarin 4 MG tablet   Commonly known as:  COUMADIN   Quantity:  30 tablet        as directed by the Anticoagulation Clinic, current dose 4 mg Mon, Fri, 6 mg rest of week   Refills:  2        ZZZQUIL PO   Dose:  50 mg        Take 50 mg by mouth nightly as needed Alternates with Tylenol PM   Refills:  0        * Notice:  This list has 5 medication(s) that are the same as other medications prescribed for you. Read the directions carefully, and ask your doctor or other care provider to review them with you.            Prescriptions were sent or printed at these locations (1 Prescription)                   [facility] [address] [place] [address] AT [address] [address]    Telephone:  [phone]   Fax:  [phone]   Hours:                  E-Prescribed (1 of 1)         amoxicillin-clavulanate (AUGMENTIN) 875-125 MG per tablet                Procedures and tests performed during your visit     Blood gas venous    CBC with platelets differential    Chest XR,  PA & LAT    Comprehensive metabolic panel     EKG 12 lead    INR    NT pro BNP    Peripheral IV catheter    Troponin I    UA with Microscopic    Urine Culture      Orders Needing Specimen Collection     None      Pending Results     Date and Time Order Name Status Description    9/1/2017 1130 Chest XR,  PA & LAT Preliminary     9/1/2017 1129 Urine Culture In process             Pending Culture Results     Date and Time Order Name Status Description    9/1/2017 1129 Urine Culture In process             Pending Results Instructions     If you had any lab results that were not finalized at the time of your Discharge, you can call the ED Lab Result RN at [phone]. You will be contacted by this team for any positive Lab results or changes in treatment. The nurses are available 7 days a week from 10A to 6:30P.  You can leave a message 24 hours per day and they will return your call.        Test Results From Your Hospital Stay        9/1/2017 11:50 AM      Component Results     Component Value Ref Range & Units Status    WBC 8.1 4.0 - 11.0 10e9/L Final    RBC Count 4.96 3.8 - 5.2 10e12/L Final    Hemoglobin 13.0 11.7 - 15.7 g/dL Final    Hematocrit 40.8 35.0 - 47.0 % Final    MCV 82 78 - 100 fl Final    MCH 26.2 (L) 26.5 - 33.0 pg Final    MCHC 31.9 31.5 - 36.5 g/dL Final    RDW 17.9 (H) 10.0 - 15.0 % Final    Platelet Count 222 150 - 450 10e9/L Final    Diff Method Automated Method  Final    % Neutrophils 66.2 % Final    % Lymphocytes 12.6 % Final    % Monocytes 12.9 % Final    % Eosinophils 7.3 % Final    % Basophils 0.6 % Final    % Immature Granulocytes 0.4 % Final    Absolute Neutrophil 5.4 1.6 - 8.3 10e9/L Final    Absolute Lymphocytes 1.0 0.8 - 5.3 10e9/L Final    Absolute Monocytes 1.0 0.0 - 1.3 10e9/L Final    Absolute Eosinophils 0.6 0.0 - 0.7 10e9/L Final    Absolute Basophils 0.1 0.0 - 0.2 10e9/L Final    Abs Immature Granulocytes 0.0 0 - 0.4 10e9/L Final         9/1/2017 12:18 PM      Component Results     Component Value Ref Range & Units Status     Sodium 137 133 - 144 mmol/L Final    Potassium 4.1 3.4 - 5.3 mmol/L Final    Chloride 103 94 - 109 mmol/L Final    Carbon Dioxide 29 20 - 32 mmol/L Final    Anion Gap 5 3 - 14 mmol/L Final    Glucose 155 (H) 70 - 99 mg/dL Final    Urea Nitrogen 21 7 - 30 mg/dL Final    Creatinine 1.14 (H) 0.52 - 1.04 mg/dL Final    GFR Estimate 45 (L) >60 mL/min/1.7m2 Final    Non  GFR Calc    GFR Estimate If Black 55 (L) >60 mL/min/1.7m2 Final    African American GFR Calc    Calcium 10.0 8.5 - 10.1 mg/dL Final    Bilirubin Total 0.8 0.2 - 1.3 mg/dL Final    Albumin 3.2 (L) 3.4 - 5.0 g/dL Final    Protein Total 7.6 6.8 - 8.8 g/dL Final    Alkaline Phosphatase 267 (H) 40 - 150 U/L Final    ALT 26 0 - 50 U/L Final    AST 28 0 - 45 U/L Final         9/1/2017 12:18 PM      Component Results     Component Value Ref Range & Units Status    Troponin I ES <0.015 0.000 - 0.045 ug/L Final    The 99th percentile for upper reference range is 0.045 ug/L.  Troponin values   in the range of 0.045 - 0.120 ug/L may be associated with risks of adverse   clinical events.           9/1/2017 12:01 PM      Component Results     Component Value Ref Range & Units Status    Ph Venous 7.44 (H) 7.32 - 7.43 pH Final    PCO2 Venous 41 40 - 50 mm Hg Final    PO2 Venous 33 25 - 47 mm Hg Final    Bicarbonate Venous 28 21 - 28 mmol/L Final    Base Excess Venous 3.6 mmol/L Final    Abnormal Result, Ref range: -7.7 to 1.9         9/1/2017 12:18 PM      Component Results     Component Value Ref Range & Units Status    N-Terminal Pro BNP Inpatient 1633 0 - 1800 pg/mL Final       Reference range shown and results flagged as abnormal are suggested inpatient   cut points for confirming diagnosis if CHF in an acute setting. Establishing a   baseline value for each individual patient is useful for follow-up. An   inpatient or emergency department NT-proPBNP <300 pg/mL effectively rules out   acute CHF, with 99% negative predictive value.  The outpatient  non-acute reference range for ruling out CHF is:   0-125 pg/mL (age 18 to less than 75)   0-450 pg/mL (age 75 yrs and older)           9/1/2017 12:05 PM         9/1/2017 12:42 PM      Component Results     Component Value Ref Range & Units Status    Color Urine Yellow  Final    Appearance Urine Clear  Final    Glucose Urine Negative NEG^Negative mg/dL Final    Bilirubin Urine Negative NEG^Negative Final    Ketones Urine Negative NEG^Negative mg/dL Final    Specific Gravity Urine 1.008 1.003 - 1.035 Final    Blood Urine Negative NEG^Negative Final    pH Urine 7.0 5.0 - 7.0 pH Final    Protein Albumin Urine Negative NEG^Negative mg/dL Final    Urobilinogen mg/dL Normal 0.0 - 2.0 mg/dL Final    Nitrite Urine Negative NEG^Negative Final    Leukocyte Esterase Urine Trace (A) NEG^Negative Final    Source Midstream Urine  Final    WBC Urine 3 (H) 0 - 2 /HPF Final    RBC Urine 1 0 - 2 /HPF Final    Squamous Epithelial /HPF Urine 2 (H) 0 - 1 /HPF Final    Hyaline Casts 9 (H) 0 - 2 /LPF Final         9/1/2017 12:20 PM      Narrative     CHEST TWO VIEWS 9/1/2017 12:03 PM     HISTORY: Short of air.    COMPARISON: 7/26/2017.        Impression     IMPRESSION: A single lead cardiac device is seen. There is an  additional lead with its tip projecting in the right ventricle which  is not connected to the current cardiac device and is likely left over  from a previous device. This is unchanged. Previously seen  retrocardiac opacity has moderately improved but some opacity remains  consistent with effusion/infiltrate/atelectasis. The remainder of the  lungs are clear of infiltrates. Probable mild vascular congestion  again noted. Prior median sternotomy and valve replacement and mild  cardiomegaly without change. Marked compression deformity of a single  lower thoracic vertebral body without change.         9/1/2017 12:15 PM      Component Results     Component Value Ref Range & Units Status    INR 1.76 (H) 0.86 - 1.14 Final            "     Thank you for choosing [facility]       Thank you for choosing [facility] for your care. Our goal is always to provide you with excellent care. Hearing back from our patients is one way we can continue to improve our services. Please take a few minutes to complete the written survey that you may receive in the mail after you visit with us. Thank you!        [employer] Information     [employer] lets you send messages to your doctor, view your test results, renew your prescriptions, schedule appointments and more. To sign up, go to [email or web address] . Click on \"Log in\" on the left side of the screen, which will take you to the Welcome page. Then click on \"Sign up Now\" on the right side of the page.     You will be asked to enter the access code listed below, as well as some personal information. Please follow the directions to create your username and password.     Your access code is: [email or web address]  Expires: 2017 11:43 AM     Your access code will  in 90 days. If you need help or a new code, please call your [facility] clinic or [phone].        Care EveryWhere ID     This is your Care EveryWhere ID. This could be used by other organizations to access your [facility] medical records  [id number]        Equal Access to Services     JA FOWLER : Lilian browneo Fer, waaxda luqadaha, qaybta kaalmada adequynhyada, lyric knapp. So [facility] [phone].    ATENCIÓN: Si habla español, tiene a simmons disposición servicios gratuitos de asistencia lingüística. Llame al [phone].    We comply with applicable federal civil rights laws and [place] laws. We do not discriminate on the basis of race, color, national origin, age, disability sex, sexual orientation or gender identity.            After Visit Summary       This is your record. Keep this with you and show to your community pharmacist(s) and doctor(s) at your next visit.                  "

## 2017-09-01 NOTE — DISCHARGE INSTRUCTIONS
Altered Level of Consciousness  Level of consciousness (LOC) is a measure of a person s ability to interact with other people and to react to the surroundings. A person with an altered level of consciousness may not respond to touch or voices. He or she may look vacant or blank and may not make eye contact with others. He or she may be limp and may not move for a long time or show little interest in moving. He or she may also be confused.  There are many causes of altered LOC. They include low blood sugar, infection, medicines, injuries, or other medical problems.  Altered LOC is a medical emergency. The healthcare provider will do tests to help find the cause. These may include blood tests and imaging tests. The person is treated so breathing and heart rate are stable. An intravenous (IV) line may be put into a vein in the arm or hand to give medicines. Once the cause of altered LOC is found, the goal is to treat the cause.  In almost all cases, the person will be admitted to the hospital for observation.  Home care  When your loved one is released from the hospital, you will be given guidelines for caring for him or her. In general:    Follow the healthcare provider's instructions for giving any prescribed medicines to your child.    Stay with your loved one or have another responsible adult look after him or her. Watch carefully for any return of symptoms or changes in behavior.    If the person has diabetes, make sure that any approved medicines are given on time and as prescribed.  Follow-up care  Follow up with the healthcare provider or our staff.  When to seek medical advice  Call the healthcare provider right away for any of the following:    Symptoms of altered LOC return    New symptoms appear  Date Last Reviewed: 8/23/2015 2000-2017 [employer]. [address] [place] [address], [place] [address]. All rights reserved. This information is not intended as a substitute for professional medical  care. Always follow your healthcare professional's instructions.          Confusion  Confusion is a change in a person s ability to think clearly. There may be trouble recognizing familiar people and places or knowing what day it is. Memory, judgment, and decision-making may also be affected. In severe cases, the person may have limited or no response to being spoken to.  Confusion is usually a sign of an underlying problem. It may occur suddenly. Or it may develop gradually over time. Causes of confusion include brain injury, medicines, alcohol, withdrawal from certain medicines or illegal drugs, and infection. Heart attack and stroke may cause it. Confusion can also be a sign of dementia or a mental illness.  Treatment will depend on the cause of the problem. If the issue is a medicine, stopping the medicine may help. The healthcare provider will perform an evaluation and certain tests may be done. Follow up with the healthcare provider for the results.  Home care    Be sure someone is with the confused person at all times. He or she should not be left alone or unsupervised.    Tell the healthcare provider about all medicines that the person takes. These include prescription, over-the-counter, herbs, and supplements.    Dehydration can increase confusion. Ask the healthcare provider how much fluid the person should be drinking. Offer liquids and ensure that they are taken.    Keep all medicines in a secure place under the caregiver s control. To prevent overdose, a confused person should take medicines only under the supervision of a caregiver.    To help a person with confusion:    Establish a daily routine. Change can be a source of stress for someone with confusion. Make and keep a time schedule for common tasks such as bathing, dressing, taking medicines, meals, going for walks, shopping, naps and bed time.    Speak slowly and clearly with a gentle tone of voice. Use short simple words and sentences. Ask one  question at a time. Do not interrupt, criticize or argue. Be calm and supportive. Use friendly facial expressions. Use pointing and touching to help communicate. If there has been loss of long-term memory, do not ask questions about past events. This would only cause frustration for the person.    Use lists, signs, family photos, clocks and calendars as memory aids. Label cabinets and drawers. Try to distract, not confront, the patient. When he/she becomes frustrated or upset, redirect his/her attention to eating or some other activity of interest.    If this proves to be due to a permanent condition, talk to the healthcare provider or a  about getting a Power of  for healthcare and for financial decisions. It is best to do this while the person can still sign legal documents and make his or her own decisions. Otherwise, a court order will be required.  Follow-up care  Follow up with the person's healthcare provider or as advised for further testing or changes in medical care.  When to seek medical advice  Call the healthcare provider for any of the following:    Frequent falling    Refusal to eat or drink    Violent behavior or behavior too difficult to manage at home    New hallucinations or delusions    Increased drowsiness    Complaints of headache or numbness or weakness of the face, arm or leg    Nausea or vomiting    Slurred speech or trouble speaking, walking, or seeing    Fainting spell, dizziness or seizure    Unexplained fever over 100.4  F (38.0  C) or as directed by the healthcare provider  Date Last Reviewed: 8/23/2015 2000-2017 [employer]. [address] [place] [address], [place] [address]. All rights reserved. This information is not intended as a substitute for professional medical care. Always follow your healthcare professional's instructions.          Pleural Effusion    The pleura is a smooth double membrane that surrounds the lungs. It separates the lungs from the chest  wall. One side of the pleura attaches to the lung. The other side attaches to the chest wall. This membrane makes it easier for the chest to inflate and deflate as you breathe without rubbing against the ribs. You normally have a small amount of lubricating fluid (pleural fluid) between the pleural membranes.  A pleural effusion is when too much fluid collects in the space between the two pleural membranes (pleural space). As the amount of fluid increases, it begins to press on the lung. This makes it harder to take a full breath.  There are two types of pleural effusion:    Inflammatory. This is caused by a lung disease like pneumonia or lung cancer, which irritates the pleura.    Noninflammatory. This is caused by abnormal fluid pressures inside the lungs. The pressure can be caused by congestive heart failure (CHF). In CHF, extra fluid collects inside the lung tissues because of a weakened heart muscle. This extra fluid then leaks into the pleural space.  Pleural effusion may cause any of these symptoms:    Shortness of breath    Rapid breathing    Cough or hiccups    Sharp chest pain that hurts more with coughing or deep breathing  A small pleural effusion may cause no symptoms at all.  Treatment will be directed at the cause of the pleural effusion. If you are having a lot of trouble breathing, a thoracentesis procedure may be done to remove the fluid from the pleural space. This involves placing a needle or tube (catheter) through the chest wall into the pleural space. This usually gives relief right away. But the fluid may gradually return.  Home care  Follow these guidelines when caring for yourself at home:    Rest until you feel better. Exerting yourself may make your symptoms worse.    Your healthcare provider may have prescribed medicines to treat the underlying cause of the pleural effusion. Take these exactly as directed.  Follow-up care  Follow up with your healthcare provider, or as advised.  When to  seek medical advice  Call your healthcare provider right away if any of these occur:    Fever of 100.4 F (38 C) or higher  Call 911 or get immediate medical care  Contact emergency services right away if any of the following occur:    Shortness of breath gets worse    Chest pain gets worse    Coughing up blood    Weakness, dizziness, or fainting  Date Last Reviewed: 9/13/2015 2000-2017 [employer]. [address] [place] [address]. All rights reserved. This information is not intended as a substitute for professional medical care. Always follow your healthcare professional's instructions.

## 2017-09-05 NOTE — MR AVS SNAPSHOT
Natalie Hair   9/5/2017   Anticoagulation Therapy Visit    Description:  85 year old female   Provider:  Letitia Aquino, RN   Department:  [facility]           INR as of 9/5/2017     Today's INR 1.88!      Anticoagulation Summary as of 9/5/2017     INR goal 2.5-3.5   Today's INR 1.88!   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 9/11 will resume 4mg unless otherwise instructed by a physician.      September 2017 Details    Sun Mon Tue Wed Thu Fri Sat          1               2                 3               4               5         See details      6      Hold         7      Hold         8      Hold         9      Hold           10      Hold         11      4 mg         12            13               14               15               16                 17               18               19               20               21               22               23                 24               25               26               27               28               29               30                Date Details   09/05 This INR check       Date of next INR:  9/12/2017         How to take your warfarin dose     To take:  4 mg Take 1 of the 4 mg tablets.    Hold Do not take your warfarin dose. See the Details table to the right for additional instructions.

## 2017-09-05 NOTE — PROGRESS NOTES
SUBJECTIVE:   Natalie Hair is a 85 year old female who presents to clinic today for the following health issues:      ED/UC Followup:    Facility:  [facility]  Date of visit: 9/1/17  Reason for visit: Shortness of breath, altered mental status   Current Status: She is still feeling short of breath, still feeling the same it has not gotten any better.       -Her daughter mentioned that on Enrique 9/3/17, her mom had an accident in her pull up and the stool was black. Patient does not recall that happening.      Patient brought to ED on 9/1/17 due to worsening SOB and more confusion  UA suggestive of possible infection so she was started on an abx  CXR with ongoing left pleural effusion - not as big as before but possible increasing in size again. Did have a thoracentesis last on 7/17/17 where approx 1400mL of fluid was removed  She was set up for an outpatient thoracentesis although daughter did not receive word of this so there has been nothing scheduled to date    They do have an appt at [facility] set up some time in October for follow up    Daughter tells me today her confusion comes and goes although this isn't that much different from her baseline  She is still very winded and more SOB  Unable to speak in full sentences  Patient endorses feeling more SOB than normal  She denies any chest pain  Appetite remains decreased - unchanged overall per daughter      Problem list and histories reviewed & adjusted, as indicated.  Additional history: as documented    Current Outpatient Prescriptions   Medication Sig Dispense Refill     order for DME Mepilex sacral dressings (Patient not taking: Reported on 9/8/2017) 10 Units 3     silver sulfADIAZINE (SILVADENE) 1 % cream Apply topically daily as needed for other (bed sores)       torsemide (DEMADEX) 20 MG tablet Take 1 tablet (20 mg) by mouth 2 times daily 60 tablet 1     donepezil (ARICEPT) 5 MG tablet Take 1 tablet (5 mg) by mouth At Bedtime 90 tablet 0      clopidogrel (PLAVIX) 75 MG tablet Take 1 tablet (75 mg) by mouth daily 90 tablet 0     triamcinolone (KENALOG) 0.1 % cream Apply sparingly to affected area three times daily for 5-7 days. (Patient not taking: Reported on 9/19/2017) 30 g 0     spironolactone (ALDACTONE) 25 MG tablet Take 1 tablet (25 mg) by mouth 2 times daily 60 tablet 1     calcium carb 1250 mg, 500 mg [place],/vitamin D 200 units (OSCAL WITH D) 500-200 MG-UNIT per tablet Take 1 tablet by mouth 2 times daily (with meals)       DiphenhydrAMINE HCl, Sleep, (ZZZQUIL PO) Take 50 mg by mouth nightly as needed Alternates with Tylenol PM       potassium chloride (K-TAB,KLOR-CON) 10 MEQ tablet Take 1 tablet (10 mEq) by mouth 2 times daily 90 tablet 1     ezetimibe (ZETIA) 10 MG tablet Take 1 tablet (10 mg) by mouth daily 90 tablet 0     Diphenhydramine-APAP, sleep, (TYLENOL PM EXTRA STRENGTH PO) Take 2-3 tablets by mouth nightly as needed        alendronate (FOSAMAX) 70 MG tablet TAKE 1 TABLET BY MOUTH ONCE WEEKLY ON EMPTY STOMACH 12 tablet 0     omega 3 1000 MG CAPS Take 1 g by mouth daily 90 capsule 3     multivitamin, therapeutic with minerals (MULTI-VITAMIN) TABS tablet Take 1 tablet by mouth daily       metoprolol (LOPRESSOR) 25 MG tablet Take 0.5 tablets (12.5 mg) by mouth 2 times daily 90 tablet 3     ondansetron (ZOFRAN ODT) 4 MG ODT tab Take 1-2 tablets (4-8 mg) by mouth every 6 hours as needed for nausea 10 tablet 0     order for DME Equipment being ordered: Hospital Bed  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 -ongoing symptoms of lower extremity edema,shortness of breath,cough  sacral pressure sores within last 6 months    An electric hospital bed to allow for increase in head of bed elevation and for ease in wheelchair transfers   Length of need: lifelong  NPI - [id number] (Patient not taking: Reported on 9/8/2017) 1 Device 0     cetirizine (ZYRTEC) 5 MG tablet Take 1 tablet (5 mg) by mouth daily 30 tablet 3     polyethylene glycol  "(MIRALAX/GLYCOLAX) powder Take 1 capful by mouth every morning Takes 3/4 of a capful       Cholecalciferol (VITAMIN D) 2000 UNITS CAPS Take 1 capsule by mouth daily       senna-docusate (SENNA S) 8.6-50 MG per tablet Take 2 tablets by mouth 2 times daily       order for DME Equipment being ordered: Walker with a seat (Patient not taking: Reported on 9/8/2017) 1 Package 0     nitroglycerin (NITROSTAT) 0.4 MG SL tablet Place 1 tablet (0.4 mg) under the tongue every 5 minutes as needed for chest pain 25 tablet 1     ACETAMINOPHEN PO Take 1,000 mg by mouth every 8 hours as needed for pain        triamcinolone (KENALOG) 0.1 % cream Apply sparingly to affected area two times daily for 7 days. 30 g 0     warfarin (COUMADIN) 4 MG tablet as directed by the Anticoagulation Clinic, no maintenance dose established. 30 tablet 2     Allergies   Allergen Reactions     Lorazepam Nausea and Vomiting     Morphine Sulfate Cr [Morphine Sulfate] Nausea and Vomiting     Crestor [Rosuvastatin] Other (See Comments)     Abdominal/Back pain     Lidocaine GI Disturbance     Cozaar [Losartan] Rash     Rash        Reviewed and updated as needed this visit by clinical staff  Tobacco  Allergies  Meds  Med Hx  Surg Hx  Fam Hx  Soc Hx      Reviewed and updated as needed this visit by Provider         ROS:  Remainder of ROS obtained and found to be negative other than that which was documented above      OBJECTIVE:     /55 (BP Location: Right arm, Patient Position: Chair, Cuff Size: Adult Regular)  Pulse 70  Temp 97.9  F (36.6  C) (Tympanic)  Ht 5' 6\" (1.676 m)  SpO2 96%  There is no height or weight on file to calculate BMI.  GENERAL: alert, breathing a little mores shallow. Unable to speak more than a few words without a break and slightly gasp in voice. Different than previous visits  EYES: Eyes grossly normal to inspection  RESP: lungs diminished in lower lobe on left side, right side clear with fair breath sounds  CV: regular " rates and rhythm, normal S1 S2, no S3 or S4 and no murmur, click or rub  PSYCH: mentation appears normal, affect normal    Diagnostic Test Results:  none     ASSESSMENT/PLAN:     ASSESSMENT/PLAN:      ICD-10-CM    1. SOB (shortness of breath) R06.02    2. Black stool K92.1 Occult blood stool     CANCELED: Occult blood stool 1-3 spec     CANCELED: Occult blood stool   3. Long term current use of anticoagulant therapy Z79.01 INR   4. Heart valve replaced Z95.2 INR       Patient Instructions   To do list:     1. Call to schedule Thoracentesis - call [phone]   - The ER provider already placed the order  - this will be done in [place] similar to the last one  - let them know that she was in pain after the procedure    2. Depending on when the procedure is scheduled for, determines when you start holding the coumadin  - plan to hold 5 doses prior to the procedure     3. Call [facility] and let them know what has been going in   - tell them she continues to have issues with the pleural effusion and will be undergoing a 2nd procedure (thoracentesis) to drain it    4. Keep your appointment with Dr. Bingham (cardiology) on Friday    5. IF she has another dark or black stool, do a guiac study so we can make sure there is not blood in the stool      CALL ME/CLINIC with any question/concerns or issues with any of the above            Leilani Whitman PA-C  [facility]

## 2017-09-05 NOTE — NURSING NOTE
"Chief Complaint   Patient presents with     RECHECK       Initial /55 (BP Location: Right arm, Patient Position: Chair, Cuff Size: Adult Regular)  Pulse 70  Temp 97.9  F (36.6  C) (Tympanic)  Ht 5' 6\" (1.676 m)  SpO2 96% Estimated body mass index is 26.47 kg/(m^2) as calculated from the following:    Height as of 8/7/17: 5' 6\" (1.676 m).    Weight as of 8/7/17: 164 lb (74.4 kg).  Medication Reconciliation: complete     Asad Escobar CMA    "

## 2017-09-05 NOTE — PROGRESS NOTES
ANTICOAGULATION FOLLOW-UP CLINIC VISIT    Patient Name:  Natalie Hair  Date:  9/5/2017  Contact Type:  Telephone/ [facility]    SUBJECTIVE:        OBJECTIVE    INR   Date Value Ref Range Status   09/05/2017 1.88 (H) 0.86 - 1.14 Final       ASSESSMENT / PLAN  No question data found.  Anticoagulation Summary as of 9/5/2017     INR goal 2.5-3.5   Today's INR 1.88!   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  [facility] as of 7-18-17, Felicia [phone]      Anticoagulation Care Providers     Provider Role Specialty Phone number    Mimi Mcguire MD [facility] [phone]            See the Encounter Report to view Anticoagulation Flowsheet and Dosing Calendar (Go to Encounters tab in chart review, and find the Anticoagulation Therapy Visit)    Dosage adjustment made based on physician directed care plan.    Letitia Aquino RN

## 2017-09-05 NOTE — PATIENT INSTRUCTIONS
To do list:     1. Call to schedule Thoracentesis - call [phone]   - The ER provider already placed the order  - this will be done in [place] similar to the last one  - let them know that she was in pain after the procedure    2. Depending on when the procedure is scheduled for, determines when you start holding the coumadin  - plan to hold 5 doses prior to the procedure     3. Call [facility] and let them know what has been going in   - tell them she continues to have issues with the pleural effusion and will be undergoing a 2nd procedure (thoracentesis) to drain it    4. Keep your appointment with Dr. Bingham (cardiology) on Friday    5. IF she has another dark or black stool, do a guiac study so we can make sure there is not blood in the stool      CALL ME/CLINIC with any question/concerns or issues with any of the above

## 2017-09-05 NOTE — MR AVS SNAPSHOT
After Visit Summary   9/5/2017    Natalie Hair    MRN: [id number]           Patient Information     Date Of Birth          8/28/1932        Visit Information        Provider Department      9/5/2017 10:00 AM Leilani Whitman PA-C [facility]        Today's Diagnoses     Black stool    -  1    Encounter for screening for malignant neoplasm of colon         Long term current use of anticoagulant therapy        Heart valve replaced          Care Instructions    To do list:     1. Call to schedule Thoracentesis - call [phone]   - The ER provider already placed the order  - this will be done in [place] similar to the last one  - let them know that she was in pain after the procedure    2. Depending on when the procedure is scheduled for, determines when you start holding the coumadin  - plan to hold 5 doses prior to the procedure     3. Call [place] and let them know what has been going in   - tell them she continues to have issues with the pleural effusion and will be undergoing a 2nd procedure (thoracentesis) to drain it    4. Keep your appointment with Dr. Bingham (cardiology) on Friday    5. IF she has another dark or black stool, do a guiac study so we can make sure there is not blood in the stool      CALL ME/CLINIC with any question/concerns or issues with any of the above          Follow-ups after your visit        Your next 10 appointments already scheduled     Sep 07, 2017  9:20 AM CDT   SHORT with Leilani Whitman PA-C   [facility]    [address] [address]   [phone]            Sep 08, 2017 11:00 AM CDT   Return Visit with Emelyn Bingham MD   [facility] ([facility])    [address] [address]   [phone]              Future tests that were ordered for you today     Open Future Orders        Priority Expected Expires  "Ordered    Occult blood stool 1-3 spec Routine  2018            Who to contact     Normal or non-critical lab and imaging results will be communicated to you by ThetaRayhart, letter or phone within 4 business days after the clinic has received the results. If you do not hear from us within 7 days, please contact the clinic through ThetaRayhart or phone. If you have a critical or abnormal lab result, we will notify you by phone as soon as possible.  Submit refill requests through WebPT or call your pharmacy and they will forward the refill request to us. Please allow 3 business days for your refill to be completed.          If you need to speak with a  for additional information , please call: [phone]             Additional Information About Your Visit        WebPT Information     WebPT lets you send messages to your doctor, view your test results, renew your prescriptions, schedule appointments and more. To sign up, go to [email or web address] . Click on \"Log in\" on the left side of the screen, which will take you to the Welcome page. Then click on \"Sign up Now\" on the right side of the page.     You will be asked to enter the access code listed below, as well as some personal information. Please follow the directions to create your username and password.     Your access code is: [email or web address]  Expires: 2017 11:43 AM     Your access code will  in 90 days. If you need help or a new code, please call your [place] clinic or [phone].        Care EveryWhere ID     This is your Care EveryWhere ID. This could be used by other organizations to access your [place] medical records  [id number]        Your Vitals Were     Pulse Temperature Height Pulse Oximetry          70 97.9  F (36.6  C) (Tympanic) 5' 6\" (1.676 m) 96%         Blood Pressure from Last 3 Encounters:   17 122/55   17 98/59   17 124/72    Weight from Last 3 Encounters:   17 164 lb (74.4 " kg)   07/21/17 170 lb 11.2 oz (77.4 kg)   07/13/17 174 lb 14.4 oz (79.3 kg)              We Performed the Following     INR        Primary Care Provider Office Phone # Fax #    Mimi Mcguire [phone] [id number] [facility] [address]        Equal Access to Services     [facility]: Hadii aad ku hadasho Soomaali, waaxda luqadaha, qaybta kaalmada adeegyada, waxay idiin hayaan adeeg kharash laTevinaan . So [facility] [place] [phone].    ATENCIÓN: Si habla español, tiene a simmons disposición servicios gratuitos de asistencia lingüística. Llame al [phone].    We comply with applicable federal civil rights laws and [place] laws. We do not discriminate on the basis of race, color, national origin, age, disability sex, sexual orientation or gender identity.            Thank you!     Thank you for choosing [facility]  for your care. Our goal is always to provide you with excellent care. Hearing back from our patients is one way we can continue to improve our services. Please take a few minutes to complete the written survey that you may receive in the mail after your visit with us. Thank you!             Your Updated Medication List - Protect others around you: Learn how to safely use, store and throw away your medicines at www.disposemymeds.org.          This list is accurate as of: 9/5/17 10:14 AM.  Always use your most recent med list.                   Brand Name Dispense Instructions for use Diagnosis    ACETAMINOPHEN PO      Take 1,000 mg by mouth every 8 hours as needed for pain        alendronate 70 MG tablet    FOSAMAX    12 tablet    TAKE 1 TABLET BY MOUTH ONCE WEEKLY ON EMPTY STOMACH    Osteopenia       amoxicillin-clavulanate 875-125 MG per tablet    AUGMENTIN    20 tablet    Take 1 tablet by mouth 2 times daily for 10 days        calcium carb 1250 mg (500 mg [place])/vitamin D 200 units 500-200 MG-UNIT per tablet    OSCAL with D     Take 1 tablet by mouth 2 times daily (with  meals)        cetirizine 5 MG tablet    zyrTEC    30 tablet    Take 1 tablet (5 mg) by mouth daily    Acute urticaria       ciprofloxacin 250 MG tablet    CIPRO    6 tablet    Take 1 tablet (250 mg) by mouth 2 times daily    Acute cystitis without hematuria       clopidogrel 75 MG tablet    PLAVIX    90 tablet    Take 1 tablet (75 mg) by mouth daily    Congestive heart failure, unspecified congestive heart failure chronicity, unspecified congestive heart failure type (H), Lymphedema of both lower extremities       donepezil 5 MG tablet    ARICEPT    90 tablet    Take 1 tablet (5 mg) by mouth At Bedtime    Dementia without behavioral disturbance, unspecified dementia type       ezetimibe 10 MG tablet    ZETIA    90 tablet    Take 1 tablet (10 mg) by mouth daily    Hyperlipidemia LDL goal <100       metoprolol 25 MG tablet    LOPRESSOR    90 tablet    Take 0.5 tablets (12.5 mg) by mouth 2 times daily        Multi-vitamin Tabs tablet      Take 1 tablet by mouth daily        nitroGLYcerin 0.4 MG sublingual tablet    NITROSTAT    25 tablet    Place 1 tablet (0.4 mg) under the tongue every 5 minutes as needed for chest pain    Unspecified cardiovascular disease       omega 3 1000 MG Caps     90 capsule    Take 1 g by mouth daily    Health Care Home       ondansetron 4 MG ODT tab    ZOFRAN ODT    10 tablet    Take 1-2 tablets (4-8 mg) by mouth every 6 hours as needed for nausea        * order for DME     1 Package    Equipment being ordered: Walker with a seat    Acute on chronic diastolic congestive heart failure (H), At risk for falling       * order for DME     1 Device    Equipment being ordered: Hospital Bed 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 -ongoing symptoms of lower extremity edema,shortness of breath,cough sacral pressure sores within last 6 months   An electric hospital bed to allow for increase in head of bed elevation and for ease in wheelchair transfers  Length of need: lifelong NPI - [id number]     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), Lymphedema of both lower extremities, Right-sided congestive heart failure (H), Tricuspid valve insufficiency, unspecified etiology, Congestive heart failure, unspecified congestive heart failure chronicity, unspecified congestive heart failure type (H), History of pressure ulcer       * order for DME     10 Units    Mepilex sacral dressings    Decubitus ulcer of sacral region, unspecified ulcer stage       polyethylene glycol powder    MIRALAX/GLYCOLAX     Take 1 capful by mouth every morning Takes 3/4 of a capful        potassium chloride 10 MEQ tablet    K-TAB,KLOR-CON    90 tablet    Take 1 tablet (10 mEq) by mouth 2 times daily    Shortness of breath on exertion, Essential hypertension       SENNA S 8.6-50 MG per tablet   Generic drug:  senna-docusate      Take 2 tablets by mouth 2 times daily        silver sulfADIAZINE 1 % cream    SILVADENE     Apply topically daily as needed for other (bed sores)        spironolactone 25 MG tablet    ALDACTONE    60 tablet    Take 1 tablet (25 mg) by mouth 2 times daily    Congestive heart failure, unspecified congestive heart failure chronicity, unspecified congestive heart failure type (H), Lymphedema of both lower extremities       torsemide 20 MG tablet    DEMADEX    60 tablet    Take 1 tablet (20 mg) by mouth 2 times daily    Lymphedema of both lower extremities, Pleural effusion       triamcinolone 0.1 % cream    KENALOG    30 g    Apply sparingly to affected area three times daily for 5-7 days.    Dermatitis       TYLENOL PM EXTRA STRENGTH PO      Take 2-3 tablets by mouth nightly as needed        vitamin D 2000 UNITS Caps      Take 1 capsule by mouth daily        * warfarin 4 MG tablet    COUMADIN    30 tablet    as directed by Anticoagulation Clinic, currently establishing a maintenance dose    Congestive heart failure, unspecified congestive heart failure chronicity, unspecified  congestive heart failure type (H)       * warfarin 4 MG tablet    COUMADIN    30 tablet    as directed by the Anticoagulation Clinic, current dose 4 mg Mon, Fri, 6 mg rest of week    Congestive heart failure, unspecified congestive heart failure chronicity, unspecified congestive heart failure type (H)       ZZZQUIL PO      Take 50 mg by mouth nightly as needed Alternates with Tylenol PM        * Notice:  This list has 5 medication(s) that are the same as other medications prescribed for you. Read the directions carefully, and ask your doctor or other care provider to review them with you.

## 2017-09-08 NOTE — PROGRESS NOTES
Cardiology     I had the pleasure to follow up with your patient, Ms. Natalie Hair, along with her daughter in the Cardiovascular Medicine Clinic.  She is a patient well known to me.  She unfortunately has a very complex recent and remote cardiac history.  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  She has had multiple admissions over the last year and a half for diastolic heart failure.  She also has had periods of time where she was in paroxysmal atrial fibrillation and flutter.      This past summer, she was admitted to [facility] with an attempted ablation; however, this was unsuccessful.  She ultimately underwent AV node ablation with a dual-chamber pacemaker placement at that point.  She unfortunately has had progressive symptoms related to severe tricuspid regurgitation.      During her previous admissions, she has had a formal evaluation by the Cardiothoracic Surgery Service at the [facility].  The consensus opinion was that she would be prohibitively high risk and we have recommended that she go to [facility] for a second opinion.  The patient does have an appointment scheduled on 12/09/2016.  The patient has been on a variety of diuretics and her regimen has been relatively stable.       She states that her p.o. intake is quite poor.  She has no desire or taste for food.  This is confirmed by her daughter.  She has been seen at [facility] for potential transcatheter approach for her severe TR (Latif FORMA valve).  They will be contacting her shortly whether she is a good candidate.      She was accepted ultimately and underwent device placement on June 1, 2017 patient remained in the hospital for approximately 9 days. She unfortunately has had admissions for shortness of breath and pleural effusions. She did require thoracentesis and unfortunately will require one next week. She did derive some benefit from the valve procedure. Patient's  family states that she was only able to walk 10-15 feet prior to the procedure. At her best she is now able to walk 250 feet with a walker and with assistance. Her appetite again is also an issue and she has lost a considerable amount of weight. Her daughter attempts to supplement her diet as best as she can. She does have an appointment with her [facility] doctors later this month. Records of the procedure and follow-up have been scanned in the chart for review including follow-up echocardiograms.     PHYSICAL EXAMINATION:   VITAL SIGNS:  Blood pressure 130/86, pulse 67, weight 71.2 kg (157 lb), SpO2 98 %, not currently breastfeeding.  GENERAL:  The patient is alert, oriented, appears older than stated age.   HEENT:  Oropharynx is clear.   NECK:  JVP is 7-8 cm at a seated position with pulsations of her jugular veins noted.   CARDIOVASCULAR:  Distant heart tones, irregular with a 1-2/6 systolic ejection murmur best heard at the right upper sternal border.   LUNGS:  Diminished, however, no rales are noted.   ABDOMEN:  Obese, soft, nontender.     EXTREMITIES:  Dressings are in place.      ASSESSMENT:   1.  Severe tricuspid regurgitation with primarily right-sided heart failure symptoms.  S/P Latif FORMA valve at [facility]  2.  Congestive heart failure with diastolic dysfunction and valvular heart disease.    3.  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    4.  Status post pacemaker placement for atrial fibrillation/flutter with AV node ablation.      RECOMMENDATIONS:      1.  Went over recent testing and evaluations  2.  Patient is holding her Coumadin in anticipation of her thoracentesis on Monday. Would resume shortly thereafter.  3.  Continue presents medications and have liberalized her diet  4.  RTC in ~ 3 months (December 2017).  Follow-up with her [facility] doctors as scheduled.       Emelyn Bingham MD

## 2017-09-08 NOTE — MR AVS SNAPSHOT
After Visit Summary   9/8/2017    Natalie Hair    MRN: [id number]           Patient Information     Date Of Birth          8/28/1932        Visit Information        Provider Department      9/8/2017 11:00 AM Emelny Bingham MD [facility] PHYSICIAN HEART AT [facility]        Today's Diagnoses     Acute on chronic diastolic congestive heart failure (H)          Care Instructions    Thank you for your M Heart Care visit today. Your provider has recommended the following:  Medication Changes:  No Medication Changes at your Heart Care appointment today 9/8/2017  Recommendations:  No further recommendations at this time  Follow-up:  See Dr. Bingham for cardiology follow up in Late November/Early December 2017.    We kindly ask that you call cardiology scheduling at [phone] three months prior to requested revisit date to schedule future cardiology appointments.  Reminder:  1. Please bring in your current medication list or your medication, over the counter supplements and vitamin bottles as we will review these at each office visit.               [facility]~[address]~[place], [place]~[address]  Questions about your visit today?  Call your Cardiology Clinic RN's-Hattie De Leon and/or Olimpia Cuevas at [phone].          Follow-ups after your visit        Additional Services     Follow-Up with Cardiologist                 Your next 10 appointments already scheduled     Sep 11, 2017 10:30 AM CDT   US THORACENTESIS with TAYLOR RAMEY IMAGING NURSE, [place] [facility]   [facility] ([facility])    [address] [id number] [phone]           Tell us in advance if there s any chance you may be pregnant.  Bring a list of your medicines to the exam. Include vitamins, minerals and over-the-counter drugs.  If you take blood thinners, you may need to stop taking them a few days  before treatment. Talk to your doctor before stopping these medicines. You will need a blood test the morning of your exam.   Stop taking Coumadin (warfarin) 3 days before your exam. Restart the day after your exam.   If you take aspirin, you may need to stop taking it 3 days before your scan.   If you take Plavix, Ticlid, Pletal or Persantine, you may need to stop taking them 5 days before your scan. Please talk to your doctor before stopping these medicines.  IF YOU WILL RECEIVE SEDATION (medicine to help you relax):   See your family doctor for an exam within 30 days of treatment.   Plan for an adult to drive you home and stay with you for at least 6 hours.   Follow the eating and drinking guidelines checked below:   No eating or drinking for 4 hours before your test. You may take medicine with small sips of water.   If you have diabetes:If you take insulin, call your diabetes care team. Do not take diabetes pills on the morning of your test. If you take metformin (Avandamet, Glucophage, Glucovance, Metaglip) and received contrast, wait 48 hours before re-starting this medicine.  Please call the Imaging Department at your exam site with any questions.              Future tests that were ordered for you today     Open Future Orders        Priority Expected Expires Ordered    Follow-Up with Cardiologist Routine 12/7/2017 9/8/2018 9/8/2017            Who to contact     If you have questions or need follow up information about today's clinic visit or your schedule please contact [facility] PHYSICIAN HEART AT [facility] directly at [phone].  Normal or non-critical lab and imaging results will be communicated to you by MyChart, letter or phone within 4 business days after the clinic has received the results. If you do not hear from us within 7 days, please contact the clinic through MyChart or phone. If you have a critical or abnormal lab result, we will notify you by phone as soon as  "possible.  Submit refill requests through [employer] or call your pharmacy and they will forward the refill request to us. Please allow 3 business days for your refill to be completed.          Additional Information About Your Visit        ResolutionTubeharScout Information     Lineagen lets you send messages to your doctor, view your test results, renew your prescriptions, schedule appointments and more. To sign up, go to [email or web address] . Click on \"Log in\" on the left side of the screen, which will take you to the Welcome page. Then click on \"Sign up Now\" on the right side of the page.     You will be asked to enter the access code listed below, as well as some personal information. Please follow the directions to create your username and password.     Your access code is: [email or web address]  Expires: 2017 11:43 AM     Your access code will  in 90 days. If you need help or a new code, please call your [facility] clinic or [phone].        Care EveryWhere ID     This is your Care EveryWhere ID. This could be used by other organizations to access your [facility] medical records  [id number]        Your Vitals Were     Pulse Pulse Oximetry BMI (Body Mass Index)             67 98% 25.34 kg/m2          Blood Pressure from Last 3 Encounters:   17 130/86   17 122/55   17 98/59    Weight from Last 3 Encounters:   17 71.2 kg (157 lb)   17 74.4 kg (164 lb)   17 77.4 kg (170 lb 11.2 oz)              We Performed the Following     Follow-Up with Cardiologist        Primary Care Provider Office Phone # Fax #    Mimi Mcguire [phone] [address] [place] [address]        Equal Access to Services     NIKI FOWLER : Lilian Monroe, ariana ulloa, qatatianata elenaallyric albarado. [facility] [phone].    ATENCIÓN: Si habla español, tiene a simmons disposición servicios gratuitos de asistencia lingüística. " Kevin pitt [phone].    We comply with applicable federal civil rights laws and [place] laws. We do not discriminate on the basis of race, color, national origin, age, disability sex, sexual orientation or gender identity.            Thank you!     Thank you for choosing [facility] PHYSICIAN HEART AT [facility]  for your care. Our goal is always to provide you with excellent care. Hearing back from our patients is one way we can continue to improve our services. Please take a few minutes to complete the written survey that you may receive in the mail after your visit with us. Thank you!             Your Updated Medication List - Protect others around you: Learn how to safely use, store and throw away your medicines at www.disposemymeds.org.          This list is accurate as of: 9/8/17 11:26 AM.  Always use your most recent med list.                   Brand Name Dispense Instructions for use Diagnosis    ACETAMINOPHEN PO      Take 1,000 mg by mouth every 8 hours as needed for pain        alendronate 70 MG tablet    FOSAMAX    12 tablet    TAKE 1 TABLET BY MOUTH ONCE WEEKLY ON EMPTY STOMACH    Osteopenia       calcium carb 1250 mg (500 mg [place])/vitamin D 200 units 500-200 MG-UNIT per tablet    OSCAL with D     Take 1 tablet by mouth 2 times daily (with meals)        cetirizine 5 MG tablet    zyrTEC    30 tablet    Take 1 tablet (5 mg) by mouth daily    Acute urticaria       clopidogrel 75 MG tablet    PLAVIX    90 tablet    Take 1 tablet (75 mg) by mouth daily    Congestive heart failure, unspecified congestive heart failure chronicity, unspecified congestive heart failure type (H), Lymphedema of both lower extremities       donepezil 5 MG tablet    ARICEPT    90 tablet    Take 1 tablet (5 mg) by mouth At Bedtime    Dementia without behavioral disturbance, unspecified dementia type       ezetimibe 10 MG tablet    ZETIA    90 tablet    Take 1 tablet (10 mg) by mouth daily    Hyperlipidemia LDL goal  <100       metoprolol 25 MG tablet    LOPRESSOR    90 tablet    Take 0.5 tablets (12.5 mg) by mouth 2 times daily        Multi-vitamin Tabs tablet      Take 1 tablet by mouth daily        nitroGLYcerin 0.4 MG sublingual tablet    NITROSTAT    25 tablet    Place 1 tablet (0.4 mg) under the tongue every 5 minutes as needed for chest pain    Unspecified cardiovascular disease       omega 3 1000 MG Caps     90 capsule    Take 1 g by mouth daily    Health Care Home       ondansetron 4 MG ODT tab    ZOFRAN ODT    10 tablet    Take 1-2 tablets (4-8 mg) by mouth every 6 hours as needed for nausea        * order for DME     1 Package    Equipment being ordered: Walker with a seat    Acute on chronic diastolic congestive heart failure (H), At risk for falling       * order for DME     1 Device    Equipment being ordered: Hospital Bed 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 -ongoing symptoms of lower extremity edema,shortness of breath,cough sacral pressure sores within last 6 months   An electric hospital bed to allow for increase in head of bed elevation and for ease in wheelchair transfers  Length of need: lifelong NPI - [id number]    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), Lymphedema of both lower extremities, Right-sided congestive heart failure (H), Tricuspid valve insufficiency, unspecified etiology, Congestive heart failure, unspecified congestive heart failure chronicity, unspecified congestive heart failure type (H), History of pressure ulcer       * order for DME     10 Units    Mepilex sacral dressings    Decubitus ulcer of sacral region, unspecified ulcer stage       polyethylene glycol powder    MIRALAX/GLYCOLAX     Take 1 capful by mouth every morning Takes 3/4 of a capful        potassium chloride 10 MEQ tablet    K-TAB,KLOR-CON    90 tablet    Take 1 tablet (10 mEq) by mouth 2 times daily    Shortness of breath on exertion, Essential hypertension       SENNA S 8.6-50 MG  per tablet   Generic drug:  senna-docusate      Take 2 tablets by mouth 2 times daily        silver sulfADIAZINE 1 % cream    SILVADENE     Apply topically daily as needed for other (bed sores)        spironolactone 25 MG tablet    ALDACTONE    60 tablet    Take 1 tablet (25 mg) by mouth 2 times daily    Congestive heart failure, unspecified congestive heart failure chronicity, unspecified congestive heart failure type (H), Lymphedema of both lower extremities       torsemide 20 MG tablet    DEMADEX    60 tablet    Take 1 tablet (20 mg) by mouth 2 times daily    Lymphedema of both lower extremities, Pleural effusion       triamcinolone 0.1 % cream    KENALOG    30 g    Apply sparingly to affected area three times daily for 5-7 days.    Dermatitis       TYLENOL PM EXTRA STRENGTH PO      Take 2-3 tablets by mouth nightly as needed        vitamin D 2000 UNITS Caps      Take 1 capsule by mouth daily        * warfarin 4 MG tablet    COUMADIN    30 tablet    as directed by Anticoagulation Clinic, currently establishing a maintenance dose    Congestive heart failure, unspecified congestive heart failure chronicity, unspecified congestive heart failure type (H)       * warfarin 4 MG tablet    COUMADIN    30 tablet    as directed by the Anticoagulation Clinic, current dose 4 mg Mon, Fri, 6 mg rest of week    Congestive heart failure, unspecified congestive heart failure chronicity, unspecified congestive heart failure type (H)       ZZZQUIL PO      Take 50 mg by mouth nightly as needed Alternates with Tylenol PM        * Notice:  This list has 5 medication(s) that are the same as other medications prescribed for you. Read the directions carefully, and ask your doctor or other care provider to review them with you.

## 2017-09-08 NOTE — LETTER
9/8/2017    Mimi Mcguire MD  [address] [address]    RE: Natalie Hair       Dear Colleague,    I had the pleasure of seeing Natalie Hair in the [facility].       Cardiology     I had the pleasure to follow up with your patient, Ms. Natalie Hair, along with her daughter in the Cardiovascular Medicine Clinic.  She is a patient well known to me.  She unfortunately has a very complex recent and remote cardiac history.  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  She has had multiple admissions over the last year and a half for diastolic heart failure.  She also has had periods of time where she was in paroxysmal atrial fibrillation and flutter.      This past summer, she was admitted to [facility] with an attempted ablation; however, this was unsuccessful.  She ultimately underwent AV node ablation with a dual-chamber pacemaker placement at that point.  She unfortunately has had progressive symptoms related to severe tricuspid regurgitation.      During her previous admissions, she has had a formal evaluation by the Cardiothoracic Surgery Service at the [facility].  The consensus opinion was that she would be prohibitively high risk and we have recommended that she go to [facility] for a second opinion.  The patient does have an appointment scheduled on 12/09/2016.  The patient has been on a variety of diuretics and her regimen has been relatively stable.       She states that her p.o. intake is quite poor.  She has no desire or taste for food.  This is confirmed by her daughter.  She has been seen at [facility] for potential transcatheter approach for her severe TR (Latif FORMA valve).  They will be contacting her shortly whether she is a good candidate.      She was accepted ultimately and underwent device placement on June 1, 2017 patient remained in the hospital for approximately 9  days. She unfortunately has had admissions for shortness of breath and pleural effusions. She did require thoracentesis and unfortunately will require one next week. She did derive some benefit from the valve procedure. Patient's family states that she was only able to walk 10-15 feet prior to the procedure. At her best she is now able to walk 250 feet with a walker and with assistance. Her appetite again is also an issue and she has lost a considerable amount of weight. Her daughter attempts to supplement her diet as best as she can. She does have an appointment with her [facility] doctors later this month. Records of the procedure and follow-up have been scanned in the chart for review including follow-up echocardiograms.     PHYSICAL EXAMINATION:   VITAL SIGNS:  Blood pressure 130/86, pulse 67, weight 71.2 kg (157 lb), SpO2 98 %, not currently breastfeeding.  GENERAL:  The patient is alert, oriented, appears older than stated age.   HEENT:  Oropharynx is clear.   NECK:  JVP is 7-8 cm at a seated position with pulsations of her jugular veins noted.   CARDIOVASCULAR:  Distant heart tones, irregular with a 1-2/6 systolic ejection murmur best heard at the right upper sternal border.   LUNGS:  Diminished, however, no rales are noted.   ABDOMEN:  Obese, soft, nontender.     EXTREMITIES:  Dressings are in place.      ASSESSMENT:   1.  Severe tricuspid regurgitation with primarily right-sided heart failure symptoms.  S/P Latif FORMA valve at [facility]  2.  Congestive heart failure with diastolic dysfunction and valvular heart disease.    3.  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    4.  Status post pacemaker placement for atrial fibrillation/flutter with AV node ablation.      RECOMMENDATIONS:      1.  Went over recent testing and evaluations  2.  Patient is holding her Coumadin in anticipation of her thoracentesis on Monday. Would resume shortly thereafter.  3.   Continue presents medications and have liberalized her diet  4.  RTC in ~ 3 months (December 2017).  Follow-up with her [facility] doctors as scheduled.     Thank you for allowing me to participate in the care of your patient.    Sincerely,     Emelyn Bingham MD     [facility]

## 2017-09-08 NOTE — PATIENT INSTRUCTIONS
Thank you for your M Heart Care visit today. Your provider has recommended the following:  Medication Changes:  No Medication Changes at your Heart Care appointment today 9/8/2017  Recommendations:  No further recommendations at this time  Follow-up:  See Dr. Bingham for cardiology follow up in Late November/Early December 2017.    We kindly ask that you call cardiology scheduling at [phone] three months prior to requested revisit date to schedule future cardiology appointments.  Reminder:  1. Please bring in your current medication list or your medication, over the counter supplements and vitamin bottles as we will review these at each office visit.               [facility] HEART CARE  [facility]~[address]~[place], [place]~[address]  Questions about your visit today?  Call your Cardiology Clinic RN's-Hattie De Leon and/or Olimpia Cuevas at [phone].

## 2017-09-11 NOTE — DISCHARGE INSTRUCTIONS
Radiology  Discharge Instructions for Thoracentesis    You have had a thoracentesis procedure today.  A needle or catheter was inserted into your chest to remove fluid for laboratory studies and/or to remove the excess fluid from your chest.    AFTER YOU ARE HOME:    Rest at home today.    Limit heavy physical activity such as lifting, straining, or exercise for 48 hours.  You may resume normal activity in 24 hours.    Resume previous diet and medications.    You may remove the bandage in 24 hours.    CALL YOUR PRIMARY PROVIDER IF:    You develop a temperature over 101oF, or redness at the drainage site.    You have any other questions or concerns.    AFTER HOURS CALL [facility] AT [phone]    COME TO EMERGENCY ROOM IF:    You develop chest pain or shortness of breath, heavy bleeding from the thoracentesis site, severe lightheadedness or fainting.  DO NOT DRIVE YOURSELF.    Your ordering physician will contact you with the laboratory results.      ADDITIONAL INSTRUCTIONS:  You may resume your Coumadin today.    I have reviewed and understand these instructions.      __________________________________________________  Natalie Hair      __________________________________________________     Nurse/Radiologist Signature      Date: 9/11/2017

## 2017-09-11 NOTE — IP AVS SNAPSHOT
[facility]    [address] [address]    Phone:  [phone]                                       After Visit Summary   9/11/2017    Natalie Hair    MRN: [id number]           After Visit Summary Signature Page     I have received my discharge instructions, and my questions have been answered. I have discussed any challenges I see with this plan with the nurse or doctor.    ..........................................................................................................................................  Patient/Patient Representative Signature      ..........................................................................................................................................  Patient Representative Print Name and Relationship to Patient    ..................................................               ................................................  Date                                            Time    ..........................................................................................................................................  Reviewed by Signature/Title    ...................................................              ..............................................  Date                                                            Time

## 2017-09-11 NOTE — PROGRESS NOTES
RADIOLOGY PROCEDURE NOTE  Patient name: Natalie Hair  MRN: [id number]  : 1932    Pre-procedure diagnosis: Short of breath.  Post-procedure diagnosis: Same    Procedure Date/Time: 2017  11:17 AM  Procedure: US guided left thoracentesis.  Estimated blood loss: None  Specimen(s) collected:  1.1 L trixie colored fluid.  The patient tolerated the procedure well with no immediate complications.    See imaging dictation for procedural details.    Provider name: Bill Marion  Assistant(s):None

## 2017-09-11 NOTE — PROGRESS NOTES
Left sided thoracentesis performed by radiologist.    1100 ml of clear dark yellow fluid removed. Pressure held at site after catheter removed.  Band aid applied. No bleeding noted. Post procedure CXR performed and read by radiologist.

## 2017-09-11 NOTE — IP AVS SNAPSHOT
MRN:[id number]                      After Visit Summary   9/11/2017    Natalie Hair    MRN: [id number]           Visit Information        Provider Department      9/11/2017 10:30 AM [facility]; [facility] NURSE; KAROLINE Garcia Ultrasound           Review of your medicines      UNREVIEWED medicines. Ask your doctor about these medicines        Dose / Directions    ACETAMINOPHEN PO        Dose:  1000 mg   Take 1,000 mg by mouth every 8 hours as needed for pain   Refills:  0       alendronate 70 MG tablet   Commonly known as:  FOSAMAX   Used for:  Osteopenia        TAKE 1 TABLET BY MOUTH ONCE WEEKLY ON EMPTY STOMACH   Quantity:  12 tablet   Refills:  0       calcium carb 1250 mg (500 mg [place])/vitamin D 200 units 500-200 MG-UNIT per tablet   Commonly known as:  OSCAL with D        Dose:  1 tablet   Take 1 tablet by mouth 2 times daily (with meals)   Refills:  0       cetirizine 5 MG tablet   Commonly known as:  zyrTEC   Used for:  Acute urticaria        Dose:  5 mg   Take 1 tablet (5 mg) by mouth daily   Quantity:  30 tablet   Refills:  3       clopidogrel 75 MG tablet   Commonly known as:  PLAVIX   Used for:  Congestive heart failure, unspecified congestive heart failure chronicity, unspecified congestive heart failure type (H), Lymphedema of both lower extremities        Dose:  75 mg   Take 1 tablet (75 mg) by mouth daily   Quantity:  90 tablet   Refills:  0       donepezil 5 MG tablet   Commonly known as:  ARICEPT   Used for:  Dementia without behavioral disturbance, unspecified dementia type        Dose:  5 mg   Take 1 tablet (5 mg) by mouth At Bedtime   Quantity:  90 tablet   Refills:  0       ezetimibe 10 MG tablet   Commonly known as:  ZETIA   Used for:  Hyperlipidemia LDL goal <100        Dose:  10 mg   Take 1 tablet (10 mg) by mouth daily   Quantity:  90 tablet   Refills:  0       metoprolol 25 MG tablet   Commonly known as:  LOPRESSOR        Dose:  12.5 mg   Take 0.5 tablets (12.5  mg) by mouth 2 times daily   Quantity:  90 tablet   Refills:  3       Multi-vitamin Tabs tablet        Dose:  1 tablet   Take 1 tablet by mouth daily   Refills:  0       nitroGLYcerin 0.4 MG sublingual tablet   Commonly known as:  NITROSTAT   Used for:  Unspecified cardiovascular disease        Dose:  0.4 mg   Place 1 tablet (0.4 mg) under the tongue every 5 minutes as needed for chest pain   Quantity:  25 tablet   Refills:  1       omega 3 1000 MG Caps   Used for:  Health Care Home        Dose:  1 g   Take 1 g by mouth daily   Quantity:  90 capsule   Refills:  3       ondansetron 4 MG ODT tab   Commonly known as:  ZOFRAN ODT        Dose:  4-8 mg   Take 1-2 tablets (4-8 mg) by mouth every 6 hours as needed for nausea   Quantity:  10 tablet   Refills:  0       polyethylene glycol powder   Commonly known as:  MIRALAX/GLYCOLAX        Dose:  1 capful   Take 1 capful by mouth every morning Takes 3/4 of a capful   Refills:  0       potassium chloride 10 MEQ tablet   Commonly known as:  K-TAB,KLOR-CON   Used for:  Shortness of breath on exertion, Essential hypertension        Dose:  10 mEq   Take 1 tablet (10 mEq) by mouth 2 times daily   Quantity:  90 tablet   Refills:  1       SENNA S 8.6-50 MG per tablet   Generic drug:  senna-docusate        Dose:  2 tablet   Take 2 tablets by mouth 2 times daily   Refills:  0       silver sulfADIAZINE 1 % cream   Commonly known as:  SILVADENE        Apply topically daily as needed for other (bed sores)   Refills:  0       spironolactone 25 MG tablet   Commonly known as:  ALDACTONE   Used for:  Congestive heart failure, unspecified congestive heart failure chronicity, unspecified congestive heart failure type (H), Lymphedema of both lower extremities        Dose:  25 mg   Take 1 tablet (25 mg) by mouth 2 times daily   Quantity:  60 tablet   Refills:  1       torsemide 20 MG tablet   Commonly known as:  DEMADEX   Used for:  Lymphedema of both lower extremities, Pleural effusion         Dose:  20 mg   Take 1 tablet (20 mg) by mouth 2 times daily   Quantity:  60 tablet   Refills:  1       triamcinolone 0.1 % cream   Commonly known as:  KENALOG   Used for:  Dermatitis        Apply sparingly to affected area three times daily for 5-7 days.   Quantity:  30 g   Refills:  0       TYLENOL PM EXTRA STRENGTH PO        Dose:  2-3 tablet   Take 2-3 tablets by mouth nightly as needed   Refills:  0       vitamin D 2000 UNITS Caps        Dose:  1 capsule   Take 1 capsule by mouth daily   Refills:  0       * warfarin 4 MG tablet   Commonly known as:  COUMADIN   Used for:  Congestive heart failure, unspecified congestive heart failure chronicity, unspecified congestive heart failure type (H)        as directed by Anticoagulation Clinic, currently establishing a maintenance dose   Quantity:  30 tablet   Refills:  1       * warfarin 4 MG tablet   Commonly known as:  COUMADIN   Used for:  Congestive heart failure, unspecified congestive heart failure chronicity, unspecified congestive heart failure type (H)        as directed by the Anticoagulation Clinic, current dose 4 mg Mon, Fri, 6 mg rest of week   Quantity:  30 tablet   Refills:  2       ZZZQUIL PO        Dose:  50 mg   Take 50 mg by mouth nightly as needed Alternates with Tylenol PM   Refills:  0       * Notice:  This list has 2 medication(s) that are the same as other medications prescribed for you. Read the directions carefully, and ask your doctor or other care provider to review them with you.      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), Lymphedema of both lower extremities, Right-sided congestive heart failure (H),  Tricuspid valve insufficiency, unspecified etiology, Congestive heart failure, unspecified congestive heart failure chronicity, unspecified congestive heart failure type (H), History of pressure ulcer        Equipment being ordered: Hospital Bed 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 -ongoing symptoms of lower extremity edema,shortness of breath,cough sacral pressure sores within last 6 months   An electric hospital bed to allow for increase in head of bed elevation and for ease in wheelchair transfers  Length of need: lifelong NPI - [id number]   Quantity:  1 Device   Refills:  0       * order for DME   Used for:  Decubitus ulcer of sacral region, unspecified ulcer stage        Mepilex sacral dressings   Quantity:  10 Units   Refills:  3       * Notice:  This list has 3 medication(s) that are the same as other medications prescribed for you. Read the directions carefully, and ask your doctor or other care provider to review them with you.             Protect others around you: Learn how to safely use, store and throw away your medicines at www.disposemymeds.org.         Follow-ups after your visit        Your next 10 appointments already scheduled     Nov 21, 2017  9:30 AM CST   Return Visit with Emelyn Bingham MD   [facility] ([facility])    [address] [place] [id number] [phone]               Care Instructions        Further instructions from your care team                         Radiology  Discharge Instructions for Thoracentesis    You have had a thoracentesis procedure today.  A needle or catheter was inserted into your chest to remove fluid for laboratory studies and/or to remove the excess fluid from your chest.    AFTER YOU ARE HOME:    Rest at home today.    Limit heavy physical activity such as lifting, straining, or exercise for 48 hours.  You may resume normal activity in 24 hours.    Resume previous diet and  "medications.    You may remove the bandage in 24 hours.    CALL YOUR PRIMARY PROVIDER IF:    You develop a temperature over 101oF, or redness at the drainage site.    You have any other questions or concerns.    AFTER HOURS CALL [facility] AT [phone]    COME TO EMERGENCY ROOM IF:    You develop chest pain or shortness of breath, heavy bleeding from the thoracentesis site, severe lightheadedness or fainting.  DO NOT DRIVE YOURSELF.    Your ordering physician will contact you with the laboratory results.      ADDITIONAL INSTRUCTIONS:  You may resume your Coumadin today.    I have reviewed and understand these instructions.      __________________________________________________  Natalie Hair      __________________________________________________     Nurse/Radiologist Signature      Date: 2017             Additional Information About Your Visit        [employer] Information     "[employer]hart lets you send messages to your doctor, view your test results, renew your prescriptions, schedule appointments and more. To sign up, go to [email or web address] . Click on \"Log in\" on the left side of the screen, which will take you to the Welcome page. Then click on \"Sign up Now\" on the right side of the page.     You will be asked to enter the access code listed below, as well as some personal information. Please follow the directions to create your username and password.     Your access code is: [email or web address]  Expires: 2017 11:43 AM     Your access code will  in 90 days. If you need help or a new code, please call your [facility] clinic or [phone].        Care EveryWhere ID     This is your Care EveryWhere ID. This could be used by other organizations to access your [facility] medical records  [id number]        Your Vitals Were     Blood Pressure Pulse Respirations Pulse Oximetry          103/63 (BP Location: Left arm) 70 18 98%         Primary Care Provider Office Phone # Fax #    " Mimi Mcguire [phone]      Equal Access to Services     [facility] : Hadii aad ku hadaustinsharri [facility], ariana ulloa, ari elenaradhajuliocesar rodriges, lyric suesonyarosa maria knapp. So [facility] [phone].    ATENCIÓN: Si habla español, tiene a simmons disposición servicios gratuitos de asistencia lingüística. Brendaame al [phone].    We comply with applicable federal civil rights laws and [place] laws. We do not discriminate on the basis of race, color, national origin, age, disability sex, sexual orientation or gender identity.            Thank you!     Thank you for choosing [facility] for your care. Our goal is always to provide you with excellent care. Hearing back from our patients is one way we can continue to improve our services. Please take a few minutes to complete the written survey that you may receive in the mail after you visit with us. Thank you!             Medication List: This is a list of all your medications and when to take them. Check marks below indicate your daily home schedule. Keep this list as a reference.      Medications           Morning Afternoon Evening Bedtime As Needed    ACETAMINOPHEN PO   Take 1,000 mg by mouth every 8 hours as needed for pain                                alendronate 70 MG tablet   Commonly known as:  FOSAMAX   TAKE 1 TABLET BY MOUTH ONCE WEEKLY ON EMPTY STOMACH                                calcium carb 1250 mg (500 mg [place])/vitamin D 200 units 500-200 MG-UNIT per tablet   Commonly known as:  OSCAL with D   Take 1 tablet by mouth 2 times daily (with meals)                                cetirizine 5 MG tablet   Commonly known as:  zyrTEC   Take 1 tablet (5 mg) by mouth daily                                clopidogrel 75 MG tablet   Commonly known as:  PLAVIX   Take 1 tablet (75 mg) by mouth daily                                donepezil 5 MG tablet   Commonly known as:  ARICEPT   Take 1 tablet (5 mg) by mouth At Bedtime                                 ezetimibe 10 MG tablet   Commonly known as:  ZETIA   Take 1 tablet (10 mg) by mouth daily                                metoprolol 25 MG tablet   Commonly known as:  LOPRESSOR   Take 0.5 tablets (12.5 mg) by mouth 2 times daily                                Multi-vitamin Tabs tablet   Take 1 tablet by mouth daily                                nitroGLYcerin 0.4 MG sublingual tablet   Commonly known as:  NITROSTAT   Place 1 tablet (0.4 mg) under the tongue every 5 minutes as needed for chest pain                                omega 3 1000 MG Caps   Take 1 g by mouth daily                                ondansetron 4 MG ODT tab   Commonly known as:  ZOFRAN ODT   Take 1-2 tablets (4-8 mg) by mouth every 6 hours as needed for nausea                                * order for DME   Equipment being ordered: Walker with a seat                                * order for DME   Equipment being ordered: Hospital Bed 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 -ongoing symptoms of lower extremity edema,shortness of breath,cough sacral pressure sores within last 6 months   An electric hospital bed to allow for increase in head of bed elevation and for ease in wheelchair transfers  Length of need: lifelong NPI - [id number]                                * order for DME   Mepilex sacral dressings                                polyethylene glycol powder   Commonly known as:  MIRALAX/GLYCOLAX   Take 1 capful by mouth every morning Takes 3/4 of a capful                                potassium chloride 10 MEQ tablet   Commonly known as:  K-TAB,KLOR-CON   Take 1 tablet (10 mEq) by mouth 2 times daily                                SENNA S 8.6-50 MG per tablet   Take 2 tablets by mouth 2 times daily   Generic drug:  senna-docusate                                silver sulfADIAZINE 1 % cream   Commonly known as:  SILVADENE   Apply topically daily as needed for other (bed sores)                                 spironolactone 25 MG tablet   Commonly known as:  ALDACTONE   Take 1 tablet (25 mg) by mouth 2 times daily                                torsemide 20 MG tablet   Commonly known as:  DEMADEX   Take 1 tablet (20 mg) by mouth 2 times daily                                triamcinolone 0.1 % cream   Commonly known as:  KENALOG   Apply sparingly to affected area three times daily for 5-7 days.                                TYLENOL PM EXTRA STRENGTH PO   Take 2-3 tablets by mouth nightly as needed                                vitamin D 2000 UNITS Caps   Take 1 capsule by mouth daily                                * warfarin 4 MG tablet   Commonly known as:  COUMADIN   as directed by Anticoagulation Clinic, currently establishing a maintenance dose                                * warfarin 4 MG tablet   Commonly known as:  COUMADIN   as directed by the Anticoagulation Clinic, current dose 4 mg Mon, Fri, 6 mg rest of week                                ZZZQUIL PO   Take 50 mg by mouth nightly as needed Alternates with Tylenol PM                                * Notice:  This list has 5 medication(s) that are the same as other medications prescribed for you. Read the directions carefully, and ask your doctor or other care provider to review them with you.

## 2017-09-12 NOTE — PROGRESS NOTES
ANTICOAGULATION FOLLOW-UP CLINIC VISIT    Patient Name:  Natalie Hair  Date:  9/12/2017  Contact Type:  Telephone/ spoke with CHU Staton from [facility], who stated she would call daughter, Emely with dosing instructions    SUBJECTIVE:     Patient Findings     Positives Intentional hold of therapy (warfarin held 9/6-9/10 for thoracentesis)    Comments Pt was instructed to hold warfarin for 5 days prior to thoracentesis per notes by SWEETIE Levin, restarted on 4 mg 9/11.           OBJECTIVE    INR   Date Value Ref Range Status   09/12/2017 1.1  Final       ASSESSMENT / PLAN  No question data found.  Anticoagulation Summary as of 9/12/2017     INR goal 2.5-3.5   Today's INR 1.1!   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  [facility] Care as of 7-18-17, [place] [phone]      Anticoagulation Care Providers     Provider Role Specialty Phone number    Mimi Mcguire MD [facility] [phone]            See the Encounter Report to view Anticoagulation Flowsheet and Dosing Calendar (Go to Encounters tab in chart review, and find the Anticoagulation Therapy Visit)        Marizol Santoro RN

## 2017-09-12 NOTE — MR AVS SNAPSHOT
Natalie GOMEZ Hair   9/12/2017   Anticoagulation Therapy Visit    Description:  85 year old female   Provider:  Marizol Santoro RN   [facility]           INR as of 9/12/2017     Today's INR 1.1!      Anticoagulation Summary as of 9/12/2017     INR goal 2.5-3.5   Today's INR 1.1!   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]  Long term current use of anticoagulant therapy [Z79.01]         September 2017 Details    Sun Mon Tue Wed Thu Fri Sat          1               2                 3               4               5               6               7               8               9                 10               11               12      6 mg   See details      13      6 mg         14      4 mg         15            16                 17               18               19               20               21               22               23                 24               25               26               27               28               29               30                Date Details   09/12 This INR check       Date of next INR:  9/15/2017         How to take your warfarin dose     To take:  4 mg Take 1 of the 4 mg tablets.    To take:  6 mg Take 1.5 of the 4 mg tablets.

## 2017-09-15 NOTE — PROGRESS NOTES
ANTICOAGULATION FOLLOW-UP CLINIC VISIT    Patient Name:  Natalie Hair  Date:  9/15/2017  Contact Type:  Telephone/ CHU Duarte, [phone], [facility]    SUBJECTIVE:     Patient Findings     Positives Intentional hold of therapy (9/6 - 9/10)           OBJECTIVE    INR   Date Value Ref Range Status   09/15/2017 1.8  Final       ASSESSMENT / PLAN  INR assessment SUB intentional hold   Recheck INR In: 4 DAYS    INR Location Homecare INR [facility]     Anticoagulation Summary as of 9/15/2017     INR goal 2.5-3.5   Today's INR 1.8!   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 POOL    Comments *PLEASE CALL REENA WITH NEW DOSING INSTRUCTIONS [phone].  [facility] as of 7-18-17, Felicia [phone]      Anticoagulation Care Providers     Provider Role Specialty Phone number    Mimi Mcguire MD [facility] [phone]            See the Encounter Report to view Anticoagulation Flowsheet and Dosing Calendar (Go to Encounters tab in chart review, and find the Anticoagulation Therapy Visit)    Susan Caruso RN

## 2017-09-15 NOTE — MR AVS SNAPSHOT
Natalie GOMEZ Hair   9/15/2017   Anticoagulation Therapy Visit    Description:  85 year old female   Provider:  Susan Caruso, RN   Department:  [facility]           INR as of 9/15/2017     Today's INR 1.8!      Anticoagulation Summary as of 9/15/2017     INR goal 2.5-3.5   Today's INR 1.8!   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  2mg Mon.  Recheck INR Tuesday 9/19/17 (at [facility] when at MD appt).      September 2017 Details    Sun Mon Tue Wed Thu Fri Sat          1               2                 3               4               5               6               7               8               9                 10               11               12               13               14               15      4 mg   See details      16      4 mg           17      4 mg         18      2 mg         19            20               21               22               23                 24               25               26               27               28               29               30                Date Details   09/15 This INR check       Date of next INR:  9/19/2017         How to take your warfarin dose     To take:  2 mg Take 0.5 of a 4 mg tablet.    To take:  4 mg Take 1 of the 4 mg tablets.

## 2017-09-19 NOTE — PROGRESS NOTES
SUBJECTIVE:   Natalie Hair is a 85 year old female who presents to clinic today for the following health issues:    Guo on back  (red and itchy)   Ongoing   No blisters. No stinging or pain, just itchy      Weight gain recently -    Wt Readings from Last 4 Encounters:   09/19/17 164 lb 6.4 oz (74.6 kg)   09/08/17 157 lb (71.2 kg)   08/07/17 164 lb (74.4 kg)   07/21/17 170 lb 11.2 oz (77.4 kg)     Minimal shortness of breath  Symptoms have been much better since getting thoracentesis a week ago - they took off 1 liter.     They have f/u planned at [place] for November. [place] md doesn't know about second thoracentesis.       Daughter wants to take her to [place]- would like to discuss if that is okay or not with provider.   - wants to drive to [place] - leave Thursday and return Monday   2 days down and one day back   Visiting her son there - he is ill. He can't get here.   They will stay at a hotel on the way.     She has been eating ok and sleeping fine  Memory is a little better overall      Review of systems:   No f/c   No headache  No n/v   No d/c     Problem list and histories reviewed & adjusted, as indicated.  Additional history: as documented      Patient Active Problem List   Diagnosis     Heart valve replaced     Cardiovascular disease     History of T12 compression fracture     Backache     Vitamin D deficiency     Hyperlipidemia LDL goal <100     Advanced directives, counseling/discussion     Long term current use of anticoagulant therapy     Urinary incontinence     Unstable angina (H)     Benign essential hypertension, BP goal <150/90     Bilateral low back pain without sciatica     Osteopenia     Atrial fibrillation with RVR (H)     Primary pulmonary hypertension (H)     Lymphedema of both lower extremities     Tricuspid valve insufficiency, unspecified etiology     Near syncope     Chronic diastolic congestive heart failure (H)     History of pressure ulcer     Health Care Home     S/P CABG  "(coronary artery bypass graft) - 2002     Stented coronary artery - 5/4/2017 at [place]     Decubitus ulcer of sacral region, unspecified ulcer stage     Pleural effusion     Memory loss     Current Outpatient Prescriptions   Medication     warfarin (COUMADIN) 4 MG tablet     torsemide (DEMADEX) 20 MG tablet     donepezil (ARICEPT) 5 MG tablet     clopidogrel (PLAVIX) 75 MG tablet     spironolactone (ALDACTONE) 25 MG tablet     calcium carb 1250 mg, 500 mg [place],/vitamin D 200 units (OSCAL WITH D) 500-200 MG-UNIT per tablet     DiphenhydrAMINE HCl, Sleep, (ZZZQUIL PO)     potassium chloride (K-TAB,KLOR-CON) 10 MEQ tablet     ezetimibe (ZETIA) 10 MG tablet     Diphenhydramine-APAP, sleep, (TYLENOL PM EXTRA STRENGTH PO)     alendronate (FOSAMAX) 70 MG tablet     omega 3 1000 MG CAPS     multivitamin, therapeutic with minerals (MULTI-VITAMIN) TABS tablet     metoprolol (LOPRESSOR) 25 MG tablet     cetirizine (ZYRTEC) 5 MG tablet     polyethylene glycol (MIRALAX/GLYCOLAX) powder     Cholecalciferol (VITAMIN D) 2000 UNITS CAPS     senna-docusate (SENNA S) 8.6-50 MG per tablet     ACETAMINOPHEN PO     order for DME     silver sulfADIAZINE (SILVADENE) 1 % cream     triamcinolone (KENALOG) 0.1 % cream     ondansetron (ZOFRAN ODT) 4 MG ODT tab     order for DME     order for DME     nitroglycerin (NITROSTAT) 0.4 MG SL tablet     No current facility-administered medications for this visit.         Allergies   Allergen Reactions     Lorazepam Nausea and Vomiting     Morphine Sulfate Cr [Morphine Sulfate] Nausea and Vomiting     Crestor [Rosuvastatin] Other (See Comments)     Abdominal/Back pain     Lidocaine GI Disturbance     Cozaar [Losartan] Rash     Rash        /55 (BP Location: Right arm, Patient Position: Sitting, Cuff Size: Adult Regular)  Pulse 69  Temp 96.5  F (35.8  C) (Tympanic)  Ht 5' 6\" (1.676 m)  Wt 164 lb 6.4 oz (74.6 kg)  SpO2 94%  BMI 26.53 kg/m2   GENERAL - Pt is alert and oriented in no acute " distress.  Affect is appropriate. Good eye contact.  NECK - Neck is supple w/o LA or thyromegaly  RESPIRATORY - Clear to auscultation bilaterally.  No wheezing noted  CV - RRR, no murmurs, rubs, gallops.   SKIN - in the middle of her back there is a macular erythematous lesion that is about 3cm in length and 1cm in width.     Assessment/Plan -  (L30.8) Other eczema  (primary encounter diagnosis)  Comment: will try topical steroid cream bid for a week.   Plan: triamcinolone (KENALOG) 0.1 % cream            (Z79.01) Long term current use of anticoagulant therapy  Comment: inr today prior to their trip   Plan:     (Z95.2) Heart valve replaced  Comment: [place] and u of [employer] f/u   Plan:     (I50.32) Chronic diastolic congestive heart failure (H)  Comment: has had two thoracentesis since d/c from [place]. Both were therapeutic. Daughter is encouraged to let [place] know.  Plan:     (I07.1) Tricuspid valve insufficiency, unspecified etiology  Comment:   Plan:     Regarding her trip -   Travelling is risky. However, this is important to her and her daughter's understand her health issues and will be with her.  They will elevate her legs in the car and take a lot of breaks. They will bring her meds and med list as well as [place] diagnosis and device info.        ROZINA Mcguire MD

## 2017-09-19 NOTE — NURSING NOTE
"Chief Complaint   Patient presents with     lung check       Initial /55 (BP Location: Right arm, Patient Position: Sitting, Cuff Size: Adult Regular)  Pulse 69  Temp 96.5  F (35.8  C) (Tympanic)  Ht 5' 6\" (1.676 m)  Wt 164 lb 6.4 oz (74.6 kg)  SpO2 94%  BMI 26.53 kg/m2 Estimated body mass index is 26.53 kg/(m^2) as calculated from the following:    Height as of this encounter: 5' 6\" (1.676 m).    Weight as of this encounter: 164 lb 6.4 oz (74.6 kg).  Medication Reconciliation: complete   Margie Abarca LPN    "

## 2017-09-19 NOTE — MR AVS SNAPSHOT
Natalie GOMEZ Hair   9/19/2017   Anticoagulation Therapy Visit    Description:  85 year old female   Provider:  Marizol Santoro RN   [facility]           INR as of 9/19/2017     Today's INR 1.86!      Anticoagulation Summary as of 9/19/2017     INR goal 2.5-3.5   Today's INR 1.86!   Full instructions 9/19: 6 mg; 9/20: 4 mg; 9/21: 4 mg   Next INR check 9/22/2017    Indications   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]  Long term current use of anticoagulant therapy [Z79.01]         September 2017 Details    Sun Mon Tue Wed Thu Fri Sat          1               2                 3               4               5               6               7               8               9                 10               11               12               13               14               15               16                 17               18               19      6 mg   See details      20      4 mg         21      4 mg         22            23                 24               25               26               27               28               29               30                Date Details   09/19 This INR check       Date of next INR:  9/22/2017         How to take your warfarin dose     To take:  4 mg Take 1 of the 4 mg tablets.    To take:  6 mg Take 1.5 of the 4 mg tablets.

## 2017-09-19 NOTE — MR AVS SNAPSHOT
"              After Visit Summary   9/19/2017    Natalie Hair    MRN: [id number]           Patient Information     Date Of Birth          8/28/1932        Visit Information        Provider Department      9/19/2017 10:00 AM Mimi Mcguire MD [facility]        Today's Diagnoses     Other eczema    -  1    Long term current use of anticoagulant therapy        Heart valve replaced        Chronic diastolic congestive heart failure (H)        Tricuspid valve insufficiency, unspecified etiology           Follow-ups after your visit        Your next 10 appointments already scheduled     Nov 21, 2017  9:30 AM CST   Return Visit with Emelyn Bingham MD   [facility] AT [facility] ([facility])    [address] [address] [phone]              Who to contact     Normal or non-critical lab and imaging results will be communicated to you by MyChart, letter or phone within 4 business days after the clinic has received the results. If you do not hear from us within 7 days, please contact the clinic through MyChart or phone. If you have a critical or abnormal lab result, we will notify you by phone as soon as possible.  Submit refill requests through [employer] or call your pharmacy and they will forward the refill request to us. Please allow 3 business days for your refill to be completed.          If you need to speak with a  for additional information , please call: [phone]             Additional Information About Your Visit        [email or web address] Information     [employer] lets you send messages to your doctor, view your test results, renew your prescriptions, schedule appointments and more. To sign up, go to [email or web address] . Click on \"Log in\" on the left side of the screen, which will take you to the Welcome page. Then click on \"Sign up Now\" on the right side of the page.     You will be asked to enter the access " "code listed below, as well as some personal information. Please follow the directions to create your username and password.     Your access code is: [email or web address]  Expires: 2017 11:43 AM     Your access code will  in 90 days. If you need help or a new code, please call your [place] [facility] clinic or [phone].        Care EveryWhere ID     This is your Care EveryWhere ID. This could be used by other organizations to access your [place] [facility] medical records  [id number]        Your Vitals Were     Pulse Temperature Height Pulse Oximetry BMI (Body Mass Index)       69 96.5  F (35.8  C) (Tympanic) 5' 6\" (1.676 m) 94% 26.53 kg/m2        Blood Pressure from Last 3 Encounters:   17 116/55   17 103/63   17 130/86    Weight from Last 3 Encounters:   17 164 lb 6.4 oz (74.6 kg)   17 157 lb (71.2 kg)   17 164 lb (74.4 kg)              We Performed the Following     INR          Today's Medication Changes          These changes are accurate as of: 17 11:01 AM.  If you have any questions, ask your nurse or doctor.               These medicines have changed or have updated prescriptions.        Dose/Directions    * triamcinolone 0.1 % cream   Commonly known as:  KENALOG   This may have changed:  Another medication with the same name was added. Make sure you understand how and when to take each.   Used for:  Dermatitis   Changed by:  Mimi Mcguire MD        Apply sparingly to affected area three times daily for 5-7 days.   Quantity:  30 g   Refills:  0       * triamcinolone 0.1 % cream   Commonly known as:  KENALOG   This may have changed:  You were already taking a medication with the same name, and this prescription was added. Make sure you understand how and when to take each.   Used for:  Other eczema   Changed by:  Mimi Mcguire MD        Apply sparingly to affected area two times daily for 7 days.   Quantity:  30 g   Refills:  0       * Notice:  This list has 2 " medication(s) that are the same as other medications prescribed for you. Read the directions carefully, and ask your doctor or other care provider to review them with you.         Where to get your medicines      These medications were sent to [facility] [address] [place] [address] [address] AT [place] [address]  [address] [place] [address]     Phone:  [phone]     triamcinolone 0.1 % cream                Primary Care Provider Office Phone # Fax #    Mimi Mcguire [phone] [address] [facility] [address]        Equal Access to Services     NIKI FOWLER : Hadii pily campbell Somanuel, waaxda luqadaha, qaybta kaalmada federicayada, lyric sanchez . So [facility] [phone].    ATENCIÓN: Si habla español, tiene a simmons disposición servicios gratuitos de asistencia lingüística. Llame al [phone].    We comply with applicable federal civil rights laws and [place] laws. We do not discriminate on the basis of race, color, national origin, age, disability sex, sexual orientation or gender identity.            Thank you!     Thank you for choosing [facility]  for your care. Our goal is always to provide you with excellent care. Hearing back from our patients is one way we can continue to improve our services. Please take a few minutes to complete the written survey that you may receive in the mail after your visit with us. Thank you!             Your Updated Medication List - Protect others around you: Learn how to safely use, store and throw away your medicines at www.disposemymeds.org.          This list is accurate as of: 9/19/17 11:01 AM.  Always use your most recent med list.                   Brand Name Dispense Instructions for use Diagnosis    ACETAMINOPHEN PO      Take 1,000 mg by mouth every 8 hours as needed for pain        alendronate 70 MG tablet    FOSAMAX    12 tablet    TAKE 1 TABLET BY MOUTH ONCE  WEEKLY ON EMPTY STOMACH    Osteopenia       calcium carb 1250 mg (500 mg [place])/vitamin D 200 units 500-200 MG-UNIT per tablet    OSCAL with D     Take 1 tablet by mouth 2 times daily (with meals)        cetirizine 5 MG tablet    zyrTEC    30 tablet    Take 1 tablet (5 mg) by mouth daily    Acute urticaria       clopidogrel 75 MG tablet    PLAVIX    90 tablet    Take 1 tablet (75 mg) by mouth daily    Congestive heart failure, unspecified congestive heart failure chronicity, unspecified congestive heart failure type (H), Lymphedema of both lower extremities       donepezil 5 MG tablet    ARICEPT    90 tablet    Take 1 tablet (5 mg) by mouth At Bedtime    Dementia without behavioral disturbance, unspecified dementia type       ezetimibe 10 MG tablet    ZETIA    90 tablet    Take 1 tablet (10 mg) by mouth daily    Hyperlipidemia LDL goal <100       metoprolol 25 MG tablet    LOPRESSOR    90 tablet    Take 0.5 tablets (12.5 mg) by mouth 2 times daily        Multi-vitamin Tabs tablet      Take 1 tablet by mouth daily        nitroGLYcerin 0.4 MG sublingual tablet    NITROSTAT    25 tablet    Place 1 tablet (0.4 mg) under the tongue every 5 minutes as needed for chest pain    Unspecified cardiovascular disease       omega 3 1000 MG Caps     90 capsule    Take 1 g by mouth daily    Health Care Home       ondansetron 4 MG ODT tab    ZOFRAN ODT    10 tablet    Take 1-2 tablets (4-8 mg) by mouth every 6 hours as needed for nausea        * order for DME     1 Package    Equipment being ordered: Walker with a seat    Acute on chronic diastolic congestive heart failure (H), At risk for falling       * order for DME     1 Device    Equipment being ordered: Hospital Bed 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 -ongoing symptoms of lower extremity edema,shortness of breath,cough sacral pressure sores within last 6 months   An electric hospital bed to allow for increase in head of bed elevation and for ease in wheelchair transfers   Length of need: lifelong [facility] - [phone]    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), Lymphedema of both lower extremities, Right-sided congestive heart failure (H), Tricuspid valve insufficiency, unspecified etiology, Congestive heart failure, unspecified congestive heart failure chronicity, unspecified congestive heart failure type (H), History of pressure ulcer       * order for DME     10 Units    Mepilex sacral dressings    Decubitus ulcer of sacral region, unspecified ulcer stage       polyethylene glycol powder    MIRALAX/GLYCOLAX     Take 1 capful by mouth every morning Takes 3/4 of a capful        potassium chloride 10 MEQ tablet    K-TAB,KLOR-CON    90 tablet    Take 1 tablet (10 mEq) by mouth 2 times daily    Shortness of breath on exertion, Essential hypertension       SENNA S 8.6-50 MG per tablet   Generic drug:  senna-docusate      Take 2 tablets by mouth 2 times daily        silver sulfADIAZINE 1 % cream    SILVADENE     Apply topically daily as needed for other (bed sores)        spironolactone 25 MG tablet    ALDACTONE    60 tablet    Take 1 tablet (25 mg) by mouth 2 times daily    Congestive heart failure, unspecified congestive heart failure chronicity, unspecified congestive heart failure type (H), Lymphedema of both lower extremities       torsemide 20 MG tablet    DEMADEX    60 tablet    Take 1 tablet (20 mg) by mouth 2 times daily    Lymphedema of both lower extremities, Pleural effusion       * triamcinolone 0.1 % cream    KENALOG    30 g    Apply sparingly to affected area three times daily for 5-7 days.    Dermatitis       * triamcinolone 0.1 % cream    KENALOG    30 g    Apply sparingly to affected area two times daily for 7 days.    Other eczema       TYLENOL PM EXTRA STRENGTH PO      Take 2-3 tablets by mouth nightly as needed        vitamin D 2000 UNITS Caps      Take 1 capsule by mouth daily        warfarin 4 MG tablet    COUMADIN    30 tablet     as directed by the Anticoagulation Clinic, current dose 4 mg Mon, Fri, 6 mg rest of week    Congestive heart failure, unspecified congestive heart failure chronicity, unspecified congestive heart failure type (H)       ZZZQUIL PO      Take 50 mg by mouth nightly as needed Alternates with Tylenol PM        * Notice:  This list has 5 medication(s) that are the same as other medications prescribed for you. Read the directions carefully, and ask your doctor or other care provider to review them with you.

## 2017-09-19 NOTE — PROGRESS NOTES
ANTICOAGULATION FOLLOW-UP CLINIC VISIT    Patient Name:  Natalie Hair  Date:  9/19/2017  Contact Type:  Telephone/ writer left vm for [facility] at [facility] regarding INR, dosing and recheck date. Also spoke to Salas (son-in-law), gave instructions regarding INR, dosing and recheck.     SUBJECTIVE:     Patient Findings     Positives No Problem Findings    Comments Writer spoke with pt's son-in-law, Salas (Emely's ) who denies any changes or missed doses. Writer gave dosing and recheck instructions, Salas repeated accurately and voiced understanding.           OBJECTIVE    INR   Date Value Ref Range Status   09/19/2017 1.86 (H) 0.86 - 1.14 Final       ASSESSMENT / PLAN  No question data found.  Anticoagulation Summary as of 9/19/2017     INR goal 2.5-3.5   Today's INR 1.86!   Maintenance plan No maintenance plan   Full instructions 9/19: 6 mg; 9/20: 4 mg; 9/21: 4 mg   Plan last modified Letitia Aquino RN (8/29/2017)   Next INR check 9/22/2017   Priority INR   Target end date Indefinite    Indications   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]  Long term current use of anticoagulant therapy [Z79.01]         Anticoagulation Episode Summary     INR check location     Preferred lab     Send INR reminders to [facility] PHONE ANTICOAG POOL    Comments *PLEASE CALL EMELY WITH NEW DOSING INSTRUCTIONS -[phone].  [facility] Care as of 7-18-17, Felicia [phone]      Anticoagulation Care Providers     Provider Role Specialty Phone number    Mimi Mcguire MD [facility] [phone]            See the Encounter Report to view Anticoagulation Flowsheet and Dosing Calendar (Go to Encounters tab in chart review, and find the Anticoagulation Therapy Visit)        Marizol Santoro, RN

## 2017-09-20 NOTE — PROGRESS NOTES
Neymar spoke with Felicia at home care. The patient will be out of town and the next time she can check INR will be 9-26-17. Writer called Emely, daughter, and gave her updated dosing instructions through next recheck date. She has had 30 mg/week at this point and INR is subtherapeutic. Will increase so she has 34 mg/week by next recheck, ~11% increase.    NUSRAT AndersonN, RN  9-20-17 at 1:50 pm

## 2017-09-20 NOTE — TELEPHONE ENCOUNTER
Routing refill request to provider for review/approval because:  Labs out of range:  Please review 9/1/17 [facility]. Thank you.  Anam Dejesus RN

## 2017-09-21 NOTE — TELEPHONE ENCOUNTER
I reviewed the labs.    Med refilled    Come for bmp next week in clinic. Future order in     C. Kamron Mcguire MD

## 2017-09-22 NOTE — PROGRESS NOTES
Dear Dr. Mimi Mcguire  Medicare Home Health regulations requires [facility] to notify the Physician when the plan for visits has been altered.  We have provided fewer visits than ordered.  We are notifying you of a Missed Visit.  Natalie PATRICIA Hair; MRN [id number]  Missed Visit  Is SN   Dates of missed services 9/22/17   Reason: Patient cancelled   Sincerely [facility]  Margarita Xiong  [phone]

## 2017-09-26 NOTE — MR AVS SNAPSHOT
Natalie Hair   9/26/2017   Anticoagulation Therapy Visit    Description:  85 year old female   Provider:  Marizol Santoro, RN   Department:  [facility]           INR as of 9/26/2017     Today's INR 2.8      Anticoagulation Summary as of 9/26/2017     INR goal 2.5-3.5   Today's INR 2.8   Full instructions 6 mg on Sun, Tue, Thu; 4 mg all other days   Next INR check 10/4/2017    Indications   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]  Long term current use of anticoagulant therapy [Z79.01]         Your next Anticoagulation Clinic appointment(s)     Oct 04, 2017 11:45 AM CDT   Anticoagulation Visit with [facility] ANTI COAG   [facility]    [address]  [facility] [address]   [phone]              September 2017 Details    Sun Mon Tue Wed Thu Fri Sat          1               2                 3               4               5               6               7               8               9                 10               11               12               13               14               15               16                 17               18               19               20               21               22               23                 24               25               26      6 mg   See details      27      4 mg         28      6 mg         29      4 mg         30      4 mg          Date Details   09/26 This INR check               How to take your warfarin dose     To take:  4 mg Take 1 of the 4 mg tablets.    To take:  6 mg Take 1.5 of the 4 mg tablets.           October 2017 Details    Sun Mon Tue Wed Thu Fri Sat     1      6 mg         2      4 mg         3      6 mg         4            5               6               7                 8               9               10               11               12               13               14                 15               16               17               18               19               20               21                  22               23               24               25               26               27               28                 29               30               31                    Date Details   No additional details    Date of next INR:  10/4/2017         How to take your warfarin dose     To take:  4 mg Take 1 of the 4 mg tablets.    To take:  6 mg Take 1.5 of the 4 mg tablets.

## 2017-09-28 NOTE — TELEPHONE ENCOUNTER
Routing refill request to provider for review/approval because:  Drug interaction warning    Routing to provider.    Maylin GENAO Rn

## 2017-09-28 NOTE — TELEPHONE ENCOUNTER
POTASSIUM CL 10MEQ ER TABLETS        Last Written Prescription Date: 6/27/17  Last Fill Quantity: 90, # refills: 1  [place] [facility] or [facility] prescribing provider: 9/19/17  Next 5 appointments (look out 90 days)     Nov 21, 2017  9:30 AM CST   Return Visit with Emelyn Bingham MD   [facility] PHYSICIAN HEART AT [facility] ([facility])    [address] [place] [address] [phone]                   Potassium   Date Value Ref Range Status   09/27/2017 4.3 3.4 - 5.3 mmol/L Final     Creatinine   Date Value Ref Range Status   09/27/2017 1.12 (H) 0.52 - 1.04 mg/dL Final     BP Readings from Last 3 Encounters:   09/19/17 116/55   09/11/17 103/63   09/08/17 130/86

## 2017-10-04 NOTE — MR AVS SNAPSHOT
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 ([facility]    [address]  [facility] [address] [phone]              Contact Numbers     Please call [phone] to cancel and/or reschedule your appointment.  Please call [phone] with any problems or questions regarding your therapy          October 2017 Details    Sun Mon Tue Wed Thu Fri Sat     1               2               3               4      4 mg   See details      5      6 mg         6      4 mg         7      4 mg           8      6 mg         9      4 mg         10      6 mg         11            12               13               14                 15               16               17               18               19               20               21                 22               23               24               25               26               27               28                 29               30               31                    Date Details   10/04 This INR check       Date of next INR:  10/11/2017         How to take your warfarin dose     To take:  4 mg Take 1 of the 4 mg tablets.    To take:  6 mg Take 1.5 of the 4 mg tablets.

## 2017-10-04 NOTE — PROGRESS NOTES
ANTICOAGULATION FOLLOW-UP CLINIC VISIT    Patient Name:  Natalie Hair  Date:  10/4/2017  Contact Type:  Face to Face, accompanied by patient's daughter     SUBJECTIVE:     Patient Findings     Positives No Problem Findings    Comments No changes noted. Patient was discharged from home care last week.            OBJECTIVE    INR Protime   Date Value Ref Range Status   10/04/2017 2.5 (A) 0.86 - 1.14 Final       ASSESSMENT / PLAN  INR assessment THER    Recheck INR In: 1 WEEK    INR Location Clinic      Anticoagulation Summary as of 10/4/2017     INR goal 2.5-3.5   Today's INR 2.5   Maintenance plan 6 mg (4 mg x 1.5) on Sun, Tue, Thu; 4 mg (4 mg x 1) all other days   Full instructions 6 mg on Sun, Tue, Thu; 4 mg all other days   Weekly total 34 mg   No change documented Christa Guzman RN   Plan last modified Marizol Santoro RN (9/26/2017)   Next INR check 10/11/2017   Priority INR   Target end date Indefinite    Indications   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

## 2017-10-09 NOTE — PROGRESS NOTES
"Natalie Hair is a 85 year old female who is being evaluated via a billable telephone visit.      The patient has been notified of following:     \"This telephone visit will be conducted via a call between you and your physician/provider. We have found that certain health care needs can be provided without the need for a physical exam.  This service lets us provide the care you need with a short phone conversation.  If a prescription is necessary we can send it directly to your pharmacy.  If lab work is needed we can place an order for that and you can then stop by our lab to have the test done at a later time.    We will bill your insurance company for this service.  Please check with your medical insurance if this type of visit is covered. You may be responsible for the cost of this type of visit if insurance coverage is denied.  The typical cost is $30 (10min), $59 (11-20min) and $85 (21-30min).  Most often these visits are shorter than 10 minutes.    If during the course of the call the physician/provider feels a telephone visit is not appropriate, you will not be charged for this service.\"       Consent has been obtained for this service by care team member: yes. See the scanned image in the medical record.  SUBJECTIVE:     Natalie Hair complains of    Chief Complaint   Patient presents with     Urine Odor       I have reviewed and updated the patient's Past Medical History, Social History, Family History and Medication List.    ALLERGIES  Lorazepam; Morphine sulfate cr [morphine sulfate]; Crestor [rosuvastatin]; Lidocaine; and Cozaar [losartan]    Leilani Whitman PA-C      Additional provider notes:     Talked and spoke with daughter  States Natalie is doing okay  Has good days and bad days  Some days her memory is bad and she doesn't remember who she is or that Emely is her daughter  Other days her memory is okay  She is getting more easily fatigued  Usually regimen is to take a nap after lunch " but lately daughter reports she is wanting to nap before lunch  Also refusing physical therapy several days  Some days it is a struggle to get her into the shower    On Friday, the daughter noticed that her urine had a very strong odor  Says she could smell it outside the bathroom        OBJECTIVE:     UA RESULTS:  Recent Labs   Lab Test  10/09/17   1647   COLOR  Yellow   APPEARANCE  Clear   URINEGLC  Negative   URINEBILI  Negative   URINEKETONE  Negative   SG  1.015   UBLD  Negative   URINEPH  7.5*   PROTEIN  Negative   UROBILINOGEN  0.2   NITRITE  Negative   LEUKEST  Moderate*   RBCU  O - 2   WBCU  O - 2     Urine Culture pending      ICD-10-CM    1. Acute cystitis without hematuria N30.00 ciprofloxacin (CIPRO) 250 MG tablet     Patient with more fatigue and confusion although patient admits this is hard to tell as some of this has been ongoing/progressive  + strong odor  UA not strongly suggestive of infection. Given moderate LE it is going to be cultured but per  only 2 WBC per high power field. However patient's last 2 positive urines were done due to sx of strong urine odor and ended up growing E coli    Plan to start treatment and monitor culture      I have reviewed the note as documented above.  This accurately captures the substance of my conversation with the patient,  Leilani Whitman PA-C      Total time of call between patient and provider was 5:30 minutes     Asad Escobar

## 2017-10-09 NOTE — TELEPHONE ENCOUNTER
Reason for call:  Patient reporting a symptom    Symptom or request:  Digna (daughter) LEFT MESSAGE:  She believes that mom, Nataile may have a UTI.  She states the she has hx of frequent UTI and when last seen, Digna was given a hat and a few specimen cups so when her mom had sx of UTI, she could just collect sample and bring in.  No orders are in chart.  Please call and assess. Thank you..Claire Carroll    Duration (how long have symptoms been present): did not specify in message    Have you been treated for this before? Yes    Additional comments: none    Phone Number patient can be reached at:  Other phone number:  [phone]    Best Time:  Did not specify in message    Can we leave a detailed message on this number:  did not specify in message    Call taken on 10/9/2017 at 11:41 AM by Claire Carroll

## 2017-10-09 NOTE — TELEPHONE ENCOUNTER
"Emely reports that Natalie's urine is \"really strong smelling.\" Denies all of the following: pain, frequency, fever. Advised Emely of UA order and telephone visit for this afternoon with Leilani.  Anam Dejesus RN    "

## 2017-10-09 NOTE — MR AVS SNAPSHOT
After Visit Summary   10/9/2017    Natalie Hair    MRN: [id number]           Patient Information     Date Of Birth          8/28/1932        Visit Information        Provider Department      10/9/2017 5:00 PM Leilani Whitman PA-C [facility]        Today's Diagnoses     Acute cystitis without hematuria    -  1       Follow-ups after your visit        Your next 10 appointments already scheduled     Oct 11, 2017  2:30 PM CDT   Anticoagulation Visit with LL ANTI COAG   [facility] ([facility])    [address]  [facility] [address]   [phone]            Nov 21, 2017  8:40 AM CST   Pacemaker Check with Wy Device Rn   [facility] ([facility])    [address] [facility] [id number] [phone]            Nov 21, 2017  9:30 AM CST   Return Visit with Emelyn Bingham MD   [facility] PHYSICIAN HEART AT [facility] ([facility])    [address] [facility] [address] [phone]              Who to contact     Normal or non-critical lab and imaging results will be communicated to you by Bloomfirehart, letter or phone within 4 business days after the clinic has received the results. If you do not hear from us within 7 days, please contact the clinic through MyChart or phone. If you have a critical or abnormal lab result, we will notify you by phone as soon as possible.  Submit refill requests through Picurio or call your pharmacy and they will forward the refill request to us. Please allow 3 business days for your refill to be completed.          If you need to speak with a  for additional information , please call: [phone]             Additional Information About Your Visit        Picurio Information     Picurio lets you send messages to your doctor, view your test results, renew your prescriptions, schedule appointments and more. To sign  "up, go to [email or web address] . Click on \"Log in\" on the left side of the screen, which will take you to the Welcome page. Then click on \"Sign up Now\" on the right side of the page.     You will be asked to enter the access code listed below, as well as some personal information. Please follow the directions to create your username and password.     Your access code is: [email or web address]  Expires: 2017 11:43 AM     Your access code will  in 90 days. If you need help or a new code, please call your [place] clinic or [phone].        Care EveryWhere ID     This is your Care EveryWhere ID. This could be used by other organizations to access your [place] medical records  [id number]         Blood Pressure from Last 3 Encounters:   17 116/55   17 103/63   17 130/86    Weight from Last 3 Encounters:   17 164 lb 6.4 oz (74.6 kg)   17 157 lb (71.2 kg)   17 164 lb (74.4 kg)              Today, you had the following     No orders found for display         Today's Medication Changes          These changes are accurate as of: 10/9/17  6:07 PM.  If you have any questions, ask your nurse or doctor.               Start taking these medicines.        Dose/Directions    ciprofloxacin 250 MG tablet   Commonly known as:  CIPRO   Used for:  Acute cystitis without hematuria   Started by:  Leilani Whitman PA-C        Dose:  250 mg   Take 1 tablet (250 mg) by mouth 2 times daily   Quantity:  6 tablet   Refills:  0            Where to get your medicines      These medications were sent to [facility] [address] [place] [address] [employer] [facility] [place] [address] [address] [facility] [address]     Phone:  [phone]     ciprofloxacin 250 MG tablet                Primary Care Provider Office Phone # Fax #    Mimi Mcguire [phone] [address] [place] [address]        Equal Access to Services     [facility] " AH: Hadii pily dennisonaustinsharri Fer, waaxda luqadaha, qaybta kalen rodriges, lyric scottin hayaafrantz laraquynh encinas daniel kanpp. So [facility] [phone].    ATENCIÓN: Si saminala arturo, tiene a simmons disposición servicios gratuitos de asistencia lingüística. Llame al [phone].    We comply with applicable federal civil rights laws and [place] laws. We do not discriminate on the basis of race, color, national origin, age, disability, sex, sexual orientation, or gender identity.            Thank you!     Thank you for choosing [facility]  for your care. Our goal is always to provide you with excellent care. Hearing back from our patients is one way we can continue to improve our services. Please take a few minutes to complete the written survey that you may receive in the mail after your visit with us. Thank you!             Your Updated Medication List - Protect others around you: Learn how to safely use, store and throw away your medicines at www.disposemymeds.org.          This list is accurate as of: 10/9/17  6:07 PM.  Always use your most recent med list.                   Brand Name Dispense Instructions for use Diagnosis    ACETAMINOPHEN PO      Take 1,000 mg by mouth every 8 hours as needed for pain        alendronate 70 MG tablet    FOSAMAX    12 tablet    TAKE 1 TABLET BY MOUTH ONCE WEEKLY ON EMPTY STOMACH    Osteopenia       calcium carb 1250 mg (500 mg [place])/vitamin D 200 units 500-200 MG-UNIT per tablet    OSCAL with D     Take 1 tablet by mouth 2 times daily (with meals)        cetirizine 5 MG tablet    zyrTEC    30 tablet    Take 1 tablet (5 mg) by mouth daily    Acute urticaria       ciprofloxacin 250 MG tablet    CIPRO    6 tablet    Take 1 tablet (250 mg) by mouth 2 times daily    Acute cystitis without hematuria       clopidogrel 75 MG tablet    PLAVIX    90 tablet    Take 1 tablet (75 mg) by mouth daily    Congestive heart failure, unspecified congestive heart failure chronicity, unspecified  congestive heart failure type (H), Lymphedema of both lower extremities       donepezil 5 MG tablet    ARICEPT    90 tablet    Take 1 tablet (5 mg) by mouth At Bedtime    Dementia without behavioral disturbance, unspecified dementia type       ezetimibe 10 MG tablet    ZETIA    90 tablet    Take 1 tablet (10 mg) by mouth daily    Hyperlipidemia LDL goal <100       metoprolol 25 MG tablet    LOPRESSOR    90 tablet    Take 0.5 tablets (12.5 mg) by mouth 2 times daily        Multi-vitamin Tabs tablet      Take 1 tablet by mouth daily        nitroGLYcerin 0.4 MG sublingual tablet    NITROSTAT    25 tablet    Place 1 tablet (0.4 mg) under the tongue every 5 minutes as needed for chest pain    Unspecified cardiovascular disease       omega 3 1000 MG Caps     90 capsule    Take 1 g by mouth daily    Health Care Home       ondansetron 4 MG ODT tab    ZOFRAN ODT    10 tablet    Take 1-2 tablets (4-8 mg) by mouth every 6 hours as needed for nausea        * order for DME     1 Package    Equipment being ordered: Walker with a seat    Acute on chronic diastolic congestive heart failure (H), At risk for falling       * order for DME     1 Device    Equipment being ordered: Hospital Bed 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 -ongoing symptoms of lower extremity edema,shortness of breath,cough sacral pressure sores within last 6 months   An electric hospital bed to allow for increase in head of bed elevation and for ease in wheelchair transfers  Length of need: lifelong NPI - [id number]    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), Lymphedema of both lower extremities, Right-sided congestive heart failure, Tricuspid valve insufficiency, unspecified etiology, Congestive heart failure, unspecified congestive heart failure chronicity, unspecified congestive heart failure type (H), History of pressure ulcer       * order for DME     10 Units    Mepilex sacral dressings    Decubitus ulcer of  sacral region, unspecified ulcer stage       polyethylene glycol powder    MIRALAX/GLYCOLAX     Take 1 capful by mouth every morning Takes 3/4 of a capful        potassium chloride 10 MEQ tablet    K-TAB,KLOR-CON    90 tablet    TAKE 1 TABLET(10 MEQ) BY MOUTH TWICE DAILY    Shortness of breath on exertion, Essential hypertension       SENNA S 8.6-50 MG per tablet   Generic drug:  senna-docusate      Take 2 tablets by mouth 2 times daily        silver sulfADIAZINE 1 % cream    SILVADENE     Apply topically daily as needed for other (bed sores)        spironolactone 25 MG tablet    ALDACTONE    60 tablet    Take 1 tablet (25 mg) by mouth 2 times daily    Congestive heart failure, unspecified congestive heart failure chronicity, unspecified congestive heart failure type (H), Lymphedema of both lower extremities       torsemide 20 MG tablet    DEMADEX    60 tablet    TAKE 1 TABLET(20 MG) BY MOUTH TWICE DAILY    Lymphedema of both lower extremities, Pleural effusion       * triamcinolone 0.1 % cream    KENALOG    30 g    Apply sparingly to affected area three times daily for 5-7 days.    Dermatitis       * triamcinolone 0.1 % cream    KENALOG    30 g    Apply sparingly to affected area two times daily for 7 days.    Other eczema       TYLENOL PM EXTRA STRENGTH PO      Take 2-3 tablets by mouth nightly as needed        vitamin D 2000 UNITS Caps      Take 1 capsule by mouth daily        warfarin 4 MG tablet    COUMADIN    30 tablet    Take 6 mg Sunday, Tues and Thurs, 4 mg rest of week or as directed by the Anticoagulation Clinic.    Congestive heart failure, unspecified congestive heart failure chronicity, unspecified congestive heart failure type (H)       ZZZQUIL PO      Take 50 mg by mouth nightly as needed Alternates with Tylenol PM        * Notice:  This list has 5 medication(s) that are the same as other medications prescribed for you. Read the directions carefully, and ask your doctor or other care provider to  review them with you.

## 2017-10-10 NOTE — TELEPHONE ENCOUNTER
clopidogrel            Last Written Prescription Date: 7/13/17  Last Fill Quantity: 90, # refills: 0    Last Office Visit with [facility] or [facility] prescribing provider:  10/9/17   Future Office Visit:    Next 5 appointments (look out 90 days)     Nov 21, 2017  9:30 AM CST   Return Visit with Emelyn Bingham MD   [facility] PHYSICIAN HEART AT [facility] ([facility])    [address] [place] [address]   [phone]                   Lab Results   Component Value Date    WBC 8.1 09/01/2017     Lab Results   Component Value Date    RBC 4.96 09/01/2017     Lab Results   Component Value Date    HGB 13.0 09/01/2017     Lab Results   Component Value Date    HCT 40.8 09/01/2017     No components found for: MCT  Lab Results   Component Value Date    MCV 82 09/01/2017     Lab Results   Component Value Date    MCH 26.2 09/01/2017     Lab Results   Component Value Date    MCHC 31.9 09/01/2017     Lab Results   Component Value Date    RDW 17.9 09/01/2017     Lab Results   Component Value Date     09/01/2017     Lab Results   Component Value Date    AST 28 09/01/2017     Lab Results   Component Value Date    ALT 26 09/01/2017     Creatinine   Date Value Ref Range Status   09/27/2017 1.12 (H) 0.52 - 1.04 mg/dL Final   ]

## 2017-10-11 NOTE — MR AVS SNAPSHOT
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  Please call [phone] with any problems or questions regarding your therapy          October 2017 Details    Sun Mon Tue Wed Thu Fri Sat     1               2               3               4               5               6               7                 8               9               10               11      4 mg   See details      12      6 mg         13      4 mg         14      4 mg           15      6 mg         16      4 mg         17      6 mg         18      4 mg         19      6 mg         20      4 mg         21      4 mg           22      6 mg         23      4 mg         24      6 mg         25            26               27               28                 29               30               31                    Date Details   10/11 This INR check       Date of next INR:  10/25/2017         How to take your warfarin dose     To take:  4 mg Take 1 of the 4 mg tablets.    To take:  6 mg Take 1.5 of the 4 mg tablets.

## 2017-10-11 NOTE — PROGRESS NOTES
ANTICOAGULATION FOLLOW-UP CLINIC VISIT    Patient Name:  Natalie Hair  Date:  10/11/2017  Contact Type:  Face to Face, accompanied by patient's daughter    SUBJECTIVE:     Patient Findings     Positives Antibiotic use or infection (Cipro for UTI), No Problem Findings           OBJECTIVE    INR Protime   Date Value Ref Range Status   10/11/2017 3.1 (A) 0.86 - 1.14 Final       ASSESSMENT / PLAN  INR assessment THER    Recheck INR In: 2 WEEKS    INR Location Clinic      Anticoagulation Summary as of 10/11/2017     INR goal 2.5-3.5   Today's INR 3.1   Maintenance plan 6 mg (4 mg x 1.5) on Sun, Tue, Thu; 4 mg (4 mg x 1) all other days   Full instructions 6 mg on Sun, Tue, Thu; 4 mg all other days   Weekly total 34 mg   No change documented Christa Guzman RN   Plan last modified Marizol Santoro RN (9/26/2017)   Next INR check 10/25/2017   Priority INR   Target end date Indefinite    Indications   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

## 2017-10-12 NOTE — TELEPHONE ENCOUNTER
Please call her daughter - I thought they were planning to discuss the plavix with cardiology? I'm not sure how long they recommend that she stay on this med.   She is also on coumadin. She had a stent placed this spring ( approximately) and has an experimental cardiac device in place     ROZINA Mcguire MD

## 2017-10-12 NOTE — TELEPHONE ENCOUNTER
Routing refill request to provider for review/approval because:  Labs out of range:  See below    Ashley Espinoza RN

## 2017-10-25 NOTE — MR AVS SNAPSHOT
After Visit Summary   10/25/2017    Natalie Hair    MRN: [id number]           Patient Information     Date Of Birth          8/28/1932        Visit Information        Provider Department      10/25/2017 10:20 AM Jennifer Jerez PA-C [facility]        Today's Diagnoses     SOB (shortness of breath)    -  1    Pleural effusion          Care Instructions    CXR in clinic today shows a left sided pleural effusion (similar to what you have had in the past). Stop coumadin x 5 days and schedule a thoracentesis for Monday.  If symptoms worsen at at time in the next 5 days, she should go to the ER.                Follow-ups after your visit        Your next 10 appointments already scheduled     Nov 08, 2017  9:30 AM CST   Anticoagulation Visit with MATT ANTI COAG   [facility] ([facility]    [address] [address]   [phone]            Nov 21, 2017  8:40 AM CST   Pacemaker Check with Wy Device Rn   [facility] ([facility])    [address] [address]   [phone]            Nov 21, 2017  9:30 AM CST   Return Visit with Emelyn Bingham MD   [facility] PHYSICIAN HEART AT [facility] ([facility])    [address] [address]   [phone]              Future tests that were ordered for you today     Open Future Orders        Priority Expected Expires Ordered    US Thoracentesis Routine  10/25/2018 10/25/2017            Who to contact     Normal or non-critical lab and imaging results will be communicated to you by MyChart, letter or phone within 4 business days after the clinic has received the results. If you do not hear from us within 7 days, please contact the clinic through MyChart or phone. If you have a critical or abnormal lab result, we will notify you by phone as soon as possible.  Submit refill requests through  "Maximil[employer] or call your pharmacy and they will forward the refill request to us. Please allow 3 business days for your refill to be completed.          If you need to speak with a  for additional information , please call: [phone]           Additional Information About Your Visit        [employer] Information     [employer] lets you send messages to your doctor, view your test results, renew your prescriptions, schedule appointments and more. To sign up, go to [email or web address] . Click on \"Log in\" on the left side of the screen, which will take you to the Welcome page. Then click on \"Sign up Now\" on the right side of the page.     You will be asked to enter the access code listed below, as well as some personal information. Please follow the directions to create your username and password.     Your access code is: [email or web address]  Expires: 2017 11:43 AM     Your access code will  in 90 days. If you need help or a new code, please call your [place] clinic or [phone].        Care EveryWhere ID     This is your Care EveryWhere ID. This could be used by other organizations to access your [place] medical records  [id number]        Your Vitals Were     Pulse Temperature Height Pulse Oximetry BMI (Body Mass Index)       70 97  F (36.1  C) (Tympanic) 5' 6\" (1.676 m) 96% 27.6 kg/m2        Blood Pressure from Last 3 Encounters:   10/25/17 115/69   17 116/55   17 103/63    Weight from Last 3 Encounters:   10/25/17 171 lb (77.6 kg)   17 164 lb 6.4 oz (74.6 kg)   17 157 lb (71.2 kg)              We Performed the Following     XR Chest 2 Views          Today's Medication Changes          These changes are accurate as of: 10/25/17 11:11 AM.  If you have any questions, ask your nurse or doctor.               These medicines have changed or have updated prescriptions.        Dose/Directions    warfarin 4 MG tablet   Commonly known as:  COUMADIN   This may have changed:  " additional instructions   Used for:  Congestive heart failure, unspecified congestive heart failure chronicity, unspecified congestive heart failure type (H)        Take 6 mg on Sun, Tue, Thu; 4 mg all other days or as directed by the Anticoagulation Clinic.   Quantity:  40 tablet   Refills:  2            Where to get your medicines      These medications were sent to [facility] [address] [place] [address] [address] AT [place] [address]  [address], [facility] [address]     Phone:  [phone]     warfarin 4 MG tablet                Primary Care Provider Office Phone # Fax #    Mimi Kamron Mcguire [phone] [address]  [facility] [address]        Equal Access to Services     JA FOWLER : Hadii aad ku hadasho Soomaali, waaxda luqadaha, qaybta kaalmada adeegyada, lyric knapp. So [facility] [phone].    ATENCIÓN: Si habla español, tiene a simmons disposición servicios gratuitos de asistencia lingüística. [facility] [phone].    We comply with applicable federal civil rights laws and [place] laws. We do not discriminate on the basis of race, color, national origin, age, disability, sex, sexual orientation, or gender identity.            Thank you!     Thank you for choosing [facility]  for your care. Our goal is always to provide you with excellent care. Hearing back from our patients is one way we can continue to improve our services. Please take a few minutes to complete the written survey that you may receive in the mail after your visit with us. Thank you!             Your Updated Medication List - Protect others around you: Learn how to safely use, store and throw away your medicines at www.disposemymeds.org.          This list is accurate as of: 10/25/17 11:11 AM.  Always use your most recent med list.                   Brand Name Dispense Instructions for use Diagnosis    ACETAMINOPHEN PO      Take 1,000  mg by mouth every 8 hours as needed for pain        alendronate 70 MG tablet    FOSAMAX    12 tablet    TAKE 1 TABLET BY MOUTH ONCE WEEKLY ON EMPTY STOMACH    Osteopenia       calcium carb 1250 mg (500 mg [place])/vitamin D 200 units 500-200 MG-UNIT per tablet    OSCAL with D     Take 1 tablet by mouth 2 times daily (with meals)        cetirizine 5 MG tablet    zyrTEC    30 tablet    Take 1 tablet (5 mg) by mouth daily    Acute urticaria       clopidogrel 75 MG tablet    PLAVIX    90 tablet    Take 1 tablet (75 mg) by mouth daily    Congestive heart failure, unspecified congestive heart failure chronicity, unspecified congestive heart failure type (H), Lymphedema of both lower extremities       donepezil 5 MG tablet    ARICEPT    90 tablet    Take 1 tablet (5 mg) by mouth At Bedtime    Dementia without behavioral disturbance, unspecified dementia type       ezetimibe 10 MG tablet    ZETIA    90 tablet    Take 1 tablet (10 mg) by mouth daily    Hyperlipidemia LDL goal <100       metoprolol 25 MG tablet    LOPRESSOR    90 tablet    Take 0.5 tablets (12.5 mg) by mouth 2 times daily        Multi-vitamin Tabs tablet      Take 1 tablet by mouth daily        nitroGLYcerin 0.4 MG sublingual tablet    NITROSTAT    25 tablet    Place 1 tablet (0.4 mg) under the tongue every 5 minutes as needed for chest pain    Unspecified cardiovascular disease       omega 3 1000 MG Caps     90 capsule    Take 1 g by mouth daily    Health Care Home       ondansetron 4 MG ODT tab    ZOFRAN ODT    10 tablet    Take 1-2 tablets (4-8 mg) by mouth every 6 hours as needed for nausea        * order for DME     1 Package    Equipment being ordered: Walker with a seat    Acute on chronic diastolic congestive heart failure (H), At risk for falling       * order for DME     1 Device    Equipment being ordered: Hospital Bed 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 -ongoing symptoms of lower extremity edema,shortness of breath,cough sacral pressure sores  within last 6 months   An electric hospital bed to allow for increase in head of bed elevation and for ease in wheelchair transfers  Length of need: lifelong NPI - [id number]    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), Lymphedema of both lower extremities, Right-sided congestive heart failure, Tricuspid valve insufficiency, unspecified etiology, Congestive heart failure, unspecified congestive heart failure chronicity, unspecified congestive heart failure type (H), History of pressure ulcer       * order for DME     10 Units    Mepilex sacral dressings    Decubitus ulcer of sacral region, unspecified ulcer stage       polyethylene glycol powder    MIRALAX/GLYCOLAX     Take 1 capful by mouth every morning Takes 3/4 of a capful        potassium chloride 10 MEQ tablet    K-TAB,KLOR-CON    90 tablet    TAKE 1 TABLET(10 MEQ) BY MOUTH TWICE DAILY    Shortness of breath on exertion, Essential hypertension       SENNA S 8.6-50 MG per tablet   Generic drug:  senna-docusate      Take 2 tablets by mouth 2 times daily        spironolactone 25 MG tablet    ALDACTONE    60 tablet    Take 1 tablet (25 mg) by mouth 2 times daily    Congestive heart failure, unspecified congestive heart failure chronicity, unspecified congestive heart failure type (H), Lymphedema of both lower extremities       torsemide 20 MG tablet    DEMADEX    60 tablet    TAKE 1 TABLET(20 MG) BY MOUTH TWICE DAILY    Lymphedema of both lower extremities, Pleural effusion       triamcinolone 0.1 % cream    KENALOG    30 g    Apply sparingly to affected area two times daily for 7 days.    Other eczema       TYLENOL PM EXTRA STRENGTH PO      Take 2-3 tablets by mouth nightly as needed        vitamin D 2000 UNITS Caps      Take 1 capsule by mouth daily        warfarin 4 MG tablet    COUMADIN    40 tablet    Take 6 mg on Sun, Tue, Thu; 4 mg all other days or as directed by the Anticoagulation Clinic.    Congestive heart failure,  unspecified congestive heart failure chronicity, unspecified congestive heart failure type (H)       ZZZQUIL PO      Take 50 mg by mouth nightly as needed Alternates with Tylenol PM        * Notice:  This list has 3 medication(s) that are the same as other medications prescribed for you. Read the directions carefully, and ask your doctor or other care provider to review them with you.

## 2017-10-25 NOTE — MR AVS SNAPSHOT
Natalie Hair   10/25/2017 9:45 AM   Anticoagulation Therapy Visit    Description:  85 year old female   Provider:  LL ANTI COAG   Department:  [facility]           INR as of 10/25/2017     Today's INR 2.4!      Anticoagulation Summary as of 10/25/2017     INR goal 2.5-3.5   Today's INR 2.4!   Full instructions 10/25: Hold; 10/26: Hold; 10/27: Hold; 10/28: Hold; 10/29: Hold; 10/30: 8 mg; Otherwise 6 mg on Sun, Tue, Thu; 4 mg all other days   Next INR check 11/8/2017    Indications   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]  Long term current use of anticoagulant therapy [Z79.01]         Your next Anticoagulation Clinic appointment(s)     Nov 01, 2017  3:15 PM CDT   [facility]    [address] [address]   [phone]            Nov 08, 2017  9:30 AM CST   [facility]ticoagulation [facility] with LL ANTI COAG   [facility]    [address] [address]   [phone]              Contact Numbers     Please call [phone] to cancel and/or reschedule your appointment.  Please call [phone] with any problems or questions regarding your therapy          October 2017 Details    Sun Mon Tue Wed Thu Fri Sat     1               2               3               4               5               6               7                 8               9               10               11               12               13               14                 15               16               17               18               19               20               21                 22               23               24               25      Hold   See details      26      Hold         27      Hold         28      Hold           29      Hold         30      8 mg         31      6 mg              Date Details   10/25 This INR check               How to take your warfarin dose     To  take:  6 mg Take 1.5 of the 4 mg tablets.    To take:  8 mg Take 2 of the 4 mg tablets.    Hold Do not take your warfarin dose. See the Details table to the right for additional instructions.                November 2017 Details    Sun Mon Tue Wed Thu Fri Sat        1      4 mg         2      6 mg         3      4 mg         4      4 mg           5      6 mg         6      4 mg         7      6 mg         8            9               10               11                 12               13               14               15               16               17               18                 19               20               21               22               23               24               25                 26               27               28               29               30                  Date Details   No additional details    Date of next INR:  11/8/2017         How to take your warfarin dose     To take:  4 mg Take 1 of the 4 mg tablets.    To take:  6 mg Take 1.5 of the 4 mg tablets.

## 2017-10-25 NOTE — PATIENT INSTRUCTIONS
CXR in clinic today shows a left sided pleural effusion (similar to what you have had in the past). Stop coumadin x 5 days and schedule a thoracentesis for Monday.  If symptoms worsen at at time in the next 5 days, she should go to the ER.

## 2017-10-25 NOTE — PROGRESS NOTES
"  SUBJECTIVE:   Natalie Hair is a 85 year old female who presents to clinic today for the following health issues:    ENT Symptoms             Symptoms: cc Present Absent Comment   Fever/Chills   x    Fatigue  x     Muscle Aches   x    Eye Irritation   x    Sneezing   x    Nasal Stewart/Drg   x    Sinus Pressure/Pain   x    Loss of smell   x    Dental pain   x    Sore Throat   x    Swollen Glands   x    Ear Pain/Fullness   x    Cough   x    Wheeze   x    Chest Pain  x     Shortness of breath  x     Rash   x    Other  x  Weight gain     Symptom duration:  7 days   Symptom severity:  moderate   Treatments tried:  none   Contacts:  none     Notes from cardiologist 9/8/17   Natalie \"has a very complex recent and remote cardiac history.  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  She has had multiple admissions over the last year and a half for diastolic heart failure.  She also has had periods of time where she was in paroxysmal atrial fibrillation and flutter.\"    She has had two thoracentesis in the past few months to help with drainage.    Takes Torsemide BID (no missed doses)    She c/o progressive SOB over the past 1 week.  Daughter has noticed her sentences are shorter and she is not able to walk as far with fatigue, similar to previous pleural effusions.    Patient denies any fever, chills or sweats.     She had a heart valve replaced and is taking coumadin for that.      Problem list and histories reviewed & adjusted, as indicated.  Additional history: as documented    Patient Active Problem List   Diagnosis     Heart valve replaced     Cardiovascular disease     History of T12 compression fracture     Backache     Vitamin D deficiency     Hyperlipidemia LDL goal <100     Advanced directives, counseling/discussion     Long term current use of anticoagulant therapy     Urinary incontinence     Unstable angina (H)     Benign essential hypertension, BP goal <150/90 "     Bilateral low back pain without sciatica     Osteopenia     Atrial fibrillation with RVR (H)     Primary pulmonary hypertension (H)     Lymphedema of both lower extremities     Tricuspid valve insufficiency, unspecified etiology     Near syncope     Chronic diastolic congestive heart failure (H)     History of pressure ulcer     Health Care Home     S/P CABG (coronary artery bypass graft) - 2002     Stented coronary artery - 5/4/2017 at [place]     Decubitus ulcer of sacral region, unspecified ulcer stage     Pleural effusion     Memory loss     Past Surgical History:   Procedure Laterality Date     ARTHROPLASTY KNEE BILATERAL       SURGICAL HISTORY OF -   12/2002    Triple bypass w/valve replacement - aortic       Social History   Substance Use Topics     Smoking status: Former Smoker     Types: Cigarettes     Smokeless tobacco: Never Used      Comment: 40 years ago     Alcohol use No     Family History   Problem Relation Age of Onset     HEART DISEASE Mother      Lipids Son      HEART DISEASE Son      Lipids Daughter          Current Outpatient Prescriptions   Medication Sig Dispense Refill     warfarin (COUMADIN) 4 MG tablet Take 6 mg on Sun, Tue, Thu; 4 mg all other days or as directed by the Anticoagulation Clinic. 40 tablet 2     potassium chloride (K-TAB,KLOR-CON) 10 MEQ tablet TAKE 1 TABLET(10 MEQ) BY MOUTH TWICE DAILY 90 tablet 1     ezetimibe (ZETIA) 10 MG tablet Take 1 tablet (10 mg) by mouth daily 90 tablet 3     torsemide (DEMADEX) 20 MG tablet TAKE 1 TABLET(20 MG) BY MOUTH TWICE DAILY 60 tablet 0     triamcinolone (KENALOG) 0.1 % cream Apply sparingly to affected area two times daily for 7 days. 30 g 0     order for DME Mepilex sacral dressings 10 Units 3     donepezil (ARICEPT) 5 MG tablet Take 1 tablet (5 mg) by mouth At Bedtime 90 tablet 0     clopidogrel (PLAVIX) 75 MG tablet Take 1 tablet (75 mg) by mouth daily 90 tablet 0     spironolactone (ALDACTONE) 25 MG tablet Take 1 tablet (25 mg) by mouth  2 times daily 60 tablet 1     calcium carb 1250 mg, 500 mg [place],/vitamin D 200 units (OSCAL WITH D) 500-200 MG-UNIT per tablet Take 1 tablet by mouth 2 times daily (with meals)       DiphenhydrAMINE HCl, Sleep, (ZZZQUIL PO) Take 50 mg by mouth nightly as needed Alternates with Tylenol PM       Diphenhydramine-APAP, sleep, (TYLENOL PM EXTRA STRENGTH PO) Take 2-3 tablets by mouth nightly as needed        alendronate (FOSAMAX) 70 MG tablet TAKE 1 TABLET BY MOUTH ONCE WEEKLY ON EMPTY STOMACH 12 tablet 0     omega 3 1000 MG CAPS Take 1 g by mouth daily 90 capsule 3     multivitamin, therapeutic with minerals (MULTI-VITAMIN) TABS tablet Take 1 tablet by mouth daily       metoprolol (LOPRESSOR) 25 MG tablet Take 0.5 tablets (12.5 mg) by mouth 2 times daily 90 tablet 3     order for DME Equipment being ordered: Hospital Bed  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 -ongoing symptoms of lower extremity edema,shortness of breath,cough  sacral pressure sores within last 6 months    An electric hospital bed to allow for increase in head of bed elevation and for ease in wheelchair transfers   Length of need: lifelong  NPI - [id number] 1 Device 0     cetirizine (ZYRTEC) 5 MG tablet Take 1 tablet (5 mg) by mouth daily 30 tablet 3     polyethylene glycol (MIRALAX/GLYCOLAX) powder Take 1 capful by mouth every morning Takes 3/4 of a capful       Cholecalciferol (VITAMIN D) 2000 UNITS CAPS Take 1 capsule by mouth daily       senna-docusate (SENNA S) 8.6-50 MG per tablet Take 2 tablets by mouth 2 times daily       ACETAMINOPHEN PO Take 1,000 mg by mouth every 8 hours as needed for pain        [DISCONTINUED] warfarin (COUMADIN) 4 MG tablet Take 6 mg Sunday, Tues and Thurs, 4 mg rest of week or as directed by the Anticoagulation Clinic. 30 tablet 2     ondansetron (ZOFRAN ODT) 4 MG ODT tab Take 1-2 tablets (4-8 mg) by mouth every 6 hours as needed for nausea (Patient not taking: Reported on 10/25/2017) 10 tablet 0     order for DME  "Equipment being ordered: Walker with a seat (Patient not taking: Reported on 9/8/2017) 1 Package 0     nitroglycerin (NITROSTAT) 0.4 MG SL tablet Place 1 tablet (0.4 mg) under the tongue every 5 minutes as needed for chest pain (Patient not taking: Reported on 10/25/2017) 25 tablet 1     BP Readings from Last 3 Encounters:   10/25/17 115/69   09/19/17 116/55   09/11/17 103/63    Wt Readings from Last 3 Encounters:   10/25/17 171 lb (77.6 kg)   09/19/17 164 lb 6.4 oz (74.6 kg)   09/08/17 157 lb (71.2 kg)                        Reviewed and updated as needed this visit by clinical staff     Reviewed and updated as needed this visit by Provider         ROS:  Constitutional, HEENT, cardiovascular, pulmonary, GI, , musculoskeletal, neuro, skin, endocrine and psych systems are negative, except as otherwise noted.      OBJECTIVE:   /69  Pulse 70  Temp 97  F (36.1  C) (Tympanic)  Ht 5' 6\" (1.676 m)  Wt 171 lb (77.6 kg)  SpO2 96%  BMI 27.6 kg/m2  Body mass index is 27.6 kg/(m^2).  GENERAL: healthy, alert and no distress  NECK: no adenopathy, no asymmetry, masses, or scars and thyroid normal to palpation  RESP: lungs clear to auscultation - no rales, rhonchi or wheezes, decreased breath sounds on left lower lung  CV: regular rate and rhythm, normal S1 S2, no S3 or S4, no murmur, click or rub, 1+ pitting edema noted bilateral shins .   MS: no gross musculoskeletal defects noted, no edema    Diagnostic Test Results:  Results for orders placed or performed in visit on 10/25/17 (from the past 24 hour(s))   INR point of care   Result Value Ref Range    INR Protime 2.4 (A) 0.86 - 1.14       ASSESSMENT/PLAN:         1. SOB (shortness of breath)  CXR does show pleural effusion on left (reviewed previous xrays and similar to 9/1/17, xray from 9/11/17 s/p thoracentesis showed drastic improvement in pleural effusion).    Oxygen saturation is stable today and she denies any angina symptoms.  She has a significant cardiac " history, however recent visit with cardiologist on 9/8/17 things were stable and 3 month recheck advised.  No signs of pneumonia on exam.  SOB most fits with the pleural effusion again, however if her symptoms change or worsen she is to go to the ER immediately, patient and daughter verbalized understanding and agreement with plan.     - XR Chest 2 Views  - US Thoracentesis; Future    2. Pleural effusion  Has been an issue recently.  Will again schedule for a thoracentesis after coumadin is held x 5 days (reviewed cardiology note and this was the plan for the last thoracentesis and she tolerated it well, no bridge noted).  I suggest she schedule an appt with her PCP for f/u (appt set for day after Thoracentesis)  - US Thoracentesis; Future    3. Chronic diastolic congestive heart failure (H)  Managed by cardiology.    4. Long term current use of anticoagulant therapy  Will hold coumadin for procedure.  She was not bridged with lovenox prior to her last thoracentesis.         CONSULTATION/REFERRAL to PCP for continued care.    Jennifer Jerez PA-C  [facility]

## 2017-10-25 NOTE — NURSING NOTE
"Chief Complaint   Patient presents with     Chest Pain       Initial LMP 02/24/2017 (Exact Date) Estimated body mass index is 23.49 kg/(m^2) as calculated from the following:    Height as of 4/4/17: 5' 7\" (1.702 m).    Weight as of 4/4/17: 150 lb (68 kg).  Medication Reconciliation: complete     Josefina Scott CMA   "

## 2017-10-25 NOTE — PROGRESS NOTES
ANTICOAGULATION FOLLOW-UP CLINIC VISIT    Patient Name:  Natalie Hair  Date:  10/25/2017  Contact Type:  Face to Face, accompanied by her daughter Emely    SUBJECTIVE:     Patient Findings     Positives Activity level change (decreased activity)    Comments Patient reports feeling more tired than usual (less ambition to do anything) and gets short of breath very easily. She is seeing a provider today to determine if their is fluid in her lungs. If she needs to have a thoracentesis writer mentioned to her daughter the need to ask about Lovenox. In the past she states she has not needed to use it (as recently as Sept this year).            OBJECTIVE    INR Protime   Date Value Ref Range Status   10/25/2017 2.4 (A) 0.86 - 1.14 Final       ASSESSMENT / PLAN  INR assessment THER +/- 0.1 of goal INR range   Recheck INR In: 2 WEEKS    INR Location Clinic      Anticoagulation Summary as of 10/25/2017     INR goal 2.5-3.5   Today's INR 2.4!   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    Comments *        Anticoagulation Care Providers     Provider Role Specialty Phone number    Mimi Mcguire MD [facility] [phone]            See the Encounter Report to view Anticoagulation Flowsheet and Dosing Calendar (Go to Encounters tab in chart review, and find the Anticoagulation Therapy Visit)        Christa Guzman, CHU

## 2017-10-26 NOTE — TELEPHONE ENCOUNTER
Proof of medical need completed for :   Tylenol - pain  ca with vit d - bone strength, nutrition  Cetirizine - allergies, itching  mvi - nutrition and recent post surgical weight loss  Senna - constipation     Send form in enclosed envelope after copy/scan to chart.     DME orders written for always discreet underwear and flushable cleansing cloths ( wipes)  For incontinence and primitivo-care   - call rene, daughter about where she wants these scripts faxed     ROZINA Mcguire MD

## 2017-10-26 NOTE — TELEPHONE ENCOUNTER
Form copied, mailed, sent to scanning.  Scripts faxed to [facility] on [address] per daughter request.    Jessie Hurtado, [place]

## 2017-10-30 NOTE — IP AVS SNAPSHOT
MRN:[id number]                      After Visit Summary   10/30/2017    Natalie Hair    MRN: [id number]           Visit Information        Provider Department      10/30/2017  1:15 PM [facility]; [facility]SE; [facility]           Review of your medicines      UNREVIEWED medicines. Ask your doctor about these medicines        Dose / Directions    * ACETAMINOPHEN PO        Dose:  1000 mg   Take 1,000 mg by mouth every 8 hours as needed for pain   Refills:  0       * acetaminophen 325 MG tablet   Commonly known as:  TYLENOL   Used for:  Back pain, unspecified back location, unspecified back pain laterality, unspecified chronicity, Chronic bilateral low back pain without sciatica        Dose:  325-650 mg   Take 1-2 tablets (325-650 mg) by mouth every 6 hours as needed for mild pain or pain   Quantity:  100 tablet   Refills:  1       alendronate 70 MG tablet   Commonly known as:  FOSAMAX   Used for:  Osteopenia        TAKE 1 TABLET BY MOUTH ONCE WEEKLY ON EMPTY STOMACH   Quantity:  12 tablet   Refills:  0       calcium carb 1250 mg (500 mg [place])/vitamin D 200 units 500-200 MG-UNIT per tablet   Commonly known as:  OSCAL with D        Dose:  1 tablet   Take 1 tablet by mouth 2 times daily (with meals)   Refills:  0       cetirizine 5 MG tablet   Commonly known as:  zyrTEC   Used for:  Acute urticaria        Dose:  5 mg   Take 1 tablet (5 mg) by mouth daily   Quantity:  30 tablet   Refills:  3       clopidogrel 75 MG tablet   Commonly known as:  PLAVIX   Used for:  Congestive heart failure, unspecified congestive heart failure chronicity, unspecified congestive heart failure type (H), Lymphedema of both lower extremities        Dose:  75 mg   Take 1 tablet (75 mg) by mouth daily   Quantity:  90 tablet   Refills:  0       donepezil 5 MG tablet   Commonly known as:  ARICEPT   Used for:  Dementia without behavioral disturbance, unspecified dementia type        Dose:  5 mg   Take  1 tablet (5 mg) by mouth At Bedtime   Quantity:  90 tablet   Refills:  0       ezetimibe 10 MG tablet   Commonly known as:  ZETIA   Used for:  Hyperlipidemia LDL goal <100        Dose:  10 mg   Take 1 tablet (10 mg) by mouth daily   Quantity:  90 tablet   Refills:  3       metoprolol 25 MG tablet   Commonly known as:  LOPRESSOR        Dose:  12.5 mg   Take 0.5 tablets (12.5 mg) by mouth 2 times daily   Quantity:  90 tablet   Refills:  3       Multi-vitamin Tabs tablet        Dose:  1 tablet   Take 1 tablet by mouth daily   Refills:  0       nitroGLYcerin 0.4 MG sublingual tablet   Commonly known as:  NITROSTAT   Used for:  Unspecified cardiovascular disease        Dose:  0.4 mg   Place 1 tablet (0.4 mg) under the tongue every 5 minutes as needed for chest pain   Quantity:  25 tablet   Refills:  1       omega 3 1000 MG Caps   Used for:  Health Care Home        Dose:  1 g   Take 1 g by mouth daily   Quantity:  90 capsule   Refills:  3       ondansetron 4 MG ODT tab   Commonly known as:  ZOFRAN ODT        Dose:  4-8 mg   Take 1-2 tablets (4-8 mg) by mouth every 6 hours as needed for nausea   Quantity:  10 tablet   Refills:  0       polyethylene glycol powder   Commonly known as:  MIRALAX/GLYCOLAX        Dose:  1 capful   Take 1 capful by mouth every morning Takes 3/4 of a capful   Refills:  0       potassium chloride 10 MEQ tablet   Commonly known as:  K-TAB,KLOR-CON   Used for:  Shortness of breath on exertion, Essential hypertension        TAKE 1 TABLET(10 MEQ) BY MOUTH TWICE DAILY   Quantity:  90 tablet   Refills:  1       SENNA S 8.6-50 MG per tablet   Generic drug:  senna-docusate        Dose:  2 tablet   Take 2 tablets by mouth 2 times daily   Refills:  0       spironolactone 25 MG tablet   Commonly known as:  ALDACTONE   Used for:  Congestive heart failure, unspecified congestive heart failure chronicity, unspecified congestive heart failure type (H), Lymphedema of both lower extremities        Dose:  25 mg    Take 1 tablet (25 mg) by mouth 2 times daily   Quantity:  60 tablet   Refills:  1       torsemide 20 MG tablet   Commonly known as:  DEMADEX   Used for:  Lymphedema of both lower extremities, Pleural effusion        TAKE 1 TABLET(20 MG) BY MOUTH TWICE DAILY   Quantity:  60 tablet   Refills:  0       triamcinolone 0.1 % cream   Commonly known as:  KENALOG   Used for:  Other eczema        Apply sparingly to affected area two times daily for 7 days.   Quantity:  30 g   Refills:  0       TYLENOL PM EXTRA STRENGTH PO        Dose:  2-3 tablet   Take 2-3 tablets by mouth nightly as needed   Refills:  0       vitamin D 2000 UNITS Caps        Dose:  1 capsule   Take 1 capsule by mouth daily   Refills:  0       warfarin 4 MG tablet   Commonly known as:  COUMADIN   Used for:  Congestive heart failure, unspecified congestive heart failure chronicity, unspecified congestive heart failure type (H)        Take 6 mg on Sun, Tue, Thu; 4 mg all other days or as directed by the Anticoagulation Clinic.   Quantity:  40 tablet   Refills:  2       ZZZQUIL PO        Dose:  50 mg   Take 50 mg by mouth nightly as needed Alternates with Tylenol PM   Refills:  0       * Notice:  This list has 2 medication(s) that are the same as other medications prescribed for you. Read the directions carefully, and ask your doctor or other care provider to review them with you.      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), Lymphedema of both lower extremities, Right-sided congestive heart failure, Tricuspid valve insufficiency, unspecified etiology, Congestive heart failure, unspecified congestive heart failure  chronicity, unspecified congestive heart failure type (H), History of pressure ulcer        Equipment being ordered: Hospital Bed 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 -ongoing symptoms of lower extremity edema,shortness of breath,cough sacral pressure sores within last 6 months   An electric hospital bed to allow for increase in head of bed elevation and for ease in wheelchair transfers  Length of need: lifelong NPI - [id number]   Quantity:  1 Device   Refills:  0       * order for DME   Used for:  Decubitus ulcer of sacral region, unspecified ulcer stage        Mepilex sacral dressings   Quantity:  10 Units   Refills:  3       * order for DME   Used for:  Urinary incontinence, unspecified type, History of pressure ulcer        always discreet underwear - 2 pairs/day  - 60/month flushable cleansing cloths ( wipes)  - several packages/month   For incontinence and primitivo-care   Quantity:  1 Month   Refills:  3       * Notice:  This list has 4 medication(s) that are the same as other medications prescribed for you. Read the directions carefully, and ask your doctor or other care provider to review them with you.             Protect others around you: Learn how to safely use, store and throw away your medicines at www.disposemymeds.org.         Follow-ups after your visit        Your next 10 appointments already scheduled     Oct 31, 2017  3:00 PM CDT   SHORT with Mimi Mcguire MD   [facility]    [address] [address]   [phone]            Nov 08, 2017  9:30 AM CST   Anticoagulation Visit with MATT ANTI COAG   [facility]    [address] [address]   [phone]            Nov 21, 2017  8:40 AM CST   Pacemaker Check with Wy Device Rn   [facility] ([facility])    [address] [address]   [phone]            Nov 21, 2017  9:30 AM  "CST   Return Visit with Emelyn Bingham MD   [facility] AT [facility] ([facility])    [address] [address] [phone]               Care Instructions        Further instructions from your care team                         Radiology  Discharge Instructions for Thoracentesis    You have had a thoracentesis procedure today.  A needle or catheter was inserted into your chest to remove fluid for laboratory studies and/or to remove the excess fluid from your chest.    AFTER YOU ARE HOME:    Rest at home today.    Limit heavy physical activity such as lifting, straining, or exercise for 48 hours.  You may resume normal activity in 24 hours.    Resume previous diet and medications.    You may remove the bandage in 24 hours.    CALL YOUR PRIMARY PROVIDER IF:    You develop a temperature over 101oF, or redness at the drainage site.    You have any other questions or concerns.    AFTER HOURS CALL [place] [facility] AT [phone]    COME TO EMERGENCY ROOM IF:    You develop chest pain or shortness of breath, heavy bleeding from the thoracentesis site, severe lightheadedness or fainting.  DO NOT DRIVE YOURSELF.          ADDITIONAL INSTRUCTIONS:   Return tomorrow at 9:30 for a follow up chest xray.    I have reviewed and understand these instructions.      __________________________________________________  Natalie Hair      __________________________________________________     Nurse/Radiologist Signature      Date: 10/30/2017             Additional Information About Your Visit        Gizmo5har[employer]urence Information     [email or web address] lets you send messages to your doctor, view your test results, renew your prescriptions, schedule appointments and more. To sign up, go to [email or web address] . Click on \"Log in\" on the left side of the screen, which will take you to the Welcome page. Then click on \"Sign up Now\" on the right side of the page.     You will " be asked to enter the access code listed below, as well as some personal information. Please follow the directions to create your username and password.     Your access code is: [email or web address]  Expires: 2017 11:43 AM     Your access code will  in 90 days. If you need help or a new code, please call your [facility] clinic or [phone].        Care EveryWhere ID     This is your Care EveryWhere ID. This could be used by other organizations to access your [facility] medical records  [id number]        Your Vitals Were     Blood Pressure Pulse Respirations Pulse Oximetry          119/68 72 16 98%         Primary Care Provider Office Phone # Fax #    Mimi Mcguire [phone]      Equal Access to Services     JA FOWLER : Hadii aad ku hadasho Soomaali, waaxda luqadaha, qaybta kaalmada adeegyada, lyric sanchez . So [facility] [phone].    ATENCIÓN: Si habla español, tiene a simmons disposición servicios gratuitos de asistencia lingüística. [facility] [phone].    We comply with applicable federal civil rights laws and [place] laws. We do not discriminate on the basis of race, color, national origin, age, disability, sex, sexual orientation, or gender identity.            Thank you!     Thank you for choosing [facility] for your care. Our goal is always to provide you with excellent care. Hearing back from our patients is one way we can continue to improve our services. Please take a few minutes to complete the written survey that you may receive in the mail after you visit with us. Thank you!             Medication List: This is a list of all your medications and when to take them. Check marks below indicate your daily home schedule. Keep this list as a reference.      Medications           Morning Afternoon Evening Bedtime As Needed    * ACETAMINOPHEN PO   Take 1,000 mg by mouth every 8 hours as needed for pain                                * acetaminophen 325 MG  tablet   Commonly known as:  TYLENOL   Take 1-2 tablets (325-650 mg) by mouth every 6 hours as needed for mild pain or pain                                alendronate 70 MG tablet   Commonly known as:  FOSAMAX   TAKE 1 TABLET BY MOUTH ONCE WEEKLY ON EMPTY STOMACH                                calcium carb 1250 mg (500 mg [place])/vitamin D 200 units 500-200 MG-UNIT per tablet   Commonly known as:  OSCAL with D   Take 1 tablet by mouth 2 times daily (with meals)                                cetirizine 5 MG tablet   Commonly known as:  zyrTEC   Take 1 tablet (5 mg) by mouth daily                                clopidogrel 75 MG tablet   Commonly known as:  PLAVIX   Take 1 tablet (75 mg) by mouth daily                                donepezil 5 MG tablet   Commonly known as:  ARICEPT   Take 1 tablet (5 mg) by mouth At Bedtime                                ezetimibe 10 MG tablet   Commonly known as:  ZETIA   Take 1 tablet (10 mg) by mouth daily                                metoprolol 25 MG tablet   Commonly known as:  LOPRESSOR   Take 0.5 tablets (12.5 mg) by mouth 2 times daily                                Multi-vitamin Tabs tablet   Take 1 tablet by mouth daily                                nitroGLYcerin 0.4 MG sublingual tablet   Commonly known as:  NITROSTAT   Place 1 tablet (0.4 mg) under the tongue every 5 minutes as needed for chest pain                                omega 3 1000 MG Caps   Take 1 g by mouth daily                                ondansetron 4 MG ODT tab   Commonly known as:  ZOFRAN ODT   Take 1-2 tablets (4-8 mg) by mouth every 6 hours as needed for nausea                                * order for DME   Equipment being ordered: Walker with a seat                                * order for DME   Equipment being ordered: Hospital Bed 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 -ongoing symptoms of lower extremity edema,shortness of breath,cough sacral pressure sores within last 6 months   An  electric hospital bed to allow for increase in head of bed elevation and for ease in wheelchair transfers  Length of need: lifelong NPI - [id number]                                * order for DME   Mepilex sacral dressings                                * order for DME   always discreet underwear - 2 pairs/day  - 60/month flushable cleansing cloths ( wipes)  - several packages/month   For incontinence and primitivo-care                                polyethylene glycol powder   Commonly known as:  MIRALAX/GLYCOLAX   Take 1 capful by mouth every morning Takes 3/4 of a capful                                potassium chloride 10 MEQ tablet   Commonly known as:  K-TAB,KLOR-CON   TAKE 1 TABLET(10 MEQ) BY MOUTH TWICE DAILY                                SENNA S 8.6-50 MG per tablet   Take 2 tablets by mouth 2 times daily   Generic drug:  senna-docusate                                spironolactone 25 MG tablet   Commonly known as:  ALDACTONE   Take 1 tablet (25 mg) by mouth 2 times daily                                torsemide 20 MG tablet   Commonly known as:  DEMADEX   TAKE 1 TABLET(20 MG) BY MOUTH TWICE DAILY                                triamcinolone 0.1 % cream   Commonly known as:  KENALOG   Apply sparingly to affected area two times daily for 7 days.                                TYLENOL PM EXTRA STRENGTH PO   Take 2-3 tablets by mouth nightly as needed                                vitamin D 2000 UNITS Caps   Take 1 capsule by mouth daily                                warfarin 4 MG tablet   Commonly known as:  COUMADIN   Take 6 mg on Sun, Tue, Thu; 4 mg all other days or as directed by the Anticoagulation Clinic.                                ZZZQUIL PO   Take 50 mg by mouth nightly as needed Alternates with Tylenol PM                                * Notice:  This list has 6 medication(s) that are the same as other medications prescribed for you. Read the directions carefully, and ask your doctor or other care  provider to review them with you.

## 2017-10-30 NOTE — IP AVS SNAPSHOT
[facility]    [address] [address]    Phone:  [phone]                                       After Visit Summary   10/30/2017    Natalie Hair    MRN: [id number]           After Visit Summary Signature Page     I have received my discharge instructions, and my questions have been answered. I have discussed any challenges I see with this plan with the nurse or doctor.    ..........................................................................................................................................  Patient/Patient Representative Signature      ..........................................................................................................................................  Patient Representative Print Name and Relationship to Patient    ..................................................               ................................................  Date                                            Time    ..........................................................................................................................................  Reviewed by Signature/Title    ...................................................              ..............................................  Date                                                            Time

## 2017-10-30 NOTE — PROGRESS NOTES
Left sided thoracentesis performed by radiologist.  1100   ml of clear yellow fluid removed. Pressure held at site after catheter removed.  Band aid applied. No bleeding noted. Post procedure CXR performed and read by radiologist. Small pneumothorax noted. Patient monitored and CXR repeated at 1501. Pneumothorax not completely resolved, therefore patient returning in AM for follow up xray. Patient's daughter understands discharge instruction.

## 2017-10-30 NOTE — DISCHARGE INSTRUCTIONS
Radiology  Discharge Instructions for Thoracentesis    You have had a thoracentesis procedure today.  A needle or catheter was inserted into your chest to remove fluid for laboratory studies and/or to remove the excess fluid from your chest.    AFTER YOU ARE HOME:    Rest at home today.    Limit heavy physical activity such as lifting, straining, or exercise for 48 hours.  You may resume normal activity in 24 hours.    Resume previous diet and medications.    You may remove the bandage in 24 hours.    CALL YOUR PRIMARY PROVIDER IF:    You develop a temperature over 101oF, or redness at the drainage site.    You have any other questions or concerns.    AFTER HOURS CALL [place] [facility] AT [phone]    COME TO EMERGENCY ROOM IF:    You develop chest pain or shortness of breath, heavy bleeding from the thoracentesis site, severe lightheadedness or fainting.  DO NOT DRIVE YOURSELF.          ADDITIONAL INSTRUCTIONS:   Return tomorrow at 9:30 for a follow up chest xray.    I have reviewed and understand these instructions.      __________________________________________________  Natalie Hair      __________________________________________________     Nurse/Radiologist Signature      Date: 10/30/2017

## 2017-10-31 NOTE — NURSING NOTE
"Chief Complaint   Patient presents with     Follow Up       Initial /49 (BP Location: Right arm, Patient Position: Chair, Cuff Size: Adult Regular)  Pulse 71  Temp 97.9  F (36.6  C) (Tympanic)  Ht 5' 6\" (1.676 m)  SpO2 97% Estimated body mass index is 27.6 kg/(m^2) as calculated from the following:    Height as of 10/25/17: 5' 6\" (1.676 m).    Weight as of 10/25/17: 171 lb (77.6 kg).  Medication Reconciliation: complete     Asad Escobar CMA    "

## 2017-10-31 NOTE — TELEPHONE ENCOUNTER
TYLOR to Dr. Mcguire: Margarita from [facility] called and reports that Natalie had a thoracentesis of her left lung yesterday.She had a PA Chest xray afterwards and it showed that she had a small pneumothorax. She was asymptomatic. This morning she had another PA chest xray and it has not resloved. Margarita said that radiologist, Dr. William Del Castillo at [phone] would like you to order a STAT PA chest xray at Natalie's office visit today and get Dr. Del Castillo's reading of xray while Natalie is at her appointment here this afternoon.  Anam Dejesus, RN

## 2017-10-31 NOTE — PROGRESS NOTES
SUBJECTIVE:   Natalie Hair is a 85 year old female who presents to clinic today for the following health issues:      Patient is here today for a follow up. Had a left sided thoracentesis yesterday and was diagnosed with pneumothorax afterwards.   Study Result   XR CHEST 1 VW 10/31/2017 9:31 AM     COMPARISON: Chest radiographs from the previous day.     HISTORY: Follow-up postthoracentesis pneumothorax.         IMPRESSION: Small left apical pneumothorax is again seen. This  measures a depth of 13 mm at this time, this is slightly increased  from 9 mm on 10/30/2017 at 1517 hours. However, the left base  component of the pneumothorax is no longer present, suggesting a  redistribution of air. Recommend continued follow-up with repeat chest  x-ray in approximately 5-6 hours. Small left pleural effusion is also  seen. Right lung is clear. Enlarged cardiac silhouette again seen and  unchanged.     CASEY THOMAS MD       Patient is feeling sore on the left side of her chest when coughing. But no shortness of breath.   Pain is bearable     She also has a lump under her right arm that she is concerned about. No pain.     She also would like a flu shot if possible.     Review of systems:  No f/c   No n/v   P    Problem list and histories reviewed & adjusted, as indicated.  Additional history: as documented    Patient Active Problem List   Diagnosis     Heart valve replaced     Cardiovascular disease     History of T12 compression fracture     Backache     Vitamin D deficiency     Hyperlipidemia LDL goal <100     Advanced directives, counseling/discussion     Long term current use of anticoagulant therapy     Urinary incontinence     Unstable angina (H)     Benign essential hypertension, BP goal <150/90     Bilateral low back pain without sciatica     Osteopenia     Atrial fibrillation with RVR (H)     Primary pulmonary hypertension (H)     Lymphedema of both lower extremities     Tricuspid valve insufficiency,  unspecified etiology     Near syncope     Chronic diastolic congestive heart failure (H)     History of pressure ulcer     Health Care Home     S/P CABG (coronary artery bypass graft) - 2002     Stented coronary artery - 5/4/2017 at [place]     Decubitus ulcer of sacral region, unspecified ulcer stage     Pleural effusion     Memory loss     Current Outpatient Prescriptions   Medication     torsemide (DEMADEX) 20 MG tablet     acetaminophen (TYLENOL) 325 MG tablet     potassium chloride (K-TAB,KLOR-CON) 10 MEQ tablet     ezetimibe (ZETIA) 10 MG tablet     triamcinolone (KENALOG) 0.1 % cream     donepezil (ARICEPT) 5 MG tablet     clopidogrel (PLAVIX) 75 MG tablet     spironolactone (ALDACTONE) 25 MG tablet     calcium carb 1250 mg, 500 mg [place],/vitamin D 200 units (OSCAL WITH D) 500-200 MG-UNIT per tablet     DiphenhydrAMINE HCl, Sleep, (ZZZQUIL PO)     Diphenhydramine-APAP, sleep, (TYLENOL PM EXTRA STRENGTH PO)     alendronate (FOSAMAX) 70 MG tablet     omega 3 1000 MG CAPS     multivitamin, therapeutic with minerals (MULTI-VITAMIN) TABS tablet     metoprolol (LOPRESSOR) 25 MG tablet     cetirizine (ZYRTEC) 5 MG tablet     polyethylene glycol (MIRALAX/GLYCOLAX) powder     Cholecalciferol (VITAMIN D) 2000 UNITS CAPS     senna-docusate (SENNA S) 8.6-50 MG per tablet     ACETAMINOPHEN PO     order for DME     warfarin (COUMADIN) 4 MG tablet     order for DME     ondansetron (ZOFRAN ODT) 4 MG ODT tab     order for DME     order for DME     nitroglycerin (NITROSTAT) 0.4 MG SL tablet     No current facility-administered medications for this visit.         Allergies   Allergen Reactions     Lorazepam Nausea and Vomiting     Morphine Sulfate Cr [Morphine Sulfate] Nausea and Vomiting     Crestor [Rosuvastatin] Other (See Comments)     Abdominal/Back pain     Lidocaine GI Disturbance     Cozaar [Losartan] Rash     Rash        /49 (BP Location: Right arm, Patient Position: Chair, Cuff Size: Adult Regular)  Pulse 71  Temp  "97.9  F (36.6  C) (Tympanic)  Ht 5' 6\" (1.676 m)  SpO2 97%  GENERAL - Pt is alert and oriented in no acute distress.  Affect is appropriate. Good eye contact.  RESPIRATORY - Clear to auscultation bilaterally.  No wheezing noted  SKIN - superficial lump under the right arm. Approximately 5mm in diameter. No redness/warmth/swelling     chest x-ray- discussed with radiology - stable left pneumothorax      Assessment/Plan -  (J95.811) Postprocedural pneumothorax  (primary encounter diagnosis)  Comment: stable. Recheck in 24hours per radiology.   Plan: XR Chest 1 View            (L72.3) Sebaceous cyst  Comment: reassurance given   Plan:       (Z23) Need for prophylactic vaccination and inoculation against influenza  Comment:   Plan: ADMIN INFLUENZA (For MEDICARE Patients ONLY)         [], FLU VACCINE, INCREASED ANTIGEN, PRESV         FREE, AGE 65+ [65438], ADMIN INFLUENZA (For         MEDICARE Patients ONLY) []            ROZINA Mcguire MD             Injectable Influenza Immunization Documentation    1.  Is the person to be vaccinated sick today?   No    2. Does the person to be vaccinated have an allergy to a component   of the vaccine?   No  Egg Allergy Algorithm Link    3. Has the person to be vaccinated ever had a serious reaction   to influenza vaccine in the past?   No    4. Has the person to be vaccinated ever had Guillain-Barré syndrome?   No    Form completed by Asad Escobar CMA                    "

## 2017-10-31 NOTE — MR AVS SNAPSHOT
After Visit Summary   10/31/2017    Natalie Hair    MRN: [id number]           Patient Information     Date Of Birth          8/28/1932        Visit Information        Provider Department      10/31/2017 3:00 PM Mimi Mcguire MD [facility]        Today's Diagnoses     Postprocedural pneumothorax    -  1    Need for prophylactic vaccination and inoculation against influenza        Sebaceous cyst           Follow-ups after your visit        Your next 10 appointments already scheduled     Nov 01, 2017  3:40 PM CDT   Office Visit with Fan Chadwick MD   [facility]    [address] [facility] [address]   [phone]           Bring a current list of meds and any records pertaining to this visit. For Physicals, please bring immunization records and any forms needing to be filled out. Please arrive 10 minutes early to complete paperwork.            Nov 08, 2017  9:30 AM CST   Anticoagulation Visit with MATT ANTI COAG   [facility] ([facility])    [address]  [facility] [address]   [phone]            Nov 21, 2017  8:40 AM CST   Pacemaker Check with Wy Device Rn   [facility] ([facility])    [address] [facility] [address] [phone]            Nov 21, 2017  9:30 AM CST   Return Visit with Emelyn Bingham MD   [facility] ([facility])    [address] [address] [phone]              Future tests that were ordered for you today     Open Future Orders        Priority Expected Expires Ordered    XR Chest 1 View Routine 10/30/2017 10/30/2018 10/30/2017            Who to contact     Normal or non-critical lab and imaging results will be communicated to you by MyChart, letter or phone within 4 business days after the clinic has received the results. If you do not hear from  "us within 7 days, please contact the clinic through [facility] or phone. If you have a critical or abnormal lab result, we will notify you by phone as soon as possible.  Submit refill requests through [facility] or call your pharmacy and they will forward the refill request to us. Please allow 3 business days for your refill to be completed.          If you need to speak with a  for additional information , please call: [phone]             Additional Information About Your Visit        [facility] Information     [facility] lets you send messages to your doctor, view your test results, renew your prescriptions, schedule appointments and more. To sign up, go to [email or web address] . Click on \"Log in\" on the left side of the screen, which will take you to the Welcome page. Then click on \"Sign up Now\" on the right side of the page.     You will be asked to enter the access code listed below, as well as some personal information. Please follow the directions to create your username and password.     Your access code is: [email or web address]  Expires: 2017 11:43 AM     Your access code will  in 90 days. If you need help or a new code, please call your [facility] clinic or [phone].        Care EveryWhere ID     This is your Care EveryWhere ID. This could be used by other organizations to access your [facility] medical records  [id number]        Your Vitals Were     Pulse Temperature Height Pulse Oximetry          71 97.9  F (36.6  C) (Tympanic) 5' 6\" (1.676 m) 97%         Blood Pressure from Last 3 Encounters:   10/31/17 123/49   10/30/17 119/68   10/25/17 115/69    Weight from Last 3 Encounters:   10/25/17 171 lb (77.6 kg)   17 164 lb 6.4 oz (74.6 kg)   17 157 lb (71.2 kg)              We Performed the Following     ADMIN INFLUENZA (For MEDICARE Patients ONLY) []     ADMIN INFLUENZA (For MEDICARE Patients ONLY) []     FLU VACCINE, INCREASED ANTIGEN, PRESV FREE, AGE 65+ [24666]  "       Primary Care Provider Office Phone # Fax #    Mimi Mcguire [phone] [address] [address]        Equal Access to Services     JA MCNULTYADRIEN : Lilian pily almaguer adrian Monroe, waaxda luqadaha, qaybta kaalmada zahira, lyric moffettfrantz ra. So [facility] [phone].    ATENCIÓN: Si habla español, tiene a simmons disposición servicios gratuitos de asistencia lingüística. Llame al [phone].    We comply with applicable federal civil rights laws and [place] laws. We do not discriminate on the basis of race, color, national origin, age, disability, sex, sexual orientation, or gender identity.            Thank you!     Thank you for choosing [facility]  for your care. Our goal is always to provide you with excellent care. Hearing back from our patients is one way we can continue to improve our services. Please take a few minutes to complete the written survey that you may receive in the mail after your visit with us. Thank you!             Your Updated Medication List - Protect others around you: Learn how to safely use, store and throw away your medicines at www.disposemymeds.org.          This list is accurate as of: 10/31/17  3:48 PM.  Always use your most recent med list.                   Brand Name Dispense Instructions for use Diagnosis    * ACETAMINOPHEN PO      Take 1,000 mg by mouth every 8 hours as needed for pain        * acetaminophen 325 MG tablet    TYLENOL    100 tablet    Take 1-2 tablets (325-650 mg) by mouth every 6 hours as needed for mild pain or pain    Back pain, unspecified back location, unspecified back pain laterality, unspecified chronicity, Chronic bilateral low back pain without sciatica       alendronate 70 MG tablet    FOSAMAX    12 tablet    TAKE 1 TABLET BY MOUTH ONCE WEEKLY ON EMPTY STOMACH    Osteopenia       calcium carb 1250 mg (500 mg [place])/vitamin D 200 units 500-200 MG-UNIT per tablet    OSCAL with D      Take 1 tablet by mouth 2 times daily (with meals)        cetirizine 5 MG tablet    zyrTEC    30 tablet    Take 1 tablet (5 mg) by mouth daily    Acute urticaria       clopidogrel 75 MG tablet    PLAVIX    90 tablet    Take 1 tablet (75 mg) by mouth daily    Congestive heart failure, unspecified congestive heart failure chronicity, unspecified congestive heart failure type (H), Lymphedema of both lower extremities       donepezil 5 MG tablet    ARICEPT    90 tablet    Take 1 tablet (5 mg) by mouth At Bedtime    Dementia without behavioral disturbance, unspecified dementia type       ezetimibe 10 MG tablet    ZETIA    90 tablet    Take 1 tablet (10 mg) by mouth daily    Hyperlipidemia LDL goal <100       metoprolol 25 MG tablet    LOPRESSOR    90 tablet    Take 0.5 tablets (12.5 mg) by mouth 2 times daily        Multi-vitamin Tabs tablet      Take 1 tablet by mouth daily        nitroGLYcerin 0.4 MG sublingual tablet    NITROSTAT    25 tablet    Place 1 tablet (0.4 mg) under the tongue every 5 minutes as needed for chest pain    Unspecified cardiovascular disease       omega 3 1000 MG Caps     90 capsule    Take 1 g by mouth daily    Health Care Home       ondansetron 4 MG ODT tab    ZOFRAN ODT    10 tablet    Take 1-2 tablets (4-8 mg) by mouth every 6 hours as needed for nausea        * order for DME     1 Package    Equipment being ordered: Walker with a seat    Acute on chronic diastolic congestive heart failure (H), At risk for falling       * order for DME     1 Device    Equipment being ordered: Hospital Bed 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 -ongoing symptoms of lower extremity edema,shortness of breath,cough sacral pressure sores within last 6 months   An electric hospital bed to allow for increase in head of bed elevation and for ease in wheelchair transfers  Length of need: lifelong NPI - [id number]    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), Lymphedema of both  lower extremities, Right-sided congestive heart failure, Tricuspid valve insufficiency, unspecified etiology, Congestive heart failure, unspecified congestive heart failure chronicity, unspecified congestive heart failure type (H), History of pressure ulcer       * order for DME     10 Units    Mepilex sacral dressings    Decubitus ulcer of sacral region, unspecified ulcer stage       * order for DME     1 Month    always discreet underwear - 2 pairs/day  - 60/month flushable cleansing cloths ( wipes)  - several packages/month   For incontinence and primitivo-care    Urinary incontinence, unspecified type, History of pressure ulcer       polyethylene glycol powder    MIRALAX/GLYCOLAX     Take 1 capful by mouth every morning Takes 3/4 of a capful        potassium chloride 10 MEQ tablet    K-TAB,KLOR-CON    90 tablet    TAKE 1 TABLET(10 MEQ) BY MOUTH TWICE DAILY    Shortness of breath on exertion, Essential hypertension       SENNA S 8.6-50 MG per tablet   Generic drug:  senna-docusate      Take 2 tablets by mouth 2 times daily        spironolactone 25 MG tablet    ALDACTONE    60 tablet    Take 1 tablet (25 mg) by mouth 2 times daily    Congestive heart failure, unspecified congestive heart failure chronicity, unspecified congestive heart failure type (H), Lymphedema of both lower extremities       torsemide 20 MG tablet    DEMADEX    60 tablet    TAKE 1 TABLET(20 MG) BY MOUTH TWICE DAILY    Lymphedema of both lower extremities, Pleural effusion       triamcinolone 0.1 % cream    KENALOG    30 g    Apply sparingly to affected area two times daily for 7 days.    Other eczema       TYLENOL PM EXTRA STRENGTH PO      Take 2-3 tablets by mouth nightly as needed        vitamin D 2000 UNITS Caps      Take 1 capsule by mouth daily        warfarin 4 MG tablet    COUMADIN    40 tablet    Take 6 mg on Sun, Tue, Thu; 4 mg all other days or as directed by the Anticoagulation Clinic.    Congestive heart failure, unspecified congestive  heart failure chronicity, unspecified congestive heart failure type (H)       ZZZQUIL PO      Take 50 mg by mouth nightly as needed Alternates with Tylenol PM        * Notice:  This list has 6 medication(s) that are the same as other medications prescribed for you. Read the directions carefully, and ask your doctor or other care provider to review them with you.

## 2017-11-01 NOTE — PROGRESS NOTES
SUBJECTIVE:                                                    Natalie Hair is a 85 year old female who presents to clinic today for the following health issues:    Chief Complaint   Patient presents with     Follow Up For     Yesterdays appointemtn with Dr. Mcguire     **Needing a chest x ray  **Wants to discuss warfarin schedule      Problem list and histories reviewed & adjusted, as indicated.  Additional history:     Patient Active Problem List   Diagnosis     Heart valve replaced     Cardiovascular disease     History of T12 compression fracture     Backache     Vitamin D deficiency     Hyperlipidemia LDL goal <100     Advanced directives, counseling/discussion     Long term current use of anticoagulant therapy     Urinary incontinence     Unstable angina (H)     Benign essential hypertension, BP goal <150/90     Bilateral low back pain without sciatica     Osteopenia     Atrial fibrillation with RVR (H)     Primary pulmonary hypertension (H)     Lymphedema of both lower extremities     Tricuspid valve insufficiency, unspecified etiology     Near syncope     Chronic diastolic congestive heart failure (H)     History of pressure ulcer     Health Care Home     S/P CABG (coronary artery bypass graft) - 2002     Stented coronary artery - 5/4/2017 at [place]     Decubitus ulcer of sacral region, unspecified ulcer stage     Pleural effusion     Memory loss     Past Surgical History:   Procedure Laterality Date     ARTHROPLASTY KNEE BILATERAL       SURGICAL HISTORY OF -   12/2002    Triple bypass w/valve replacement - aortic       Social History   Substance Use Topics     Smoking status: Former Smoker     Types: Cigarettes     Smokeless tobacco: Never Used      Comment: 40 years ago     Alcohol use No     Family History   Problem Relation Age of Onset     HEART DISEASE Mother      Lipids Son      HEART DISEASE Son      Lipids Daughter          she feels good  At her baseline not worse not beter.  "  ROS:  Constitutional, HEENT, cardiovascular, pulmonary, gi and gu systems are negative, except as otherwise noted.      OBJECTIVE:                                                    /66 (BP Location: Right arm, Patient Position: Sitting, Cuff Size: Adult Regular)  Pulse 69  Temp 97  F (36.1  C) (Tympanic)  Ht 5' 6\" (1.676 m)  Wt 171 lb (77.6 kg)  SpO2 96%  BMI 27.6 kg/m2 Body mass index is 27.6 kg/(m^2).   GENERAL: healthy, alert, well nourished, well hydrated, no distress  HENT: ear canals- normal; TMs- normal; Nose- normal; Mouth- no ulcers, no lesions  NECK: no tenderness, no adenopathy, no asymmetry, no masses, no stiffness; thyroid- normal to palpation  RESP: lungs clear to auscultation - no rales, no rhonchi, no wheezes  CV: regular rates and rhythm, normal S1 S2, no S3 or S4 and no murmur, no click or rub -  ABDOMEN: soft, no tenderness, no  hepatosplenomegaly, no masses, normal bowel sounds       ASSESSMENT/PLAN:                                                      (J95.811) Postprocedural pneumothorax  (primary encounter diagnosis)  Plan: XR Chest 1 View  My read is no change to very slightly worse.  Should have repeat cxr janette am.    X-RAY read personally by me and reviewed with patient, sent for final read by radiology    Call 911 is she gets sob.  [facility]    "

## 2017-11-01 NOTE — MR AVS SNAPSHOT
After Visit Summary   11/1/2017    Natalie Hair    MRN: [id number]           Patient Information     Date Of Birth          8/28/1932        Visit Information        Provider Department      11/1/2017 3:40 PM Fan Chadwick MD [facility]        Today's Diagnoses     Postprocedural pneumothorax    -  1       Follow-ups after your visit        Your next 10 appointments already scheduled     Nov 08, 2017  9:30 AM CST   [facility] with LL ANTI COAG   [facility]    [address] [address]   [phone]            Nov 21, 2017  8:40 AM CST   Pacemaker Check with Wy Device Rn   [facility] ([facility])    [address] [address] [phone]            Nov 21, 2017  9:30 AM CST   Return Visit with Emelyn Bingham MD   [facility] ([facility])    [address] [address] [phone]              Who to contact     Normal or non-critical lab and imaging results will be communicated to you by [employer], letter or phone within 4 business days after the clinic has received the results. If you do not hear from us within 7 days, please contact the clinic through MyChart or phone. If you have a critical or abnormal lab result, we will notify you by phone as soon as possible.  Submit refill requests through [employer] or call your pharmacy and they will forward the refill request to us. Please allow 3 business days for your refill to be completed.          If you need to speak with a  for additional information , please call: [phone]             Additional Information About Your Visit        [employer] Information     [employer] lets you send messages to your doctor, view your test results, renew your prescriptions, schedule appointments and more. To sign up, go to  [email or web address] . Click on \"Log in\" on the left side of the screen, which will take you to the Welcome page. Then click on \"Sign up Now\" on the right side of the page.     You will be asked to enter the access code listed below, as well as some personal information. Please follow the directions to create your username and password.     Your access code is: [email or web address]  Expires: 2017 11:43 AM     Your access code will  in 90 days. If you need help or a new code, please call your [facility] clinic or [phone].        Care EveryWhere ID     This is your Care EveryWhere ID. This could be used by other organizations to access your [facility] medical records  [id number]        Your Vitals Were     Pulse Temperature Height Pulse Oximetry BMI (Body Mass Index)       69 97  F (36.1  C) (Tympanic) 5' 6\" (1.676 m) 96% 27.6 kg/m2        Blood Pressure from Last 3 Encounters:   17 118/66   10/31/17 123/49   10/30/17 119/68    Weight from Last 3 Encounters:   17 171 lb (77.6 kg)   10/25/17 171 lb (77.6 kg)   17 164 lb 6.4 oz (74.6 kg)               Primary Care Provider Office Phone # Fax #    Mimi Kamron Mcguire [phone] [address] [facility] [address]        Equal Access to Services     [facility] AH: Hadii aad ku hadasho Soomaali, waaxda luqadaha, qaybta kaalmada adeegyada, waxay bjorn sanchez . So [facility] [phone].    ATENCIÓN: Si habla español, tiene a simmons disposición servicios gratuitos de asistencia lingüística. Kevin al [phone].    We comply with applicable federal civil rights laws and [place] laws. We do not discriminate on the basis of race, color, national origin, age, disability, sex, sexual orientation, or gender identity.            Thank you!     Thank you for choosing [facility]  for your care. Our goal is always to provide you with excellent care. Hearing back from our patients is one way we can " continue to improve our services. Please take a few minutes to complete the written survey that you may receive in the mail after your visit with us. Thank you!             Your Updated Medication List - Protect others around you: Learn how to safely use, store and throw away your medicines at www.disposemymeds.org.          This list is accurate as of: 11/1/17  4:14 PM.  Always use your most recent med list.                   Brand Name Dispense Instructions for use Diagnosis    * ACETAMINOPHEN PO      Take 1,000 mg by mouth every 8 hours as needed for pain        * acetaminophen 325 MG tablet    TYLENOL    100 tablet    Take 1-2 tablets (325-650 mg) by mouth every 6 hours as needed for mild pain or pain    Back pain, unspecified back location, unspecified back pain laterality, unspecified chronicity, Chronic bilateral low back pain without sciatica       alendronate 70 MG tablet    FOSAMAX    12 tablet    TAKE 1 TABLET BY MOUTH ONCE WEEKLY ON EMPTY STOMACH    Osteopenia       calcium carb 1250 mg (500 mg [place])/vitamin D 200 units 500-200 MG-UNIT per tablet    OSCAL with D     Take 1 tablet by mouth 2 times daily (with meals)        cetirizine 5 MG tablet    zyrTEC    30 tablet    Take 1 tablet (5 mg) by mouth daily    Acute urticaria       clopidogrel 75 MG tablet    PLAVIX    90 tablet    Take 1 tablet (75 mg) by mouth daily    Congestive heart failure, unspecified congestive heart failure chronicity, unspecified congestive heart failure type (H), Lymphedema of both lower extremities       donepezil 5 MG tablet    ARICEPT    90 tablet    Take 1 tablet (5 mg) by mouth At Bedtime    Dementia without behavioral disturbance, unspecified dementia type       ezetimibe 10 MG tablet    ZETIA    90 tablet    Take 1 tablet (10 mg) by mouth daily    Hyperlipidemia LDL goal <100       metoprolol 25 MG tablet    LOPRESSOR    90 tablet    Take 0.5 tablets (12.5 mg) by mouth 2 times daily        Multi-vitamin Tabs tablet       Take 1 tablet by mouth daily        nitroGLYcerin 0.4 MG sublingual tablet    NITROSTAT    25 tablet    Place 1 tablet (0.4 mg) under the tongue every 5 minutes as needed for chest pain    Unspecified cardiovascular disease       omega 3 1000 MG Caps     90 capsule    Take 1 g by mouth daily    Health Care Home       ondansetron 4 MG ODT tab    ZOFRAN ODT    10 tablet    Take 1-2 tablets (4-8 mg) by mouth every 6 hours as needed for nausea        * order for DME     1 Package    Equipment being ordered: Walker with a seat    Acute on chronic diastolic congestive heart failure (H), At risk for falling       * order for DME     1 Device    Equipment being ordered: Hospital Bed 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 -ongoing symptoms of lower extremity edema,shortness of breath,cough sacral pressure sores within last 6 months   An electric hospital bed to allow for increase in head of bed elevation and for ease in wheelchair transfers  Length of need: lifelong NPI - [id number]    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), Lymphedema of both lower extremities, Right-sided congestive heart failure, Tricuspid valve insufficiency, unspecified etiology, Congestive heart failure, unspecified congestive heart failure chronicity, unspecified congestive heart failure type (H), History of pressure ulcer       * order for DME     10 Units    Mepilex sacral dressings    Decubitus ulcer of sacral region, unspecified ulcer stage       * order for DME     1 Month    always discreet underwear - 2 pairs/day  - 60/month flushable cleansing cloths ( wipes)  - several packages/month   For incontinence and primitivo-care    Urinary incontinence, unspecified type, History of pressure ulcer       polyethylene glycol powder    MIRALAX/GLYCOLAX     Take 1 capful by mouth every morning Takes 3/4 of a capful        potassium chloride 10 MEQ tablet    K-TAB,KLOR-CON    90 tablet    TAKE 1 TABLET(10 MEQ) BY MOUTH  TWICE DAILY    Shortness of breath on exertion, Essential hypertension       SENNA S 8.6-50 MG per tablet   Generic drug:  senna-docusate      Take 2 tablets by mouth 2 times daily        spironolactone 25 MG tablet    ALDACTONE    60 tablet    Take 1 tablet (25 mg) by mouth 2 times daily    Congestive heart failure, unspecified congestive heart failure chronicity, unspecified congestive heart failure type (H), Lymphedema of both lower extremities       torsemide 20 MG tablet    DEMADEX    60 tablet    TAKE 1 TABLET(20 MG) BY MOUTH TWICE DAILY    Lymphedema of both lower extremities, Pleural effusion       triamcinolone 0.1 % cream    KENALOG    30 g    Apply sparingly to affected area two times daily for 7 days.    Other eczema       TYLENOL PM EXTRA STRENGTH PO      Take 2-3 tablets by mouth nightly as needed        vitamin D 2000 UNITS Caps      Take 1 capsule by mouth daily        warfarin 4 MG tablet    COUMADIN    40 tablet    Take 6 mg on Sun, Tue, Thu; 4 mg all other days or as directed by the Anticoagulation Clinic.    Congestive heart failure, unspecified congestive heart failure chronicity, unspecified congestive heart failure type (H)       ZZZQUIL PO      Take 50 mg by mouth nightly as needed Alternates with Tylenol PM        * Notice:  This list has 6 medication(s) that are the same as other medications prescribed for you. Read the directions carefully, and ask your doctor or other care provider to review them with you.

## 2017-11-01 NOTE — NURSING NOTE
"Chief Complaint   Patient presents with     Follow Up For     Yesterdays appointemtn with Dr. Mcguire       Initial /66 (BP Location: Right arm, Patient Position: Sitting, Cuff Size: Adult Regular)  Pulse 69  Temp 97  F (36.1  C) (Tympanic)  Ht 5' 6\" (1.676 m)  Wt 171 lb (77.6 kg)  SpO2 96%  BMI 27.6 kg/m2 Estimated body mass index is 27.6 kg/(m^2) as calculated from the following:    Height as of this encounter: 5' 6\" (1.676 m).    Weight as of this encounter: 171 lb (77.6 kg).  Medication Reconciliation: complete   ,Zainab Tejada CMA  "

## 2017-11-02 NOTE — PROGRESS NOTES
SUBJECTIVE:                                                    Natalie Hair is a 85 year old female who presents to clinic today for the following health issues:    Chief Complaint   Patient presents with     Follow Up For     yesterdays appointment.     **Needing a follow up x-ray    Problem list and histories reviewed & adjusted, as indicated.  Additional history:     Patient Active Problem List   Diagnosis     Heart valve replaced     Cardiovascular disease     History of T12 compression fracture     Backache     Vitamin D deficiency     Hyperlipidemia LDL goal <100     Advanced directives, counseling/discussion     Long term current use of anticoagulant therapy     Urinary incontinence     Unstable angina (H)     Benign essential hypertension, BP goal <150/90     Bilateral low back pain without sciatica     Osteopenia     Atrial fibrillation with RVR (H)     Primary pulmonary hypertension (H)     Lymphedema of both lower extremities     Tricuspid valve insufficiency, unspecified etiology     Near syncope     Chronic diastolic congestive heart failure (H)     History of pressure ulcer     Health Care Home     S/P CABG (coronary artery bypass graft) - 2002     Stented coronary artery - 5/4/2017 at [place]     Decubitus ulcer of sacral region, unspecified ulcer stage     Pleural effusion     Memory loss     Past Surgical History:   Procedure Laterality Date     ARTHROPLASTY KNEE BILATERAL       SURGICAL HISTORY OF -   12/2002    Triple bypass w/valve replacement - aortic       Social History   Substance Use Topics     Smoking status: Former Smoker     Types: Cigarettes     Smokeless tobacco: Never Used      Comment: 40 years ago     Alcohol use No     Family History   Problem Relation Age of Onset     HEART DISEASE Mother      Lipids Son      HEART DISEASE Son      Lipids Daughter          ROS:  Constitutional, HEENT, cardiovascular, pulmonary, gi and gu systems are negative, except as otherwise  "noted.    OBJECTIVE:                                                    /45 (BP Location: Right arm, Patient Position: Sitting, Cuff Size: Adult Regular)  Pulse 70  Temp 96.8  F (36  C) (Tympanic)  Ht 5' 6\" (1.676 m)  Wt 171 lb (77.6 kg)  BMI 27.6 kg/m2 Body mass index is 27.6 kg/(m^2).   GENERAL: healthy, alert, well nourished, well hydrated, no distress  HENT: ear canals- normal; TMs- normal; Nose- normal; Mouth- no ulcers, no lesions  NECK: no tenderness, no adenopathy, no asymmetry, no masses, no stiffness; thyroid- normal to palpation  RESP: lungs clear to auscultation - no rales, no rhonchi, no wheezes  CV: regular rates and rhythm, normal S1 S2, no S3 or S4 and no murmur, no click or rub -  ABDOMEN: soft, no tenderness, no  hepatosplenomegaly, no masses, normal bowel sounds       ASSESSMENT/PLAN:                                                      (J95.811) Postprocedural pneumothorax  (primary encounter diagnosis)  Plan: XR Chest 1 View   X-RAY read personally by me and reviewed with patient, sent for final read by radiology  loooks unchanged   recheck in 1-2 days   reports that she has quit smoking. Her smoking use included Cigarettes. She has never used smokeless tobacco.      [facility]  "

## 2017-11-02 NOTE — MR AVS SNAPSHOT
After Visit Summary   11/2/2017    Natalie Hair    MRN: [id number]           Patient Information     Date Of Birth          8/28/1932        Visit Information        Provider Department      11/2/2017 1:40 PM Fan Chadwick MD [facility]        Today's Diagnoses     Postprocedural pneumothorax    -  1       Follow-ups after your visit        Your next 10 appointments already scheduled     Nov 14, 2017 10:30 AM CST   Office Visit with Mimi Mcguire MD   [facility]    [address] [address]   [phone]           Bring a current list of meds and any records pertaining to this visit. For Physicals, please bring immunization records and any forms needing to be filled out. Please arrive 10 minutes early to complete paperwork.            Nov 15, 2017 10:15 AM CST   Anticoagulation Visit with LL ANTI COAG   [facility]    [address] [address]   [phone]            Nov 21, 2017  8:40 AM CST   Pacemaker Check with Wy Device Rn   [facility] ([facility])    [address] [address]   [phone]            Nov 21, 2017  9:30 AM CST   Return Visit with Emelyn Bingham MD   [facility] ([facility])    [address] [address]   [phone]            Nov 21, 2017 10:30 AM CST   Anticoagulation Visit with [place] ANTI COAG   [facility]    [address] [address]   [phone]              Who to contact     Normal or non-critical lab and imaging results will be communicated to you by MyChart, letter or phone within 4 business days after the clinic has received the results. If you do not hear from us within 7 days, please contact the clinic through MyChart or phone. If you have  "a critical or abnormal lab result, we will notify you by phone as soon as possible.  Submit refill requests through [employer] or call your pharmacy and they will forward the refill request to us. Please allow 3 business days for your refill to be completed.          If you need to speak with a  for additional information , please call: [phone]             Additional Information About Your Visit        [employer] Information     [employer] lets you send messages to your doctor, view your test results, renew your prescriptions, schedule appointments and more. To sign up, go to [email or web address] . Click on \"Log in\" on the left side of the screen, which will take you to the Welcome page. Then click on \"Sign up Now\" on the right side of the page.     You will be asked to enter the access code listed below, as well as some personal information. Please follow the directions to create your username and password.     Your access code is: [email or web address]  Expires: 2017 10:43 AM     Your access code will  in 90 days. If you need help or a new code, please call your [place] clinic or [phone].        Care EveryWhere ID     This is your Care EveryWhere ID. This could be used by other organizations to access your [place] medical records  [id number]        Your Vitals Were     Pulse Temperature Height BMI (Body Mass Index)          70 96.8  F (36  C) (Tympanic) 5' 6\" (1.676 m) 27.6 kg/m2         Blood Pressure from Last 3 Encounters:   17 116/53   17 120/60   17 113/45    Weight from Last 3 Encounters:   17 171 lb 4.8 oz (77.7 kg)   17 168 lb 4.8 oz (76.3 kg)   17 171 lb (77.6 kg)               Primary Care Provider Office Phone # Fax #    Mimi Mcguire [phone] [address] [address]        Equal Access to Services     [facility] CenterADRIEN AH: Lilian Monroe, ariana ulloa, ari rodriges, " lyric arthur federica hall'aan ah. So [facility] [phone].    ATENCIÓN: Si byron morris, tiene a simmons disposición servicios gratuitos de asistencia lingüística. Kevin pitt [phone].    We comply with applicable federal civil rights laws and [place] laws. We do not discriminate on the basis of race, color, national origin, age, disability, sex, sexual orientation, or gender identity.            Thank you!     Thank you for choosing [facility]  for your care. Our goal is always to provide you with excellent care. Hearing back from our patients is one way we can continue to improve our services. Please take a few minutes to complete the written survey that you may receive in the mail after your visit with us. Thank you!             Your Updated Medication List - Protect others around you: Learn how to safely use, store and throw away your medicines at www.disposemymeds.org.          This list is accurate as of: 11/2/17 11:59 PM.  Always use your most recent med list.                   Brand Name Dispense Instructions for use Diagnosis    * ACETAMINOPHEN PO      Take 1,000 mg by mouth every 8 hours as needed for pain        * acetaminophen 325 MG tablet    TYLENOL    100 tablet    Take 1-2 tablets (325-650 mg) by mouth every 6 hours as needed for mild pain or pain    Back pain, unspecified back location, unspecified back pain laterality, unspecified chronicity, Chronic bilateral low back pain without sciatica       alendronate 70 MG tablet    FOSAMAX    12 tablet    TAKE 1 TABLET BY MOUTH ONCE WEEKLY ON EMPTY STOMACH    Osteopenia       Calcium carb-Vitamin D 500 mg [place]-200 units 500-200 MG-UNIT per tablet    OSCAL with D;Oyster Shell Calcium     Take 1 tablet by mouth 2 times daily (with meals)        cetirizine 5 MG tablet    zyrTEC    30 tablet    Take 1 tablet (5 mg) by mouth daily    Acute urticaria       clopidogrel 75 MG tablet    PLAVIX    90 tablet    Take 1 tablet (75 mg) by mouth daily     Congestive heart failure, unspecified congestive heart failure chronicity, unspecified congestive heart failure type (H), Lymphedema of both lower extremities       donepezil 5 MG tablet    ARICEPT    90 tablet    Take 1 tablet (5 mg) by mouth At Bedtime    Dementia without behavioral disturbance, unspecified dementia type       ezetimibe 10 MG tablet    ZETIA    90 tablet    Take 1 tablet (10 mg) by mouth daily    Hyperlipidemia LDL goal <100       metoprolol 25 MG tablet    LOPRESSOR    90 tablet    Take 0.5 tablets (12.5 mg) by mouth 2 times daily        Multi-vitamin Tabs tablet      Take 1 tablet by mouth daily        nitroGLYcerin 0.4 MG sublingual tablet    NITROSTAT    25 tablet    Place 1 tablet (0.4 mg) under the tongue every 5 minutes as needed for chest pain    Unspecified cardiovascular disease       omega 3 1000 MG Caps     90 capsule    Take 1 g by mouth daily    Health Care Home       ondansetron 4 MG ODT tab    ZOFRAN ODT    10 tablet    Take 1-2 tablets (4-8 mg) by mouth every 6 hours as needed for nausea        * order for DME     1 Package    Equipment being ordered: Walker with a seat    Acute on chronic diastolic congestive heart failure (H), At risk for falling       * order for DME     1 Device    Equipment being ordered: Hospital Bed 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 -ongoing symptoms of lower extremity edema,shortness of breath,cough sacral pressure sores within last 6 months   An electric hospital bed to allow for increase in head of bed elevation and for ease in wheelchair transfers  Length of need: lifelong NPI - [id number]    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), Lymphedema of both lower extremities, Right-sided congestive heart failure, Tricuspid valve insufficiency, unspecified etiology, Congestive heart failure, unspecified congestive heart failure chronicity, unspecified congestive heart failure type (H), History of pressure ulcer        * order for DME     10 Units    Mepilex sacral dressings    Decubitus ulcer of sacral region, unspecified ulcer stage       * order for DME     1 Month    always discreet underwear - 2 pairs/day  - 60/month flushable cleansing cloths ( wipes)  - several packages/month   For incontinence and primitivo-care    Urinary incontinence, unspecified type, History of pressure ulcer       polyethylene glycol powder    MIRALAX/GLYCOLAX     Take 1 capful by mouth every morning Takes 3/4 of a capful        potassium chloride 10 MEQ tablet    K-TAB,KLOR-CON    90 tablet    TAKE 1 TABLET(10 MEQ) BY MOUTH TWICE DAILY    Shortness of breath on exertion, Essential hypertension       SENNA S 8.6-50 MG per tablet   Generic drug:  senna-docusate      Take 2 tablets by mouth 2 times daily        spironolactone 25 MG tablet    ALDACTONE    60 tablet    Take 1 tablet (25 mg) by mouth 2 times daily    Congestive heart failure, unspecified congestive heart failure chronicity, unspecified congestive heart failure type (H), Lymphedema of both lower extremities       torsemide 20 MG tablet    DEMADEX    60 tablet    TAKE 1 TABLET(20 MG) BY MOUTH TWICE DAILY    Lymphedema of both lower extremities, Pleural effusion       triamcinolone 0.1 % cream    KENALOG    30 g    Apply sparingly to affected area two times daily for 7 days.    Other eczema       TYLENOL PM EXTRA STRENGTH PO      Take 2-3 tablets by mouth nightly as needed        vitamin D 2000 UNITS Caps      Take 1 capsule by mouth daily        warfarin 4 MG tablet    COUMADIN    40 tablet    Take 6 mg on Sun, Tue, Thu; 4 mg all other days or as directed by the Anticoagulation Clinic.    Congestive heart failure, unspecified congestive heart failure chronicity, unspecified congestive heart failure type (H)       ZZZQUIL PO      Take 50 mg by mouth nightly as needed Alternates with Tylenol PM        * Notice:  This list has 6 medication(s) that are the same as other medications prescribed for  you. Read the directions carefully, and ask your doctor or other care provider to review them with you.

## 2017-11-02 NOTE — TELEPHONE ENCOUNTER
I had Susy come bacl Thursday again as I thought it was a bit bigger, radiologist didn't agree felt is was a bit smaller.  Today looks unchanged.  Since I had her come back today, we discussed and she will come back on Monday.

## 2017-11-03 NOTE — NURSING NOTE
"Chief Complaint   Patient presents with     RECHECK       Initial /60 (BP Location: Right arm, Patient Position: Sitting, Cuff Size: Adult Regular)  Pulse 69  Temp 96.1  F (35.6  C) (Tympanic)  Ht 5' 6\" (1.676 m)  Wt 168 lb 4.8 oz (76.3 kg)  SpO2 97%  BMI 27.16 kg/m2 Estimated body mass index is 27.16 kg/(m^2) as calculated from the following:    Height as of this encounter: 5' 6\" (1.676 m).    Weight as of this encounter: 168 lb 4.8 oz (76.3 kg).  Medication Reconciliation: complete   Margie Abarca LPN    "

## 2017-11-03 NOTE — PROGRESS NOTES
SUBJECTIVE:   Natalie Hair is a 85 year old female who presents to clinic today for the following health issues:      Follow up for chest x- ray  -   Has a pneumothorax from thoracentesis on oct 30th. Has had 6 xrays this week and all show stability.  She is not shortness of breath   No cough  Mild pain at the site of thoracentesis with movement.     Appetite has been poor since surgery   Memory is worsening      Wt Readings from Last 10 Encounters:   11/03/17 168 lb 4.8 oz (76.3 kg)   11/02/17 171 lb (77.6 kg)   11/01/17 171 lb (77.6 kg)   10/25/17 171 lb (77.6 kg)   09/19/17 164 lb 6.4 oz (74.6 kg)   09/08/17 157 lb (71.2 kg)   08/07/17 164 lb (74.4 kg)   07/21/17 170 lb 11.2 oz (77.4 kg)   07/13/17 174 lb 14.4 oz (79.3 kg)   07/11/17 178 lb (80.7 kg)     Weight was 210 one year ago     Review of systems:  No f/c   No n/v   No d/c     XR CHEST 1 VW 11/2/2017 2:11 PM  HISTORY: Follow-up of pneumothorax.  COMPARISON: 11/1/2017.  IMPRESSION: A single view the chest shows the small left apical  pneumothorax to be unchanged. A small amount of pleural fluid at the  left lung base is stable. There is a slight increase in bandlike  opacity at the left lung base, most consistent with atelectasis.  Cardiomegaly is stable.    JOESPH GUILLEN MD      XR CHEST 1 VW 11/1/2017 3:58 PM  HISTORY: Follow-up of pneumothorax.  COMPARISON: Prior study done at 1358 hours on October 31.  IMPRESSION: A single view of the chest shows the small left apical  pneumothorax to be stable. It measures approximately 1.1 cm of  lucency. Cardiomegaly is stable. Pulmonary vessels are slightly less  congested. A small amount of pleural fluid at the left lung base is  unchanged.   JOESPH GUILLEN MD      Problem list and histories reviewed & adjusted, as indicated.  Additional history: as documented    Patient Active Problem List   Diagnosis     Heart valve replaced     Cardiovascular disease     History of T12 compression fracture     Backache      Vitamin D deficiency     Hyperlipidemia LDL goal <100     Advanced directives, counseling/discussion     Long term current use of anticoagulant therapy     Urinary incontinence     Unstable angina (H)     Benign essential hypertension, BP goal <150/90     Bilateral low back pain without sciatica     Osteopenia     Atrial fibrillation with RVR (H)     Primary pulmonary hypertension (H)     Lymphedema of both lower extremities     Tricuspid valve insufficiency, unspecified etiology     Near syncope     Chronic diastolic congestive heart failure (H)     History of pressure ulcer     Health Care Home     S/P CABG (coronary artery bypass graft) - 2002     Stented coronary artery - 5/4/2017 at [place]     Decubitus ulcer of sacral region, unspecified ulcer stage     Pleural effusion     Memory loss     Current Outpatient Prescriptions   Medication     torsemide (DEMADEX) 20 MG tablet     warfarin (COUMADIN) 4 MG tablet     potassium chloride (K-TAB,KLOR-CON) 10 MEQ tablet     ezetimibe (ZETIA) 10 MG tablet     triamcinolone (KENALOG) 0.1 % cream     donepezil (ARICEPT) 5 MG tablet     clopidogrel (PLAVIX) 75 MG tablet     spironolactone (ALDACTONE) 25 MG tablet     calcium carb 1250 mg, 500 mg [place],/vitamin D 200 units (OSCAL WITH D) 500-200 MG-UNIT per tablet     Diphenhydramine-APAP, sleep, (TYLENOL PM EXTRA STRENGTH PO)     alendronate (FOSAMAX) 70 MG tablet     omega 3 1000 MG CAPS     multivitamin, therapeutic with minerals (MULTI-VITAMIN) TABS tablet     metoprolol (LOPRESSOR) 25 MG tablet     cetirizine (ZYRTEC) 5 MG tablet     polyethylene glycol (MIRALAX/GLYCOLAX) powder     Cholecalciferol (VITAMIN D) 2000 UNITS CAPS     senna-docusate (SENNA S) 8.6-50 MG per tablet     order for DME     acetaminophen (TYLENOL) 325 MG tablet     order for DME     DiphenhydrAMINE HCl, Sleep, (ZZZQUIL PO)     ondansetron (ZOFRAN ODT) 4 MG ODT tab     order for DME     order for DME     nitroglycerin (NITROSTAT) 0.4 MG SL tablet      "ACETAMINOPHEN PO     No current facility-administered medications for this visit.         Allergies   Allergen Reactions     Lorazepam Nausea and Vomiting     Morphine Sulfate Cr [Morphine Sulfate] Nausea and Vomiting     Crestor [Rosuvastatin] Other (See Comments)     Abdominal/Back pain     Lidocaine GI Disturbance     Cozaar [Losartan] Rash     Rash      /60 (BP Location: Right arm, Patient Position: Sitting, Cuff Size: Adult Regular)  Pulse 69  Temp 96.1  F (35.6  C) (Tympanic)  Ht 5' 6\" (1.676 m)  Wt 168 lb 4.8 oz (76.3 kg)  SpO2 97%  BMI 27.16 kg/m2  GENERAL - Pt is alert and oriented in no acute distress.  Affect is appropriate. Good eye contact.  NECK - Neck is supple w/o LA or thyromegaly  RESPIRATORY - Clear to auscultation bilaterally.  No wheezing noted  CV - RRR, no murmurs, rubs, gallops.       Assessment/Plan -  (J90) Pleural effusion  (primary encounter diagnosis)  Comment: drained. Continues to reaccumulate every 2-4 weeks.   Plan:     (Z98.890) S/P thoracentesis  Comment:   Plan:     (J95.811) Pneumothorax of left lung after biopsy  Comment: stable. Asymptomatic. Will recheck next week. The patient indicates understanding of these issues and agrees with the plan.   Plan:     (R63.4) Weight loss  Comment: no appetite. Chronic disease. Discussed small amounts frequently, high fat/calorie options. Patient and her daughter agree to try smoothies this weekend   Plan:     (R41.3) Memory loss  Comment: possibly related to rapid weight loss and chronic disease   Plan:     ROZINA Mcguire MD   "

## 2017-11-03 NOTE — MR AVS SNAPSHOT
After Visit Summary   11/3/2017    Natalie Hair    MRN: [id number]           Patient Information     Date Of Birth          8/28/1932        Visit Information        Provider Department      11/3/2017 11:00 AM Mimi Mcguire MD [facility]        Today's Diagnoses     Pleural effusion    -  1    S/P thoracentesis        Pneumothorax of left lung after biopsy        Weight loss        Memory loss           Follow-ups after your visit        Your next 10 appointments already scheduled     Nov 07, 2017 10:00 AM CST   Office Visit with Mimi Mcguire MD   [facility] ([facility])    [address] [address] [phone]           Bring a current list of meds and any records pertaining to this visit. For Physicals, please bring immunization records and any forms needing to be filled out. Please arrive 10 minutes early to complete paperwork.            Nov 08, 2017  9:30 AM CST   Anticoagulation Visit with LL ANTI COAG   [facility] ([facility]    [address] [address]   [phone]            Nov 09, 2017 10:30 AM CST   Office Visit with Mimi Mcguire MD   [facility] ([facility])    [address] [address] [phone]           Bring a current list of meds and any records pertaining to this visit. For Physicals, please bring immunization records and any forms needing to be filled out. Please arrive 10 minutes early to complete paperwork.            Nov 21, 2017  8:40 AM CST   Pacemaker Check with Wy Device Jaylyn   [facility] ([facility])    [address] [address]   [phone]            Nov 21, 2017  9:30 AM CST   Return Visit with Emelyn Bingham MD   [facility] ([facility])    [address] [address]  "  [phone]              Who to contact     Normal or non-critical lab and imaging results will be communicated to you by MyChart, letter or phone within 4 business days after the clinic has received the results. If you do not hear from us within 7 days, please contact the clinic through MyChart or phone. If you have a critical or abnormal lab result, we will notify you by phone as soon as possible.  Submit refill requests through [employer] or call your pharmacy and they will forward the refill request to us. Please allow 3 business days for your refill to be completed.          If you need to speak with a  for additional information , please call: [phone]             Additional Information About Your Visit        [employer] Information     [employer] lets you send messages to your doctor, view your test results, renew your prescriptions, schedule appointments and more. To sign up, go to [email or web address] . Click on \"Log in\" on the left side of the screen, which will take you to the Welcome page. Then click on \"Sign up Now\" on the right side of the page.     You will be asked to enter the access code listed below, as well as some personal information. Please follow the directions to create your username and password.     Your access code is: [email or web address]  Expires: 2017 11:43 AM     Your access code will  in 90 days. If you need help or a new code, please call your [place] clinic or [phone].        Care EveryWhere ID     This is your Care EveryWhere ID. This could be used by other organizations to access your [place] medical records  [id number]        Your Vitals Were     Pulse Temperature Height Pulse Oximetry BMI (Body Mass Index)       69 96.1  F (35.6  C) (Tympanic) 5' 6\" (1.676 m) 97% 27.16 kg/m2        Blood Pressure from Last 3 Encounters:   17 120/60   17 113/45   17 118/66    Weight from Last 3 Encounters:   17 168 lb 4.8 oz (76.3 kg) "   11/02/17 171 lb (77.6 kg)   11/01/17 171 lb (77.6 kg)              Today, you had the following     No orders found for display       Primary Care Provider Office Phone # Fax #    Mimi Mcguire [phone] [address] [facility] [address]        Equal Access to Services     [facility] CampusADRIEN : Hadii aad ku hadasho Soomaali, waaxda luqadaha, qaybta kaalmada adeegyada, waxay idiin hayaan adeeg khsonyarosa maria lasegundon . So [facility] [phone].    ATENCIÓN: Si habla español, tiene a simmons disposición servicios gratuitos de asistencia lingüística. Llame al [phone].    We comply with applicable federal civil rights laws and [place] laws. We do not discriminate on the basis of race, color, national origin, age, disability, sex, sexual orientation, or gender identity.            Thank you!     Thank you for choosing [facility]  for your care. Our goal is always to provide you with excellent care. Hearing back from our patients is one way we can continue to improve our services. Please take a few minutes to complete the written survey that you may receive in the mail after your visit with us. Thank you!             Your Updated Medication List - Protect others around you: Learn how to safely use, store and throw away your medicines at www.disposemymeds.org.          This list is accurate as of: 11/3/17  4:53 PM.  Always use your most recent med list.                   Brand Name Dispense Instructions for use Diagnosis    * ACETAMINOPHEN PO      Take 1,000 mg by mouth every 8 hours as needed for pain        * acetaminophen 325 MG tablet    TYLENOL    100 tablet    Take 1-2 tablets (325-650 mg) by mouth every 6 hours as needed for mild pain or pain    Back pain, unspecified back location, unspecified back pain laterality, unspecified chronicity, Chronic bilateral low back pain without sciatica       alendronate 70 MG tablet    FOSAMAX    12 tablet    TAKE 1 TABLET BY MOUTH ONCE WEEKLY ON  EMPTY STOMACH    Osteopenia       Calcium carb-Vitamin D 500 mg [place]-200 units 500-200 MG-UNIT per tablet    OSCAL with D;Oyster Shell Calcium     Take 1 tablet by mouth 2 times daily (with meals)        cetirizine 5 MG tablet    zyrTEC    30 tablet    Take 1 tablet (5 mg) by mouth daily    Acute urticaria       clopidogrel 75 MG tablet    PLAVIX    90 tablet    Take 1 tablet (75 mg) by mouth daily    Congestive heart failure, unspecified congestive heart failure chronicity, unspecified congestive heart failure type (H), Lymphedema of both lower extremities       donepezil 5 MG tablet    ARICEPT    90 tablet    Take 1 tablet (5 mg) by mouth At Bedtime    Dementia without behavioral disturbance, unspecified dementia type       ezetimibe 10 MG tablet    ZETIA    90 tablet    Take 1 tablet (10 mg) by mouth daily    Hyperlipidemia LDL goal <100       metoprolol 25 MG tablet    LOPRESSOR    90 tablet    Take 0.5 tablets (12.5 mg) by mouth 2 times daily        Multi-vitamin Tabs tablet      Take 1 tablet by mouth daily        nitroGLYcerin 0.4 MG sublingual tablet    NITROSTAT    25 tablet    Place 1 tablet (0.4 mg) under the tongue every 5 minutes as needed for chest pain    Unspecified cardiovascular disease       omega 3 1000 MG Caps     90 capsule    Take 1 g by mouth daily    Health Care Home       ondansetron 4 MG ODT tab    ZOFRAN ODT    10 tablet    Take 1-2 tablets (4-8 mg) by mouth every 6 hours as needed for nausea        * order for DME     1 Package    Equipment being ordered: Walker with a seat    Acute on chronic diastolic congestive heart failure (H), At risk for falling       * order for DME     1 Device    Equipment being ordered: Hospital Bed 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 -ongoing symptoms of lower extremity edema,shortness of breath,cough sacral pressure sores within last 6 months   An electric hospital bed to allow for increase in head of bed elevation and for ease in wheelchair transfers   Length of need: lifelong NPI - [id number]    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), Lymphedema of both lower extremities, Right-sided congestive heart failure, Tricuspid valve insufficiency, unspecified etiology, Congestive heart failure, unspecified congestive heart failure chronicity, unspecified congestive heart failure type (H), History of pressure ulcer       * order for DME     10 Units    Mepilex sacral dressings    Decubitus ulcer of sacral region, unspecified ulcer stage       * order for DME     1 Month    always discreet underwear - 2 pairs/day  - 60/month flushable cleansing cloths ( wipes)  - several packages/month   For incontinence and primitivo-care    Urinary incontinence, unspecified type, History of pressure ulcer       polyethylene glycol powder    MIRALAX/GLYCOLAX     Take 1 capful by mouth every morning Takes 3/4 of a capful        potassium chloride 10 MEQ tablet    K-TAB,KLOR-CON    90 tablet    TAKE 1 TABLET(10 MEQ) BY MOUTH TWICE DAILY    Shortness of breath on exertion, Essential hypertension       SENNA S 8.6-50 MG per tablet   Generic drug:  senna-docusate      Take 2 tablets by mouth 2 times daily        spironolactone 25 MG tablet    ALDACTONE    60 tablet    Take 1 tablet (25 mg) by mouth 2 times daily    Congestive heart failure, unspecified congestive heart failure chronicity, unspecified congestive heart failure type (H), Lymphedema of both lower extremities       torsemide 20 MG tablet    DEMADEX    60 tablet    TAKE 1 TABLET(20 MG) BY MOUTH TWICE DAILY    Lymphedema of both lower extremities, Pleural effusion       triamcinolone 0.1 % cream    KENALOG    30 g    Apply sparingly to affected area two times daily for 7 days.    Other eczema       TYLENOL PM EXTRA STRENGTH PO      Take 2-3 tablets by mouth nightly as needed        vitamin D 2000 UNITS Caps      Take 1 capsule by mouth daily        warfarin 4 MG tablet    COUMADIN    40 tablet     Take 6 mg on Sun, Tue, Thu; 4 mg all other days or as directed by the Anticoagulation Clinic.    Congestive heart failure, unspecified congestive heart failure chronicity, unspecified congestive heart failure type (H)       ZZZQUIL PO      Take 50 mg by mouth nightly as needed Alternates with Tylenol PM        * Notice:  This list has 6 medication(s) that are the same as other medications prescribed for you. Read the directions carefully, and ask your doctor or other care provider to review them with you.

## 2017-11-07 NOTE — PROGRESS NOTES
SUBJECTIVE:   Natalie Hair is a 85 year old female who presents to clinic today for the following health issues:    F/u on pneumothorax, post procedural   Stable, small on xrays     Patient's daughter said that patient has been more out of breath and weak but not drastically  Still eating but only small amounts  No n/v   No cp/wheeze/cough  No pain     Review of systems:  No f/c   No n/v   Diminished appetite    Problem list and histories reviewed & adjusted, as indicated.  Additional history: as documented      Patient Active Problem List   Diagnosis     Heart valve replaced     Cardiovascular disease     History of T12 compression fracture     Backache     Vitamin D deficiency     Hyperlipidemia LDL goal <100     Advanced directives, counseling/discussion     Long term current use of anticoagulant therapy     Urinary incontinence     Unstable angina (H)     Benign essential hypertension, BP goal <150/90     Bilateral low back pain without sciatica     Osteopenia     Atrial fibrillation with RVR (H)     Primary pulmonary hypertension (H)     Lymphedema of both lower extremities     Tricuspid valve insufficiency, unspecified etiology     Near syncope     Chronic diastolic congestive heart failure (H)     History of pressure ulcer     Health Care Home     S/P CABG (coronary artery bypass graft) - 2002     Stented coronary artery - 5/4/2017 at [place]     Decubitus ulcer of sacral region, unspecified ulcer stage     Pleural effusion     Memory loss     Current Outpatient Prescriptions   Medication     torsemide (DEMADEX) 20 MG tablet     acetaminophen (TYLENOL) 325 MG tablet     warfarin (COUMADIN) 4 MG tablet     potassium chloride (K-TAB,KLOR-CON) 10 MEQ tablet     ezetimibe (ZETIA) 10 MG tablet     triamcinolone (KENALOG) 0.1 % cream     donepezil (ARICEPT) 5 MG tablet     clopidogrel (PLAVIX) 75 MG tablet     spironolactone (ALDACTONE) 25 MG tablet     calcium carb 1250 mg, 500 mg [place],/vitamin D 200 units  "(OSCAL WITH D) 500-200 MG-UNIT per tablet     Diphenhydramine-APAP, sleep, (TYLENOL PM EXTRA STRENGTH PO)     alendronate (FOSAMAX) 70 MG tablet     omega 3 1000 MG CAPS     multivitamin, therapeutic with minerals (MULTI-VITAMIN) TABS tablet     metoprolol (LOPRESSOR) 25 MG tablet     cetirizine (ZYRTEC) 5 MG tablet     polyethylene glycol (MIRALAX/GLYCOLAX) powder     Cholecalciferol (VITAMIN D) 2000 UNITS CAPS     senna-docusate (SENNA S) 8.6-50 MG per tablet     ACETAMINOPHEN PO     order for DME     order for DME     DiphenhydrAMINE HCl, Sleep, (ZZZQUIL PO)     ondansetron (ZOFRAN ODT) 4 MG ODT tab     order for DME     order for DME     nitroglycerin (NITROSTAT) 0.4 MG SL tablet     No current facility-administered medications for this visit.         Allergies   Allergen Reactions     Lorazepam Nausea and Vomiting     Morphine Sulfate Cr [Morphine Sulfate] Nausea and Vomiting     Crestor [Rosuvastatin] Other (See Comments)     Abdominal/Back pain     Lidocaine GI Disturbance     Cozaar [Losartan] Rash     Rash      /53 (BP Location: Right arm, Patient Position: Sitting, Cuff Size: Adult Regular)  Pulse 69  Temp 96.4  F (35.8  C) (Tympanic)  Ht 5' 6\" (1.676 m)  Wt 171 lb 4.8 oz (77.7 kg)  SpO2 97%  BMI 27.65 kg/m2    GENERAL - Pt is alert and oriented in no acute distress.  Affect is appropriate. Good eye contact.  RESPIRATORY - Clear to auscultation bilaterally.  No wheezing noted  CV - RRR, no murmurs, rubs, gallops.     chest x-ray   XR CHEST 1 VW 11/7/2017 10:20 AM     COMPARISON: 11/2/2017     HISTORY: Stable pneumothorax after thoracentesis.         IMPRESSION: Previously noted left apical pneumothorax is much  decreased in size since oh 11/2/2017, now measuring approximately 2 mm  in depth, decreased from 12 mm on 11/2/2017. Small left pleural  effusion with associated atelectasis again seen. Right lung is clear.  Enlarged cardiac silhouette again seen and unchanged.     CASEY THOMAS, " MD      Assessment/Plan -  (J95.811) Postprocedural pneumothorax  (primary encounter diagnosis)  Comment: slightly decreased in size. F/u in one week.   Pleural effusion is stable on xray but breathlessness is beginning again. They are encouraged to contact [facility] team. If we need another thoracentesis, this would be her 4th in 4 months. The patient indicates understanding of these issues and agrees with the plan.   Plan: XR Chest 1 View            ROZINA Mcguire MD

## 2017-11-07 NOTE — MR AVS SNAPSHOT
After Visit Summary   11/7/2017    Nataile Hair    MRN: [id number]           Patient Information     Date Of Birth          8/28/1932        Visit Information        Provider Department      11/7/2017 10:00 AM Mimi Mcguire MD [facility]        Today's Diagnoses     Postprocedural pneumothorax    -  1       Follow-ups after your visit        Your next 10 appointments already scheduled     Nov 14, 2017 10:30 AM CST   Office Visit with Mimi Mcguire MD   [facility]    [address] [address]   [phone]           Bring a current list of meds and any records pertaining to this visit. For Physicals, please bring immunization records and any forms needing to be filled out. Please arrive 10 minutes early to complete paperwork.            Nov 15, 2017 10:15 AM CST   Anticoagulation Visit with LL ANTI COAG   [facility] ([facility]    [address] [address]   [phone]            Nov 21, 2017  8:40 AM CST   Pacemaker Check with Wy Device Rn   [facility] ([facility])    [address] [address]   [phone]            Nov 21, 2017  9:30 AM CST   Return Visit with Emelyn Bingham MD   [facility] ([facility])    [address] [address]   [phone]            Nov 21, 2017 10:30 AM CST   Anticoagulation Visit with [place] ANTI COAG   [facility]    [address] [address]   [phone]              Who to contact     Normal or non-critical lab and imaging results will be communicated to you by MyChart, letter or phone within 4 business days after the clinic has received the results. If you do not hear from us within 7 days, please contact the clinic through MyChart or phone. If  "you have a critical or abnormal lab result, we will notify you by phone as soon as possible.  Submit refill requests through [employer] or call your pharmacy and they will forward the refill request to us. Please allow 3 business days for your refill to be completed.          If you need to speak with a  for additional information , please call: [phone]             Additional Information About Your Visit        [employer] Information     [employer] lets you send messages to your doctor, view your test results, renew your prescriptions, schedule appointments and more. To sign up, go to [email or web address] . Click on \"Log in\" on the left side of the screen, which will take you to the Welcome page. Then click on \"Sign up Now\" on the right side of the page.     You will be asked to enter the access code listed below, as well as some personal information. Please follow the directions to create your username and password.     Your access code is: [email or web address]  Expires: 2017 10:43 AM     Your access code will  in 90 days. If you need help or a new code, please call your [place] clinic or [phone].        Care EveryWhere ID     This is your Care EveryWhere ID. This could be used by other organizations to access your [place] medical records  [id number]        Your Vitals Were     Pulse Temperature Height Pulse Oximetry BMI (Body Mass Index)       69 96.4  F (35.8  C) (Tympanic) 5' 6\" (1.676 m) 97% 27.65 kg/m2        Blood Pressure from Last 3 Encounters:   17 116/53   17 120/60   17 113/45    Weight from Last 3 Encounters:   17 171 lb 4.8 oz (77.7 kg)   17 168 lb 4.8 oz (76.3 kg)   17 171 lb (77.6 kg)               Primary Care Provider Office Phone # Fax #    Mimi Mcguier [phone] [address] [address]        Equal Access to Services     JA FOWLER : ariana Cedillo, " lyric mohr ah. So [place] -[phone].    ATENCIÓN: Si byron morris, tiene a simmons disposición servicios gratuitos de asistencia lingüística. Kevin al [phone].    We comply with applicable federal civil rights laws and [place] laws. We do not discriminate on the basis of race, color, national origin, age, disability, sex, sexual orientation, or gender identity.            Thank you!     Thank you for choosing [facility]  for your care. Our goal is always to provide you with excellent care. Hearing back from our patients is one way we can continue to improve our services. Please take a few minutes to complete the written survey that you may receive in the mail after your visit with us. Thank you!             Your Updated Medication List - Protect others around you: Learn how to safely use, store and throw away your medicines at www.disposemymeds.org.          This list is accurate as of: 11/7/17 11:59 PM.  Always use your most recent med list.                   Brand Name Dispense Instructions for use Diagnosis    * ACETAMINOPHEN PO      Take 1,000 mg by mouth every 8 hours as needed for pain        * acetaminophen 325 MG tablet    TYLENOL    100 tablet    Take 1-2 tablets (325-650 mg) by mouth every 6 hours as needed for mild pain or pain    Back pain, unspecified back location, unspecified back pain laterality, unspecified chronicity, Chronic bilateral low back pain without sciatica       alendronate 70 MG tablet    FOSAMAX    12 tablet    TAKE 1 TABLET BY MOUTH ONCE WEEKLY ON EMPTY STOMACH    Osteopenia       Calcium carb-Vitamin D 500 mg [place]-200 units 500-200 MG-UNIT per tablet    OSCAL with D;[place] Calcium     Take 1 tablet by mouth 2 times daily (with meals)        cetirizine 5 MG tablet    zyrTEC    30 tablet    Take 1 tablet (5 mg) by mouth daily    Acute urticaria       clopidogrel 75 MG tablet    PLAVIX    90 tablet    Take 1  tablet (75 mg) by mouth daily    Congestive heart failure, unspecified congestive heart failure chronicity, unspecified congestive heart failure type (H), Lymphedema of both lower extremities       donepezil 5 MG tablet    ARICEPT    90 tablet    Take 1 tablet (5 mg) by mouth At Bedtime    Dementia without behavioral disturbance, unspecified dementia type       ezetimibe 10 MG tablet    ZETIA    90 tablet    Take 1 tablet (10 mg) by mouth daily    Hyperlipidemia LDL goal <100       metoprolol 25 MG tablet    LOPRESSOR    90 tablet    Take 0.5 tablets (12.5 mg) by mouth 2 times daily        Multi-vitamin Tabs tablet      Take 1 tablet by mouth daily        nitroGLYcerin 0.4 MG sublingual tablet    NITROSTAT    25 tablet    Place 1 tablet (0.4 mg) under the tongue every 5 minutes as needed for chest pain    Unspecified cardiovascular disease       omega 3 1000 MG Caps     90 capsule    Take 1 g by mouth daily    Health Care Home       ondansetron 4 MG ODT tab    ZOFRAN ODT    10 tablet    Take 1-2 tablets (4-8 mg) by mouth every 6 hours as needed for nausea        * order for DME     1 Package    Equipment being ordered: Walker with a seat    Acute on chronic diastolic congestive heart failure (H), At risk for falling       * order for DME     1 Device    Equipment being ordered: Hospital Bed 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 -ongoing symptoms of lower extremity edema,shortness of breath,cough sacral pressure sores within last 6 months   An electric hospital bed to allow for increase in head of bed elevation and for ease in wheelchair transfers  Length of need: lifelong NPI - [id number]    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), Lymphedema of both lower extremities, Right-sided congestive heart failure, Tricuspid valve insufficiency, unspecified etiology, Congestive heart failure, unspecified congestive heart failure chronicity, unspecified congestive heart failure type  (H), History of pressure ulcer       * order for DME     10 Units    Mepilex sacral dressings    Decubitus ulcer of sacral region, unspecified ulcer stage       * order for DME     1 Month    always discreet underwear - 2 pairs/day  - 60/month flushable cleansing cloths ( wipes)  - several packages/month   For incontinence and primitivo-care    Urinary incontinence, unspecified type, History of pressure ulcer       polyethylene glycol powder    MIRALAX/GLYCOLAX     Take 1 capful by mouth every morning Takes 3/4 of a capful        potassium chloride 10 MEQ tablet    K-TAB,KLOR-CON    90 tablet    TAKE 1 TABLET(10 MEQ) BY MOUTH TWICE DAILY    Shortness of breath on exertion, Essential hypertension       SENNA S 8.6-50 MG per tablet   Generic drug:  senna-docusate      Take 2 tablets by mouth 2 times daily        spironolactone 25 MG tablet    ALDACTONE    60 tablet    Take 1 tablet (25 mg) by mouth 2 times daily    Congestive heart failure, unspecified congestive heart failure chronicity, unspecified congestive heart failure type (H), Lymphedema of both lower extremities       torsemide 20 MG tablet    DEMADEX    60 tablet    TAKE 1 TABLET(20 MG) BY MOUTH TWICE DAILY    Lymphedema of both lower extremities, Pleural effusion       triamcinolone 0.1 % cream    KENALOG    30 g    Apply sparingly to affected area two times daily for 7 days.    Other eczema       TYLENOL PM EXTRA STRENGTH PO      Take 2-3 tablets by mouth nightly as needed        vitamin D 2000 UNITS Caps      Take 1 capsule by mouth daily        warfarin 4 MG tablet    COUMADIN    40 tablet    Take 6 mg on Sun, Tue, Thu; 4 mg all other days or as directed by the Anticoagulation Clinic.    Congestive heart failure, unspecified congestive heart failure chronicity, unspecified congestive heart failure type (H)       ZZZQUIL PO      Take 50 mg by mouth nightly as needed Alternates with Tylenol PM        * Notice:  This list has 6 medication(s) that are the same as  other medications prescribed for you. Read the directions carefully, and ask your doctor or other care provider to review them with you.

## 2017-11-07 NOTE — NURSING NOTE
"Chief Complaint   Patient presents with     RECHECK       Initial /53 (BP Location: Right arm, Patient Position: Sitting, Cuff Size: Adult Regular)  Pulse 69  Temp 96.4  F (35.8  C) (Tympanic)  Ht 5' 6\" (1.676 m)  Wt 171 lb 4.8 oz (77.7 kg)  SpO2 97%  BMI 27.65 kg/m2 Estimated body mass index is 27.65 kg/(m^2) as calculated from the following:    Height as of this encounter: 5' 6\" (1.676 m).    Weight as of this encounter: 171 lb 4.8 oz (77.7 kg).  Medication Reconciliation: complete   Margie Abarca LPN    "

## 2017-11-08 NOTE — MR AVS SNAPSHOT
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]  Long term current use of anticoagulant therapy [Z79.01]         Your next Anticoagulation Clinic appointment(s)     Nov 15, 2017 10:15 AM CST   Anticoagulation Visit with  ANTI COAG   [facility]    [address]  [facility] [address] [phone]            Nov 21, 2017 10:30 AM CST   Anticoagulation Visit with [place] ANTI COAG   [facility] ([facility])    [address] [address] [phone]              Contact Numbers     Please call [phone] to cancel and/or reschedule your appointment.  Please call [phone] with any problems or questions regarding your therapy          November 2017 Details    Sun Mon Tue Wed Thu Fri Sat        1               2               3               4                 5               6               7               8      4 mg   See details      9      6 mg         10      4 mg         11      4 mg           12      6 mg         13      4 mg         14      6 mg         15            16               17               18                 19               20               21               22               23               24               25                 26               27               28               29               30                  Date Details   11/08 This INR check       Date of next INR:  11/15/2017         How to take your warfarin dose     To take:  4 mg Take 1 of the 4 mg tablets.    To take:  6 mg Take 1.5 of the 4 mg tablets.

## 2017-11-08 NOTE — PROGRESS NOTES
ANTICOAGULATION FOLLOW-UP CLINIC VISIT    Patient Name:  Natalie Hair  Date:  11/8/2017  Contact Type:  Face to Face, accompanied by her daughter    SUBJECTIVE:     Patient Findings     Positives No Problem Findings    Comments Patient took warfarin as prescribed. She had a thoracentesis last Monday and had a partial pneumothorax afterwards. She has been in and out of the clinic having x-rays completed. Her daughter reports there is only a small part that is still collapsed and there is already fluid starting to build up. Her daughter is planning on calling [facility] to see if she can be seen today. Writer encouraged her to call [facility] if there are any planned procedures or changes that would need warfarin dose adjustments.            OBJECTIVE    INR Protime   Date Value Ref Range Status   11/08/2017 2.6 (A) 0.86 - 1.14 Final       ASSESSMENT / PLAN  INR assessment THER    Recheck INR In: 1 WEEK    INR Location Clinic      Anticoagulation Summary as of 11/8/2017     INR goal 2.5-3.5   Today's INR 2.6   Maintenance plan 6 mg (4 mg x 1.5) on Sun, Tue, Thu; 4 mg (4 mg x 1) all other days   Full instructions 6 mg on Sun, Tue, Thu; 4 mg all other days   Weekly total 34 mg   No change documented Christa Guzman RN   Plan last modified Marizol Santoro RN (9/26/2017)   Next INR check 11/15/2017   Priority INR   Target end date Indefinite    Indications   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

## 2017-11-13 NOTE — TELEPHONE ENCOUNTER
Prescription approved per [facility] Refill Protocol.  Amy Gama RN

## 2017-11-14 PROBLEM — J95.811 POSTPROCEDURAL PNEUMOTHORAX: Status: ACTIVE | Noted: 2017-01-01

## 2017-11-14 NOTE — PROGRESS NOTES
SUBJECTIVE:   Natalie Hair is a 85 year old female who presents to clinic today for the following health issues:    Patient's daughter said patient is having more shortness of breath     Going to [facility] on November 22nd   INR check tomorrow   Pacemaker check November 21st  And an INR check     Memory - stable. Maybe even a bit improved while taking the aricept and working on more regular eating. Will refill the aricept     Breathing - stable. Natalie feels that pleural effusion might be building up again. Has had three thoracentesis since July.     Appetite - daughter is working on offering higher fat, calorie dense foods, says eating is going better     Wt Readings from Last 10 Encounters:   11/14/17 171 lb 1.6 oz (77.6 kg)   11/07/17 171 lb 4.8 oz (77.7 kg)   11/03/17 168 lb 4.8 oz (76.3 kg)   11/02/17 171 lb (77.6 kg)   11/01/17 171 lb (77.6 kg)   10/25/17 171 lb (77.6 kg)   09/19/17 164 lb 6.4 oz (74.6 kg)   09/08/17 157 lb (71.2 kg)   08/07/17 164 lb (74.4 kg)   07/21/17 170 lb 11.2 oz (77.4 kg)           Caregiver concerns:   jose needs some help around the house - highly stressed caring for her mom   Mom is mainly non ambulatory, home bound. 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   She is also hoping to get the insurance to pay for DME items.     Review of systems:  No f/c   No cough  Some shortness of breath  No n/v   No d/c       Problem list and histories reviewed & adjusted, as indicated.  Additional history: as documented    Patient Active Problem List   Diagnosis     Heart valve replaced     Cardiovascular disease     History of T12 compression fracture     Backache     Vitamin D deficiency     Hyperlipidemia LDL goal <100     Advanced directives, counseling/discussion     Long term current use of anticoagulant therapy     Urinary incontinence     Unstable angina (H)     Benign  essential hypertension, BP goal <150/90     Bilateral low back pain without sciatica     Osteopenia     Atrial fibrillation with RVR (H)     Primary pulmonary hypertension (H)     Lymphedema of both lower extremities     Tricuspid valve insufficiency, unspecified etiology     Near syncope     Chronic diastolic congestive heart failure (H)     History of pressure ulcer     Health Care Home     S/P CABG (coronary artery bypass graft) - 2002     Stented coronary artery - 5/4/2017 at [place]     Decubitus ulcer of sacral region, unspecified ulcer stage     Pleural effusion     Memory loss     Postprocedural pneumothorax     Current Outpatient Prescriptions   Medication     donepezil (ARICEPT) 5 MG tablet     torsemide (DEMADEX) 20 MG tablet     acetaminophen (TYLENOL) 325 MG tablet     warfarin (COUMADIN) 4 MG tablet     potassium chloride (K-TAB,KLOR-CON) 10 MEQ tablet     ezetimibe (ZETIA) 10 MG tablet     triamcinolone (KENALOG) 0.1 % cream     clopidogrel (PLAVIX) 75 MG tablet     spironolactone (ALDACTONE) 25 MG tablet     calcium carb 1250 mg, 500 mg [place],/vitamin D 200 units (OSCAL WITH D) 500-200 MG-UNIT per tablet     DiphenhydrAMINE HCl, Sleep, (ZZZQUIL PO)     Diphenhydramine-APAP, sleep, (TYLENOL PM EXTRA STRENGTH PO)     alendronate (FOSAMAX) 70 MG tablet     omega 3 1000 MG CAPS     multivitamin, therapeutic with minerals (MULTI-VITAMIN) TABS tablet     metoprolol (LOPRESSOR) 25 MG tablet     ondansetron (ZOFRAN ODT) 4 MG ODT tab     cetirizine (ZYRTEC) 5 MG tablet     polyethylene glycol (MIRALAX/GLYCOLAX) powder     Cholecalciferol (VITAMIN D) 2000 UNITS CAPS     senna-docusate (SENNA S) 8.6-50 MG per tablet     ACETAMINOPHEN PO     order for DME     order for DME     order for DME     order for DME     nitroglycerin (NITROSTAT) 0.4 MG SL tablet     No current facility-administered medications for this visit.         Allergies   Allergen Reactions     Lorazepam Nausea and Vomiting     Morphine Sulfate Cr  "[Morphine Sulfate] Nausea and Vomiting     Crestor [Rosuvastatin] Other (See Comments)     Abdominal/Back pain     Lidocaine GI Disturbance     Cozaar [Losartan] Rash     Rash        /83 (BP Location: Right arm, Patient Position: Sitting, Cuff Size: Adult Regular)  Pulse 69  Temp 97.3  F (36.3  C) (Tympanic)  Ht 5' 6\" (1.676 m)  Wt 171 lb 1.6 oz (77.6 kg)  SpO2 97%  BMI 27.62 kg/m2  GENERAL - Pt is alert and oriented in no acute distress.  Affect is appropriate. Good eye contact.  RESPIRATORY - Clear to auscultation bilaterally.  No wheezing noted  CV - RRR, no murmurs, rubs, gallops.     chest x-ray   I independently visualized the xray and my findings were - similar to previous xray re; pneumothorax and pleural effusion.   Radiology review pending.        Assessment/Plan -  (J95.811) Postprocedural pneumothorax  (primary encounter diagnosis)  Comment: stable. No further cxrs unless symptoms change   Plan: XR Chest 1 View          (F03.90) Dementia without behavioral disturbance, unspecified dementia type  Comment: med refilled - daughter feels it is helping   Plan: donepezil (ARICEPT) 5 MG tablet                (Z95.2) Heart valve replaced  Comment:   Plan: CARE COORDINATION REFERRAL            (Z79.01) Long term current use of anticoagulant therapy  Comment:   Plan: CARE COORDINATION REFERRAL            (I10) Benign essential hypertension, BP goal <150/90  Comment:   Plan: CARE COORDINATION REFERRAL           (I89.0) Lymphedema of both lower extremities  Comment:   Plan: CARE COORDINATION REFERRAL            (Z95.1) S/P CABG (coronary artery bypass graft) - 2002  Comment:  Plan: CARE COORDINATION REFERRAL            (R41.3) Memory loss  Comment:   Plan: CARE COORDINATION REFERRAL           (J90) Pleural effusion  Comment:   Plan: CARE COORDINATION REFERRAL                ROZINA Mcguire MD     "

## 2017-11-14 NOTE — NURSING NOTE
"Chief Complaint   Patient presents with     RECHECK       Initial /83 (BP Location: Right arm, Patient Position: Sitting, Cuff Size: Adult Regular)  Pulse 69  Temp 97.3  F (36.3  C) (Tympanic)  Ht 5' 6\" (1.676 m)  Wt 171 lb 1.6 oz (77.6 kg)  SpO2 97%  BMI 27.62 kg/m2 Estimated body mass index is 27.62 kg/(m^2) as calculated from the following:    Height as of this encounter: 5' 6\" (1.676 m).    Weight as of this encounter: 171 lb 1.6 oz (77.6 kg).  Medication Reconciliation: complete   Margie Abarca LPN    "

## 2017-11-14 NOTE — MR AVS SNAPSHOT
After Visit Summary   11/14/2017    Natalie Hair    MRN: [id number]           Patient Information     Date Of Birth          8/28/1932        Visit Information        Provider Department      11/14/2017 10:30 AM Mimi Mcguire MD [facility]        Today's Diagnoses     Postprocedural pneumothorax    -  1    Dementia without behavioral disturbance, unspecified dementia type        Heart valve replaced        Long term current use of anticoagulant therapy        Benign essential hypertension, BP goal <150/90        Lymphedema of both lower extremities        S/P CABG (coronary artery bypass graft) - 2002        Memory loss        Pleural effusion          Care Instructions        Left sided thoracentesis : 7/17/17, 9/11/17, and 10/30/17              Follow-ups after your visit        Additional Services     CARE COORDINATION REFERRAL       Services are provided by a Care Coordinator for people with complex needs such as: medical, social, or financial troubles.  The Care Coordinator works with the patient and their Primary Care Provider to determine health goals, obtain resources, achieve outcomes, and develop care plans that help coordinate the patient's care.     Reason for Referral: Caregiver Concerns, Concerns with ADLs/IADLs and Other: daughter jose is getting very burnt out caring for Natalie    She wants help with - getting over the counter supplies paid for my medicare, with respite care and/or PCA care, and getting on medical assistance so daughter can get paid as PCA.      Provide additional details for Care Coordination to best meet the patient's current needs:     Clinical Staff have discussed the Care Coordination Referral with the patient and/or caregiver: yes                  Your next 10 appointments already scheduled     Nov 15, 2017 10:15 AM CST   Anticoagulation Visit with LL ANTI COAG   [facility] ([facility])    [address] [place] [address]  "[address]  [place] [address]   [phone]            Nov 21, 2017  8:40 AM CST   Pacemaker Check with Wy Device Rn   [facility] ([facility])    [address] [address]   [phone]            Nov 21, 2017  9:30 AM CST   Return Visit with Emelyn Bingham MD   [facility] ([facility])    [address] [facility] [address]   [phone]            Nov 21, 2017 10:30 AM CST   Anticoagulation Visit with [place] ANTI COAG   [facility]    [address] [facility] [address]   [phone]              Who to contact     Normal or non-critical lab and imaging results will be communicated to you by [employer], letter or phone within 4 business days after the clinic has received the results. If you do not hear from us within 7 days, please contact the clinic through [employer] or phone. If you have a critical or abnormal lab result, we will notify you by phone as soon as possible.  Submit refill requests through [employer] or call your pharmacy and they will forward the refill request to us. Please allow 3 business days for your refill to be completed.          If you need to speak with a  for additional information , please call: [phone]             Additional Information About Your Visit        [employer] Information     [employer] lets you send messages to your doctor, view your test results, renew your prescriptions, schedule appointments and more. To sign up, go to [email or web address] . Click on \"Log in\" on the left side of the screen, which will take you to the Welcome page. Then click on \"Sign up Now\" on the right side of the page.     You will be asked to enter the access code listed below, as well as some personal information. Please follow the directions to create your username and password.     Your access code is: " [email or web address]  Expires: 2017 10:43 AM     Your access code will  in 90 days. If you need help or a new code, please call your [place] clinic or [phone].        Care EveryWhere ID     This is your Care EveryWhere ID. This could be used by other organizations to access your [place] medical records  [id number]        Your Vitals Were     Pulse Temperature Height Pulse Oximetry BMI (Body Mass Index)       69 97.3  F (36.3  C) (Tympanic) 5' 6\" (1.676 m) 97% 27.62 kg/m2        Blood Pressure from Last 3 Encounters:   17 118/83   17 116/53   17 120/60    Weight from Last 3 Encounters:   17 171 lb 1.6 oz (77.6 kg)   17 171 lb 4.8 oz (77.7 kg)   17 168 lb 4.8 oz (76.3 kg)              We Performed the Following     CARE COORDINATION REFERRAL          Today's Medication Changes          These changes are accurate as of: 17 11:00 AM.  If you have any questions, ask your nurse or doctor.               These medicines have changed or have updated prescriptions.        Dose/Directions    donepezil 5 MG tablet   Commonly known as:  ARIcept   This may have changed:  See the new instructions.   Used for:  Dementia without behavioral disturbance, unspecified dementia type   Changed by:  Mimi Mcguire MD        TAKE 1 TABLET(5 MG) BY MOUTH AT BEDTIME   Quantity:  90 tablet   Refills:  0            Where to get your medicines      These medications were sent to [facility] [address] [place] [address] [employer]  AT [address] [facility] [address]     Phone:  [phone]     donepezil 5 MG tablet                Primary Care Provider Office Phone # Fax #    Mimi Mcguire [phone] [address] [place] [address]        Equal Access to Services     JA FOWLER AH: Hadii pily almaguer hadasho Soomaali, waaxda luqadaha, qaybta kaalmada adelyric cotton. So " [facility] [phone].    ATENCIÓN: Si byron morris, tiene a simmons disposición servicios gratuitos de asistencia lingüística. Kevin pitt [phone].    We comply with applicable federal civil rights laws and [place] laws. We do not discriminate on the basis of race, color, national origin, age, disability, sex, sexual orientation, or gender identity.            Thank you!     Thank you for choosing [facility]  for your care. Our goal is always to provide you with excellent care. Hearing back from our patients is one way we can continue to improve our services. Please take a few minutes to complete the written survey that you may receive in the mail after your visit with us. Thank you!             Your Updated Medication List - Protect others around you: Learn how to safely use, store and throw away your medicines at www.disposemymeds.org.          This list is accurate as of: 11/14/17 11:00 AM.  Always use your most recent med list.                   Brand Name Dispense Instructions for use Diagnosis    * ACETAMINOPHEN PO      Take 1,000 mg by mouth every 8 hours as needed for pain        * acetaminophen 325 MG tablet    TYLENOL    100 tablet    Take 1-2 tablets (325-650 mg) by mouth every 6 hours as needed for mild pain or pain    Back pain, unspecified back location, unspecified back pain laterality, unspecified chronicity, Chronic bilateral low back pain without sciatica       alendronate 70 MG tablet    FOSAMAX    12 tablet    TAKE 1 TABLET BY MOUTH ONCE WEEKLY ON EMPTY STOMACH    Osteopenia       Calcium carb-Vitamin D 500 mg Kashia-200 units 500-200 MG-UNIT per tablet    OSCAL with D;Oyster Shell Calcium     Take 1 tablet by mouth 2 times daily (with meals)        cetirizine 5 MG tablet    zyrTEC    30 tablet    Take 1 tablet (5 mg) by mouth daily    Acute urticaria       clopidogrel 75 MG tablet    PLAVIX    90 tablet    Take 1 tablet (75 mg) by mouth daily    Congestive heart failure, unspecified  congestive heart failure chronicity, unspecified congestive heart failure type (H), Lymphedema of both lower extremities       donepezil 5 MG tablet    ARIcept    90 tablet    TAKE 1 TABLET(5 MG) BY MOUTH AT BEDTIME    Dementia without behavioral disturbance, unspecified dementia type       ezetimibe 10 MG tablet    ZETIA    90 tablet    Take 1 tablet (10 mg) by mouth daily    Hyperlipidemia LDL goal <100       metoprolol 25 MG tablet    LOPRESSOR    90 tablet    Take 0.5 tablets (12.5 mg) by mouth 2 times daily        Multi-vitamin Tabs tablet      Take 1 tablet by mouth daily        nitroGLYcerin 0.4 MG sublingual tablet    NITROSTAT    25 tablet    Place 1 tablet (0.4 mg) under the tongue every 5 minutes as needed for chest pain    Unspecified cardiovascular disease       omega 3 1000 MG Caps     90 capsule    Take 1 g by mouth daily    Health Care Home       ondansetron 4 MG ODT tab    ZOFRAN ODT    10 tablet    Take 1-2 tablets (4-8 mg) by mouth every 6 hours as needed for nausea        * order for DME     1 Package    Equipment being ordered: Walker with a seat    Acute on chronic diastolic congestive heart failure (H), At risk for falling       * order for DME     1 Device    Equipment being ordered: Hospital Bed 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 -ongoing symptoms of lower extremity edema,shortness of breath,cough sacral pressure sores within last 6 months   An electric hospital bed to allow for increase in head of bed elevation and for ease in wheelchair transfers  Length of need: lifelong NPI - [id number]    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), Lymphedema of both lower extremities, Right-sided congestive heart failure, Tricuspid valve insufficiency, unspecified etiology, Congestive heart failure, unspecified congestive heart failure chronicity, unspecified congestive heart failure type (H), History of pressure ulcer       * order for DME     10 Units     Mepilex sacral dressings    Decubitus ulcer of sacral region, unspecified ulcer stage       * order for DME     1 Month    always discreet underwear - 2 pairs/day  - 60/month flushable cleansing cloths ( wipes)  - several packages/month   For incontinence and primitvio-care    Urinary incontinence, unspecified type, History of pressure ulcer       polyethylene glycol powder    MIRALAX/GLYCOLAX     Take 1 capful by mouth every morning Takes 3/4 of a capful        potassium chloride 10 MEQ tablet    K-TAB,KLOR-CON    90 tablet    TAKE 1 TABLET(10 MEQ) BY MOUTH TWICE DAILY    Shortness of breath on exertion, Essential hypertension       SENNA S 8.6-50 MG per tablet   Generic drug:  senna-docusate      Take 2 tablets by mouth 2 times daily        spironolactone 25 MG tablet    ALDACTONE    60 tablet    Take 1 tablet (25 mg) by mouth 2 times daily    Congestive heart failure, unspecified congestive heart failure chronicity, unspecified congestive heart failure type (H), Lymphedema of both lower extremities       torsemide 20 MG tablet    DEMADEX    60 tablet    TAKE 1 TABLET(20 MG) BY MOUTH TWICE DAILY    Lymphedema of both lower extremities, Pleural effusion       triamcinolone 0.1 % cream    KENALOG    30 g    Apply sparingly to affected area two times daily for 7 days.    Other eczema       TYLENOL PM EXTRA STRENGTH PO      Take 2-3 tablets by mouth nightly as needed        vitamin D 2000 UNITS Caps      Take 1 capsule by mouth daily        warfarin 4 MG tablet    COUMADIN    40 tablet    Take 6 mg on Sun, Tue, Thu; 4 mg all other days or as directed by the Anticoagulation Clinic.    Congestive heart failure, unspecified congestive heart failure chronicity, unspecified congestive heart failure type (H)       ZZZQUIL PO      Take 50 mg by mouth nightly as needed Alternates with Tylenol PM        * Notice:  This list has 6 medication(s) that are the same as other medications prescribed for you. Read the directions carefully,  and ask your doctor or other care provider to review them with you.

## 2017-11-15 NOTE — PROGRESS NOTES
ANTICOAGULATION FOLLOW-UP CLINIC VISIT    Patient Name:  Natalie Hair  Date:  11/15/2017  Contact Type:  Face to Face, accompanied by her daughter    SUBJECTIVE:     Patient Findings     Positives Change in diet/appetite (Appetitie is a little better)    Comments Patient had 34mg in the previous 7 days, will increase dose to 36mg by the next INR check on Tuesday (~6% increase).    Patient saw provider yesterday, had a chest x-ray completed. The pneumothorax is about the same, there is some fluid building up. Patient has an appointment at the [facility] on Nov 22nd. Patient's daughter reports that her weight is up a little, not sure if that is from the appetite being better or if it is from fluid build up in the lungs.             OBJECTIVE    INR Protime   Date Value Ref Range Status   11/15/2017 2.3 (A) 0.86 - 1.14 Final       ASSESSMENT / PLAN  INR assessment SUB    Recheck INR In: 6 DAYS    INR Location Clinic      Anticoagulation Summary as of 11/15/2017     INR goal 2.5-3.5   Today's INR 2.3!   Maintenance plan 4 mg (4 mg x 1) on Mon, Wed, Fri; 6 mg (4 mg x 1.5) all other days   Full instructions 4 mg on Mon, Wed, Fri; 6 mg all other days   Weekly total 36 mg   Plan last modified Chritsa Guzman RN (11/15/2017)   Next INR check 11/21/2017   Priority INR   Target end date Indefinite    Indications   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]  Long term current use of anticoagulant therapy [Z79.01]         Anticoagulation Episode Summary     INR check location     Preferred lab     Send INR reminders to [facility]    Comments * no bandaid        Anticoagulation Care Providers     Provider Role Specialty Phone number    Mimi Mcguire MD [facility] [phone]            See the Encounter Report to view Anticoagulation Flowsheet and Dosing Calendar (Go to Encounters tab in chart review, and find the Anticoagulation Therapy Visit)        Christa Guzman RN

## 2017-11-15 NOTE — MR AVS SNAPSHOT
Natalie GOMEZ Reginaldo   11/15/2017 10:15 AM   Anticoagulation Therapy Visit    Description:  85 year old female   Provider:  MATT ANTI COAJARROD   Department:  [facility] Antico[facility]           INR as of 11/15/2017     Today's INR 2.3!      Anticoagulation Summary as of 11/15/2017     INR goal 2.5-3.5   Today's INR 2.3!   Full instructions 4 mg on Mon, Wed, Fri; 6 mg all other days   Next INR check 11/21/2017    Indications   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]  Long term current use of anticoagulant therapy [Z79.01]         Your next Anticoagulation Clinic appointment(s)     Nov 21, 2017 10:30 AM CST   Anticoagulation [facility] with [place] ANTI COAG   [facility]    [address] [id number] [phone]            Nov 29, 2017 10:15 AM CST   Anticoagulation Visit with LL ANTI COAG   [facility]    [address] [address] [phone]              Contact Numbers     Please call [phone] to cancel and/or reschedule your appointment.  Please call [phone] with any problems or questions regarding your therapy          November 2017 Details    Sun Mon Tue Wed Thu Fri Sat        1               2               3               4                 5               6               7               8               9               10               11                 12               13               14               15      4 mg   See details      16      6 mg         17      4 mg         18      6 mg           19      6 mg         20      4 mg         21            22               23               24               25                 26               27               28               29               30                  Date Details   11/15 This INR check       Date of next INR:  11/21/2017         How to take your warfarin dose     To take:  4 mg Take 1 of the 4 mg tablets.    To take:  6 mg Take 1.5 of the 4 mg  tablets.

## 2017-11-16 NOTE — PROGRESS NOTES
"Clinic Care Coordination Contact  OUTREACH    Referral Information:  Referral Source: Care Team  Reason for Contact: \"Caregiver Concerns, Concerns with ADLs/IADLs and Other: daughter jose is getting very burnt out caring for Natalie.  She wants help with - getting over the counter supplies paid for my medicare, with respite care and/or PCA care, and getting on medical assistance so daughter can get paid as PCA.\"      HIRAL placed call to Digna and spoke with her.  Pt had her [place] Choice Assessment on 8/23/17 and after submitting all of the paperwork to [facility], the pt has been approved of Medical Assistance and an Elderly Care Waiver on 10/1/17.  Per Digna, she is her mom's paid PCA, is hired by the company of [employer] and will be able to get paid to work up to 10 hours/day.  Digna will get her first paycheck on 11/17/17, which makes her incredibly happy.    Digna shared that she has a few questions about how to get the pt's pull ups, wipes and medications such as Tylenol, calcium, multivitamin, senna and other OTC medications paid for under the pt's MA.  HIRAL recommended that Digna contact the pt's Elderly Care Waiver , Celestino Angel at [phone], to request these items be purchased under her mom's waiver.  HIRAL also called and left a message for Celestino; awaiting a return call.    If the OTC meds can be filled by the waiver, HIRAL to request that clinic RN contact Digna to gather list of medications Digna is purchasing so that PCP can write a prescription for them & get them filled via the pharmacy next month.  HIRAL awaiting return call from Celestino with answers.     HIRAL brought up the topic of respite care and Digna also shared that she has 7 siblings; Digna said that she has asked each sibling to give her 1-4 hour chunk of time each month to spend with their mom.  This will allow Digna 28 hours to take a break away from caregiving, even if she wants to nap in her home.  HIRAL and Digna " discussed that there are excellent caregiver support groups in the community, but Digna is unsure if she would attend.  HIRAL has encouraged Digna to impress the importance of how much caregivers need the support of their siblings, which she says she will do.          Plan: HIRAL to await return call from Celestino, Elderly Waiver , to discuss the content of pt's waiver services and what is included in her care plan.

## 2017-11-17 NOTE — PROGRESS NOTES
"Clinic Care Coordination Contact  Care Team Conversations    SW received return call from pt's [facility] SW, Celestino Ortiz, [phone].  Celestino explained to this writer that Digna was given the option of 2 program plans when the pt was approved for MA:  1.  Straight PCA hours:  Digna could get paid for up to 10 hours of PCA hours/day by an agency to care for her mom.  Digna is currently paid by an agency to be her mom's PCA.  2.  Elderly Waiver Program:  Pt would be eligible for PCA hours, but on a limited budget.  She would also be able to get some of her DME supplies paid for, but again, on a limited budget.     Digna chose option 1, per Celestino, and this option will be good until 8/2018 or until Digna requests a new Critical access hospital assessment and asks to change to the Elderly Waiver.  HIRAL explained all of this to Digna and she said she understood, but the wording in her mom's [facility] paperwork states, \"you were switched to the Elderly Waiver on 8/2017,\" which caused her questions.  When discussing the 2 options above, Digna stated she would chose option 1 if given the choice.  She would rather be paid to be the PCA for 10 hours/day than to be reimbursed for wipes, pullups and OTC meds.    SW to continue to follow.  Digna has both this writer's contact name & phone number as well as her [facility] phone number to call should she have questions.    Jessie Gan  Social Work Care Coordinator  [facility]  [phone]        "

## 2017-11-17 NOTE — TELEPHONE ENCOUNTER
Medication is being filled for 1 time refill only due to:  Patient needs labs before next refill.  Amy Gama RN

## 2017-11-21 NOTE — ADDENDUM NOTE
Encounter addended by: Elizabeth Narvaez on: 11/21/2017 12:09 PM<BR>     Actions taken:  activity accessed, Charge Capture section accepted

## 2017-11-21 NOTE — PROGRESS NOTES
[employer] Assurity (S) Pacemaker Device Check/ [place]  : 100 %  Mode: VVIR        Underlying Rhythm: AF/ CHB at 30 BPM  Heart Rate: Histogram is stable with little variability. Pt in WC except for transfers and short walks to BR  Sensing: NONE    Pacing Threshold: Stable   Impedance: WNL  Battery Status: 11-12 years estimated longevity   Atrial Arrhythmia: permanent AF; remains on Warfarin  Ventricular Arrhythmia: none  Setting Change: NONE    Care Plan: Remote in 3 months. Transmitter had not been set up since implant. Pt has been lost to follow up since 9-2016. Daughter Digna present. Reviewed use of Merlin. l

## 2017-11-21 NOTE — LETTER
11/21/2017    Mimi Mcguire MD  [address] [facility] [address]    RE: Natalie Hair       Dear Colleague,    I had the pleasure of seeing Natalie Hair in the [facility].  Patient has fluid in legs, daughter is concerned about this. Patient has appt at [place] tomorrow with Dr. Jara.      I had the pleasure to follow up with your patient, Ms. Natalie Hair, along with her daughter in the Cardiovascular Medicine Clinic.  She is a patient well known to me.  She unfortunately has a very complex recent and remote cardiac history.  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  She has had multiple admissions over the last year and a half for diastolic heart failure.  She also has had periods of time where she was in paroxysmal atrial fibrillation and flutter.      This past summer, she was admitted to [facility] with an attempted ablation; however, this was unsuccessful.  She ultimately underwent AV node ablation with a dual-chamber pacemaker placement at that point.  She unfortunately has had progressive symptoms related to severe tricuspid regurgitation.      During her previous admissions, she has had a formal evaluation by the Cardiothoracic Surgery Service at the [facility].  The consensus opinion was that she would be prohibitively high risk and we have recommended that she go to [facility] for a second opinion.  The patient does have an appointment scheduled on 12/09/2016.  The patient has been on a variety of diuretics and her regimen has been relatively stable.       She states that her p.o. intake is quite poor.  She has no desire or taste for food.  This is confirmed by her daughter.  She has been seen at [place] for potential transcatheter approach for her severe TR (Latif FORMA valve).  They will be contacting her shortly whether she is a good candidate.      She was accepted ultimately and  underwent device placement on June 1, 2017 patient remained in the hospital for approximately 9 days. She unfortunately has had admissions for shortness of breath and pleural effusions. She did require thoracentesis and unfortunately will require one next week. She did derive some benefit from the valve procedure. Patient's family states that she was only able to walk 10-15 feet prior to the procedure. At her best she is now able to walk 250 feet with a walker and with assistance. Her appetite again is also an issue and she has lost a considerable amount of weight. Her daughter attempts to supplement her diet as best as she can. She does have an appointment with her [facility] doctors later this month. Records of the procedure and follow-up have been scanned in the chart for review including follow-up echocardiograms.     Over the last several weeks unfortunately she has had recurrent pleural effusions requiring thoracentesis. I refer you to those documents for additional details. She has a follow-up with her [facility] doctors tomorrow    PHYSICAL EXAMINATION:   VITAL SIGNS:  Blood pressure 123/64, pulse 70, weight 78.9 kg (174 lb), SpO2 96 %, not currently breastfeeding.  GENERAL:  The patient is alert, oriented, appears older than stated age.   HEENT:  Oropharynx is clear.   NECK:  JVP is 7-8 cm at a seated position with pulsations of her jugular veins noted.   CARDIOVASCULAR:  Distant heart tones, irregular with a 1-2/6 systolic ejection murmur best heard at the right upper sternal border.   LUNGS:  Diminished, however, no rales are noted.   ABDOMEN:  Obese, soft, nontender.     EXTREMITIES:  Dressings are in place.      ASSESSMENT:   1.  Severe tricuspid regurgitation with primarily right-sided heart failure symptoms.  S/P Latif FORMA valve at [facility]  2.  Congestive heart failure with diastolic dysfunction and valvular heart disease.    3.  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    4.  Status post pacemaker placement for atrial fibrillation/flutter with AV node ablation.      RECOMMENDATIONS:      Patient from a mental and social aspect is doing well. Her daughter states that there taken off items from her bucket list. She has seen various shows and seems to be in a good mood this morning. Her only concern is some lower extremity edema which looks the same to me. She does have a follow-up with her [facility] physicians tomorrow. They will forward and fax her documents of that visit to me next week. Let us continue with her current medications. Per her request she wishes to follow-up in 3 months timeframe. We will have her return to clinic in February 2018.    Thank you for allowing me to participate in the care of your patient.    Sincerely,     Emelyn Bingham MD     [facility]

## 2017-11-21 NOTE — MR AVS SNAPSHOT
After Visit Summary   11/21/2017    Natalie Hair    MRN: [id number]           Patient Information     Date Of Birth          8/28/1932        Visit Information        Provider Department      11/21/2017 9:30 AM Emelyn Bingham MD [facility]        Today's Diagnoses     Acute on chronic diastolic congestive heart failure (H)          Care Instructions    Thank you for your [facility] visit today. Your provider has recommended the following:  Medication Changes:  None today. Continue taking your medications as you have been.  Recommendations:  None   Follow-up:  See Dr. Bingham for cardiology follow up in Feb 2018.  We kindly ask that you call cardiology scheduling at [phone] three months prior to requested revisit date to schedule future cardiology appointments.  Reminder:  1. Please bring in your current medication list or your medication, over the counter supplements and vitamin bottles as we will review these at each office visit.               [facility]~[address]~[place], [place]~[address]  Questions about your visit today?  Call your Cardiology Clinic RN's-Hattie De Leon and/or Olimpia Cuevas at [phone].                Follow-ups after your visit        Additional Services     Follow-Up with Cardiologist                 Your next 10 appointments already scheduled     Nov 21, 2017 10:30 AM CST   Anticoagulation Visit with [place] ANTI COAG   [facility]    [address] [address]   [phone]            Nov 29, 2017 10:15 AM CST   Anticoagulation Visit with  ANTI COAG   [facility]    [address] [address]   [phone]            Feb 09, 2018  9:30 AM CST   Return Visit with Emelyn Bingham MD   [facility]  "  [place] ([facility])    [address]  [facility] [address]   [phone]              Future tests that were ordered for you today     Open Future Orders        Priority Expected Expires Ordered    Follow-Up with Cardiologist Routine 2018            Who to contact     If you have questions or need follow up information about today's clinic visit or your schedule please contact [facility] directly at [phone].  Normal or non-critical lab and imaging results will be communicated to you by Ploredhart, letter or phone within 4 business days after the clinic has received the results. If you do not hear from us within 7 days, please contact the clinic through [facility] or phone. If you have a critical or abnormal lab result, we will notify you by phone as soon as possible.  Submit refill requests through [employer] or call your pharmacy and they will forward the refill request to us. Please allow 3 business days for your refill to be completed.          Additional Information About Your Visit        [employer] Information     [employer] lets you send messages to your doctor, view your test results, renew your prescriptions, schedule appointments and more. To sign up, go to [email or web address] . Click on \"Log in\" on the left side of the screen, which will take you to the Welcome page. Then click on \"Sign up Now\" on the right side of the page.     You will be asked to enter the access code listed below, as well as some personal information. Please follow the directions to create your username and password.     Your access code is: [email or web address]  Expires: 2017 10:43 AM     Your access code will  in 90 days. If you need help or a new code, please call your [place] clinic or [phone].        Care EveryWhere ID     This is your Care EveryWhere ID. This could be used by other organizations to access your [place] medical " records  [id number]        Your Vitals Were     Pulse Pulse Oximetry BMI (Body Mass Index)             70 96% 28.08 kg/m2          Blood Pressure from Last 3 Encounters:   11/21/17 123/64   11/14/17 118/83   11/07/17 116/53    Weight from Last 3 Encounters:   11/21/17 78.9 kg (174 lb)   11/14/17 77.6 kg (171 lb 1.6 oz)   11/07/17 77.7 kg (171 lb 4.8 oz)              We Performed the Following     Follow-Up with Cardiologist        Primary Care Provider Office Phone # Fax #    Mimi Mcguire [phone] [address] [facility] [address]        Equal Access to Services     JA FOWLER : Hadii pily browneo Somanuel, waaxda luqadaha, qaybta kaalmada adeegyada, lyric sanchez . So [facility] [phone].    ATENCIÓN: Si habla español, tiene a simmons disposición servicios gratuitos de asistencia lingüística. [facility] [phone].    We comply with applicable federal civil rights laws and [place] laws. We do not discriminate on the basis of race, color, national origin, age, disability, sex, sexual orientation, or gender identity.            Thank you!     Thank you for choosing [facility]  for your care. Our goal is always to provide you with excellent care. Hearing back from our patients is one way we can continue to improve our services. Please take a few minutes to complete the written survey that you may receive in the mail after your visit with us. Thank you!             Your Updated Medication List - Protect others around you: Learn how to safely use, store and throw away your medicines at www.disposemymeds.org.          This list is accurate as of: 11/21/17  9:54 AM.  Always use your most recent med list.                   Brand Name Dispense Instructions for use Diagnosis    * ACETAMINOPHEN PO      Take 1,000 mg by mouth every 8 hours as needed for pain        * acetaminophen 325 MG tablet    TYLENOL    100 tablet     Take 1-2 tablets (325-650 mg) by mouth every 6 hours as needed for mild pain or pain    Back pain, unspecified back location, unspecified back pain laterality, unspecified chronicity, Chronic bilateral low back pain without sciatica       alendronate 70 MG tablet    FOSAMAX    12 tablet    TAKE 1 TABLET BY MOUTH ONCE WEEKLY ON EMPTY STOMACH    Osteopenia       Calcium carb-Vitamin D 500 mg [place]-200 units 500-200 MG-UNIT per tablet    OSCAL with D;Oyster Shell Calcium     Take 1 tablet by mouth 2 times daily (with meals)        cetirizine 5 MG tablet    zyrTEC    30 tablet    Take 1 tablet (5 mg) by mouth daily    Acute urticaria       clopidogrel 75 MG tablet    PLAVIX    90 tablet    Take 1 tablet (75 mg) by mouth daily    Congestive heart failure, unspecified congestive heart failure chronicity, unspecified congestive heart failure type (H), Lymphedema of both lower extremities       donepezil 5 MG tablet    ARIcept    90 tablet    TAKE 1 TABLET(5 MG) BY MOUTH AT BEDTIME    Dementia without behavioral disturbance, unspecified dementia type       ezetimibe 10 MG tablet    ZETIA    90 tablet    Take 1 tablet (10 mg) by mouth daily    Hyperlipidemia LDL goal <100       metoprolol 25 MG tablet    LOPRESSOR    90 tablet    Take 0.5 tablets (12.5 mg) by mouth 2 times daily        Multi-vitamin Tabs tablet      Take 1 tablet by mouth daily        nitroGLYcerin 0.4 MG sublingual tablet    NITROSTAT    25 tablet    Place 1 tablet (0.4 mg) under the tongue every 5 minutes as needed for chest pain    Unspecified cardiovascular disease       omega 3 1000 MG Caps     90 capsule    Take 1 g by mouth daily    Health Care Home       ondansetron 4 MG ODT tab    ZOFRAN ODT    10 tablet    Take 1-2 tablets (4-8 mg) by mouth every 6 hours as needed for nausea        * order for DME     1 Package    Equipment being ordered: Walker with a seat    Acute on chronic diastolic congestive heart failure (H), At risk for falling       * order  for DME     1 Device    Equipment being ordered: Hospital Bed 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 -ongoing symptoms of lower extremity edema,shortness of breath,cough sacral pressure sores within last 6 months   An electric hospital bed to allow for increase in head of bed elevation and for ease in wheelchair transfers  Length of need: lifelong NPI - [id number]    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), Lymphedema of both lower extremities, Right-sided congestive heart failure, Tricuspid valve insufficiency, unspecified etiology, Congestive heart failure, unspecified congestive heart failure chronicity, unspecified congestive heart failure type (H), History of pressure ulcer       * order for DME     10 Units    Mepilex sacral dressings    Decubitus ulcer of sacral region, unspecified ulcer stage       * order for DME     1 Month    always discreet underwear - 2 pairs/day  - 60/month flushable cleansing cloths ( wipes)  - several packages/month   For incontinence and primitivo-care    Urinary incontinence, unspecified type, History of pressure ulcer       polyethylene glycol powder    MIRALAX/GLYCOLAX     Take 1 capful by mouth every morning Takes 3/4 of a capful        potassium chloride 10 MEQ tablet    K-TAB,KLOR-CON    90 tablet    TAKE 1 TABLET(10 MEQ) BY MOUTH TWICE DAILY    Shortness of breath on exertion, Essential hypertension       SENNA S 8.6-50 MG per tablet   Generic drug:  senna-docusate      Take 2 tablets by mouth 2 times daily        spironolactone 25 MG tablet    ALDACTONE    60 tablet    Take 1 tablet (25 mg) by mouth 2 times daily    Congestive heart failure, unspecified congestive heart failure chronicity, unspecified congestive heart failure type (H), Lymphedema of both lower extremities       torsemide 20 MG tablet    DEMADEX    60 tablet    Take 1 tablet (20 mg) by mouth 2 times daily Patient needs labs before next refill.    Lymphedema of both lower  extremities, Pleural effusion       triamcinolone 0.1 % cream    KENALOG    30 g    Apply sparingly to affected area two times daily for 7 days.    Other eczema       TYLENOL PM EXTRA STRENGTH PO      Take 2-3 tablets by mouth nightly as needed        vitamin D 2000 UNITS Caps      Take 1 capsule by mouth daily        warfarin 4 MG tablet    COUMADIN    40 tablet    Take 6 mg on Sun, Tue, Thu; 4 mg all other days or as directed by the Anticoagulation Clinic.    Congestive heart failure, unspecified congestive heart failure chronicity, unspecified congestive heart failure type (H)       ZZZQUIL PO      Take 50 mg by mouth nightly as needed Alternates with Tylenol PM        * Notice:  This list has 6 medication(s) that are the same as other medications prescribed for you. Read the directions carefully, and ask your doctor or other care provider to review them with you.

## 2017-11-21 NOTE — PROGRESS NOTES
Patient has fluid in legs, daughter is concerned about this. Patient has appt at [place] tomorrow with Dr. Jara.

## 2017-11-21 NOTE — MR AVS SNAPSHOT
Natalie PATRICIA Hair   11/21/2017 10:30 AM   Anticoagulation Therapy Visit    Description:  85 year old female   Provider:  [place] ANTI COAJARR[facility]   Department:  [place] [facility]           INR as of 11/21/2017     Today's INR 3.3      Anticoagulation Summary as of 11/21/2017     INR goal 2.5-3.5   Today's INR 3.3   Full instructions 4 mg on Mon, Wed, Fri; 6 mg all other days   Next INR check 11/29/2017    Indications   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]  Long term current use of anticoagulant therapy [Z79.01]         Your next Anticoagulation Clinic appointment(s)     Nov 29, 2017 10:15 AM CST   Anticoagulation Visit with [place] ANTI COAG   [facility]    [address] [address] [phone]              Contact Numbers     Please call [phone] to cancel and/or reschedule your appointment.  Please call [phone] with any problems or questions regarding your therapy          November 2017 Details    Sun Mon Tue Wed Thu Fri Sat        1               2               3               4                 5               6               7               8               9               10               11                 12               13               14               15               16               17               18                 19               20               21      6 mg   See details      22      4 mg         23      6 mg         24      4 mg         25      6 mg           26      6 mg         27      4 mg         28      6 mg         29            30                  Date Details   11/21 This INR check       Date of next INR:  11/29/2017         How to take your warfarin dose     To take:  4 mg Take 1 of the 4 mg tablets.    To take:  6 mg Take 1.5 of the 4 mg tablets.

## 2017-11-21 NOTE — PROGRESS NOTES
Cardiology     I had the pleasure to follow up with your patient, Ms. Natalie Hair, along with her daughter in the Cardiovascular Medicine Clinic.  She is a patient well known to me.  She unfortunately has a very complex recent and remote cardiac history.  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  She has had multiple admissions over the last year and a half for diastolic heart failure.  She also has had periods of time where she was in paroxysmal atrial fibrillation and flutter.      This past summer, she was admitted to [facility] with an attempted ablation; however, this was unsuccessful.  She ultimately underwent AV node ablation with a dual-chamber pacemaker placement at that point.  She unfortunately has had progressive symptoms related to severe tricuspid regurgitation.      During her previous admissions, she has had a formal evaluation by the Cardiothoracic Surgery Service at the [facility].  The consensus opinion was that she would be prohibitively high risk and we have recommended that she go to [facility] for a second opinion.  The patient does have an appointment scheduled on 12/09/2016.  The patient has been on a variety of diuretics and her regimen has been relatively stable.       She states that her p.o. intake is quite poor.  She has no desire or taste for food.  This is confirmed by her daughter.  She has been seen at [facility] for potential transcatheter approach for her severe TR (Latif FORMA valve).  They will be contacting her shortly whether she is a good candidate.      She was accepted ultimately and underwent device placement on June 1, 2017 patient remained in the hospital for approximately 9 days. She unfortunately has had admissions for shortness of breath and pleural effusions. She did require thoracentesis and unfortunately will require one next week. She did derive some benefit from the valve procedure. Patient's  family states that she was only able to walk 10-15 feet prior to the procedure. At her best she is now able to walk 250 feet with a walker and with assistance. Her appetite again is also an issue and she has lost a considerable amount of weight. Her daughter attempts to supplement her diet as best as she can. She does have an appointment with her [facility] doctors later this month. Records of the procedure and follow-up have been scanned in the chart for review including follow-up echocardiograms.     Over the last several weeks unfortunately she has had recurrent pleural effusions requiring thoracentesis. I refer you to those documents for additional details. She has a follow-up with her [facility] doctors tomorrow    PHYSICAL EXAMINATION:   VITAL SIGNS:  Blood pressure 123/64, pulse 70, weight 78.9 kg (174 lb), SpO2 96 %, not currently breastfeeding.  GENERAL:  The patient is alert, oriented, appears older than stated age.   HEENT:  Oropharynx is clear.   NECK:  JVP is 7-8 cm at a seated position with pulsations of her jugular veins noted.   CARDIOVASCULAR:  Distant heart tones, irregular with a 1-2/6 systolic ejection murmur best heard at the right upper sternal border.   LUNGS:  Diminished, however, no rales are noted.   ABDOMEN:  Obese, soft, nontender.     EXTREMITIES:  Dressings are in place.      ASSESSMENT:   1.  Severe tricuspid regurgitation with primarily right-sided heart failure symptoms.  S/P Latif FORMA valve at [facility]  2.  Congestive heart failure with diastolic dysfunction and valvular heart disease.    3.  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    4.  Status post pacemaker placement for atrial fibrillation/flutter with AV node ablation.      RECOMMENDATIONS:      Patient from a mental and social aspect is doing well. Her daughter states that there taken off items from her bucket list. She has seen various shows and seems to be in a good  mood this morning. Her only concern is some lower extremity edema which looks the same to me. She does have a follow-up with her [facility] physicians tomorrow. They will forward and fax her documents of that visit to me next week. Let us continue with her current medications. Per her request she wishes to follow-up in 3 months timeframe. We will have her return to clinic in February 2018.      Emelyn Bingham MD

## 2017-11-21 NOTE — PATIENT INSTRUCTIONS
Thank you for your [facility] visit today. Your provider has recommended the following:  Medication Changes:  None today. Continue taking your medications as you have been.  Recommendations:  None   Follow-up:  See Dr. Bingham for cardiology follow up in Feb 2018.  We kindly ask that you call cardiology scheduling at [phone] three months prior to requested revisit date to schedule future cardiology appointments.  Reminder:  1. Please bring in your current medication list or your medication, over the counter supplements and vitamin bottles as we will review these at each office visit.               [facility]  [facility]~[address]~[place], [place]~[address]  Questions about your visit today?  Call your Cardiology Clinic RN's-Hattie De Leon and/or Olimpia Ceuvas at [phone].

## 2017-11-29 NOTE — MR AVS SNAPSHOT
Natalie GOMEZ Hair   11/29/2017 10:15 AM   Anticoagulation Therapy Visit    Description:  85 year old female   Provider:  MATT ANTI COAJARROD   Department:  [facility]           INR as of 11/29/2017     Today's INR 4.6!      Anticoagulation Summary as of 11/29/2017     INR goal 2.5-3.5   Today's INR 4.6!   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  Please call [phone] with any problems or questions regarding your therapy          November 2017 Details    Sun Mon Tue Wed Thu Fri Sat        1               2               3               4                 5               6               7               8               9               10               11                 12               13               14               15               16               17               18                 19               20               21               22               23               24               25                 26               27               28               29      2 mg   See details      30      6 mg            Date Details   11/29 This INR check               How to take your warfarin dose     To take:  2 mg Take 0.5 of a 4 mg tablet.    To take:  6 mg Take 1.5 of the 4 mg tablets.           December 2017 Details    Sun Mon Tue Wed Thu Fri Sat          1      4 mg         2      6 mg           3      4 mg         4      4 mg         5      6 mg         6            7               8                9                 10               11               12               13               14               15               16                 17               18               19               20               21               22               23                 24               25               26               27               28               29               30                 31                      Date Details   No additional details    Date of next INR:  12/6/2017         How to take your warfarin dose     To take:  4 mg Take 1 of the 4 mg tablets.    To take:  6 mg Take 1.5 of the 4 mg tablets.

## 2017-11-29 NOTE — PROGRESS NOTES
ANTICOAGULATION FOLLOW-UP CLINIC VISIT    Patient Name:  Natalie Hair  Date:  11/29/2017  Contact Type:  Face to Face, accompanied by her daughter    SUBJECTIVE:     Patient Findings     Positives Inflammation (Patient still has some fluid build up - she increased her diuretics per [facility]. She may need a drain placed to get rid of the fluid build up in her lungs), Unexplained INR or factor level change    Comments              OBJECTIVE    INR Protime   Date Value Ref Range Status   11/29/2017 4.6 (A) 0.86 - 1.14 Final       ASSESSMENT / PLAN  INR assessment SUPRA    Recheck INR In: 1 WEEK    INR Location Clinic      Anticoagulation Summary as of 11/29/2017     INR goal 2.5-3.5   Today's INR 4.6!   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

## 2017-12-06 NOTE — MR AVS SNAPSHOT
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  Please call [phone] with any problems or questions regarding your therapy          December 2017 Details    Sun Mon Tue Wed Thu Fri Sat          1               2                 3               4               5               6      4 mg   See details      7      4 mg         8      4 mg         9      6 mg           10      4 mg         11      4 mg         12      6 mg         13            14               15               16                 17               18               19               20               21               22               23                 24               25               26               27               28               29               30                 31                      Date Details   12/06 This INR check       Date of next INR:  12/13/2017         How to take your warfarin dose     To take:  4 mg Take 1 of the 4 mg tablets.    To take:  6 mg Take 1.5 of the 4 mg tablets.

## 2017-12-06 NOTE — PROGRESS NOTES
ANTICOAGULATION FOLLOW-UP CLINIC VISIT    Patient Name:  Natalie Hair  Date:  12/6/2017  Contact Type:  Face to Face, accompanied by her daughter    SUBJECTIVE:     Patient Findings     Positives Other complaints (Patient has some cuts on her bilateral wrists (look like 1/4 inch skin tears) from dog scratches. Patient had been hiding toys from the dog and she ended up getting scratched (the toys were taken away now)), No Problem Findings    Comments No changes in medications, diet, or activity noted. Took warfarin as instructed.    Patient had 32mg in the previous 7 days, will adjust dose to keep weekly mg total the same by the next INR check in 1 week.           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

## 2017-12-13 NOTE — MR AVS SNAPSHOT
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]  Long term current use of anticoagulant therapy [Z79.01]         Your next Anticoagulation Clinic appointment(s)     Dec 27, 2017 10:30 AM CST   Anticoagulation Visit with MATT ANTI JOE   [facility]    [address] [address]   [phone]              December 2017 Details    Sun Mon Tue Wed Thu Fri Sat          1               2                 3               4               5               6               7               8               9                 10               11               12               13      4 mg   See details      14      4 mg         15      4 mg         16      6 mg           17      4 mg         18      4 mg         19      6 mg         20      4 mg         21      4 mg         22      4 mg         23      6 mg           24      4 mg         25      4 mg         26      6 mg         27            28               29               30                 31                      Date Details   12/13 This INR check       Date of next INR:  12/27/2017         How to take your warfarin dose     To take:  4 mg Take 1 of the 4 mg tablets.    To take:  6 mg Take 1.5 of the 4 mg tablets.

## 2017-12-13 NOTE — PROGRESS NOTES
"  ANTICOAGULATION FOLLOW-UP CLINIC VISIT    Patient Name:  Natalie Hair  Date:  12/13/2017  Contact Type:  Face to Face, accompanied by her daughter    SUBJECTIVE:     Patient Findings     Positives No Problem Findings    Comments No changes noted, patient is seeing PCP tomorrow. It was recommended from a MD at [place] that patient have \"a tube placed to drain her lungs\". Writer is thinking she is referring to a chest tube. Patient's daughter will let [facility] know what the plan is after her PCP appointment tomorrow.            OBJECTIVE    INR Protime   Date Value Ref Range Status   12/13/2017 2.5 (A) 0.86 - 1.14 Final       ASSESSMENT / PLAN  No question data found.  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]  Long term current use of anticoagulant therapy [Z79.01]         Anticoagulation Episode Summary     INR check location     Preferred lab     Send INR reminders to [facility]    Comments * no bandaid        Anticoagulation Care Providers     Provider Role Specialty Phone number    Mimi Mcguire MD [facility] [phone]            See the Encounter Report to view Anticoagulation Flowsheet and Dosing Calendar (Go to Encounters tab in chart review, and find the Anticoagulation Therapy Visit)        Christa Guzman RN               "

## 2017-12-14 NOTE — PROGRESS NOTES
SUBJECTIVE:   Natalie Hair is a 85 year old female who presents to clinic today for the following health issues:    CHF with forma device placed at [place]  Here with daughter who is caregiver  Daughter wants lungs checked  Natalie has been accumulating fluid on lungs since her surgery this summer, and has had 3 thoracenteses.     Shortness of breath is about the same.   Hard to lie flat though and they elevate the head of her bed and use two pillows    At [place] [facility], they discussed permanent type chest tube  Also, Nov 22 - increased torsemide to two tabs in a.m. And one tablet in the afternoon - was taking one tablet twice a day     Memory continues to be poor - asks repetitive questions  Appetite is low     Enjoys tv game shows  Sleep well   Denies pain     Review of systems:  No f/c   No cold symptoms  No n/v   No d/c   No rash     Problem list and histories reviewed & adjusted, as indicated.  Additional history: as documented    Patient Active Problem List   Diagnosis     Heart valve replaced     Cardiovascular disease     History of T12 compression fracture     Backache     Vitamin D deficiency     Hyperlipidemia LDL goal <100     Advanced directives, counseling/discussion     Long term current use of anticoagulant therapy     Urinary incontinence     Unstable angina (H)     Benign essential hypertension, BP goal <150/90     Bilateral low back pain without sciatica     Osteopenia     Atrial fibrillation with RVR (H)     Primary pulmonary hypertension (H)     Lymphedema of both lower extremities     Tricuspid valve insufficiency, unspecified etiology     Near syncope     Chronic diastolic congestive heart failure (H)     History of pressure ulcer     Health Care Home     S/P CABG (coronary artery bypass graft) - 2002     Stented coronary artery - 5/4/2017 at [place]     Decubitus ulcer of sacral region, unspecified ulcer stage     Pleural effusion     Memory loss     Postprocedural pneumothorax     Current  "Outpatient Prescriptions   Medication     warfarin (COUMADIN) 4 MG tablet     torsemide (DEMADEX) 20 MG tablet     donepezil (ARICEPT) 5 MG tablet     acetaminophen (TYLENOL) 325 MG tablet     ezetimibe (ZETIA) 10 MG tablet     triamcinolone (KENALOG) 0.1 % cream     clopidogrel (PLAVIX) 75 MG tablet     spironolactone (ALDACTONE) 25 MG tablet     calcium carb 1250 mg, 500 mg [place],/vitamin D 200 units (OSCAL WITH D) 500-200 MG-UNIT per tablet     Diphenhydramine-APAP, sleep, (TYLENOL PM EXTRA STRENGTH PO)     alendronate (FOSAMAX) 70 MG tablet     omega 3 1000 MG CAPS     multivitamin, therapeutic with minerals (MULTI-VITAMIN) TABS tablet     metoprolol (LOPRESSOR) 25 MG tablet     cetirizine (ZYRTEC) 5 MG tablet     polyethylene glycol (MIRALAX/GLYCOLAX) powder     Cholecalciferol (VITAMIN D) 2000 UNITS CAPS     senna-docusate (SENNA S) 8.6-50 MG per tablet     ACETAMINOPHEN PO     order for DME     potassium chloride (K-TAB,KLOR-CON) 10 MEQ tablet     order for DME     DiphenhydrAMINE HCl, Sleep, (ZZZQUIL PO)     ondansetron (ZOFRAN ODT) 4 MG ODT tab     order for DME     order for DME     nitroglycerin (NITROSTAT) 0.4 MG SL tablet     No current facility-administered medications for this visit.         Allergies   Allergen Reactions     Lorazepam Nausea and Vomiting     Morphine Sulfate Cr [Morphine Sulfate] Nausea and Vomiting     Crestor [Rosuvastatin] Other (See Comments)     Abdominal/Back pain     Lidocaine GI Disturbance     Cozaar [Losartan] Rash     Rash      /53 (BP Location: Right arm, Patient Position: Sitting, Cuff Size: Adult Regular)  Pulse 69  Temp 96.3  F (35.7  C) (Tympanic)  Ht 5' 6\" (1.676 m)  Wt 166 lb 4.8 oz (75.4 kg)  SpO2 98%  BMI 26.84 kg/m2  Wt Readings from Last 10 Encounters:   12/14/17 166 lb 4.8 oz (75.4 kg)   11/21/17 174 lb (78.9 kg)   11/14/17 171 lb 1.6 oz (77.6 kg)   11/07/17 171 lb 4.8 oz (77.7 kg)   11/03/17 168 lb 4.8 oz (76.3 kg)   11/02/17 171 lb (77.6 kg) "   11/01/17 171 lb (77.6 kg)   10/25/17 171 lb (77.6 kg)   09/19/17 164 lb 6.4 oz (74.6 kg)   09/08/17 157 lb (71.2 kg)     GENERAL - Pt is alert and oriented in no acute distress.  Affect is appropriate. Good eye contact.  NECK - Neck is supple w/o LA or thyromegaly  RESPIRATORY - Clear to auscultation bilaterally.  No wheezing noted  CV - RRR, no murmurs, rubs, gallops.   EXTREM - 1+ bilateral edema    Assessment/Plan -    (I89.0) Lymphedema of both lower extremities  (primary encounter diagnosis)  Comment: will check lytes due to recent increase in torsemide. I discussed hospice referral with patient and her daughter. They think this is a good idea. Natalie is not thriving and chest fluid continues to accumulate. We discussed a focus on doing things she enjoys and making the most of the time she has. Her daughter understands this but worries that the other 7 kids won't understand that their mom won't live forever. We discussed that before the forma device, she was only given 3 months to live. She has well exceeded that but with weight loss, memory issues, and ongoing CHF symptoms, she is still very ill.    The patient indicates understanding of these issues and agrees with the plan.   Plan: Basic metabolic panel, HOME CARE NURSING         REFERRAL            (I50.32) Chronic diastolic congestive heart failure (H)  Comment:   Plan: HOME CARE NURSING REFERRAL            (I07.1) Tricuspid valve insufficiency, unspecified etiology  Comment:   Plan: HOME CARE NURSING REFERRAL            (I10) Benign essential hypertension, BP goal <150/90  Comment:   Plan: Basic metabolic panel            (Z95.1) S/P CABG (coronary artery bypass graft) - 2002  Comment:   Plan: HOME CARE NURSING REFERRAL            (Z95.5) Stented coronary artery - 5/4/2017 at [place] [facility]  Comment:   Plan: HOME CARE NURSING REFERRAL            (J90) Pleural effusion  Comment:   Plan: HOME CARE NURSING REFERRAL            (R41.3) Memory loss  Comment:   Plan:  HOME CARE NURSING REFERRAL            (Z87.2) History of pressure ulcer  Comment:   Plan: HOME CARE NURSING REFERRAL                ROZINA Mcguire MD

## 2017-12-14 NOTE — NURSING NOTE
"    Initial /53 (BP Location: Right arm, Patient Position: Sitting, Cuff Size: Adult Regular)  Pulse 69  Temp 96.3  F (35.7  C) (Tympanic)  Wt 166 lb 4.8 oz (75.4 kg)  BMI 26.84 kg/m2 Estimated body mass index is 26.84 kg/(m^2) as calculated from the following:    Height as of 11/14/17: 5' 6\" (1.676 m).    Weight as of this encounter: 166 lb 4.8 oz (75.4 kg).  Medication Reconciliation: complete   Margie Abarca LPN    "

## 2017-12-14 NOTE — MR AVS SNAPSHOT
After Visit Summary   12/14/2017    Natalie Hair    MRN: [id number]           Patient Information     Date Of Birth          8/28/1932        Visit Information        Provider Department      12/14/2017 2:30 PM Mimi Mcguire MD [facility]        Today's Diagnoses     Lymphedema of both lower extremities    -  1    Chronic diastolic congestive heart failure (H)        Tricuspid valve insufficiency, unspecified etiology        Benign essential hypertension, BP goal <150/90        S/P CABG (coronary artery bypass graft) - 2002        Stented coronary artery - 5/4/2017 at [place]        Pleural effusion        Memory loss        History of pressure ulcer           Follow-ups after your visit        Additional Services     HOME CARE NURSING REFERRAL       **Order classes of: [place] [facility], [facility] and [facility] will route to the Home Care and Hospice Referral Pool.  Home Care or Hospice will then contact the patient to schedule their appointment.**    If you do not hear from Home Care and Hospice, or you would like to call to schedule, please call the referring place of service that your provider has listed below.  ______________________________________________________________________    Your provider has referred you to: [facility]: [facility] [phone]   [email or web address]    Extended Emergency Contact Information  Primary Emergency Contact: jose buckley  Address: [address] [place] [address]  Home Phone: [phone]  Work Phone: none  Mobile Phone: [phone]  Relation: Daughter    Patient Anticipated Discharge Date:    RN, PT, HHA to begin 24 - 48 hours after discharge.  PLEASE EVALUATE AND TREAT (Evaluation timeline is 24 - 48 hrs. Please call if there is need for a variance to this timeline).    REASON FOR REFERRAL: Hospice - Diagnosis: chf, memory loss,  weight loss, shortness of breath, weakness     ADDITIONAL SERVICES NEEDED:     OTHER PERTINENT INFORMATION: Patient was last seen by provider on 12/14/17 for shortness of breath.    Current Outpatient Prescriptions:  warfarin (COUMADIN) 4 MG tablet, Take 6 mg on Tue, Sat; 4 mg all other days or as directed by the Anticoagulation Clinic., Disp: 40 tablet, Rfl: 2  torsemide (DEMADEX) 20 MG tablet, Take 1 tablet (20 mg) by mouth 2 times daily Patient needs labs before next refill., Disp: 60 tablet, Rfl: 0  donepezil (ARICEPT) 5 MG tablet, TAKE 1 TABLET(5 MG) BY MOUTH AT BEDTIME, Disp: 90 tablet, Rfl: 0  acetaminophen (TYLENOL) 325 MG tablet, Take 1-2 tablets (325-650 mg) by mouth every 6 hours as needed for mild pain or pain, Disp: 100 tablet, Rfl: 1  ezetimibe (ZETIA) 10 MG tablet, Take 1 tablet (10 mg) by mouth daily, Disp: 90 tablet, Rfl: 3  triamcinolone (KENALOG) 0.1 % cream, Apply sparingly to affected area two times daily for 7 days., Disp: 30 g, Rfl: 0  clopidogrel (PLAVIX) 75 MG tablet, Take 1 tablet (75 mg) by mouth daily, Disp: 90 tablet, Rfl: 0  spironolactone (ALDACTONE) 25 MG tablet, Take 1 tablet (25 mg) by mouth 2 times daily, Disp: 60 tablet, Rfl: 1  calcium carb 1250 mg, 500 mg [place],/vitamin D 200 units (OSCAL WITH D) 500-200 MG-UNIT per tablet, Take 1 tablet by mouth 2 times daily (with meals), Disp: , Rfl:   Diphenhydramine-APAP, sleep, (TYLENOL PM EXTRA STRENGTH PO), Take 2-3 tablets by mouth nightly as needed , Disp: , Rfl:   alendronate (FOSAMAX) 70 MG tablet, TAKE 1 TABLET BY MOUTH ONCE WEEKLY ON EMPTY STOMACH, Disp: 12 tablet, Rfl: 0  omega 3 1000 MG CAPS, Take 1 g by mouth daily, Disp: 90 capsule, Rfl: 3  multivitamin, therapeutic with minerals (MULTI-VITAMIN) TABS tablet, Take 1 tablet by mouth daily, Disp: , Rfl:   metoprolol (LOPRESSOR) 25 MG tablet, Take 0.5 tablets (12.5 mg) by mouth 2 times daily, Disp: 90 tablet, Rfl: 3  cetirizine (ZYRTEC) 5 MG tablet, Take 1 tablet (5 mg) by mouth  daily, Disp: 30 tablet, Rfl: 3  polyethylene glycol (MIRALAX/GLYCOLAX) powder, Take 1 capful by mouth every morning Takes 3/4 of a capful, Disp: , Rfl:   Cholecalciferol (VITAMIN D) 2000 UNITS CAPS, Take 1 capsule by mouth daily, Disp: , Rfl:   senna-docusate (SENNA S) 8.6-50 MG per tablet, Take 2 tablets by mouth 2 times daily, Disp: , Rfl:   ACETAMINOPHEN PO, Take 1,000 mg by mouth every 8 hours as needed for pain , Disp: , Rfl:   order for DME, always discreet underwear - 2 pairs/day  - 60/month  flushable cleansing cloths ( wipes)  - several packages/month     For incontinence and primitivo-care, Disp: 1 Month, Rfl: 3  potassium chloride (K-TAB,KLOR-CON) 10 MEQ tablet, TAKE 1 TABLET(10 MEQ) BY MOUTH TWICE DAILY, Disp: 90 tablet, Rfl: 1  order for DME, Mepilex sacral dressings, Disp: 10 Units, Rfl: 3  DiphenhydrAMINE HCl, Sleep, (ZZZQUIL PO), Take 50 mg by mouth nightly as needed Alternates with Tylenol PM, Disp: , Rfl:   ondansetron (ZOFRAN ODT) 4 MG ODT tab, Take 1-2 tablets (4-8 mg) by mouth every 6 hours as needed for nausea, Disp: 10 tablet, Rfl: 0  order for DME, Equipment being ordered: Hospital Bed  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 -ongoing symptoms of lower extremity edema,shortness of breath,cough  sacral pressure sores within last 6 months    An electric hospital bed to allow for increase in head of bed elevation and for ease in wheelchair transfers   Length of need: lifelong  NPI - [id number], Disp: 1 Device, Rfl: 0  order for DME, Equipment being ordered: Walker with a seat, Disp: 1 Package, Rfl: 0  nitroglycerin (NITROSTAT) 0.4 MG SL tablet, Place 1 tablet (0.4 mg) under the tongue every 5 minutes as needed for chest pain, Disp: 25 tablet, Rfl: 1      Patient Active Problem List:     Heart valve replaced     Cardiovascular disease     History of T12 compression fracture     Backache     Vitamin D deficiency     Hyperlipidemia LDL goal <100     Advanced directives, counseling/discussion     Long  term current use of anticoagulant therapy     Urinary incontinence     Unstable angina (H)     Benign essential hypertension, BP goal <150/90     Bilateral low back pain without sciatica     Osteopenia     Atrial fibrillation with RVR (H)     Primary pulmonary hypertension (H)     Lymphedema of both lower extremities     Tricuspid valve insufficiency, unspecified etiology     Near syncope     Chronic diastolic congestive heart failure (H)     History of pressure ulcer     Health Care Home     S/P CABG (coronary artery bypass graft) - 2002     Stented coronary artery - 5/4/2017 at [place]     Decubitus ulcer of sacral region, unspecified ulcer stage     Pleural effusion     Memory loss     Postprocedural pneumothorax      Documentation of Face to Face and Certification for Home Health Services    I certify that patient, Natalie Hair is under my care and that I, or a Nurse Practitioner or Physician's Assistant working with me, had a face-to-face encounter that meets the physician face-to-face encounter requirements with this patient on: 12/14/2017.    This encounter with the patient was in whole, or in part, for the following medical condition, which is the primary reason for Home Health Care: .    I certify that, based on my findings, the following services are medically necessary Home Health Services: Nursing    My clinical findings support the need for the above services because: patient's symptoms are worsening. She had severe chf and experimental procedure at [place]. Has had worsening shortness of breath, lower extremity edema, 3 thoracenteses, memory loss, and weight loss     Further, I certify that my clinical findings support that this patient is homebound (i.e. absences from home require considerable and taxing effort and are for medical reasons or [employer] services or infrequently or of short duration when for other reasons) because: Requires assistance of another person or specialized equipment to access  medical services because patient: Is prone to wander/get lost without assistance., Is unable to exit home safely on own due to: weakness and confusion. and Is unable to walk greater than 15 feet without rest..    Based on the above findings, I certify that this patient is confined to the home and needs intermittent skilled nursing care, physical therapy and/or speech therapy.  The patient is under my care, and I have initiated the establishment of the plan of care.  This patient will be followed by a physician who will periodically review the plan of care.    Physician/Provider to provide follow up care: Mimi Mcguire certified Physician at time of discharge:     Please be aware that coverage of these services is subject to the terms and limitations of your health insurance plan.  Call member services at your health plan with any benefit or coverage questions.                  Your next 10 appointments already scheduled     Dec 27, 2017 10:30 AM CST   Anticoagulation Visit with LL ANTI COAG   [facility] ([facility])    [address] [address]   [phone]            Feb 09, 2018  9:30 AM CST   Return Visit with Emelyn Bingham MD   [facility] ([facility])    [address] [address] [phone]              Future tests that were ordered for you today     Open Future Orders        Priority Expected Expires Ordered    Basic metabolic panel Routine  1/5/2018 12/15/2017            Who to contact     Normal or non-critical lab and imaging results will be communicated to you by MyChart, letter or phone within 4 business days after the clinic has received the results. If you do not hear from us within 7 days, please contact the clinic through MyChart or phone. If you have a critical or abnormal lab result, we will notify you by phone as soon as possible.  Submit  "refill requests through [employer] or call your pharmacy and they will forward the refill request to us. Please allow 3 business days for your refill to be completed.          If you need to speak with a  for additional information , please call: [phone]             Additional Information About Your Visit        [employer] Information     [employer] lets you send messages to your doctor, view your test results, renew your prescriptions, schedule appointments and more. To sign up, go to [email or web address] . Click on \"Log in\" on the left side of the screen, which will take you to the Welcome page. Then click on \"Sign up Now\" on the right side of the page.     You will be asked to enter the access code listed below, as well as some personal information. Please follow the directions to create your username and password.     Your access code is: [email or web address]  Expires: 3/15/2018 12:13 PM     Your access code will  in 90 days. If you need help or a new code, please call your [place] clinic or [phone].        Care EveryWhere ID     This is your Care EveryWhere ID. This could be used by other organizations to access your [facility] medical records  [id number]        Your Vitals Were     Pulse Temperature Height Pulse Oximetry BMI (Body Mass Index)       69 96.3  F (35.7  C) (Tympanic) 5' 6\" (1.676 m) 98% 26.84 kg/m2        Blood Pressure from Last 3 Encounters:   17 111/53   17 123/64   17 118/83    Weight from Last 3 Encounters:   17 166 lb 4.8 oz (75.4 kg)   17 174 lb (78.9 kg)   17 171 lb 1.6 oz (77.6 kg)              We Performed the Following     Basic metabolic panel     HOME CARE NURSING REFERRAL        Primary Care Provider Office Phone # Fax #    Mimi Mcguire [phone] [address] [place] [address]        Equal Access to Services     [facility] AH: Hadii pily Monroe, ilianada artemio, qaybta illiam " lyric rodrigesquynh munozaan ah. So [facility] [phone].    ATENCIÓN: Si byron morris, tiene a simmons disposición servicios gratuitos de asistencia lingüística. Kevin al [phone].    We comply with applicable federal civil rights laws and [place] laws. We do not discriminate on the basis of race, color, national origin, age, disability, sex, sexual orientation, or gender identity.            Thank you!     Thank you for choosing [facility]  for your care. Our goal is always to provide you with excellent care. Hearing back from our patients is one way we can continue to improve our services. Please take a few minutes to complete the written survey that you may receive in the mail after your visit with us. Thank you!             Your Updated Medication List - Protect others around you: Learn how to safely use, store and throw away your medicines at www.disposemymeds.org.          This list is accurate as of: 12/14/17 11:59 PM.  Always use your most recent med list.                   Brand Name Dispense Instructions for use Diagnosis    * ACETAMINOPHEN PO      Take 1,000 mg by mouth every 8 hours as needed for pain        * acetaminophen 325 MG tablet    TYLENOL    100 tablet    Take 1-2 tablets (325-650 mg) by mouth every 6 hours as needed for mild pain or pain    Back pain, unspecified back location, unspecified back pain laterality, unspecified chronicity, Chronic bilateral low back pain without sciatica       alendronate 70 MG tablet    FOSAMAX    12 tablet    TAKE 1 TABLET BY MOUTH ONCE WEEKLY ON EMPTY STOMACH    Osteopenia       Calcium carb-Vitamin D 500 mg [place]-200 units 500-200 MG-UNIT per tablet    OSCAL with D;Oyster Shell Calcium     Take 1 tablet by mouth 2 times daily (with meals)        cetirizine 5 MG tablet    zyrTEC    30 tablet    Take 1 tablet (5 mg) by mouth daily    Acute urticaria       clopidogrel 75 MG tablet    PLAVIX    90 tablet    Take 1 tablet (75 mg) by  mouth daily    Congestive heart failure, unspecified congestive heart failure chronicity, unspecified congestive heart failure type (H), Lymphedema of both lower extremities       donepezil 5 MG tablet    ARIcept    90 tablet    TAKE 1 TABLET(5 MG) BY MOUTH AT BEDTIME    Dementia without behavioral disturbance, unspecified dementia type       ezetimibe 10 MG tablet    ZETIA    90 tablet    Take 1 tablet (10 mg) by mouth daily    Hyperlipidemia LDL goal <100       metoprolol 25 MG tablet    LOPRESSOR    90 tablet    Take 0.5 tablets (12.5 mg) by mouth 2 times daily        Multi-vitamin Tabs tablet      Take 1 tablet by mouth daily        nitroGLYcerin 0.4 MG sublingual tablet    NITROSTAT    25 tablet    Place 1 tablet (0.4 mg) under the tongue every 5 minutes as needed for chest pain    Unspecified cardiovascular disease       omega 3 1000 MG Caps     90 capsule    Take 1 g by mouth daily    Health Care Home       ondansetron 4 MG ODT tab    ZOFRAN ODT    10 tablet    Take 1-2 tablets (4-8 mg) by mouth every 6 hours as needed for nausea        * order for DME     1 Package    Equipment being ordered: Walker with a seat    Acute on chronic diastolic congestive heart failure (H), At risk for falling       * order for DME     1 Device    Equipment being ordered: Hospital Bed 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 -ongoing symptoms of lower extremity edema,shortness of breath,cough sacral pressure sores within last 6 months   An electric hospital bed to allow for increase in head of bed elevation and for ease in wheelchair transfers  Length of need: lifelong NPI - [id number]    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), Lymphedema of both lower extremities, Right-sided congestive heart failure, Tricuspid valve insufficiency, unspecified etiology, Congestive heart failure, unspecified congestive heart failure chronicity, unspecified congestive heart failure type (H), History of  pressure ulcer       * order for DME     10 Units    Mepilex sacral dressings    Decubitus ulcer of sacral region, unspecified ulcer stage       * order for DME     1 Month    always discreet underwear - 2 pairs/day  - 60/month flushable cleansing cloths ( wipes)  - several packages/month   For incontinence and primitivo-care    Urinary incontinence, unspecified type, History of pressure ulcer       polyethylene glycol powder    MIRALAX/GLYCOLAX     Take 1 capful by mouth every morning Takes 3/4 of a capful        potassium chloride 10 MEQ tablet    K-TAB,KLOR-CON    90 tablet    TAKE 1 TABLET(10 MEQ) BY MOUTH TWICE DAILY    Shortness of breath on exertion, Essential hypertension       SENNA S 8.6-50 MG per tablet   Generic drug:  senna-docusate      Take 2 tablets by mouth 2 times daily        spironolactone 25 MG tablet    ALDACTONE    60 tablet    Take 1 tablet (25 mg) by mouth 2 times daily    Congestive heart failure, unspecified congestive heart failure chronicity, unspecified congestive heart failure type (H), Lymphedema of both lower extremities       torsemide 20 MG tablet    DEMADEX    60 tablet    Take 1 tablet (20 mg) by mouth 2 times daily Patient needs labs before next refill.    Lymphedema of both lower extremities, Pleural effusion       triamcinolone 0.1 % cream    KENALOG    30 g    Apply sparingly to affected area two times daily for 7 days.    Other eczema       TYLENOL PM EXTRA STRENGTH PO      Take 2-3 tablets by mouth nightly as needed        vitamin D 2000 UNITS Caps      Take 1 capsule by mouth daily        warfarin 4 MG tablet    COUMADIN    40 tablet    Take 6 mg on Tue, Sat; 4 mg all other days or as directed by the Anticoagulation Clinic.    Congestive heart failure, unspecified congestive heart failure chronicity, unspecified congestive heart failure type (H)       ZZZQUIL PO      Take 50 mg by mouth nightly as needed Alternates with Tylenol PM        * Notice:  This list has 6 medication(s)  that are the same as other medications prescribed for you. Read the directions carefully, and ask your doctor or other care provider to review them with you.

## 2017-12-15 NOTE — TELEPHONE ENCOUNTER
Please call her daughter, Emely.    I think her diuretic dose was increased at her [place] visit on nov 22.    Now taking two 20mg torsemide in the a.m. And one in the afternoon.   She was taking one tablet bid.    Her creatinine and calcium are up, indicating that she may be overdiuresed.     I would like her to return to the bid dosing and come for bmp next week in clinic     ROZINA Mcguire MD       Component      Latest Ref Rng & Units 9/1/2017 9/27/2017 12/14/2017   Sodium      133 - 144 mmol/L 137 131 (L) 137   Potassium      3.4 - 5.3 mmol/L 4.1 4.3 4.3   Chloride      94 - 109 mmol/L 103 100 100   Carbon Dioxide      20 - 32 mmol/L 29 26 29   Anion Gap      3 - 14 mmol/L 5 5 8   Glucose      70 - 99 mg/dL 155 (H) 135 (H) 82   Urea Nitrogen      7 - 30 mg/dL 21 27 36 (H)   Creatinine      0.52 - 1.04 mg/dL 1.14 (H) 1.12 (H) 1.38 (H)   GFR Estimate      >60 mL/min/1.7m2 45 (L) 46 (L) 36 (L)   GFR Estimate If Black      >60 mL/min/1.7m2 55 (L) 56 (L) 44 (L)   Calcium      8.5 - 10.1 mg/dL 10.0 9.8 10.4 (H)   Bilirubin Total      0.2 - 1.3 mg/dL 0.8     Albumin      3.4 - 5.0 g/dL 3.2 (L)     Protein Total      6.8 - 8.8 g/dL 7.6     Alkaline Phosphatase      40 - 150 U/L 267 (H)     ALT      0 - 50 U/L 26     AST      0 - 45 U/L 28

## 2017-12-15 NOTE — TELEPHONE ENCOUNTER
Daughter Digna notified of recommendations. She will have labs rechecked next week.  Amy Gama RN

## 2017-12-18 NOTE — TELEPHONE ENCOUNTER
Suzette was hoping to get these orders approved today.    Suzette also added a new order, which needs approval:  INR to be added and drawn @ home on 12/27/17.  INR clinic notified and & are OK with it being drawn at home.    Could you please review and approve if possible.  Thank you..Claire Carroll

## 2017-12-18 NOTE — TELEPHONE ENCOUNTER
See warnings about Potassium taken with Spironolactone when about to sign order.     Amee Boothe RN

## 2017-12-18 NOTE — TELEPHONE ENCOUNTER
Reason for Call: Request for an order or referral:    Order or referral being requested: Suzette LEFT MESSAGE for orders needed:  Skilled nursing twice a week x 2 weeks, then once a week x 3 weeks and 3 as needed PRN visits.  Social work evaluation to help with community resources and advanced care planning.    Please review and advise.  Thank you..Claire Carroll    Date needed: as soon as possible    Has the patient been seen by the PCP for this problem? YES    Additional comments: none    Phone number Patient can be reached at:  Other phone number:  Suzette at [phone]    Best Time:  Any time    Can we leave a detailed message on this number?  YES    Call taken on 12/18/2017 at 2:21 PM by Claire Carroll

## 2017-12-19 NOTE — PROGRESS NOTES
Yes to home care.  I would like hospice involved too.     I am ok with drug drug interactions.     ThanksROZINA MD

## 2017-12-19 NOTE — TELEPHONE ENCOUNTER
Reason for Call:  Other Medication interactions    Detailed comments: Suzette from [facility] calling to let you know that they are getting a warning for the following drug interactions.  If there is anything you would like changed or for them to do please call, otherwise no need to call her back.    Zofran and Aricept.  Increased QT  Potassium and spironalactone decreased renal secretions of potassium  Spironalactone and torsemide.      Phone Number Patient can be reached at: Other phone number:  Suzette @ [phone]    Best Time: any time    Can we leave a detailed message on this number? YES    Call taken on 12/19/2017 at 3:17 PM by Claire Carroll

## 2017-12-27 NOTE — MR AVS SNAPSHOT
Natalie M Reginaldo   12/27/2017   Anticoagulation Therapy Visit    Description:  85 year old female   Provider:  Susan Caruso, RN   Department:  [facility]           INR as of 12/27/2017     Today's INR 3.3      Anticoagulation Summary as of 12/27/2017     INR goal 2.5-3.5   Today's INR 3.3   Full instructions 6 mg on Tue, Sat; 4 mg all other days   Next INR check 1/10/2018    Indications   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]  Long term current use of anticoagulant therapy [Z79.01]         Description     No change, recheck INR in two weeks.      December 2017 Details    Sun Mon Tue Wed Thu Fri Sat          1               2                 3               4               5               6               7               8               9                 10               11               12               13               14               15               16                 17               18               19               20               21               22               23                 24               25               26               27      4 mg   See details      28      4 mg         29      4 mg         30      6 mg           31      4 mg                Date Details   12/27 This INR check               How to take your warfarin dose     To take:  4 mg Take 1 of the 4 mg tablets.    To take:  6 mg Take 1.5 of the 4 mg tablets.           January 2018 Details    Sun Mon Tue Wed Thu Fri Sat      1      4 mg         2      6 mg         3      4 mg         4      4 mg         5      4 mg         6      6 mg           7      4 mg         8      4 mg         9      6 mg         10            11               12               13                 14               15               16               17               18               19               20                 21               22               23               24               25               26               27                 28               29                30               31                   Date Details   No additional details    Date of next INR:  1/10/2018         How to take your warfarin dose     To take:  4 mg Take 1 of the 4 mg tablets.    To take:  6 mg Take 1.5 of the 4 mg tablets.

## 2017-12-27 NOTE — PROGRESS NOTES
ANTICOAGULATION FOLLOW-UP CLINIC VISIT    Patient Name:  Natalie Hair  Date:  12/27/2017  Contact Type:  Telephone/ message left for [facility] by CHU Reynolds, [phone],  home care. Call returned and orders left on .    SUBJECTIVE:     Patient Findings     Positives No Problem Findings (none reported)           OBJECTIVE    INR   Date Value Ref Range Status   12/27/2017 3.3  Final       ASSESSMENT / PLAN  INR assessment THER    Recheck INR In: 2 WEEKS    INR Location Homecare INR  home care     Anticoagulation Summary as of 12/27/2017     INR goal 2.5-3.5   Today's INR 3.3   Maintenance plan 6 mg (4 mg x 1.5) on Tue, Sat; 4 mg (4 mg x 1) all other days   Full instructions 6 mg on Tue, Sat; 4 mg all other days   Weekly total 32 mg   No change documented Susan Caruso RN   Plan last modified Christa Guzman RN (12/6/2017)   Next INR check 1/10/2018   Priority INR   Target end date Indefinite    Indications   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]  Long term current use of anticoagulant therapy [Z79.01]         Anticoagulation Episode Summary     INR check location     Preferred lab     Send INR reminders to [place] PHONE ANTICOAG POOL    Comments * no bandaid. Home Care as of 12/18/17      Anticoagulation Care Providers     Provider Role Specialty Phone number    Mimi Mcguire MD [facility] [phone]            See the Encounter Report to view Anticoagulation Flowsheet and Dosing Calendar (Go to Encounters tab in chart review, and find the Anticoagulation Therapy Visit)    Susan Caruso RN

## 2018-01-01 ENCOUNTER — APPOINTMENT (OUTPATIENT)
Dept: ULTRASOUND IMAGING | Facility: CLINIC | Age: 83
DRG: 641 | End: 2018-01-01
Attending: FAMILY MEDICINE
Payer: MEDICARE

## 2018-01-01 ENCOUNTER — TELEPHONE (OUTPATIENT)
Dept: FAMILY MEDICINE | Facility: CLINIC | Age: 83
End: 2018-01-01

## 2018-01-01 ENCOUNTER — APPOINTMENT (OUTPATIENT)
Dept: PHYSICAL THERAPY | Facility: CLINIC | Age: 83
DRG: 378 | End: 2018-01-01
Attending: PHYSICIAN ASSISTANT
Payer: MEDICARE

## 2018-01-01 ENCOUNTER — CARE COORDINATION (OUTPATIENT)
Dept: CARE COORDINATION | Facility: CLINIC | Age: 83
End: 2018-01-01

## 2018-01-01 ENCOUNTER — ANTICOAGULATION THERAPY VISIT (OUTPATIENT)
Dept: ANTICOAGULATION | Facility: CLINIC | Age: 83
End: 2018-01-01
Payer: COMMERCIAL

## 2018-01-01 ENCOUNTER — OFFICE VISIT (OUTPATIENT)
Dept: CARDIOLOGY | Facility: CLINIC | Age: 83
End: 2018-01-01
Payer: COMMERCIAL

## 2018-01-01 ENCOUNTER — APPOINTMENT (OUTPATIENT)
Dept: OCCUPATIONAL THERAPY | Facility: CLINIC | Age: 83
DRG: 641 | End: 2018-01-01
Payer: MEDICARE

## 2018-01-01 ENCOUNTER — HOSPITAL ENCOUNTER (EMERGENCY)
Facility: CLINIC | Age: 83
Discharge: HOME | End: 2018-03-26
Attending: EMERGENCY MEDICINE | Admitting: EMERGENCY MEDICINE
Payer: MEDICARE

## 2018-01-01 ENCOUNTER — HOSPITAL ENCOUNTER (OUTPATIENT)
Dept: GENERAL RADIOLOGY | Facility: CLINIC | Age: 83
End: 2018-04-11
Attending: RADIOLOGY
Payer: MEDICARE

## 2018-01-01 ENCOUNTER — APPOINTMENT (OUTPATIENT)
Dept: PHYSICAL THERAPY | Facility: CLINIC | Age: 83
DRG: 641 | End: 2018-01-01
Payer: MEDICARE

## 2018-01-01 ENCOUNTER — APPOINTMENT (OUTPATIENT)
Dept: OCCUPATIONAL THERAPY | Facility: CLINIC | Age: 83
DRG: 378 | End: 2018-01-01
Attending: INTERNAL MEDICINE
Payer: MEDICARE

## 2018-01-01 ENCOUNTER — HOSPITAL ENCOUNTER (INPATIENT)
Facility: CLINIC | Age: 83
LOS: 5 days | Discharge: HOSPICE | DRG: 378 | End: 2018-05-24
Attending: INTERNAL MEDICINE | Admitting: HOSPITALIST
Payer: MEDICARE

## 2018-01-01 ENCOUNTER — MEDICAL CORRESPONDENCE (OUTPATIENT)
Dept: HEALTH INFORMATION MANAGEMENT | Facility: CLINIC | Age: 83
End: 2018-01-01

## 2018-01-01 ENCOUNTER — ANTICOAGULATION THERAPY VISIT (OUTPATIENT)
Dept: ANTICOAGULATION | Facility: CLINIC | Age: 83
End: 2018-01-01
Payer: MEDICAID

## 2018-01-01 ENCOUNTER — APPOINTMENT (OUTPATIENT)
Dept: CT IMAGING | Facility: CLINIC | Age: 83
End: 2018-01-01
Attending: EMERGENCY MEDICINE
Payer: MEDICARE

## 2018-01-01 ENCOUNTER — APPOINTMENT (OUTPATIENT)
Dept: GENERAL RADIOLOGY | Facility: CLINIC | Age: 83
End: 2018-01-01
Attending: EMERGENCY MEDICINE
Payer: MEDICARE

## 2018-01-01 ENCOUNTER — HOSPITAL ENCOUNTER (EMERGENCY)
Facility: CLINIC | Age: 83
End: 2018-01-01

## 2018-01-01 ENCOUNTER — PATIENT OUTREACH (OUTPATIENT)
Dept: CARE COORDINATION | Facility: CLINIC | Age: 83
End: 2018-01-01

## 2018-01-01 ENCOUNTER — APPOINTMENT (OUTPATIENT)
Dept: PHYSICAL THERAPY | Facility: CLINIC | Age: 83
DRG: 378 | End: 2018-01-01
Attending: INTERNAL MEDICINE
Payer: MEDICARE

## 2018-01-01 ENCOUNTER — APPOINTMENT (OUTPATIENT)
Dept: GENERAL RADIOLOGY | Facility: CLINIC | Age: 83
DRG: 641 | End: 2018-01-01
Attending: FAMILY MEDICINE
Payer: MEDICARE

## 2018-01-01 ENCOUNTER — APPOINTMENT (OUTPATIENT)
Dept: NUCLEAR MEDICINE | Facility: CLINIC | Age: 83
DRG: 641 | End: 2018-01-01
Attending: FAMILY MEDICINE
Payer: MEDICARE

## 2018-01-01 ENCOUNTER — RADIANT APPOINTMENT (OUTPATIENT)
Dept: GENERAL RADIOLOGY | Facility: CLINIC | Age: 83
End: 2018-01-01
Attending: FAMILY MEDICINE
Payer: COMMERCIAL

## 2018-01-01 ENCOUNTER — SURGERY (OUTPATIENT)
Age: 83
End: 2018-01-01

## 2018-01-01 ENCOUNTER — OFFICE VISIT (OUTPATIENT)
Dept: FAMILY MEDICINE | Facility: CLINIC | Age: 83
End: 2018-01-01
Payer: COMMERCIAL

## 2018-01-01 ENCOUNTER — HOSPITAL ENCOUNTER (EMERGENCY)
Facility: CLINIC | Age: 83
Discharge: ANOTHER HOSPITAL | End: 2018-05-19
Attending: EMERGENCY MEDICINE | Admitting: EMERGENCY MEDICINE
Payer: MEDICARE

## 2018-01-01 ENCOUNTER — DOCUMENTATION ONLY (OUTPATIENT)
Dept: CARE COORDINATION | Facility: CLINIC | Age: 83
End: 2018-01-01

## 2018-01-01 ENCOUNTER — APPOINTMENT (OUTPATIENT)
Dept: CT IMAGING | Facility: CLINIC | Age: 83
DRG: 641 | End: 2018-01-01
Attending: FAMILY MEDICINE
Payer: MEDICARE

## 2018-01-01 ENCOUNTER — HOSPITAL ENCOUNTER (INPATIENT)
Facility: CLINIC | Age: 83
LOS: 4 days | Discharge: HOME HEALTH | DRG: 641 | End: 2018-01-07
Attending: FAMILY MEDICINE | Admitting: INTERNAL MEDICINE
Payer: MEDICARE

## 2018-01-01 ENCOUNTER — ALLIED HEALTH/NURSE VISIT (OUTPATIENT)
Dept: CARDIOLOGY | Facility: CLINIC | Age: 83
End: 2018-01-01
Payer: COMMERCIAL

## 2018-01-01 ENCOUNTER — HOSPITAL ENCOUNTER (OUTPATIENT)
Dept: ULTRASOUND IMAGING | Facility: CLINIC | Age: 83
Discharge: HOME | End: 2018-04-11
Attending: FAMILY MEDICINE | Admitting: FAMILY MEDICINE
Payer: MEDICARE

## 2018-01-01 ENCOUNTER — RADIANT APPOINTMENT (OUTPATIENT)
Dept: GENERAL RADIOLOGY | Facility: CLINIC | Age: 83
End: 2018-01-01
Attending: PHYSICIAN ASSISTANT
Payer: COMMERCIAL

## 2018-01-01 VITALS
HEIGHT: 65 IN | RESPIRATION RATE: 29 BRPM | HEART RATE: 70 BPM | BODY MASS INDEX: 25.83 KG/M2 | SYSTOLIC BLOOD PRESSURE: 103 MMHG | DIASTOLIC BLOOD PRESSURE: 74 MMHG | TEMPERATURE: 97.6 F | WEIGHT: 155 LBS | OXYGEN SATURATION: 99 %

## 2018-01-01 VITALS
OXYGEN SATURATION: 95 % | SYSTOLIC BLOOD PRESSURE: 113 MMHG | HEIGHT: 66 IN | TEMPERATURE: 96.2 F | HEART RATE: 69 BPM | DIASTOLIC BLOOD PRESSURE: 60 MMHG | BODY MASS INDEX: 26.73 KG/M2 | WEIGHT: 166.3 LBS

## 2018-01-01 VITALS
OXYGEN SATURATION: 99 % | HEART RATE: 71 BPM | WEIGHT: 167.11 LBS | DIASTOLIC BLOOD PRESSURE: 47 MMHG | RESPIRATION RATE: 16 BRPM | SYSTOLIC BLOOD PRESSURE: 97 MMHG | TEMPERATURE: 97.1 F | BODY MASS INDEX: 27.81 KG/M2

## 2018-01-01 VITALS
HEIGHT: 66 IN | TEMPERATURE: 98.1 F | HEART RATE: 70 BPM | OXYGEN SATURATION: 97 % | DIASTOLIC BLOOD PRESSURE: 68 MMHG | SYSTOLIC BLOOD PRESSURE: 120 MMHG

## 2018-01-01 VITALS
DIASTOLIC BLOOD PRESSURE: 73 MMHG | HEIGHT: 65 IN | TEMPERATURE: 96.3 F | BODY MASS INDEX: 26.99 KG/M2 | WEIGHT: 162 LBS | OXYGEN SATURATION: 94 % | RESPIRATION RATE: 24 BRPM | SYSTOLIC BLOOD PRESSURE: 122 MMHG

## 2018-01-01 VITALS — OXYGEN SATURATION: 97 % | RESPIRATION RATE: 18 BRPM

## 2018-01-01 VITALS
TEMPERATURE: 97.3 F | OXYGEN SATURATION: 96 % | DIASTOLIC BLOOD PRESSURE: 49 MMHG | HEIGHT: 66 IN | SYSTOLIC BLOOD PRESSURE: 129 MMHG | HEART RATE: 70 BPM | BODY MASS INDEX: 25.99 KG/M2 | WEIGHT: 161.7 LBS

## 2018-01-01 VITALS
HEART RATE: 72 BPM | RESPIRATION RATE: 16 BRPM | WEIGHT: 168.87 LBS | SYSTOLIC BLOOD PRESSURE: 122 MMHG | OXYGEN SATURATION: 97 % | BODY MASS INDEX: 27.26 KG/M2 | DIASTOLIC BLOOD PRESSURE: 66 MMHG | TEMPERATURE: 96.3 F

## 2018-01-01 VITALS
DIASTOLIC BLOOD PRESSURE: 64 MMHG | HEART RATE: 70 BPM | BODY MASS INDEX: 25.95 KG/M2 | OXYGEN SATURATION: 98 % | WEIGHT: 160.8 LBS | SYSTOLIC BLOOD PRESSURE: 118 MMHG

## 2018-01-01 DIAGNOSIS — G93.40 ENCEPHALOPATHY: Primary | ICD-10-CM

## 2018-01-01 DIAGNOSIS — Z95.2 HEART VALVE REPLACED: ICD-10-CM

## 2018-01-01 DIAGNOSIS — I50.32 CHRONIC DIASTOLIC CONGESTIVE HEART FAILURE (H): Primary | Chronic | ICD-10-CM

## 2018-01-01 DIAGNOSIS — Z23 NEED FOR VACCINATION: ICD-10-CM

## 2018-01-01 DIAGNOSIS — R41.3 MEMORY LOSS: ICD-10-CM

## 2018-01-01 DIAGNOSIS — Z76.89 HEALTH CARE HOME: ICD-10-CM

## 2018-01-01 DIAGNOSIS — Z79.01 LONG TERM CURRENT USE OF ANTICOAGULANT THERAPY: ICD-10-CM

## 2018-01-01 DIAGNOSIS — Z79.01 LONG TERM CURRENT USE OF ANTICOAGULANT THERAPY: Chronic | ICD-10-CM

## 2018-01-01 DIAGNOSIS — R91.8 PULMONARY INFILTRATE: ICD-10-CM

## 2018-01-01 DIAGNOSIS — I50.9 CONGESTIVE HEART FAILURE, UNSPECIFIED CONGESTIVE HEART FAILURE CHRONICITY, UNSPECIFIED CONGESTIVE HEART FAILURE TYPE: Chronic | ICD-10-CM

## 2018-01-01 DIAGNOSIS — J18.9 COMMUNITY ACQUIRED PNEUMONIA, UNSPECIFIED LATERALITY: ICD-10-CM

## 2018-01-01 DIAGNOSIS — I89.0 LYMPHEDEMA OF BOTH LOWER EXTREMITIES: Chronic | ICD-10-CM

## 2018-01-01 DIAGNOSIS — R06.02 SHORTNESS OF BREATH: ICD-10-CM

## 2018-01-01 DIAGNOSIS — R53.1 WEAKNESS: ICD-10-CM

## 2018-01-01 DIAGNOSIS — J90 PLEURAL EFFUSION: ICD-10-CM

## 2018-01-01 DIAGNOSIS — Z95.0 CARDIAC PACEMAKER IN SITU: Primary | ICD-10-CM

## 2018-01-01 DIAGNOSIS — K27.9 PUD (PEPTIC ULCER DISEASE): Primary | ICD-10-CM

## 2018-01-01 DIAGNOSIS — I48.20 CHRONIC ATRIAL FIBRILLATION (H): ICD-10-CM

## 2018-01-01 DIAGNOSIS — E83.52 SERUM CALCIUM ELEVATED: ICD-10-CM

## 2018-01-01 DIAGNOSIS — Z95.5 STENTED CORONARY ARTERY: ICD-10-CM

## 2018-01-01 DIAGNOSIS — I25.10 CARDIOVASCULAR DISEASE: Chronic | ICD-10-CM

## 2018-01-01 DIAGNOSIS — R11.2 NAUSEA AND VOMITING, INTRACTABILITY OF VOMITING NOT SPECIFIED, UNSPECIFIED VOMITING TYPE: ICD-10-CM

## 2018-01-01 DIAGNOSIS — I50.32 CHRONIC DIASTOLIC CONGESTIVE HEART FAILURE (H): Chronic | ICD-10-CM

## 2018-01-01 DIAGNOSIS — R63.4 WEIGHT LOSS: ICD-10-CM

## 2018-01-01 DIAGNOSIS — I89.0 LYMPHEDEMA OF BOTH LOWER EXTREMITIES: Primary | Chronic | ICD-10-CM

## 2018-01-01 DIAGNOSIS — I07.1 TRICUSPID VALVE INSUFFICIENCY, UNSPECIFIED ETIOLOGY: Chronic | ICD-10-CM

## 2018-01-01 DIAGNOSIS — N17.9 AKI (ACUTE KIDNEY INJURY) (H): ICD-10-CM

## 2018-01-01 DIAGNOSIS — R32 URINARY INCONTINENCE, UNSPECIFIED TYPE: ICD-10-CM

## 2018-01-01 DIAGNOSIS — M62.81 GENERALIZED MUSCLE WEAKNESS: ICD-10-CM

## 2018-01-01 DIAGNOSIS — E83.52 HYPERCALCEMIA: ICD-10-CM

## 2018-01-01 DIAGNOSIS — K92.2 GI BLEED: ICD-10-CM

## 2018-01-01 DIAGNOSIS — F03.90 DEMENTIA WITHOUT BEHAVIORAL DISTURBANCE, UNSPECIFIED DEMENTIA TYPE: ICD-10-CM

## 2018-01-01 DIAGNOSIS — Z95.1 S/P CABG (CORONARY ARTERY BYPASS GRAFT): ICD-10-CM

## 2018-01-01 DIAGNOSIS — R91.8 LUNG INFILTRATE: ICD-10-CM

## 2018-01-01 DIAGNOSIS — Z95.2 HEART VALVE REPLACED: Chronic | ICD-10-CM

## 2018-01-01 DIAGNOSIS — R63.4 LOSS OF WEIGHT: ICD-10-CM

## 2018-01-01 DIAGNOSIS — I50.33 ACUTE ON CHRONIC DIASTOLIC CONGESTIVE HEART FAILURE (H): ICD-10-CM

## 2018-01-01 DIAGNOSIS — Z51.5 HOSPICE CARE PATIENT: ICD-10-CM

## 2018-01-01 DIAGNOSIS — R06.02 SOB (SHORTNESS OF BREATH): ICD-10-CM

## 2018-01-01 DIAGNOSIS — E86.0 DEHYDRATION: ICD-10-CM

## 2018-01-01 DIAGNOSIS — R06.02 SHORTNESS OF BREATH: Primary | ICD-10-CM

## 2018-01-01 DIAGNOSIS — I10 BENIGN ESSENTIAL HYPERTENSION: Primary | Chronic | ICD-10-CM

## 2018-01-01 LAB
ABO + RH BLD: NORMAL
ABO + RH BLD: NORMAL
ALBUMIN SERPL ELPH-MCNC: 3.7 G/DL (ref 3.7–5.1)
ALBUMIN SERPL-MCNC: 3.3 G/DL (ref 3.4–5)
ALBUMIN SERPL-MCNC: 3.5 G/DL (ref 3.4–5)
ALBUMIN SERPL-MCNC: 3.7 G/DL (ref 3.4–5)
ALBUMIN SERPL-MCNC: 3.8 G/DL (ref 3.4–5)
ALBUMIN UR-MCNC: NEGATIVE MG/DL
ALBUMIN UR-MCNC: NEGATIVE MG/DL
ALP SERPL-CCNC: 158 U/L (ref 40–150)
ALP SERPL-CCNC: 158 U/L (ref 40–150)
ALP SERPL-CCNC: 188 U/L (ref 40–150)
ALP SERPL-CCNC: 209 U/L (ref 40–150)
ALPHA1 GLOB SERPL ELPH-MCNC: 0.3 G/DL (ref 0.2–0.4)
ALPHA2 GLOB SERPL ELPH-MCNC: 0.6 G/DL (ref 0.5–0.9)
ALT SERPL W P-5'-P-CCNC: 18 U/L (ref 0–50)
ALT SERPL W P-5'-P-CCNC: 21 U/L (ref 0–50)
ALT SERPL W P-5'-P-CCNC: 23 U/L (ref 0–50)
ALT SERPL W P-5'-P-CCNC: 40 U/L (ref 0–50)
ANION GAP SERPL CALCULATED.3IONS-SCNC: 10 MMOL/L (ref 3–14)
ANION GAP SERPL CALCULATED.3IONS-SCNC: 11 MMOL/L (ref 3–14)
ANION GAP SERPL CALCULATED.3IONS-SCNC: 11 MMOL/L (ref 3–14)
ANION GAP SERPL CALCULATED.3IONS-SCNC: 6 MMOL/L (ref 3–14)
ANION GAP SERPL CALCULATED.3IONS-SCNC: 7 MMOL/L (ref 3–14)
ANION GAP SERPL CALCULATED.3IONS-SCNC: 7 MMOL/L (ref 3–14)
ANION GAP SERPL CALCULATED.3IONS-SCNC: 8 MMOL/L (ref 3–14)
ANION GAP SERPL CALCULATED.3IONS-SCNC: 9 MMOL/L (ref 3–14)
APPEARANCE UR: ABNORMAL
APPEARANCE UR: CLEAR
AST SERPL W P-5'-P-CCNC: 24 U/L (ref 0–45)
AST SERPL W P-5'-P-CCNC: 27 U/L (ref 0–45)
AST SERPL W P-5'-P-CCNC: 28 U/L (ref 0–45)
AST SERPL W P-5'-P-CCNC: 45 U/L (ref 0–45)
B-GLOBULIN SERPL ELPH-MCNC: 0.9 G/DL (ref 0.6–1)
BACTERIA #/AREA URNS HPF: ABNORMAL /HPF
BACTERIA SPEC CULT: NO GROWTH
BACTERIA SPEC CULT: NORMAL
BACTERIA SPEC CULT: NORMAL
BASOPHILS # BLD AUTO: 0 10E9/L (ref 0–0.2)
BASOPHILS # BLD AUTO: 0 10E9/L (ref 0–0.2)
BASOPHILS # BLD AUTO: 0.1 10E9/L (ref 0–0.2)
BASOPHILS # BLD AUTO: 0.1 10E9/L (ref 0–0.2)
BASOPHILS NFR BLD AUTO: 0.6 %
BASOPHILS NFR BLD AUTO: 0.7 %
BASOPHILS NFR BLD AUTO: 0.7 %
BASOPHILS NFR BLD AUTO: 0.8 %
BILIRUB SERPL-MCNC: 0.9 MG/DL (ref 0.2–1.3)
BILIRUB SERPL-MCNC: 1 MG/DL (ref 0.2–1.3)
BILIRUB SERPL-MCNC: 1.2 MG/DL (ref 0.2–1.3)
BILIRUB SERPL-MCNC: 1.3 MG/DL (ref 0.2–1.3)
BILIRUB UR QL STRIP: NEGATIVE
BILIRUB UR QL STRIP: NEGATIVE
BLD GP AB SCN SERPL QL: NORMAL
BLD PROD TYP BPU: NORMAL
BLD UNIT ID BPU: 0
BLOOD BANK CMNT PATIENT-IMP: NORMAL
BLOOD PRODUCT CODE: NORMAL
BPU ID: NORMAL
BUN SERPL-MCNC: 25 MG/DL (ref 7–30)
BUN SERPL-MCNC: 26 MG/DL (ref 7–30)
BUN SERPL-MCNC: 28 MG/DL (ref 7–30)
BUN SERPL-MCNC: 28 MG/DL (ref 7–30)
BUN SERPL-MCNC: 29 MG/DL (ref 7–30)
BUN SERPL-MCNC: 31 MG/DL (ref 7–30)
BUN SERPL-MCNC: 31 MG/DL (ref 7–30)
BUN SERPL-MCNC: 48 MG/DL (ref 7–30)
BUN SERPL-MCNC: 56 MG/DL (ref 7–30)
BUN SERPL-MCNC: 65 MG/DL (ref 7–30)
BUN SERPL-MCNC: 67 MG/DL (ref 7–30)
BUN SERPL-MCNC: 70 MG/DL (ref 7–30)
CA-I BLD-MCNC: 6.5 MG/DL (ref 4.4–5.2)
CALCIUM SERPL-MCNC: 10.1 MG/DL (ref 8.5–10.1)
CALCIUM SERPL-MCNC: 11.2 MG/DL (ref 8.5–10.1)
CALCIUM SERPL-MCNC: 12.8 MG/DL (ref 8.5–10.1)
CALCIUM SERPL-MCNC: 8.6 MG/DL (ref 8.5–10.1)
CALCIUM SERPL-MCNC: 8.6 MG/DL (ref 8.5–10.1)
CALCIUM SERPL-MCNC: 8.7 MG/DL (ref 8.5–10.1)
CALCIUM SERPL-MCNC: 8.7 MG/DL (ref 8.5–10.1)
CALCIUM SERPL-MCNC: 9.2 MG/DL (ref 8.5–10.1)
CALCIUM SERPL-MCNC: 9.2 MG/DL (ref 8.5–10.1)
CALCIUM SERPL-MCNC: 9.7 MG/DL (ref 8.5–10.1)
CALCIUM SERPL-MCNC: 9.7 MG/DL (ref 8.5–10.1)
CALCIUM SERPL-MCNC: 9.9 MG/DL (ref 8.5–10.1)
CHLORIDE SERPL-SCNC: 102 MMOL/L (ref 94–109)
CHLORIDE SERPL-SCNC: 102 MMOL/L (ref 94–109)
CHLORIDE SERPL-SCNC: 103 MMOL/L (ref 94–109)
CHLORIDE SERPL-SCNC: 103 MMOL/L (ref 94–109)
CHLORIDE SERPL-SCNC: 104 MMOL/L (ref 94–109)
CHLORIDE SERPL-SCNC: 105 MMOL/L (ref 94–109)
CHLORIDE SERPL-SCNC: 106 MMOL/L (ref 94–109)
CHLORIDE SERPL-SCNC: 106 MMOL/L (ref 94–109)
CHLORIDE SERPL-SCNC: 107 MMOL/L (ref 94–109)
CHLORIDE SERPL-SCNC: 108 MMOL/L (ref 94–109)
CHLORIDE SERPL-SCNC: 113 MMOL/L (ref 94–109)
CHLORIDE SERPL-SCNC: 98 MMOL/L (ref 94–109)
CK SERPL-CCNC: 27 U/L (ref 30–225)
CO2 SERPL-SCNC: 20 MMOL/L (ref 20–32)
CO2 SERPL-SCNC: 21 MMOL/L (ref 20–32)
CO2 SERPL-SCNC: 22 MMOL/L (ref 20–32)
CO2 SERPL-SCNC: 22 MMOL/L (ref 20–32)
CO2 SERPL-SCNC: 25 MMOL/L (ref 20–32)
CO2 SERPL-SCNC: 25 MMOL/L (ref 20–32)
CO2 SERPL-SCNC: 26 MMOL/L (ref 20–32)
CO2 SERPL-SCNC: 26 MMOL/L (ref 20–32)
CO2 SERPL-SCNC: 27 MMOL/L (ref 20–32)
CO2 SERPL-SCNC: 30 MMOL/L (ref 20–32)
COLOR UR AUTO: YELLOW
COLOR UR AUTO: YELLOW
CREAT SERPL-MCNC: 1.21 MG/DL (ref 0.52–1.04)
CREAT SERPL-MCNC: 1.22 MG/DL (ref 0.52–1.04)
CREAT SERPL-MCNC: 1.24 MG/DL (ref 0.52–1.04)
CREAT SERPL-MCNC: 1.25 MG/DL (ref 0.52–1.04)
CREAT SERPL-MCNC: 1.27 MG/DL (ref 0.52–1.04)
CREAT SERPL-MCNC: 1.34 MG/DL (ref 0.52–1.04)
CREAT SERPL-MCNC: 1.35 MG/DL (ref 0.52–1.04)
CREAT SERPL-MCNC: 1.36 MG/DL (ref 0.52–1.04)
CREAT SERPL-MCNC: 1.36 MG/DL (ref 0.52–1.04)
CREAT SERPL-MCNC: 1.4 MG/DL (ref 0.52–1.04)
CREAT SERPL-MCNC: 1.48 MG/DL (ref 0.52–1.04)
CREAT SERPL-MCNC: 1.63 MG/DL (ref 0.52–1.04)
DEPRECATED CALCIDIOL+CALCIFEROL SERPL-MC: <58 UG/L (ref 20–75)
DIFFERENTIAL METHOD BLD: ABNORMAL
EOSINOPHIL # BLD AUTO: 0.1 10E9/L (ref 0–0.7)
EOSINOPHIL # BLD AUTO: 0.1 10E9/L (ref 0–0.7)
EOSINOPHIL # BLD AUTO: 0.2 10E9/L (ref 0–0.7)
EOSINOPHIL # BLD AUTO: 0.2 10E9/L (ref 0–0.7)
EOSINOPHIL NFR BLD AUTO: 1 %
EOSINOPHIL NFR BLD AUTO: 1.1 %
EOSINOPHIL NFR BLD AUTO: 3.1 %
EOSINOPHIL NFR BLD AUTO: 4.2 %
ERYTHROCYTE [DISTWIDTH] IN BLOOD BY AUTOMATED COUNT: 15.4 % (ref 10–15)
ERYTHROCYTE [DISTWIDTH] IN BLOOD BY AUTOMATED COUNT: 15.8 % (ref 10–15)
ERYTHROCYTE [DISTWIDTH] IN BLOOD BY AUTOMATED COUNT: 15.8 % (ref 10–15)
ERYTHROCYTE [DISTWIDTH] IN BLOOD BY AUTOMATED COUNT: 15.9 % (ref 10–15)
ERYTHROCYTE [DISTWIDTH] IN BLOOD BY AUTOMATED COUNT: 15.9 % (ref 10–15)
ERYTHROCYTE [DISTWIDTH] IN BLOOD BY AUTOMATED COUNT: 16.3 % (ref 10–15)
ERYTHROCYTE [DISTWIDTH] IN BLOOD BY AUTOMATED COUNT: 16.4 % (ref 10–15)
ERYTHROCYTE [DISTWIDTH] IN BLOOD BY AUTOMATED COUNT: 16.7 % (ref 10–15)
ERYTHROCYTE [DISTWIDTH] IN BLOOD BY AUTOMATED COUNT: 16.7 % (ref 10–15)
ERYTHROCYTE [DISTWIDTH] IN BLOOD BY AUTOMATED COUNT: 16.8 % (ref 10–15)
ERYTHROCYTE [SEDIMENTATION RATE] IN BLOOD BY WESTERGREN METHOD: 18 MM/H (ref 0–30)
FLUAV+FLUBV RNA SPEC QL NAA+PROBE: NEGATIVE
FLUAV+FLUBV RNA SPEC QL NAA+PROBE: NEGATIVE
GAMMA GLOB SERPL ELPH-MCNC: 1 G/DL (ref 0.7–1.6)
GFR SERPL CREATININE-BSD FRML MDRD: 30 ML/MIN/1.7M2
GFR SERPL CREATININE-BSD FRML MDRD: 33 ML/MIN/1.7M2
GFR SERPL CREATININE-BSD FRML MDRD: 36 ML/MIN/1.7M2
GFR SERPL CREATININE-BSD FRML MDRD: 37 ML/MIN/1.7M2
GFR SERPL CREATININE-BSD FRML MDRD: 38 ML/MIN/1.7M2
GFR SERPL CREATININE-BSD FRML MDRD: 40 ML/MIN/1.7M2
GFR SERPL CREATININE-BSD FRML MDRD: 41 ML/MIN/1.7M2
GFR SERPL CREATININE-BSD FRML MDRD: 41 ML/MIN/1.7M2
GFR SERPL CREATININE-BSD FRML MDRD: 42 ML/MIN/1.7M2
GFR SERPL CREATININE-BSD FRML MDRD: 42 ML/MIN/1.7M2
GLUCOSE BLDC GLUCOMTR-MCNC: 97 MG/DL (ref 70–99)
GLUCOSE SERPL-MCNC: 110 MG/DL (ref 70–99)
GLUCOSE SERPL-MCNC: 117 MG/DL (ref 70–99)
GLUCOSE SERPL-MCNC: 123 MG/DL (ref 70–99)
GLUCOSE SERPL-MCNC: 129 MG/DL (ref 70–99)
GLUCOSE SERPL-MCNC: 132 MG/DL (ref 70–99)
GLUCOSE SERPL-MCNC: 149 MG/DL (ref 70–99)
GLUCOSE SERPL-MCNC: 69 MG/DL (ref 70–99)
GLUCOSE SERPL-MCNC: 89 MG/DL (ref 70–99)
GLUCOSE SERPL-MCNC: 90 MG/DL (ref 70–99)
GLUCOSE SERPL-MCNC: 91 MG/DL (ref 70–99)
GLUCOSE SERPL-MCNC: 93 MG/DL (ref 70–99)
GLUCOSE SERPL-MCNC: 94 MG/DL (ref 70–99)
GLUCOSE UR STRIP-MCNC: NEGATIVE MG/DL
GLUCOSE UR STRIP-MCNC: NEGATIVE MG/DL
H PYLORI AG STL QL IA: NORMAL
HCT VFR BLD AUTO: 22 % (ref 35–47)
HCT VFR BLD AUTO: 26.1 % (ref 35–47)
HCT VFR BLD AUTO: 26.2 % (ref 35–47)
HCT VFR BLD AUTO: 26.6 % (ref 35–47)
HCT VFR BLD AUTO: 35.8 % (ref 35–47)
HCT VFR BLD AUTO: 38.8 % (ref 35–47)
HCT VFR BLD AUTO: 38.8 % (ref 35–47)
HCT VFR BLD AUTO: 39.7 % (ref 35–47)
HCT VFR BLD AUTO: 43.3 % (ref 35–47)
HCT VFR BLD AUTO: 45 % (ref 35–47)
HGB BLD-MCNC: 11.6 G/DL (ref 11.7–15.7)
HGB BLD-MCNC: 12.6 G/DL (ref 11.7–15.7)
HGB BLD-MCNC: 14.1 G/DL (ref 11.7–15.7)
HGB BLD-MCNC: 14.6 G/DL (ref 11.7–15.7)
HGB BLD-MCNC: 7.2 G/DL (ref 11.7–15.7)
HGB BLD-MCNC: 7.5 G/DL (ref 11.7–15.7)
HGB BLD-MCNC: 7.9 G/DL (ref 11.7–15.7)
HGB BLD-MCNC: 8.4 G/DL (ref 11.7–15.7)
HGB BLD-MCNC: 8.5 G/DL (ref 11.7–15.7)
HGB BLD-MCNC: 8.5 G/DL (ref 11.7–15.7)
HGB BLD-MCNC: 8.6 G/DL (ref 11.7–15.7)
HGB BLD-MCNC: 8.7 G/DL (ref 11.7–15.7)
HGB BLD-MCNC: 8.7 G/DL (ref 11.7–15.7)
HGB BLD-MCNC: 8.8 G/DL (ref 11.7–15.7)
HGB UR QL STRIP: NEGATIVE
HGB UR QL STRIP: NEGATIVE
HYALINE CASTS #/AREA URNS LPF: 7 /LPF (ref 0–2)
IMM GRANULOCYTES # BLD: 0 10E9/L (ref 0–0.4)
IMM GRANULOCYTES NFR BLD: 0 %
IMM GRANULOCYTES NFR BLD: 0.2 %
IMM GRANULOCYTES NFR BLD: 0.2 %
IMM GRANULOCYTES NFR BLD: 0.3 %
INR POINT OF CARE: 2.6 (ref 0.86–1.14)
INR POINT OF CARE: 2.9 (ref 0.86–1.14)
INR POINT OF CARE: 2.9 (ref 0.86–1.14)
INR POINT OF CARE: 3 (ref 0.86–1.14)
INR POINT OF CARE: 3.5 (ref 0.86–1.14)
INR POINT OF CARE: 4.1 (ref 0.86–1.14)
INR POINT OF CARE: 4.2 (ref 0.86–1.14)
INR POINT OF CARE: 4.4 (ref 0.86–1.14)
INR POINT OF CARE: 8 (ref 0.86–1.14)
INR PPP: 1.4 (ref 0.86–1.14)
INR PPP: 1.52 (ref 0.86–1.14)
INR PPP: 1.56 (ref 0.86–1.14)
INR PPP: 1.68 (ref 0.86–1.14)
INR PPP: 1.72 (ref 0.86–1.14)
INR PPP: 1.9
INR PPP: 1.9
INR PPP: 1.91 (ref 0.86–1.14)
INR PPP: 2
INR PPP: 2.05 (ref 0.86–1.14)
INR PPP: 2.1
INR PPP: 2.2
INR PPP: 2.33 (ref 0.86–1.14)
INR PPP: 2.57 (ref 0.86–1.14)
INR PPP: 2.61 (ref 0.86–1.14)
INR PPP: 3.04 (ref 0.86–1.14)
INR PPP: 3.3 (ref 0.86–1.14)
INR PPP: 3.31 (ref 0.86–1.14)
INR PPP: 3.4 (ref 0.86–1.14)
INR PPP: 3.56 (ref 0.86–1.14)
INR PPP: 3.6
INR PPP: 3.6
INR PPP: 3.8
INR PPP: 3.9
INR PPP: 7.1
INTERPRETATION ECG - MUSE: NORMAL
KETONES UR STRIP-MCNC: NEGATIVE MG/DL
KETONES UR STRIP-MCNC: NEGATIVE MG/DL
LACTATE BLD-SCNC: 1.2 MMOL/L (ref 0.7–2)
LACTATE BLD-SCNC: 2.1 MMOL/L (ref 0.7–2)
LEUKOCYTE ESTERASE UR QL STRIP: ABNORMAL
LEUKOCYTE ESTERASE UR QL STRIP: NEGATIVE
LIPASE SERPL-CCNC: 95 U/L (ref 73–393)
LYMPHOCYTES # BLD AUTO: 0.8 10E9/L (ref 0.8–5.3)
LYMPHOCYTES # BLD AUTO: 0.8 10E9/L (ref 0.8–5.3)
LYMPHOCYTES # BLD AUTO: 1.3 10E9/L (ref 0.8–5.3)
LYMPHOCYTES # BLD AUTO: 1.9 10E9/L (ref 0.8–5.3)
LYMPHOCYTES NFR BLD AUTO: 14.5 %
LYMPHOCYTES NFR BLD AUTO: 14.7 %
LYMPHOCYTES NFR BLD AUTO: 14.8 %
LYMPHOCYTES NFR BLD AUTO: 20.8 %
Lab: NORMAL
M PROTEIN SERPL ELPH-MCNC: 0 G/DL
MAGNESIUM SERPL-MCNC: 2.2 MG/DL (ref 1.6–2.3)
MCH RBC QN AUTO: 27.3 PG (ref 26.5–33)
MCH RBC QN AUTO: 27.4 PG (ref 26.5–33)
MCH RBC QN AUTO: 27.5 PG (ref 26.5–33)
MCH RBC QN AUTO: 27.6 PG (ref 26.5–33)
MCH RBC QN AUTO: 27.6 PG (ref 26.5–33)
MCH RBC QN AUTO: 27.7 PG (ref 26.5–33)
MCH RBC QN AUTO: 27.8 PG (ref 26.5–33)
MCH RBC QN AUTO: 28 PG (ref 26.5–33)
MCH RBC QN AUTO: 28.1 PG (ref 26.5–33)
MCH RBC QN AUTO: 28.2 PG (ref 26.5–33)
MCHC RBC AUTO-ENTMCNC: 31.7 G/DL (ref 31.5–36.5)
MCHC RBC AUTO-ENTMCNC: 32.4 G/DL (ref 31.5–36.5)
MCHC RBC AUTO-ENTMCNC: 32.5 G/DL (ref 31.5–36.5)
MCHC RBC AUTO-ENTMCNC: 32.5 G/DL (ref 31.5–36.5)
MCHC RBC AUTO-ENTMCNC: 32.6 G/DL (ref 31.5–36.5)
MCHC RBC AUTO-ENTMCNC: 32.7 G/DL (ref 31.5–36.5)
MCHC RBC AUTO-ENTMCNC: 32.7 G/DL (ref 31.5–36.5)
MCHC RBC AUTO-ENTMCNC: 33 G/DL (ref 31.5–36.5)
MCV RBC AUTO: 84 FL (ref 78–100)
MCV RBC AUTO: 85 FL (ref 78–100)
MCV RBC AUTO: 86 FL (ref 78–100)
MONOCYTES # BLD AUTO: 0.8 10E9/L (ref 0–1.3)
MONOCYTES # BLD AUTO: 0.9 10E9/L (ref 0–1.3)
MONOCYTES # BLD AUTO: 1 10E9/L (ref 0–1.3)
MONOCYTES # BLD AUTO: 1.2 10E9/L (ref 0–1.3)
MONOCYTES NFR BLD AUTO: 11.3 %
MONOCYTES NFR BLD AUTO: 13.6 %
MONOCYTES NFR BLD AUTO: 14.3 %
MONOCYTES NFR BLD AUTO: 16.4 %
NEUTROPHILS # BLD AUTO: 3.5 10E9/L (ref 1.6–8.3)
NEUTROPHILS # BLD AUTO: 3.5 10E9/L (ref 1.6–8.3)
NEUTROPHILS # BLD AUTO: 6 10E9/L (ref 1.6–8.3)
NEUTROPHILS # BLD AUTO: 6.2 10E9/L (ref 1.6–8.3)
NEUTROPHILS NFR BLD AUTO: 63.9 %
NEUTROPHILS NFR BLD AUTO: 65.8 %
NEUTROPHILS NFR BLD AUTO: 66.9 %
NEUTROPHILS NFR BLD AUTO: 70.1 %
NITRATE UR QL: NEGATIVE
NITRATE UR QL: NEGATIVE
NRBC # BLD AUTO: 0 10*3/UL
NRBC BLD AUTO-RTO: 0 /100
NT-PROBNP SERPL-MCNC: 1817 PG/ML (ref 0–1800)
NUM BPU REQUESTED: 1
NUM BPU REQUESTED: 1
PH UR STRIP: 7 PH (ref 5–7)
PH UR STRIP: 7.5 PH (ref 5–7)
PHOSPHATE SERPL-MCNC: 2.7 MG/DL (ref 2.5–4.5)
PLATELET # BLD AUTO: 138 10E9/L (ref 150–450)
PLATELET # BLD AUTO: 148 10E9/L (ref 150–450)
PLATELET # BLD AUTO: 153 10E9/L (ref 150–450)
PLATELET # BLD AUTO: 154 10E9/L (ref 150–450)
PLATELET # BLD AUTO: 171 10E9/L (ref 150–450)
PLATELET # BLD AUTO: 181 10E9/L (ref 150–450)
PLATELET # BLD AUTO: 186 10E9/L (ref 150–450)
PLATELET # BLD AUTO: 193 10E9/L (ref 150–450)
PLATELET # BLD AUTO: 211 10E9/L (ref 150–450)
PLATELET # BLD AUTO: 265 10E9/L (ref 150–450)
POTASSIUM SERPL-SCNC: 3.4 MMOL/L (ref 3.4–5.3)
POTASSIUM SERPL-SCNC: 3.4 MMOL/L (ref 3.4–5.3)
POTASSIUM SERPL-SCNC: 3.6 MMOL/L (ref 3.4–5.3)
POTASSIUM SERPL-SCNC: 4 MMOL/L (ref 3.4–5.3)
POTASSIUM SERPL-SCNC: 4.3 MMOL/L (ref 3.4–5.3)
POTASSIUM SERPL-SCNC: 4.4 MMOL/L (ref 3.4–5.3)
POTASSIUM SERPL-SCNC: 4.4 MMOL/L (ref 3.4–5.3)
POTASSIUM SERPL-SCNC: 4.5 MMOL/L (ref 3.4–5.3)
POTASSIUM SERPL-SCNC: 4.5 MMOL/L (ref 3.4–5.3)
POTASSIUM SERPL-SCNC: 4.7 MMOL/L (ref 3.4–5.3)
POTASSIUM SERPL-SCNC: 4.8 MMOL/L (ref 3.4–5.3)
POTASSIUM SERPL-SCNC: 5.1 MMOL/L (ref 3.4–5.3)
PROCALCITONIN SERPL-MCNC: 0.08 NG/ML
PROT PATTERN SERPL ELPH-IMP: NORMAL
PROT PATTERN UR ELPH-IMP: NORMAL
PROT SERPL-MCNC: 7.2 G/DL (ref 6.8–8.8)
PROT SERPL-MCNC: 7.6 G/DL (ref 6.8–8.8)
PROT SERPL-MCNC: 8 G/DL (ref 6.8–8.8)
PROT SERPL-MCNC: 8.3 G/DL (ref 6.8–8.8)
PTH RELATED PROT SERPL-SCNC: 3.4 PMOL/L (ref 0–3.4)
PTH-INTACT SERPL-MCNC: 8 PG/ML (ref 12–72)
RBC # BLD AUTO: 2.61 10E12/L (ref 3.8–5.2)
RBC # BLD AUTO: 3.06 10E12/L (ref 3.8–5.2)
RBC # BLD AUTO: 3.08 10E12/L (ref 3.8–5.2)
RBC # BLD AUTO: 3.1 10E12/L (ref 3.8–5.2)
RBC # BLD AUTO: 4.2 10E12/L (ref 3.8–5.2)
RBC # BLD AUTO: 4.54 10E12/L (ref 3.8–5.2)
RBC # BLD AUTO: 4.58 10E12/L (ref 3.8–5.2)
RBC # BLD AUTO: 4.61 10E12/L (ref 3.8–5.2)
RBC # BLD AUTO: 5.03 10E12/L (ref 3.8–5.2)
RBC # BLD AUTO: 5.28 10E12/L (ref 3.8–5.2)
RBC #/AREA URNS AUTO: 1 /HPF (ref 0–2)
RSV RNA SPEC NAA+PROBE: NEGATIVE
SODIUM SERPL-SCNC: 134 MMOL/L (ref 133–144)
SODIUM SERPL-SCNC: 135 MMOL/L (ref 133–144)
SODIUM SERPL-SCNC: 137 MMOL/L (ref 133–144)
SODIUM SERPL-SCNC: 138 MMOL/L (ref 133–144)
SODIUM SERPL-SCNC: 139 MMOL/L (ref 133–144)
SODIUM SERPL-SCNC: 139 MMOL/L (ref 133–144)
SODIUM SERPL-SCNC: 140 MMOL/L (ref 133–144)
SODIUM SERPL-SCNC: 140 MMOL/L (ref 133–144)
SODIUM SERPL-SCNC: 142 MMOL/L (ref 133–144)
SODIUM SERPL-SCNC: 144 MMOL/L (ref 133–144)
SOURCE: ABNORMAL
SOURCE: ABNORMAL
SP GR UR STRIP: 1.01 (ref 1–1.03)
SP GR UR STRIP: 1.01 (ref 1–1.03)
SPECIMEN EXP DATE BLD: NORMAL
SPECIMEN SOURCE: NORMAL
SQUAMOUS #/AREA URNS AUTO: 1 /HPF (ref 0–1)
TRANSFUSION STATUS PATIENT QL: NORMAL
TROPONIN I SERPL-MCNC: <0.015 UG/L (ref 0–0.04)
TROPONIN I SERPL-MCNC: <0.015 UG/L (ref 0–0.04)
UPPER GI ENDOSCOPY: NORMAL
UROBILINOGEN UR STRIP-MCNC: 0 MG/DL (ref 0–2)
UROBILINOGEN UR STRIP-MCNC: NORMAL MG/DL (ref 0–2)
VITAMIN D2 SERPL-MCNC: <5 UG/L
VITAMIN D3 SERPL-MCNC: 53 UG/L
WBC # BLD AUTO: 5.2 10E9/L (ref 4–11)
WBC # BLD AUTO: 5.2 10E9/L (ref 4–11)
WBC # BLD AUTO: 5.5 10E9/L (ref 4–11)
WBC # BLD AUTO: 6 10E9/L (ref 4–11)
WBC # BLD AUTO: 6.7 10E9/L (ref 4–11)
WBC # BLD AUTO: 7.5 10E9/L (ref 4–11)
WBC # BLD AUTO: 7.5 10E9/L (ref 4–11)
WBC # BLD AUTO: 8.1 10E9/L (ref 4–11)
WBC # BLD AUTO: 8.8 10E9/L (ref 4–11)
WBC # BLD AUTO: 9.1 10E9/L (ref 4–11)
WBC #/AREA URNS AUTO: 10 /HPF (ref 0–5)

## 2018-01-01 PROCEDURE — 82306 VITAMIN D 25 HYDROXY: CPT | Performed by: FAMILY MEDICINE

## 2018-01-01 PROCEDURE — 85027 COMPLETE CBC AUTOMATED: CPT | Performed by: PHYSICIAN ASSISTANT

## 2018-01-01 PROCEDURE — 97530 THERAPEUTIC ACTIVITIES: CPT | Mod: GP | Performed by: PHYSICAL THERAPIST

## 2018-01-01 PROCEDURE — 25000128 H RX IP 250 OP 636: Performed by: PHYSICIAN ASSISTANT

## 2018-01-01 PROCEDURE — 36416 COLLJ CAPILLARY BLOOD SPEC: CPT

## 2018-01-01 PROCEDURE — 25000132 ZZH RX MED GY IP 250 OP 250 PS 637: Mod: GY | Performed by: HOSPITALIST

## 2018-01-01 PROCEDURE — 25000128 H RX IP 250 OP 636: Performed by: FAMILY MEDICINE

## 2018-01-01 PROCEDURE — 99239 HOSP IP/OBS DSCHRG MGMT >30: CPT | Mod: GW | Performed by: INTERNAL MEDICINE

## 2018-01-01 PROCEDURE — 25000132 ZZH RX MED GY IP 250 OP 250 PS 637: Mod: GY | Performed by: INTERNAL MEDICINE

## 2018-01-01 PROCEDURE — 40000886 ZZH STATISTIC STEP DOWN HRS DAY

## 2018-01-01 PROCEDURE — 80048 BASIC METABOLIC PNL TOTAL CA: CPT | Performed by: PHYSICIAN ASSISTANT

## 2018-01-01 PROCEDURE — 99207 ZZC NO CHARGE NURSE ONLY: CPT

## 2018-01-01 PROCEDURE — 40000133 ZZH STATISTIC OT WARD VISIT: Performed by: OCCUPATIONAL THERAPIST

## 2018-01-01 PROCEDURE — 84484 ASSAY OF TROPONIN QUANT: CPT | Performed by: FAMILY MEDICINE

## 2018-01-01 PROCEDURE — P9016 RBC LEUKOCYTES REDUCED: HCPCS | Performed by: PHYSICIAN ASSISTANT

## 2018-01-01 PROCEDURE — 99285 EMERGENCY DEPT VISIT HI MDM: CPT | Mod: 25 | Performed by: EMERGENCY MEDICINE

## 2018-01-01 PROCEDURE — 85027 COMPLETE CBC AUTOMATED: CPT | Performed by: INTERNAL MEDICINE

## 2018-01-01 PROCEDURE — 85610 PROTHROMBIN TIME: CPT | Mod: QW

## 2018-01-01 PROCEDURE — 25000132 ZZH RX MED GY IP 250 OP 250 PS 637: Mod: GY | Performed by: PHYSICIAN ASSISTANT

## 2018-01-01 PROCEDURE — 97530 THERAPEUTIC ACTIVITIES: CPT | Mod: GO | Performed by: OCCUPATIONAL THERAPIST

## 2018-01-01 PROCEDURE — 36415 COLL VENOUS BLD VENIPUNCTURE: CPT | Performed by: PHYSICIAN ASSISTANT

## 2018-01-01 PROCEDURE — 97161 PT EVAL LOW COMPLEX 20 MIN: CPT | Mod: GP | Performed by: PHYSICAL THERAPIST

## 2018-01-01 PROCEDURE — A9270 NON-COVERED ITEM OR SERVICE: HCPCS | Mod: GY | Performed by: PHYSICIAN ASSISTANT

## 2018-01-01 PROCEDURE — 40000193 ZZH STATISTIC PT WARD VISIT: Performed by: PHYSICAL THERAPIST

## 2018-01-01 PROCEDURE — 80053 COMPREHEN METABOLIC PANEL: CPT | Performed by: EMERGENCY MEDICINE

## 2018-01-01 PROCEDURE — 00000146 ZZHCL STATISTIC GLUCOSE BY METER IP

## 2018-01-01 PROCEDURE — 96361 HYDRATE IV INFUSION ADD-ON: CPT | Performed by: FAMILY MEDICINE

## 2018-01-01 PROCEDURE — 87631 RESP VIRUS 3-5 TARGETS: CPT | Performed by: FAMILY MEDICINE

## 2018-01-01 PROCEDURE — 94640 AIRWAY INHALATION TREATMENT: CPT | Mod: 76

## 2018-01-01 PROCEDURE — 25000132 ZZH RX MED GY IP 250 OP 250 PS 637: Mod: GY | Performed by: FAMILY MEDICINE

## 2018-01-01 PROCEDURE — 78707 K FLOW/FUNCT IMAGE W/O DRUG: CPT

## 2018-01-01 PROCEDURE — 86900 BLOOD TYPING SEROLOGIC ABO: CPT | Performed by: INTERNAL MEDICINE

## 2018-01-01 PROCEDURE — 96361 HYDRATE IV INFUSION ADD-ON: CPT

## 2018-01-01 PROCEDURE — 86850 RBC ANTIBODY SCREEN: CPT | Performed by: PHYSICIAN ASSISTANT

## 2018-01-01 PROCEDURE — 96374 THER/PROPH/DIAG INJ IV PUSH: CPT

## 2018-01-01 PROCEDURE — 25000125 ZZHC RX 250: Performed by: RADIOLOGY

## 2018-01-01 PROCEDURE — 85025 COMPLETE CBC W/AUTO DIFF WBC: CPT | Performed by: PHYSICIAN ASSISTANT

## 2018-01-01 PROCEDURE — 40000884 ZZH STATISTIC STEP DOWN HRS NIGHT

## 2018-01-01 PROCEDURE — 99233 SBSQ HOSP IP/OBS HIGH 50: CPT | Performed by: FAMILY MEDICINE

## 2018-01-01 PROCEDURE — 97110 THERAPEUTIC EXERCISES: CPT | Mod: GO

## 2018-01-01 PROCEDURE — 71046 X-RAY EXAM CHEST 2 VIEWS: CPT

## 2018-01-01 PROCEDURE — 25000125 ZZHC RX 250: Performed by: PHYSICIAN ASSISTANT

## 2018-01-01 PROCEDURE — 97166 OT EVAL MOD COMPLEX 45 MIN: CPT | Mod: GO | Performed by: OCCUPATIONAL THERAPIST

## 2018-01-01 PROCEDURE — 97530 THERAPEUTIC ACTIVITIES: CPT | Mod: GP

## 2018-01-01 PROCEDURE — 25000128 H RX IP 250 OP 636: Performed by: EMERGENCY MEDICINE

## 2018-01-01 PROCEDURE — 99207 ZZC NO CHARGE NURSE ONLY: CPT | Performed by: REGISTERED NURSE

## 2018-01-01 PROCEDURE — 71046 X-RAY EXAM CHEST 2 VIEWS: CPT | Mod: FY

## 2018-01-01 PROCEDURE — 40000986 XR CHEST 1 VW

## 2018-01-01 PROCEDURE — 96376 TX/PRO/DX INJ SAME DRUG ADON: CPT

## 2018-01-01 PROCEDURE — 93010 ELECTROCARDIOGRAM REPORT: CPT | Performed by: INTERNAL MEDICINE

## 2018-01-01 PROCEDURE — 85610 PROTHROMBIN TIME: CPT | Performed by: INTERNAL MEDICINE

## 2018-01-01 PROCEDURE — 27210190 US THORACENTESIS

## 2018-01-01 PROCEDURE — 84165 PROTEIN E-PHORESIS SERUM: CPT | Performed by: FAMILY MEDICINE

## 2018-01-01 PROCEDURE — 97110 THERAPEUTIC EXERCISES: CPT | Mod: GO | Performed by: OCCUPATIONAL THERAPIST

## 2018-01-01 PROCEDURE — 85610 PROTHROMBIN TIME: CPT | Performed by: RADIOLOGY

## 2018-01-01 PROCEDURE — 85610 PROTHROMBIN TIME: CPT | Performed by: EMERGENCY MEDICINE

## 2018-01-01 PROCEDURE — A9270 NON-COVERED ITEM OR SERVICE: HCPCS | Mod: GY | Performed by: HOSPITALIST

## 2018-01-01 PROCEDURE — 74177 CT ABD & PELVIS W/CONTRAST: CPT

## 2018-01-01 PROCEDURE — 21000000 ZZH R&B IMCU HEART CARE

## 2018-01-01 PROCEDURE — 97165 OT EVAL LOW COMPLEX 30 MIN: CPT | Mod: GO | Performed by: OCCUPATIONAL THERAPIST

## 2018-01-01 PROCEDURE — 87086 URINE CULTURE/COLONY COUNT: CPT | Performed by: FAMILY MEDICINE

## 2018-01-01 PROCEDURE — 80048 BASIC METABOLIC PNL TOTAL CA: CPT | Performed by: INTERNAL MEDICINE

## 2018-01-01 PROCEDURE — 86850 RBC ANTIBODY SCREEN: CPT | Performed by: INTERNAL MEDICINE

## 2018-01-01 PROCEDURE — 84145 PROCALCITONIN (PCT): CPT | Performed by: PHYSICIAN ASSISTANT

## 2018-01-01 PROCEDURE — 34300033 ZZH RX 343: Performed by: FAMILY MEDICINE

## 2018-01-01 PROCEDURE — 93296 REM INTERROG EVL PM/IDS: CPT | Performed by: INTERNAL MEDICINE

## 2018-01-01 PROCEDURE — 12000007 ZZH R&B INTERMEDIATE

## 2018-01-01 PROCEDURE — 25000125 ZZHC RX 250: Performed by: EMERGENCY MEDICINE

## 2018-01-01 PROCEDURE — 82542 COL CHROMOTOGRAPHY QUAL/QUAN: CPT | Performed by: FAMILY MEDICINE

## 2018-01-01 PROCEDURE — 99223 1ST HOSP IP/OBS HIGH 75: CPT | Mod: AI | Performed by: PHYSICIAN ASSISTANT

## 2018-01-01 PROCEDURE — 40000133 ZZH STATISTIC OT WARD VISIT

## 2018-01-01 PROCEDURE — 40000885 ZZH STATISTIC STEP DOWN HRS EVENING

## 2018-01-01 PROCEDURE — 85652 RBC SED RATE AUTOMATED: CPT | Performed by: INTERNAL MEDICINE

## 2018-01-01 PROCEDURE — 99214 OFFICE O/P EST MOD 30 MIN: CPT | Performed by: FAMILY MEDICINE

## 2018-01-01 PROCEDURE — 93010 ELECTROCARDIOGRAM REPORT: CPT | Mod: Z6 | Performed by: EMERGENCY MEDICINE

## 2018-01-01 PROCEDURE — 97110 THERAPEUTIC EXERCISES: CPT | Mod: GP | Performed by: PHYSICAL THERAPIST

## 2018-01-01 PROCEDURE — A9270 NON-COVERED ITEM OR SERVICE: HCPCS | Mod: GY | Performed by: FAMILY MEDICINE

## 2018-01-01 PROCEDURE — 84100 ASSAY OF PHOSPHORUS: CPT | Performed by: FAMILY MEDICINE

## 2018-01-01 PROCEDURE — 85018 HEMOGLOBIN: CPT | Performed by: PHYSICIAN ASSISTANT

## 2018-01-01 PROCEDURE — 93005 ELECTROCARDIOGRAM TRACING: CPT

## 2018-01-01 PROCEDURE — 82550 ASSAY OF CK (CPK): CPT | Performed by: PHYSICIAN ASSISTANT

## 2018-01-01 PROCEDURE — 86901 BLOOD TYPING SEROLOGIC RH(D): CPT | Performed by: PHYSICIAN ASSISTANT

## 2018-01-01 PROCEDURE — 99214 OFFICE O/P EST MOD 30 MIN: CPT | Performed by: INTERNAL MEDICINE

## 2018-01-01 PROCEDURE — 76770 US EXAM ABDO BACK WALL COMP: CPT

## 2018-01-01 PROCEDURE — 94640 AIRWAY INHALATION TREATMENT: CPT

## 2018-01-01 PROCEDURE — 99222 1ST HOSP IP/OBS MODERATE 55: CPT | Performed by: INTERNAL MEDICINE

## 2018-01-01 PROCEDURE — P9059 PLASMA, FRZ BETWEEN 8-24HOUR: HCPCS | Performed by: INTERNAL MEDICINE

## 2018-01-01 PROCEDURE — 97535 SELF CARE MNGMENT TRAINING: CPT | Mod: GO | Performed by: OCCUPATIONAL THERAPIST

## 2018-01-01 PROCEDURE — 25000128 H RX IP 250 OP 636: Performed by: HOSPITALIST

## 2018-01-01 PROCEDURE — 93005 ELECTROCARDIOGRAM TRACING: CPT | Performed by: FAMILY MEDICINE

## 2018-01-01 PROCEDURE — 82330 ASSAY OF CALCIUM: CPT | Performed by: FAMILY MEDICINE

## 2018-01-01 PROCEDURE — 12000000 ZZH R&B MED SURG/OB

## 2018-01-01 PROCEDURE — 36415 COLL VENOUS BLD VENIPUNCTURE: CPT | Performed by: INTERNAL MEDICINE

## 2018-01-01 PROCEDURE — 36415 COLL VENOUS BLD VENIPUNCTURE: CPT | Performed by: FAMILY MEDICINE

## 2018-01-01 PROCEDURE — 93010 ELECTROCARDIOGRAM REPORT: CPT | Mod: Z6 | Performed by: FAMILY MEDICINE

## 2018-01-01 PROCEDURE — A9270 NON-COVERED ITEM OR SERVICE: HCPCS | Mod: GY | Performed by: INTERNAL MEDICINE

## 2018-01-01 PROCEDURE — 97116 GAIT TRAINING THERAPY: CPT | Mod: GP

## 2018-01-01 PROCEDURE — 25000125 ZZHC RX 250: Performed by: INTERNAL MEDICINE

## 2018-01-01 PROCEDURE — 97110 THERAPEUTIC EXERCISES: CPT | Mod: GP

## 2018-01-01 PROCEDURE — 87338 HPYLORI STOOL AG IA: CPT | Performed by: INTERNAL MEDICINE

## 2018-01-01 PROCEDURE — 84484 ASSAY OF TROPONIN QUANT: CPT | Performed by: EMERGENCY MEDICINE

## 2018-01-01 PROCEDURE — 83880 ASSAY OF NATRIURETIC PEPTIDE: CPT | Performed by: EMERGENCY MEDICINE

## 2018-01-01 PROCEDURE — 85025 COMPLETE CBC W/AUTO DIFF WBC: CPT | Performed by: EMERGENCY MEDICINE

## 2018-01-01 PROCEDURE — 99214 OFFICE O/P EST MOD 30 MIN: CPT | Mod: 25 | Performed by: FAMILY MEDICINE

## 2018-01-01 PROCEDURE — 86901 BLOOD TYPING SEROLOGIC RH(D): CPT | Performed by: INTERNAL MEDICINE

## 2018-01-01 PROCEDURE — 40000275 ZZH STATISTIC RCP TIME EA 10 MIN

## 2018-01-01 PROCEDURE — 96365 THER/PROPH/DIAG IV INF INIT: CPT | Mod: 59

## 2018-01-01 PROCEDURE — G0463 HOSPITAL OUTPT CLINIC VISIT: HCPCS

## 2018-01-01 PROCEDURE — 99233 SBSQ HOSP IP/OBS HIGH 50: CPT | Performed by: INTERNAL MEDICINE

## 2018-01-01 PROCEDURE — 90732 PPSV23 VACC 2 YRS+ SUBQ/IM: CPT | Performed by: FAMILY MEDICINE

## 2018-01-01 PROCEDURE — 96375 TX/PRO/DX INJ NEW DRUG ADDON: CPT

## 2018-01-01 PROCEDURE — 85025 COMPLETE CBC W/AUTO DIFF WBC: CPT | Performed by: FAMILY MEDICINE

## 2018-01-01 PROCEDURE — 93005 ELECTROCARDIOGRAM TRACING: CPT | Performed by: EMERGENCY MEDICINE

## 2018-01-01 PROCEDURE — 0DJ08ZZ INSPECTION OF UPPER INTESTINAL TRACT, VIA NATURAL OR ARTIFICIAL OPENING ENDOSCOPIC: ICD-10-PCS | Performed by: INTERNAL MEDICINE

## 2018-01-01 PROCEDURE — 74150 CT ABDOMEN W/O CONTRAST: CPT

## 2018-01-01 PROCEDURE — 99285 EMERGENCY DEPT VISIT HI MDM: CPT | Mod: 25

## 2018-01-01 PROCEDURE — 40000193 ZZH STATISTIC PT WARD VISIT

## 2018-01-01 PROCEDURE — 86900 BLOOD TYPING SEROLOGIC ABO: CPT | Performed by: PHYSICIAN ASSISTANT

## 2018-01-01 PROCEDURE — 85610 PROTHROMBIN TIME: CPT | Performed by: PHYSICIAN ASSISTANT

## 2018-01-01 PROCEDURE — 85610 PROTHROMBIN TIME: CPT | Performed by: HOSPITALIST

## 2018-01-01 PROCEDURE — 43235 EGD DIAGNOSTIC BRUSH WASH: CPT | Performed by: INTERNAL MEDICINE

## 2018-01-01 PROCEDURE — 99285 EMERGENCY DEPT VISIT HI MDM: CPT | Mod: Z6 | Performed by: FAMILY MEDICINE

## 2018-01-01 PROCEDURE — 99214 OFFICE O/P EST MOD 30 MIN: CPT | Performed by: PHYSICIAN ASSISTANT

## 2018-01-01 PROCEDURE — 85018 HEMOGLOBIN: CPT | Performed by: INTERNAL MEDICINE

## 2018-01-01 PROCEDURE — G0009 ADMIN PNEUMOCOCCAL VACCINE: HCPCS | Performed by: FAMILY MEDICINE

## 2018-01-01 PROCEDURE — G0180 MD CERTIFICATION HHA PATIENT: HCPCS | Performed by: FAMILY MEDICINE

## 2018-01-01 PROCEDURE — 86923 COMPATIBILITY TEST ELECTRIC: CPT | Performed by: PHYSICIAN ASSISTANT

## 2018-01-01 PROCEDURE — 99285 EMERGENCY DEPT VISIT HI MDM: CPT | Mod: 25 | Performed by: FAMILY MEDICINE

## 2018-01-01 PROCEDURE — 99223 1ST HOSP IP/OBS HIGH 75: CPT | Performed by: PHYSICIAN ASSISTANT

## 2018-01-01 PROCEDURE — 81003 URINALYSIS AUTO W/O SCOPE: CPT | Performed by: FAMILY MEDICINE

## 2018-01-01 PROCEDURE — 80053 COMPREHEN METABOLIC PANEL: CPT | Performed by: FAMILY MEDICINE

## 2018-01-01 PROCEDURE — 84166 PROTEIN E-PHORESIS/URINE/CSF: CPT | Performed by: FAMILY MEDICINE

## 2018-01-01 PROCEDURE — 83690 ASSAY OF LIPASE: CPT | Performed by: EMERGENCY MEDICINE

## 2018-01-01 PROCEDURE — 00000402 ZZHCL STATISTIC TOTAL PROTEIN: Performed by: FAMILY MEDICINE

## 2018-01-01 PROCEDURE — 40000344 ZZHCL STATISTIC THAWING COMPONENT: Performed by: INTERNAL MEDICINE

## 2018-01-01 PROCEDURE — 99231 SBSQ HOSP IP/OBS SF/LOW 25: CPT | Performed by: INTERNAL MEDICINE

## 2018-01-01 PROCEDURE — 96365 THER/PROPH/DIAG IV INF INIT: CPT | Performed by: FAMILY MEDICINE

## 2018-01-01 PROCEDURE — G0500 MOD SEDAT ENDO SERVICE >5YRS: HCPCS | Performed by: INTERNAL MEDICINE

## 2018-01-01 PROCEDURE — 93294 REM INTERROG EVL PM/LDLS PM: CPT | Performed by: INTERNAL MEDICINE

## 2018-01-01 PROCEDURE — 99207 ZZC CDG-CODE CATEGORY CHANGED: CPT | Performed by: INTERNAL MEDICINE

## 2018-01-01 PROCEDURE — 87086 URINE CULTURE/COLONY COUNT: CPT | Performed by: EMERGENCY MEDICINE

## 2018-01-01 PROCEDURE — 83605 ASSAY OF LACTIC ACID: CPT | Performed by: EMERGENCY MEDICINE

## 2018-01-01 PROCEDURE — 85610 PROTHROMBIN TIME: CPT | Performed by: FAMILY MEDICINE

## 2018-01-01 PROCEDURE — A9562 TC99M MERTIATIDE: HCPCS | Performed by: FAMILY MEDICINE

## 2018-01-01 PROCEDURE — 83970 ASSAY OF PARATHORMONE: CPT | Performed by: FAMILY MEDICINE

## 2018-01-01 PROCEDURE — 99239 HOSP IP/OBS DSCHRG MGMT >30: CPT | Performed by: FAMILY MEDICINE

## 2018-01-01 PROCEDURE — 96367 TX/PROPH/DG ADDL SEQ IV INF: CPT | Performed by: FAMILY MEDICINE

## 2018-01-01 PROCEDURE — 81001 URINALYSIS AUTO W/SCOPE: CPT | Performed by: EMERGENCY MEDICINE

## 2018-01-01 PROCEDURE — 97535 SELF CARE MNGMENT TRAINING: CPT | Mod: GO

## 2018-01-01 PROCEDURE — 25000128 H RX IP 250 OP 636: Performed by: INTERNAL MEDICINE

## 2018-01-01 PROCEDURE — 83735 ASSAY OF MAGNESIUM: CPT | Performed by: FAMILY MEDICINE

## 2018-01-01 RX ORDER — WARFARIN SODIUM 4 MG/1
TABLET ORAL
Qty: 40 TABLET | Refills: 2 | COMMUNITY
Start: 2018-01-01 | End: 2018-01-01

## 2018-01-01 RX ORDER — DONEPEZIL HYDROCHLORIDE 5 MG/1
5 TABLET, FILM COATED ORAL AT BEDTIME
Status: DISCONTINUED | OUTPATIENT
Start: 2018-01-01 | End: 2018-01-01 | Stop reason: HOSPADM

## 2018-01-01 RX ORDER — POLYETHYLENE GLYCOL 3350 17 G/17G
17 POWDER, FOR SOLUTION ORAL EVERY MORNING
Status: DISCONTINUED | OUTPATIENT
Start: 2018-01-01 | End: 2018-01-01 | Stop reason: HOSPADM

## 2018-01-01 RX ORDER — BISACODYL 10 MG
10 SUPPOSITORY, RECTAL RECTAL DAILY PRN
Status: DISCONTINUED | OUTPATIENT
Start: 2018-01-01 | End: 2018-01-01

## 2018-01-01 RX ORDER — SENNOSIDES 8.6 MG
1-2 TABLET ORAL DAILY PRN
Status: DISCONTINUED | OUTPATIENT
Start: 2018-01-01 | End: 2018-01-01 | Stop reason: HOSPADM

## 2018-01-01 RX ORDER — WARFARIN SODIUM 4 MG/1
4 TABLET ORAL
Status: COMPLETED | OUTPATIENT
Start: 2018-01-01 | End: 2018-01-01

## 2018-01-01 RX ORDER — ATROPINE SULFATE 10 MG/ML
2-4 SOLUTION/ DROPS OPHTHALMIC
Qty: 5 ML | Refills: 1 | Status: SHIPPED | OUTPATIENT
Start: 2018-01-01

## 2018-01-01 RX ORDER — WARFARIN SODIUM 2 MG/1
4 TABLET ORAL
Status: COMPLETED | OUTPATIENT
Start: 2018-01-01 | End: 2018-01-01

## 2018-01-01 RX ORDER — AMOXICILLIN 250 MG
2 CAPSULE ORAL 2 TIMES DAILY PRN
Status: DISCONTINUED | OUTPATIENT
Start: 2018-01-01 | End: 2018-01-01

## 2018-01-01 RX ORDER — PANTOPRAZOLE SODIUM 40 MG/1
40 TABLET, DELAYED RELEASE ORAL
Qty: 60 TABLET | Refills: 0 | Status: SHIPPED | OUTPATIENT
Start: 2018-01-01 | End: 2018-01-01

## 2018-01-01 RX ORDER — WARFARIN SODIUM 4 MG/1
TABLET ORAL
Qty: 110 TABLET | Refills: 0 | Status: SHIPPED | OUTPATIENT
Start: 2018-01-01 | End: 2018-01-01

## 2018-01-01 RX ORDER — BISACODYL 10 MG
SUPPOSITORY, RECTAL RECTAL
Qty: 12 SUPPOSITORY | Refills: 1 | Status: SHIPPED | OUTPATIENT
Start: 2018-01-01

## 2018-01-01 RX ORDER — PROCHLORPERAZINE MALEATE 5 MG
5 TABLET ORAL EVERY 6 HOURS PRN
Status: DISCONTINUED | OUTPATIENT
Start: 2018-01-01 | End: 2018-01-01 | Stop reason: HOSPADM

## 2018-01-01 RX ORDER — AMOXICILLIN 250 MG
2 CAPSULE ORAL 2 TIMES DAILY
Status: DISCONTINUED | OUTPATIENT
Start: 2018-01-01 | End: 2018-01-01 | Stop reason: HOSPADM

## 2018-01-01 RX ORDER — ACETAMINOPHEN 650 MG/1
650 SUPPOSITORY RECTAL EVERY 4 HOURS PRN
Qty: 12 SUPPOSITORY | Refills: 1 | Status: SHIPPED | OUTPATIENT
Start: 2018-01-01

## 2018-01-01 RX ORDER — EZETIMIBE 10 MG/1
10 TABLET ORAL DAILY
Status: DISCONTINUED | OUTPATIENT
Start: 2018-01-01 | End: 2018-01-01 | Stop reason: HOSPADM

## 2018-01-01 RX ORDER — SUCRALFATE ORAL 1 G/10ML
1 SUSPENSION ORAL
Status: DISCONTINUED | OUTPATIENT
Start: 2018-01-01 | End: 2018-01-01 | Stop reason: HOSPADM

## 2018-01-01 RX ORDER — METOLAZONE 2.5 MG/1
2.5 TABLET ORAL DAILY
Qty: 2 TABLET | Refills: 1 | Status: ON HOLD | OUTPATIENT
Start: 2018-01-01 | End: 2018-01-01

## 2018-01-01 RX ORDER — DONEPEZIL HYDROCHLORIDE 5 MG/1
TABLET, FILM COATED ORAL
Qty: 90 TABLET | Refills: 3 | Status: SHIPPED | OUTPATIENT
Start: 2018-01-01

## 2018-01-01 RX ORDER — METOPROLOL TARTRATE 25 MG/1
TABLET, FILM COATED ORAL
Qty: 90 TABLET | Refills: 3 | Status: SHIPPED | OUTPATIENT
Start: 2018-01-01

## 2018-01-01 RX ORDER — ONDANSETRON 4 MG/1
4 TABLET, ORALLY DISINTEGRATING ORAL EVERY 6 HOURS PRN
Status: DISCONTINUED | OUTPATIENT
Start: 2018-01-01 | End: 2018-01-01 | Stop reason: HOSPADM

## 2018-01-01 RX ORDER — FENTANYL CITRATE 50 UG/ML
INJECTION, SOLUTION INTRAMUSCULAR; INTRAVENOUS PRN
Status: DISCONTINUED | OUTPATIENT
Start: 2018-01-01 | End: 2018-01-01 | Stop reason: HOSPADM

## 2018-01-01 RX ORDER — IOPAMIDOL 755 MG/ML
75 INJECTION, SOLUTION INTRAVASCULAR ONCE
Status: COMPLETED | OUTPATIENT
Start: 2018-01-01 | End: 2018-01-01

## 2018-01-01 RX ORDER — DIPHENHYDRAMINE HCL 50 MG
50 CAPSULE ORAL
Status: DISCONTINUED | OUTPATIENT
Start: 2018-01-01 | End: 2018-01-01 | Stop reason: HOSPADM

## 2018-01-01 RX ORDER — ACETAMINOPHEN 325 MG/1
650 TABLET ORAL EVERY 4 HOURS PRN
Status: DISCONTINUED | OUTPATIENT
Start: 2018-01-01 | End: 2018-01-01 | Stop reason: HOSPADM

## 2018-01-01 RX ORDER — PANTOPRAZOLE SODIUM 40 MG/1
40 TABLET, DELAYED RELEASE ORAL
Qty: 60 TABLET | Refills: 0 | Status: SHIPPED | OUTPATIENT
Start: 2018-01-01

## 2018-01-01 RX ORDER — LIDOCAINE 40 MG/G
CREAM TOPICAL
Status: DISCONTINUED | OUTPATIENT
Start: 2018-01-01 | End: 2018-01-01 | Stop reason: HOSPADM

## 2018-01-01 RX ORDER — CEFTRIAXONE SODIUM 2 G/50ML
2 INJECTION, SOLUTION INTRAVENOUS EVERY 24 HOURS
Status: DISCONTINUED | OUTPATIENT
Start: 2018-01-01 | End: 2018-01-01

## 2018-01-01 RX ORDER — WARFARIN SODIUM 1 MG/1
1 TABLET ORAL
Status: COMPLETED | OUTPATIENT
Start: 2018-01-01 | End: 2018-01-01

## 2018-01-01 RX ORDER — WARFARIN SODIUM 2 MG/1
2 TABLET ORAL
Status: COMPLETED | OUTPATIENT
Start: 2018-01-01 | End: 2018-01-01

## 2018-01-01 RX ORDER — DONEPEZIL HYDROCHLORIDE 5 MG/1
5 TABLET, FILM COATED ORAL AT BEDTIME
Status: DISCONTINUED | OUTPATIENT
Start: 2018-01-01 | End: 2018-01-01

## 2018-01-01 RX ORDER — FUROSEMIDE 10 MG/ML
40 INJECTION INTRAMUSCULAR; INTRAVENOUS ONCE
Status: COMPLETED | OUTPATIENT
Start: 2018-01-01 | End: 2018-01-01

## 2018-01-01 RX ORDER — SODIUM CHLORIDE 9 MG/ML
1000 INJECTION, SOLUTION INTRAVENOUS CONTINUOUS
Status: DISCONTINUED | OUTPATIENT
Start: 2018-01-01 | End: 2018-01-01 | Stop reason: HOSPADM

## 2018-01-01 RX ORDER — HALOPERIDOL 0.5 MG/1
.5-1 TABLET ORAL EVERY 6 HOURS PRN
Qty: 30 TABLET | Refills: 1 | Status: SHIPPED | OUTPATIENT
Start: 2018-01-01

## 2018-01-01 RX ORDER — NITROGLYCERIN 0.4 MG/1
0.4 TABLET SUBLINGUAL EVERY 5 MIN PRN
Status: DISCONTINUED | OUTPATIENT
Start: 2018-01-01 | End: 2018-01-01 | Stop reason: HOSPADM

## 2018-01-01 RX ORDER — TORSEMIDE 20 MG/1
20 TABLET ORAL 2 TIMES DAILY
Status: DISCONTINUED | OUTPATIENT
Start: 2018-01-01 | End: 2018-01-01 | Stop reason: HOSPADM

## 2018-01-01 RX ORDER — HYOSCYAMINE SULFATE 0.125 MG
0.125-0.25 TABLET ORAL EVERY 4 HOURS PRN
Qty: 40 TABLET | Refills: 1 | Status: SHIPPED | OUTPATIENT
Start: 2018-01-01 | End: 2018-01-01

## 2018-01-01 RX ORDER — POLYETHYLENE GLYCOL 3350 17 G/17G
17 POWDER, FOR SOLUTION ORAL DAILY PRN
Status: DISCONTINUED | OUTPATIENT
Start: 2018-01-01 | End: 2018-01-01

## 2018-01-01 RX ORDER — HYOSCYAMINE SULFATE 0.125 MG
0.125-0.25 TABLET ORAL EVERY 4 HOURS PRN
Qty: 40 TABLET | Refills: 1
Start: 2018-01-01

## 2018-01-01 RX ORDER — BENZTROPINE MESYLATE 1 MG/1
1-2 TABLET ORAL 3 TIMES DAILY PRN
Status: DISCONTINUED | OUTPATIENT
Start: 2018-01-01 | End: 2018-01-01 | Stop reason: HOSPADM

## 2018-01-01 RX ORDER — ONDANSETRON 2 MG/ML
4 INJECTION INTRAMUSCULAR; INTRAVENOUS EVERY 6 HOURS PRN
Status: DISCONTINUED | OUTPATIENT
Start: 2018-01-01 | End: 2018-01-01 | Stop reason: HOSPADM

## 2018-01-01 RX ORDER — NALOXONE HYDROCHLORIDE 0.4 MG/ML
.1-.4 INJECTION, SOLUTION INTRAMUSCULAR; INTRAVENOUS; SUBCUTANEOUS
Status: DISCONTINUED | OUTPATIENT
Start: 2018-01-01 | End: 2018-01-01 | Stop reason: HOSPADM

## 2018-01-01 RX ORDER — SUCRALFATE ORAL 1 G/10ML
1 SUSPENSION ORAL
Qty: 840 ML | Refills: 0 | Status: SHIPPED | OUTPATIENT
Start: 2018-01-01

## 2018-01-01 RX ORDER — TORSEMIDE 20 MG/1
20 TABLET ORAL 2 TIMES DAILY
Status: DISCONTINUED | OUTPATIENT
Start: 2018-01-01 | End: 2018-01-01

## 2018-01-01 RX ORDER — SUCRALFATE ORAL 1 G/10ML
1 SUSPENSION ORAL
Qty: 840 ML | Refills: 0 | Status: SHIPPED | OUTPATIENT
Start: 2018-01-01 | End: 2018-01-01

## 2018-01-01 RX ORDER — CETIRIZINE HYDROCHLORIDE 5 MG/1
5 TABLET ORAL AT BEDTIME
Status: DISCONTINUED | OUTPATIENT
Start: 2018-01-01 | End: 2018-01-01 | Stop reason: HOSPADM

## 2018-01-01 RX ORDER — AMOXICILLIN 250 MG
1 CAPSULE ORAL 2 TIMES DAILY PRN
Status: DISCONTINUED | OUTPATIENT
Start: 2018-01-01 | End: 2018-01-01 | Stop reason: HOSPADM

## 2018-01-01 RX ORDER — PROCHLORPERAZINE 25 MG
12.5 SUPPOSITORY, RECTAL RECTAL EVERY 12 HOURS PRN
Status: DISCONTINUED | OUTPATIENT
Start: 2018-01-01 | End: 2018-01-01 | Stop reason: HOSPADM

## 2018-01-01 RX ORDER — POTASSIUM CHLORIDE 750 MG/1
10 TABLET, EXTENDED RELEASE ORAL 2 TIMES DAILY
Status: DISCONTINUED | OUTPATIENT
Start: 2018-01-01 | End: 2018-01-01 | Stop reason: HOSPADM

## 2018-01-01 RX ORDER — WARFARIN SODIUM 4 MG/1
TABLET ORAL
Qty: 80 TABLET | Refills: 0 | COMMUNITY
Start: 2018-01-01

## 2018-01-01 RX ORDER — PANTOPRAZOLE SODIUM 40 MG/1
40 TABLET, DELAYED RELEASE ORAL
Status: DISCONTINUED | OUTPATIENT
Start: 2018-01-01 | End: 2018-01-01 | Stop reason: HOSPADM

## 2018-01-01 RX ORDER — SODIUM CHLORIDE 9 MG/ML
INJECTION, SOLUTION INTRAVENOUS CONTINUOUS
Status: DISCONTINUED | OUTPATIENT
Start: 2018-01-01 | End: 2018-01-01

## 2018-01-01 RX ORDER — SPIRONOLACTONE 25 MG/1
25 TABLET ORAL 2 TIMES DAILY
Status: DISCONTINUED | OUTPATIENT
Start: 2018-01-01 | End: 2018-01-01 | Stop reason: HOSPADM

## 2018-01-01 RX ORDER — ACETAMINOPHEN 650 MG/1
650 SUPPOSITORY RECTAL EVERY 4 HOURS PRN
Status: DISCONTINUED | OUTPATIENT
Start: 2018-01-01 | End: 2018-01-01 | Stop reason: HOSPADM

## 2018-01-01 RX ORDER — IPRATROPIUM BROMIDE AND ALBUTEROL SULFATE 2.5; .5 MG/3ML; MG/3ML
3 SOLUTION RESPIRATORY (INHALATION)
Status: DISCONTINUED | OUTPATIENT
Start: 2018-01-01 | End: 2018-01-01 | Stop reason: HOSPADM

## 2018-01-01 RX ORDER — FUROSEMIDE 10 MG/ML
20 INJECTION INTRAMUSCULAR; INTRAVENOUS ONCE
Status: COMPLETED | OUTPATIENT
Start: 2018-01-01 | End: 2018-01-01

## 2018-01-01 RX ORDER — ONDANSETRON 2 MG/ML
4 INJECTION INTRAMUSCULAR; INTRAVENOUS EVERY 30 MIN PRN
Status: DISCONTINUED | OUTPATIENT
Start: 2018-01-01 | End: 2018-01-01 | Stop reason: HOSPADM

## 2018-01-01 RX ORDER — OMEGA-3-ACID ETHYL ESTERS 1 G/1
CAPSULE, LIQUID FILLED ORAL
Qty: 90 CAPSULE | Refills: 0 | OUTPATIENT
Start: 2018-01-01

## 2018-01-01 RX ORDER — CLOPIDOGREL BISULFATE 75 MG/1
75 TABLET ORAL DAILY
Status: DISCONTINUED | OUTPATIENT
Start: 2018-01-01 | End: 2018-01-01 | Stop reason: HOSPADM

## 2018-01-01 RX ORDER — WARFARIN SODIUM 3 MG/1
3 TABLET ORAL
Status: DISCONTINUED | OUTPATIENT
Start: 2018-01-01 | End: 2018-01-01 | Stop reason: HOSPADM

## 2018-01-01 RX ORDER — TORSEMIDE 20 MG/1
20 TABLET ORAL
Status: DISCONTINUED | OUTPATIENT
Start: 2018-01-01 | End: 2018-01-01

## 2018-01-01 RX ADMIN — MIDAZOLAM 1 MG: 1 INJECTION INTRAMUSCULAR; INTRAVENOUS at 10:13

## 2018-01-01 RX ADMIN — SENNOSIDES AND DOCUSATE SODIUM 2 TABLET: 8.6; 5 TABLET ORAL at 09:54

## 2018-01-01 RX ADMIN — ACETAMINOPHEN 650 MG: 325 TABLET ORAL at 00:37

## 2018-01-01 RX ADMIN — WARFARIN SODIUM 4 MG: 2 TABLET ORAL at 18:24

## 2018-01-01 RX ADMIN — SODIUM CHLORIDE 8 MG/HR: 9 INJECTION, SOLUTION INTRAVENOUS at 11:12

## 2018-01-01 RX ADMIN — SUCRALFATE 1 G: 1 SUSPENSION ORAL at 16:08

## 2018-01-01 RX ADMIN — TORSEMIDE 20 MG: 20 TABLET ORAL at 11:00

## 2018-01-01 RX ADMIN — AZITHROMYCIN MONOHYDRATE 250 MG: 500 INJECTION, POWDER, LYOPHILIZED, FOR SOLUTION INTRAVENOUS at 19:41

## 2018-01-01 RX ADMIN — Medication 1 MG: at 23:38

## 2018-01-01 RX ADMIN — SUCRALFATE 1 G: 1 SUSPENSION ORAL at 07:07

## 2018-01-01 RX ADMIN — EZETIMIBE 10 MG: 10 TABLET ORAL at 09:54

## 2018-01-01 RX ADMIN — SPIRONOLACTONE 25 MG: 25 TABLET ORAL at 20:58

## 2018-01-01 RX ADMIN — POTASSIUM CHLORIDE 10 MEQ: 750 TABLET, FILM COATED, EXTENDED RELEASE ORAL at 08:31

## 2018-01-01 RX ADMIN — CEFTRIAXONE SODIUM 2 G: 2 INJECTION, SOLUTION INTRAVENOUS at 17:39

## 2018-01-01 RX ADMIN — SENNOSIDES AND DOCUSATE SODIUM 2 TABLET: 8.6; 5 TABLET ORAL at 20:46

## 2018-01-01 RX ADMIN — MIDAZOLAM 1 MG: 1 INJECTION INTRAMUSCULAR; INTRAVENOUS at 10:14

## 2018-01-01 RX ADMIN — SODIUM CHLORIDE 1000 ML: 9 INJECTION, SOLUTION INTRAVENOUS at 13:18

## 2018-01-01 RX ADMIN — IPRATROPIUM BROMIDE AND ALBUTEROL SULFATE 3 ML: .5; 3 SOLUTION RESPIRATORY (INHALATION) at 21:59

## 2018-01-01 RX ADMIN — WARFARIN SODIUM 4 MG: 2 TABLET ORAL at 18:18

## 2018-01-01 RX ADMIN — CLOPIDOGREL 75 MG: 75 TABLET, FILM COATED ORAL at 09:49

## 2018-01-01 RX ADMIN — CLOPIDOGREL 75 MG: 75 TABLET, FILM COATED ORAL at 08:31

## 2018-01-01 RX ADMIN — IPRATROPIUM BROMIDE AND ALBUTEROL SULFATE 3 ML: .5; 3 SOLUTION RESPIRATORY (INHALATION) at 11:21

## 2018-01-01 RX ADMIN — DONEPEZIL HYDROCHLORIDE 5 MG: 5 TABLET, FILM COATED ORAL at 21:22

## 2018-01-01 RX ADMIN — SUCRALFATE 1 G: 1 SUSPENSION ORAL at 21:24

## 2018-01-01 RX ADMIN — DONEPEZIL HYDROCHLORIDE 5 MG: 5 TABLET, FILM COATED ORAL at 21:33

## 2018-01-01 RX ADMIN — IPRATROPIUM BROMIDE AND ALBUTEROL SULFATE 3 ML: .5; 3 SOLUTION RESPIRATORY (INHALATION) at 06:54

## 2018-01-01 RX ADMIN — POLYETHYLENE GLYCOL 3350 17 G: 17 POWDER, FOR SOLUTION ORAL at 09:30

## 2018-01-01 RX ADMIN — VITAMIN D, TAB 1000IU (100/BT) 2000 UNITS: 25 TAB at 19:17

## 2018-01-01 RX ADMIN — SUCRALFATE 1 G: 1 SUSPENSION ORAL at 06:41

## 2018-01-01 RX ADMIN — Medication 7.5 MILLICURIE: at 14:40

## 2018-01-01 RX ADMIN — DONEPEZIL HYDROCHLORIDE 5 MG: 5 TABLET, FILM COATED ORAL at 21:07

## 2018-01-01 RX ADMIN — FENTANYL CITRATE 25 MCG: 50 INJECTION, SOLUTION INTRAMUSCULAR; INTRAVENOUS at 10:13

## 2018-01-01 RX ADMIN — PANTOPRAZOLE SODIUM 40 MG: 40 TABLET, DELAYED RELEASE ORAL at 16:50

## 2018-01-01 RX ADMIN — SODIUM CHLORIDE: 9 INJECTION, SOLUTION INTRAVENOUS at 04:07

## 2018-01-01 RX ADMIN — SUCRALFATE 1 G: 1 SUSPENSION ORAL at 11:47

## 2018-01-01 RX ADMIN — SODIUM CHLORIDE 80 MG: 9 INJECTION, SOLUTION INTRAVENOUS at 10:52

## 2018-01-01 RX ADMIN — SODIUM CHLORIDE: 9 INJECTION, SOLUTION INTRAVENOUS at 23:41

## 2018-01-01 RX ADMIN — PANTOPRAZOLE SODIUM 40 MG: 40 TABLET, DELAYED RELEASE ORAL at 06:41

## 2018-01-01 RX ADMIN — EZETIMIBE 10 MG: 10 TABLET ORAL at 09:45

## 2018-01-01 RX ADMIN — IPRATROPIUM BROMIDE AND ALBUTEROL SULFATE 3 ML: .5; 3 SOLUTION RESPIRATORY (INHALATION) at 10:31

## 2018-01-01 RX ADMIN — SUCRALFATE 1 G: 1 SUSPENSION ORAL at 11:53

## 2018-01-01 RX ADMIN — CLOPIDOGREL 75 MG: 75 TABLET, FILM COATED ORAL at 10:59

## 2018-01-01 RX ADMIN — PANTOPRAZOLE SODIUM 40 MG: 40 TABLET, DELAYED RELEASE ORAL at 20:05

## 2018-01-01 RX ADMIN — IPRATROPIUM BROMIDE AND ALBUTEROL SULFATE 3 ML: .5; 3 SOLUTION RESPIRATORY (INHALATION) at 06:52

## 2018-01-01 RX ADMIN — IPRATROPIUM BROMIDE AND ALBUTEROL SULFATE 3 ML: .5; 3 SOLUTION RESPIRATORY (INHALATION) at 20:26

## 2018-01-01 RX ADMIN — MICONAZOLE NITRATE: 2 POWDER TOPICAL at 21:09

## 2018-01-01 RX ADMIN — FUROSEMIDE 40 MG: 10 INJECTION, SOLUTION INTRAVENOUS at 15:48

## 2018-01-01 RX ADMIN — SENNOSIDES AND DOCUSATE SODIUM 2 TABLET: 8.6; 5 TABLET ORAL at 21:03

## 2018-01-01 RX ADMIN — LIDOCAINE HYDROCHLORIDE 10 ML: 10 INJECTION, SOLUTION EPIDURAL; INFILTRATION; INTRACAUDAL; PERINEURAL at 13:41

## 2018-01-01 RX ADMIN — Medication 12.5 MG: at 21:00

## 2018-01-01 RX ADMIN — PHYTONADIONE 2 MG: 2 INJECTION, EMULSION INTRAMUSCULAR; INTRAVENOUS; SUBCUTANEOUS at 20:54

## 2018-01-01 RX ADMIN — Medication 12.5 MG: at 09:04

## 2018-01-01 RX ADMIN — WARFARIN SODIUM 2 MG: 2 TABLET ORAL at 18:40

## 2018-01-01 RX ADMIN — PANTOPRAZOLE SODIUM 40 MG: 40 TABLET, DELAYED RELEASE ORAL at 07:01

## 2018-01-01 RX ADMIN — Medication 12.5 MG: at 09:49

## 2018-01-01 RX ADMIN — TORSEMIDE 20 MG: 20 TABLET ORAL at 21:20

## 2018-01-01 RX ADMIN — SPIRONOLACTONE 25 MG: 25 TABLET ORAL at 20:45

## 2018-01-01 RX ADMIN — ACETAMINOPHEN 650 MG: 325 TABLET ORAL at 20:41

## 2018-01-01 RX ADMIN — Medication 12.5 MG: at 10:59

## 2018-01-01 RX ADMIN — SUCRALFATE 1 G: 1 SUSPENSION ORAL at 16:50

## 2018-01-01 RX ADMIN — DONEPEZIL HYDROCHLORIDE 5 MG: 5 TABLET ORAL at 21:36

## 2018-01-01 RX ADMIN — ONDANSETRON 4 MG: 2 INJECTION INTRAMUSCULAR; INTRAVENOUS at 22:41

## 2018-01-01 RX ADMIN — PANTOPRAZOLE SODIUM 40 MG: 40 TABLET, DELAYED RELEASE ORAL at 16:08

## 2018-01-01 RX ADMIN — MICONAZOLE NITRATE: 2 POWDER TOPICAL at 09:46

## 2018-01-01 RX ADMIN — IPRATROPIUM BROMIDE AND ALBUTEROL SULFATE 3 ML: .5; 3 SOLUTION RESPIRATORY (INHALATION) at 05:17

## 2018-01-01 RX ADMIN — CETIRIZINE HYDROCHLORIDE 5 MG: 5 TABLET ORAL at 21:07

## 2018-01-01 RX ADMIN — CEFTRIAXONE SODIUM 2 G: 2 INJECTION, SOLUTION INTRAVENOUS at 18:39

## 2018-01-01 RX ADMIN — CEFTRIAXONE SODIUM 2 G: 2 INJECTION, SOLUTION INTRAVENOUS at 17:45

## 2018-01-01 RX ADMIN — ACETAMINOPHEN 650 MG: 325 TABLET ORAL at 10:02

## 2018-01-01 RX ADMIN — SPIRONOLACTONE 25 MG: 25 TABLET ORAL at 08:31

## 2018-01-01 RX ADMIN — ONDANSETRON 4 MG: 4 TABLET, ORALLY DISINTEGRATING ORAL at 09:46

## 2018-01-01 RX ADMIN — IPRATROPIUM BROMIDE AND ALBUTEROL SULFATE 3 ML: .5; 3 SOLUTION RESPIRATORY (INHALATION) at 12:35

## 2018-01-01 RX ADMIN — SUCRALFATE 1 G: 1 SUSPENSION ORAL at 21:00

## 2018-01-01 RX ADMIN — SODIUM CHLORIDE: 9 INJECTION, SOLUTION INTRAVENOUS at 19:51

## 2018-01-01 RX ADMIN — MICONAZOLE NITRATE: 2 POWDER TOPICAL at 22:21

## 2018-01-01 RX ADMIN — DONEPEZIL HYDROCHLORIDE 5 MG: 5 TABLET ORAL at 21:24

## 2018-01-01 RX ADMIN — TOPICAL ANESTHETIC 1 SPRAY: 200 SPRAY DENTAL; PERIODONTAL at 10:05

## 2018-01-01 RX ADMIN — DONEPEZIL HYDROCHLORIDE 5 MG: 5 TABLET ORAL at 23:02

## 2018-01-01 RX ADMIN — Medication 12.5 MG: at 08:34

## 2018-01-01 RX ADMIN — EZETIMIBE 10 MG: 10 TABLET ORAL at 08:31

## 2018-01-01 RX ADMIN — MICONAZOLE NITRATE: 2 POWDER TOPICAL at 21:05

## 2018-01-01 RX ADMIN — AZITHROMYCIN MONOHYDRATE 500 MG: 500 INJECTION, POWDER, LYOPHILIZED, FOR SOLUTION INTRAVENOUS at 18:32

## 2018-01-01 RX ADMIN — POTASSIUM CHLORIDE 10 MEQ: 750 TABLET, FILM COATED, EXTENDED RELEASE ORAL at 10:59

## 2018-01-01 RX ADMIN — EZETIMIBE 10 MG: 10 TABLET ORAL at 09:49

## 2018-01-01 RX ADMIN — DONEPEZIL HYDROCHLORIDE 5 MG: 5 TABLET, FILM COATED ORAL at 21:24

## 2018-01-01 RX ADMIN — SUCRALFATE 1 G: 1 SUSPENSION ORAL at 23:02

## 2018-01-01 RX ADMIN — SUCRALFATE 1 G: 1 SUSPENSION ORAL at 12:16

## 2018-01-01 RX ADMIN — MICONAZOLE NITRATE: 2 POWDER TOPICAL at 09:31

## 2018-01-01 RX ADMIN — SODIUM CHLORIDE: 9 INJECTION, SOLUTION INTRAVENOUS at 13:31

## 2018-01-01 RX ADMIN — SODIUM CHLORIDE 75 ML: 9 INJECTION, SOLUTION INTRAVENOUS at 11:27

## 2018-01-01 RX ADMIN — Medication 12.5 MG: at 20:59

## 2018-01-01 RX ADMIN — PANTOPRAZOLE SODIUM 40 MG: 40 TABLET, DELAYED RELEASE ORAL at 16:36

## 2018-01-01 RX ADMIN — IPRATROPIUM BROMIDE AND ALBUTEROL SULFATE 3 ML: .5; 3 SOLUTION RESPIRATORY (INHALATION) at 14:10

## 2018-01-01 RX ADMIN — SODIUM CHLORIDE: 9 INJECTION, SOLUTION INTRAVENOUS at 06:37

## 2018-01-01 RX ADMIN — POTASSIUM CHLORIDE 10 MEQ: 750 TABLET, FILM COATED, EXTENDED RELEASE ORAL at 20:45

## 2018-01-01 RX ADMIN — MICONAZOLE NITRATE: 2 POWDER TOPICAL at 08:31

## 2018-01-01 RX ADMIN — SENNOSIDES AND DOCUSATE SODIUM 2 TABLET: 8.6; 5 TABLET ORAL at 21:07

## 2018-01-01 RX ADMIN — TORSEMIDE 20 MG: 20 TABLET ORAL at 21:03

## 2018-01-01 RX ADMIN — FUROSEMIDE 40 MG: 10 INJECTION, SOLUTION INTRAVENOUS at 19:15

## 2018-01-01 RX ADMIN — POTASSIUM CHLORIDE 10 MEQ: 750 TABLET, FILM COATED, EXTENDED RELEASE ORAL at 20:58

## 2018-01-01 RX ADMIN — CETIRIZINE HYDROCHLORIDE 5 MG: 5 TABLET ORAL at 21:21

## 2018-01-01 RX ADMIN — DIPHENHYDRAMINE HYDROCHLORIDE 50 MG: 50 CAPSULE ORAL at 01:06

## 2018-01-01 RX ADMIN — EZETIMIBE 10 MG: 10 TABLET ORAL at 09:24

## 2018-01-01 RX ADMIN — SODIUM CHLORIDE 1000 ML: 9 INJECTION, SOLUTION INTRAVENOUS at 10:34

## 2018-01-01 RX ADMIN — POLYETHYLENE GLYCOL 3350 17 G: 17 POWDER, FOR SOLUTION ORAL at 08:30

## 2018-01-01 RX ADMIN — WARFARIN SODIUM 1 MG: 1 TABLET ORAL at 19:17

## 2018-01-01 RX ADMIN — PANTOPRAZOLE SODIUM 40 MG: 40 TABLET, DELAYED RELEASE ORAL at 07:07

## 2018-01-01 RX ADMIN — Medication 12.5 MG: at 21:24

## 2018-01-01 RX ADMIN — EZETIMIBE 10 MG: 10 TABLET ORAL at 08:34

## 2018-01-01 RX ADMIN — PHYTONADIONE 2 MG: 1 INJECTION, EMULSION INTRAMUSCULAR; INTRAVENOUS; SUBCUTANEOUS at 06:02

## 2018-01-01 RX ADMIN — EZETIMIBE 10 MG: 10 TABLET ORAL at 08:18

## 2018-01-01 RX ADMIN — Medication 12.5 MG: at 10:01

## 2018-01-01 RX ADMIN — WARFARIN SODIUM 4 MG: 4 TABLET ORAL at 20:05

## 2018-01-01 RX ADMIN — SUCRALFATE 1 G: 1 SUSPENSION ORAL at 06:53

## 2018-01-01 RX ADMIN — SODIUM CHLORIDE: 9 INJECTION, SOLUTION INTRAVENOUS at 05:35

## 2018-01-01 RX ADMIN — CLOPIDOGREL 75 MG: 75 TABLET, FILM COATED ORAL at 09:54

## 2018-01-01 RX ADMIN — IPRATROPIUM BROMIDE AND ALBUTEROL SULFATE 3 ML: .5; 3 SOLUTION RESPIRATORY (INHALATION) at 19:29

## 2018-01-01 RX ADMIN — TORSEMIDE 20 MG: 20 TABLET ORAL at 08:32

## 2018-01-01 RX ADMIN — SENNOSIDES AND DOCUSATE SODIUM 2 TABLET: 8.6; 5 TABLET ORAL at 08:31

## 2018-01-01 RX ADMIN — IPRATROPIUM BROMIDE AND ALBUTEROL SULFATE 3 ML: .5; 3 SOLUTION RESPIRATORY (INHALATION) at 05:47

## 2018-01-01 RX ADMIN — FUROSEMIDE 20 MG: 10 INJECTION, SOLUTION INTRAVENOUS at 12:06

## 2018-01-01 RX ADMIN — SODIUM CHLORIDE 8 MG/HR: 9 INJECTION, SOLUTION INTRAVENOUS at 00:00

## 2018-01-01 RX ADMIN — ONDANSETRON 4 MG: 2 INJECTION INTRAMUSCULAR; INTRAVENOUS at 10:35

## 2018-01-01 RX ADMIN — Medication 12.5 MG: at 20:46

## 2018-01-01 RX ADMIN — DONEPEZIL HYDROCHLORIDE 5 MG: 5 TABLET, FILM COATED ORAL at 21:00

## 2018-01-01 RX ADMIN — DONEPEZIL HYDROCHLORIDE 5 MG: 5 TABLET, FILM COATED ORAL at 20:59

## 2018-01-01 RX ADMIN — EZETIMIBE 10 MG: 10 TABLET ORAL at 09:04

## 2018-01-01 RX ADMIN — ACETAMINOPHEN 650 MG: 325 TABLET ORAL at 20:04

## 2018-01-01 RX ADMIN — CETIRIZINE HYDROCHLORIDE 5 MG: 5 TABLET ORAL at 21:00

## 2018-01-01 RX ADMIN — IOPAMIDOL 75 ML: 755 INJECTION, SOLUTION INTRAVENOUS at 11:27

## 2018-01-01 RX ADMIN — PANTOPRAZOLE SODIUM 40 MG: 40 TABLET, DELAYED RELEASE ORAL at 06:53

## 2018-01-01 RX ADMIN — ONDANSETRON 4 MG: 2 INJECTION INTRAMUSCULAR; INTRAVENOUS at 01:21

## 2018-01-01 RX ADMIN — MICONAZOLE NITRATE: 2 POWDER TOPICAL at 20:49

## 2018-01-01 RX ADMIN — Medication 12.5 MG: at 21:07

## 2018-01-01 RX ADMIN — ACETAMINOPHEN 650 MG: 325 TABLET ORAL at 01:09

## 2018-01-01 RX ADMIN — PROCHLORPERAZINE EDISYLATE 5 MG: 5 INJECTION INTRAMUSCULAR; INTRAVENOUS at 01:20

## 2018-01-01 RX ADMIN — SODIUM CHLORIDE: 9 INJECTION, SOLUTION INTRAVENOUS at 21:33

## 2018-01-01 RX ADMIN — VITAMIN D, TAB 1000IU (100/BT) 2000 UNITS: 25 TAB at 18:39

## 2018-01-01 RX ADMIN — SPIRONOLACTONE 25 MG: 25 TABLET ORAL at 10:59

## 2018-01-01 RX ADMIN — MICONAZOLE NITRATE: 2 POWDER TOPICAL at 09:50

## 2018-01-01 RX ADMIN — Medication 12.5 MG: at 21:33

## 2018-01-01 RX ADMIN — VITAMIN D, TAB 1000IU (100/BT) 2000 UNITS: 25 TAB at 17:09

## 2018-01-01 RX ADMIN — EZETIMIBE 10 MG: 10 TABLET ORAL at 10:59

## 2018-01-01 RX ADMIN — WARFARIN SODIUM 2 MG: 2 TABLET ORAL at 23:53

## 2018-01-01 RX ADMIN — TORSEMIDE 20 MG: 20 TABLET ORAL at 09:55

## 2018-01-01 RX ADMIN — Medication 12.5 MG: at 08:31

## 2018-01-01 RX ADMIN — Medication 12.5 MG: at 09:54

## 2018-01-01 RX ADMIN — WARFARIN SODIUM 4 MG: 4 TABLET ORAL at 17:09

## 2018-01-01 RX ADMIN — SODIUM CHLORIDE, POTASSIUM CHLORIDE, SODIUM LACTATE AND CALCIUM CHLORIDE 1000 ML: 600; 310; 30; 20 INJECTION, SOLUTION INTRAVENOUS at 16:20

## 2018-01-01 RX ADMIN — IPRATROPIUM BROMIDE AND ALBUTEROL SULFATE 3 ML: .5; 3 SOLUTION RESPIRATORY (INHALATION) at 15:43

## 2018-01-01 RX ADMIN — AZITHROMYCIN MONOHYDRATE 250 MG: 500 INJECTION, POWDER, LYOPHILIZED, FOR SOLUTION INTRAVENOUS at 19:19

## 2018-01-01 ASSESSMENT — ENCOUNTER SYMPTOMS
NAUSEA: 1
CONFUSION: 1
ABDOMINAL PAIN: 0
COUGH: 0
BLOOD IN STOOL: 1
NUMBNESS: 0
CHILLS: 0
LIGHT-HEADEDNESS: 0
DIAPHORESIS: 0
SHORTNESS OF BREATH: 1
VOMITING: 1
CHEST TIGHTNESS: 0
DIZZINESS: 0
FEVER: 0
NECK PAIN: 0
BACK PAIN: 0
DYSURIA: 0
BRUISES/BLEEDS EASILY: 0
FREQUENCY: 0
HEADACHES: 0
WEAKNESS: 1

## 2018-01-01 ASSESSMENT — PAIN DESCRIPTION - DESCRIPTORS
DESCRIPTORS: ACHING
DESCRIPTORS: DISCOMFORT

## 2018-01-01 ASSESSMENT — ACTIVITIES OF DAILY LIVING (ADL)
DEPENDENT_IADLS:: CLEANING;COOKING;LAUNDRY;SHOPPING;MEAL PREPARATION;MEDICATION MANAGEMENT;MONEY MANAGEMENT;TRANSPORTATION

## 2018-01-01 ASSESSMENT — PAIN SCALES - GENERAL: PAINLEVEL: NO PAIN (0)

## 2018-01-03 PROBLEM — J90 PLEURAL EFFUSION: Status: RESOLVED | Noted: 2017-01-01 | Resolved: 2018-01-01

## 2018-01-03 PROBLEM — R41.3 MEMORY LOSS: Status: ACTIVE | Noted: 2017-01-01

## 2018-01-03 PROBLEM — L89.159 DECUBITUS ULCER OF SACRAL REGION, UNSPECIFIED ULCER STAGE: Status: RESOLVED | Noted: 2017-01-01 | Resolved: 2018-01-01

## 2018-01-03 PROBLEM — R93.89 ABNORMAL CXR: Status: ACTIVE | Noted: 2018-01-01

## 2018-01-03 PROBLEM — J18.9 CAP (COMMUNITY ACQUIRED PNEUMONIA): Status: ACTIVE | Noted: 2018-01-01

## 2018-01-03 PROBLEM — I48.20 CHRONIC ATRIAL FIBRILLATION (H): Status: ACTIVE | Noted: 2018-01-01

## 2018-01-03 PROBLEM — R11.2 NAUSEA AND VOMITING: Status: ACTIVE | Noted: 2018-01-01

## 2018-01-03 PROBLEM — R53.1 WEAKNESS: Status: ACTIVE | Noted: 2018-01-01

## 2018-01-03 PROBLEM — N17.9 AKI (ACUTE KIDNEY INJURY) (H): Status: ACTIVE | Noted: 2018-01-01

## 2018-01-03 PROBLEM — E83.52 HYPERCALCEMIA: Status: ACTIVE | Noted: 2018-01-01

## 2018-01-03 PROBLEM — J95.811 POSTPROCEDURAL PNEUMOTHORAX: Status: RESOLVED | Noted: 2017-01-01 | Resolved: 2018-01-01

## 2018-01-03 NOTE — H&P
"[facility]    History and Physical  Hospital Medicine       Date of Admission:  1/3/2018  Date of Service: 1/3/2018     Primary Care Physician   Mimi Mcguire [phone]    Assessment & Plan   Natalie Hair is a 85 year old female with PMH significant for HTN, CAD, chronic diastolic CHF, severe right sided heart failure, hx of AVR, HLD, primary pulmonary HTN, Severe tricuspid insufficiency, and dementia who now presents with nausea, vomiting and weakness.    Possible Community Acquired Pneumonia  Presents with nausea, vomiting and weakness. VSS on arrival. CXR on admission revealed possible infiltrate on lateral view.  WBC normal.  - sputum cultures ordered, pending  - empiric antibiotic coverage with Ceftriaxone plus azithromycin  - duoneb's ordered Q4, while awake  - titrate supplemental oxygen to keep sats>92%      Weakness  DDx: sequale of CAP as above or perhaps hypercalcemia related  - PT/OT/care transition consults placed      Nausea and vomiting  - nausea/vomiting protocol in place      Hypercalcemia  Calcium 12.8 on arrival. This was 10.4 on 12/14/2017. Weight curve trending down, but not dramatically so in the last 2 months.  Daughter notes 90 pound weight loss in 15 months.  DDx: malignancy related versus volume contraction due to above  - ionized calcium ordered, pending  - consider CT scan of chest, abdomen, pelvis    - repeat calcium in AM  - initiate telemetry    Abnormal CXR  Called \"infiltrate\"; however, on personal review, appears markedly demarcated.  Due to above KYLE, have not imaged the patient at this point in time. CT from 07/2017 shows RLL nodule measure 0.7cm    - consider CT chest as above    KYLE (acute kidney injury) (H)  Baseline creatinine 1.0 - 1.2.  1.63 on arrival with BUN of 31. Given 1L of LR in ED  - continue IVF at 125cc/hr given CHF, lack of septic findings  - BMP in AM  - hold nephrotoxic agents  - hold PTA torsemide, spironolactone    - " "hold PTA potassium supplementation    Chronic atrial fibrillation (H)  Pacemaker in Situ placed 12/2016, s/p AV nehemias ablation  - continue PTA warfarin, pharmacy to dose  - continue PTA metoprolol    Cardiovascular disease  Severe Right Heart Failure  Thoracentesis x3 for treatment of recurrent pleural effusion  Follow with Dr. Emelyn Bingham; last seen 11/21/2017.  Has also had second multiple opinions at [facility]. S/p CABG in 2002 for preop for AVR.  Had additional stent placed at [facility] 05/04/2017. Last Echo in [facility] system 07/27/2017; \"severe tricuspid valve regurgitation, severe right ventricular enlargement, severe decrease in right ventricular systolic function, left EF of 64%.\"  Patient has hx of recurrent pleural effusion s/p thoracentesis x3  - continue PTA Plavix  - consider palliative care IP consult given complexities of the patient's care/health status.    Chronic diastolic congestive heart failure (H)   Aortic Valve Replacement, 2002 (JAEL Rodríguez)  Tricuspid valve insufficiency, unspecified etiology, s/p FORMA valve placement [facility] 07/2017  Has had multiple admission over the last year for diastolic CHF exacerbations  - may require diuresis prior to discharge    Hyperlipidemia LDL goal <100  - continue PTA Zetia      Benign essential hypertension, BP goal <150/90  - hold PTA torsemide, spironolactone      Primary pulmonary hypertension (H)           Memory loss  - continue PTA Aricept    F: 125cc/hr of NS  E: BMP in AM  N: regular diet  DVT Prophylaxis: on coumadin    Code Status: DNR / DNI    Disposition: Anticipate discharge in 2-3 days. Appropriate for inpatient care.    Case discussed with Dr. David Edwards.  Assessment and plan as written above.    Lorraine Bruce PA-C  [facility]    History is obtained from the patient and review of the EMR.    Past Medical History    Past Medical History:   Diagnosis Date     Backache      H/O aortic valve replacement in 2002    " "maintained on chronic anticoagulation with warfarin     Hyperlipidemia LDL goal < 100      Hypertension      T12 compression fracture (H)      Unspecified cardiovascular disease     hx of CAD with 3V CABG and AV replacement in 2002     Urinary incontinence      Vitamin D deficiency        Past Surgical History   Past Surgical History:   Procedure Laterality Date     ARTHROPLASTY KNEE BILATERAL       SURGICAL HISTORY OF -   12/2002    Triple bypass w/valve replacement - aortic       Family History    Family History   Problem Relation Age of Onset     HEART DISEASE Mother      Lipids Son      HEART DISEASE Son      Lipids Daughter        History of Present Illness   Natalie Hair is a 85 year old female with PMH significant for HTN, CAD, chronic diastolic CHF, severe right sided heart failure, hx of AVR, HLD, primary pulmonary HTN, Severe tricuspid insufficiency, and dementia who now presents with nausea, vomiting and weakness.    The patient reports that she was \"very cold\" on Sunday.  She then began to note significant fatigue on Monday.  She had nausea with emesis x2 on Tuesday.  She also had onset of weakness on Tuesday.  The patient's daughter notes that she had to help her Mom to the restroom on Tuesday which is remarkably atypical for her.  This morning, the patient is now unable to get up from bed alone.  As such, EMS was called. The patient noted mild dizziness this morning which was reportedly new.    The patient denies night sweats.  Reportedly 90 pound weight loss since September of 2016.  Daughter estimates that she looses 1-2 pounds per week.      ROS: Denies fever, chills, myalgias, cold-like symptoms (congestion, pharyngitis, sinus pressure, rhinorrhea), swollen glands, headaches, lightheadedness, chest pain, palpitations/flutters, SOB, cough, wheezes, abdominal pain, diarrhea/constipation, dysuria, hematuria, joint swelling, joint pain, leg swelling, and rashes.      Prior to Admission " Medications   Prior to Admission Medications   Prescriptions Last Dose Informant Patient Reported? Taking?   ACETAMINOPHEN PO  Daughter Yes No   Sig: Take 1,000 mg by mouth every 8 hours as needed for pain    Cholecalciferol (VITAMIN D) 2000 UNITS CAPS 1/2/2018 at pm Daughter Yes Yes   Sig: Take 1 capsule by mouth daily   DiphenhydrAMINE HCl, Sleep, (ZZZQUIL PO) Past Week at Unknown time Daughter Yes Yes   Sig: Take 50 mg by mouth nightly as needed Alternates with Tylenol PM   Diphenhydramine-APAP, sleep, (TYLENOL PM EXTRA STRENGTH PO) 1/2/2018 at hs Daughter Yes Yes   Sig: Take 2-3 tablets by mouth nightly as needed    acetaminophen (TYLENOL) 325 MG tablet   No No   Sig: Take 1-2 tablets (325-650 mg) by mouth every 6 hours as needed for mild pain or pain   alendronate (FOSAMAX) 70 MG tablet 1/2/2018 at am Daughter No No   Sig: TAKE 1 TABLET BY MOUTH ONCE WEEKLY ON EMPTY STOMACH   calcium carb 1250 mg, 500 mg [place],/vitamin D 200 units (OSCAL WITH D) 500-200 MG-UNIT per tablet 1/3/2018 at am Daughter Yes Yes   Sig: Take 1 tablet by mouth 2 times daily (with meals)   cetirizine (ZYRTEC) 5 MG tablet 1/2/2018 at hs Daughter No Yes   Sig: Take 1 tablet (5 mg) by mouth daily   clopidogrel (PLAVIX) 75 MG tablet 1/3/2018 at am Daughter No Yes   Sig: Take 1 tablet (75 mg) by mouth daily   donepezil (ARICEPT) 5 MG tablet 1/2/2018 at hs  No Yes   Sig: TAKE 1 TABLET(5 MG) BY MOUTH AT BEDTIME   ezetimibe (ZETIA) 10 MG tablet 1/3/2018 at am  No Yes   Sig: Take 1 tablet (10 mg) by mouth daily   metoprolol (LOPRESSOR) 25 MG tablet 1/3/2018 at am Daughter No Yes   Sig: Take 0.5 tablets (12.5 mg) by mouth 2 times daily   multivitamin, therapeutic with minerals (MULTI-VITAMIN) TABS tablet 1/2/2018 at am Daughter Yes Yes   Sig: Take 1 tablet by mouth daily   nitroglycerin (NITROSTAT) 0.4 MG SL tablet More than a month at Unknown time Daughter No No   Sig: Place 1 tablet (0.4 mg) under the tongue every 5 minutes as needed for chest pain    omega 3 1000 MG CAPS 1/2/2018 at pm Daughter No Yes   Sig: Take 1 g by mouth daily   order for DME  Daughter No No   Sig: Equipment being ordered: Walker with a seat   order for DME  Daughter No No   Sig: Equipment being ordered: Hospital Bed  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 -ongoing symptoms of lower extremity edema,shortness of breath,cough  sacral pressure sores within last 6 months    An electric hospital bed to allow for increase in head of bed elevation and for ease in wheelchair transfers   Length of need: lifelong  NPI - [id number]   order for DME   No No   Sig: Mepilex sacral dressings   order for DME   No No   Sig: always discreet underwear - 2 pairs/day  - 60/month  flushable cleansing cloths ( wipes)  - several packages/month     For incontinence and primitivo-care   polyethylene glycol (MIRALAX/GLYCOLAX) powder 1/3/2018 at am Daughter Yes Yes   Sig: Take 1 capful by mouth every morning Takes 3/4 of a capful   potassium chloride (K-TAB,KLOR-CON) 10 MEQ tablet 1/3/2018 at am  No Yes   Sig: TAKE 1 TABLET(10 MEQ) BY MOUTH TWICE DAILY   senna-docusate (SENNA S) 8.6-50 MG per tablet 1/3/2018 at am Daughter Yes Yes   Sig: Take 2 tablets by mouth 2 times daily   spironolactone (ALDACTONE) 25 MG tablet 1/3/2018 at am Daughter No Yes   Sig: Take 1 tablet (25 mg) by mouth 2 times daily   torsemide (DEMADEX) 20 MG tablet 1/3/2018 at am  No Yes   Sig: Take 1 tablet (20 mg) by mouth 2 times daily Patient needs labs before next refill.   triamcinolone (KENALOG) 0.1 % cream   No No   Sig: Apply sparingly to affected area two times daily for 7 days.   warfarin (COUMADIN) 4 MG tablet 1/2/2018 at pm  Yes Yes   Sig: Take 6 mg on Tue, Sat; 4 mg all other days or as directed by the Anticoagulation Clinic.      Facility-Administered Medications: None       Allergies   Allergies   Allergen Reactions     Lorazepam Nausea and Vomiting     Morphine Sulfate Cr [Morphine Sulfate] Nausea and Vomiting     Crestor  [Rosuvastatin] Other (See Comments)     Abdominal/Back pain     Lidocaine GI Disturbance     Cozaar [Losartan] Rash     Rash        Social History   Social History     Social History     Marital status:      Spouse name: N/A     Number of children: N/A     Years of education: N/A     Occupational History     Not on file.     Social History Main Topics     Smoking status: Former Smoker     Types: Cigarettes     Smokeless tobacco: Never Used      Comment: 40 years ago     Alcohol use No     Drug use: No     Sexual activity: Not Currently     Other Topics Concern      Service No     Blood Transfusions Yes     1954 and 2002     Caffeine Concern No     Occupational Exposure No     Hobby Hazards No     Sleep Concern Yes     wakes frequently to urinate     Stress Concern No     Weight Concern Yes     I'm overweight     Special Diet No     Back Care Yes     arthritis lower back     Exercise No     Bike Helmet No     NA     Seat Belt Yes     Self-Exams Yes     Parent/Sibling W/ Cabg, Mi Or Angioplasty Before 65f 55m? Yes     Social History Narrative        Physical Exam     /71  Temp 97.4  F (36.3  C)  Resp 16  Wt 75 kg (165 lb 5.5 oz)  SpO2 96%  BMI 26.69 kg/m2     Weight: 165 lbs 5.52 oz Body mass index is 26.69 kg/(m^2).     Constitutional: Alert and oriented x3 (not to year).  Cooperative.  Appears stated age.  Appears in no acute distress.  Appears chronically ill  HEENT: Oropharynx is clear and moist. No evidence of cranial trauma.  Lymph/Hematologic: No occipital, submental, submandibular, anterior or posterior cervical, or supraclavicular lymphadenopathy is appreciated.  Cardiovascular: Regular rate/rhythm.  Appreciable mechanical valve best heard at LSB. S1 and S2 grossly normal.  No appreciable murmur, rub, gallop.  Bilateral pitting lower extremity edema to mid-shins.  Respiratory: Clear to auscultation bilaterally. Equal chest expansion.  GI: Soft, non-tender, normal bowel sounds, no  hepatosplenomegaly.  Genitourinary: Deferred  Musculoskeletal: Normal muscle bulk and tone.  Skin: Warm and dry, no rashes.   Neurologic: Neck supple. Cranial nerves 3-12 are grossly intact.  is symmetric.     Data   Data reviewed today:     Recent Labs  Lab 01/03/18  1614   WBC 5.5   HGB 14.1   MCV 86         POTASSIUM 4.5   CHLORIDE 103   CO2 27   BUN 31*   CR 1.63*   ANIONGAP 7   SHANICE 12.8*   GLC 91   ALBUMIN 3.8   PROTTOTAL 8.0   BILITOTAL 0.9   ALKPHOS 158*   ALT 21   AST 24   TROPI <0.015       Recent Results (from the past 24 hour(s))   XR Chest 2 Views    Narrative    XR CHEST 2 VW 1/3/2018 4:04 PM    HISTORY: Weakness.    COMPARISON: 11/14/2017    FINDINGS: Left basilar airspace opacity. Right lung is relatively  clear. No pneumothorax. Stable cardiomegaly and cardiac hardware.      Impression    IMPRESSION: Left basilar opacity suggests pneumonia.    ROWDY GARIBAY MD       I personally reviewed the EKG tracing showing widening QRS complex, stable as compared to previous EKG.    Lorraine Bruce PA-C  [facility]

## 2018-01-03 NOTE — ED PROVIDER NOTES
"  History     Chief Complaint   Patient presents with     Generalized Weakness     too weak to stand today, vomiting yesterday, fell today in bathroom , dizzy. foul smelling urine     HPI    Natalie Hair is a 85 year old female with a history of lymphedema of both lower extremities, atrial fibrillation, chronic diastolic congestive heart failure, s/p heart valve replacement with long term current use of anticoagulant therapy, who presents to the ED with her daughter for the evaluation of generalized weakness. Per daughter, patient vomited yesterday after eating lunch and was \"delusional\" during the night. She reports patient fell in the bathroom onto the toilet at 0600 today but did not hit her head. Daughter states patient is unable to stand and walk due to her weakness. Patient complains of dizziness, difficulty urinating. She admits to shortness of breath as well, but reports this symptom is not different from baseline. Per daughter, patient has less swelling than usual in her lower extremities. She reports patient has a history of pleural effusion in her left lung, with her lung being drained 3 times since August 2017. Patient and daughter deny fever, cough, chest pain, abdominal pain, episodes of emesis today, diarrhea.    Problem List:    Patient Active Problem List    Diagnosis Date Noted     KYLE (acute kidney injury) (H) 01/03/2018     Priority: Medium     Abnormal CXR 01/03/2018     Priority: Medium     Called \"infiltrate\" on CXR 01/03/2017, best apprecated in lateral view; looks too-well demarkated. CT 07/26/17: \"Groundglass nodular density right lower lung measures 0.7 cm, indeterminate. See below.       Chronic atrial fibrillation (H) 01/03/2018     Priority: Medium     CAP (community acquired pneumonia) 01/03/2018     Priority: Medium     Hypercalcemia 01/03/2018     Priority: Medium     Nausea and vomiting 01/03/2018     Priority: Medium     Weakness 01/03/2018     Priority: Medium     Memory " loss 07/23/2017     Priority: Medium     S/P CABG (coronary artery bypass graft) - 2002 05/26/2017     Priority: Medium     Stented coronary artery - 5/4/2017 at [facility] 05/26/2017     Priority: Medium     Health Care Home 12/23/2016     Priority: Medium     *See Letters for [facility] Care Plan: My Access Plan         Chronic diastolic congestive heart failure (H) 12/01/2016     Priority: Medium     Right sided heart failure       Tricuspid valve insufficiency, unspecified etiology 09/27/2016     Priority: Medium     Primary pulmonary hypertension (H) 09/09/2016     Priority: Medium     Lymphedema of both lower extremities 09/09/2016     Priority: Medium     Osteopenia 03/02/2016     Priority: Medium     osteopenia on dexa scan 2/10- right hip -2, left hip -1.3, spine -0.8  FRAX score- hip 4.7%, osteo 19%       Benign essential hypertension, BP goal <150/90 09/08/2015     Priority: Medium     Unstable angina (H) 09/02/2015     Priority: Medium     Long term current use of anticoagulant therapy 04/09/2014     Priority: Medium     9/2/15Take plavix and Coumadin indefinitely.Follow-up with [facility] in 2-4 weeks. Phone [phone]  INR goal 2.5- 3.5 Mitral valve           Urinary incontinence 04/09/2014     Priority: Medium     Advanced directives, counseling/discussion 12/26/2011     Priority: Medium     Advance Directive Problem List Overview:   Name Relationship Phone    Primary Health Care Agent            Alternative Health Care Agent          Discussed advance care planning with patient; information given to patient to review. 12/26/2011          Hyperlipidemia LDL goal <100 10/31/2010     Priority: Medium     Vitamin D deficiency 01/02/2008     Priority: Medium     Problem list name updated by automated process. Provider to review       History of T12 compression fracture 01/24/2007     Priority: Medium     osteopenia on dexa scan 2/10- right hip -2, left hip -1.3, spine -0.8  FRAX score- hip 4.7%, osteo 19%        Backache 01/24/2007     Priority: Medium     CT scan 8/06 shows degeneration in facet joints and DDD  Problem list name updated by automated process. Provider to review       Cardiovascular disease      Priority: Medium     CAD status post 3 vessel CABG (LIMA to LAD, SVG to RCA, Radial graft to first OM) and mechanical AVR in 12/2002 in [facility]         Heart valve replaced 02/15/2006     Priority: Medium     AVR 12/02 [facility]- #20 ATS mechanical aortic valve, on coumadin  Problem list name updated by automated process. Provider to review          Past Medical History:    Past Medical History:   Diagnosis Date     Backache      H/O aortic valve replacement in 2002     Hyperlipidemia LDL goal < 100      Hypertension      T12 compression fracture (H)      Unspecified cardiovascular disease      Urinary incontinence      Vitamin D deficiency        Past Surgical History:    Past Surgical History:   Procedure Laterality Date     ARTHROPLASTY KNEE BILATERAL       SURGICAL HISTORY OF -   12/2002    Triple bypass w/valve replacement - aortic       Family History:    Family History   Problem Relation Age of Onset     HEART DISEASE Mother      Lipids Son      HEART DISEASE Son      Lipids Daughter        Social History:  Marital Status:   [5]  Social History   Substance Use Topics     Smoking status: Former Smoker     Types: Cigarettes     Smokeless tobacco: Never Used      Comment: 40 years ago     Alcohol use No        Medications:      potassium chloride (K-TAB,KLOR-CON) 10 MEQ tablet   warfarin (COUMADIN) 4 MG tablet   torsemide (DEMADEX) 20 MG tablet   donepezil (ARICEPT) 5 MG tablet   ezetimibe (ZETIA) 10 MG tablet   clopidogrel (PLAVIX) 75 MG tablet   spironolactone (ALDACTONE) 25 MG tablet   calcium carb 1250 mg, 500 mg [place],/vitamin D 200 units (OSCAL WITH D) 500-200 MG-UNIT per tablet   DiphenhydrAMINE HCl, Sleep, (ZZZQUIL PO)   Diphenhydramine-APAP, sleep, (TYLENOL PM EXTRA STRENGTH PO)    omega 3 1000 MG CAPS   multivitamin, therapeutic with minerals (MULTI-VITAMIN) TABS tablet   metoprolol (LOPRESSOR) 25 MG tablet   cetirizine (ZYRTEC) 5 MG tablet   polyethylene glycol (MIRALAX/GLYCOLAX) powder   Cholecalciferol (VITAMIN D) 2000 UNITS CAPS   senna-docusate (SENNA S) 8.6-50 MG per tablet   order for DME   acetaminophen (TYLENOL) 325 MG tablet   triamcinolone (KENALOG) 0.1 % cream   order for DME   alendronate (FOSAMAX) 70 MG tablet   order for DME   order for DME   nitroglycerin (NITROSTAT) 0.4 MG SL tablet   ACETAMINOPHEN PO         Review of Systems  All other systems are reviewed and are negative    Physical Exam   BP: 121/75  Temp: 97.4  F (36.3  C)  Resp: 16  Weight: 75 kg (165 lb 5.5 oz)  SpO2: 96 %    Physical Exam  Nursing note and vitals were reviewed.  Constitutional: Awake and alert, ill-appearing, obese, fatigued appearing 85-year-old who does not appear uncomfortable but answers questions only minimally  HEENT: EACs clear.  TMs normal.  PERRLA.  EOMI. Oropharynx normal.  Neck: Freely mobile.  Cardiovascular: Cardiac examination reveals normal heart rate with out of 6 murmur heard best at the apex.  Pulmonary/Chest: Breathing is mildly labored.  Breath sounds are clear and equal bilaterally.  There are no retractions or tachypnea rales or rhonchi.  Abdomen: Soft, nontender, no HSM or masses rebound or guarding.  Musculoskeletal: Extremities are warm and well-perfused with 2+ pretibial pitting edema  Neurological: Alert, oriented, but with psychomotor retardation  Skin: Warm, dry, no rashes.  Psychiatric: Affect blunted and restricted        ED Course     ED Course     Procedures               EKG Interpretation:      Interpreted by Fadi Griggs  Time reviewed: 15:43  Symptoms at time of EKG: weakness   Rhythm: Paced  Rate: 73 bpm  Axis: NA  Ectopy: none  Conduction: Paced  ST Segments/ T Waves: NA  Q Waves: NA  Comparison to prior: Unchanged from July 26, 2017    Clinical Impression:  Paced rhythm at 73 bpm unchanged from previous EKG      Critical Care time:  none             Labs Ordered and Resulted from Time of ED Arrival Up to the Time of Departure from the ED   CBC WITH PLATELETS DIFFERENTIAL - Abnormal; Notable for the following:        Result Value    RDW 15.9 (*)     All other components within normal limits   COMPREHENSIVE METABOLIC PANEL - Abnormal; Notable for the following:     Urea Nitrogen 31 (*)     Creatinine 1.63 (*)     GFR Estimate 30 (*)     GFR Estimate If Black 36 (*)     Calcium 12.8 (*)     Alkaline Phosphatase 158 (*)     All other components within normal limits   URINE MACROSCOPIC WITH REFLEX TO MICRO - Abnormal; Notable for the following:     pH Urine 7.5 (*)     All other components within normal limits   TROPONIN I   CALCIUM IONIZED WHOLE BLOOD   INR   URINE CULTURE AEROBIC BACTERIAL     Results for orders placed or performed during the hospital encounter of 01/03/18 (from the past 24 hour(s))   XR Chest 2 Views    Narrative    XR CHEST 2 VW 1/3/2018 4:04 PM    HISTORY: Weakness.    COMPARISON: 11/14/2017    FINDINGS: Left basilar airspace opacity. Right lung is relatively  clear. No pneumothorax. Stable cardiomegaly and cardiac hardware.      Impression    IMPRESSION: Left basilar opacity suggests pneumonia.    ROWDY GARIBAY MD   CBC with platelets differential   Result Value Ref Range    WBC 5.5 4.0 - 11.0 10e9/L    RBC Count 5.03 3.8 - 5.2 10e12/L    Hemoglobin 14.1 11.7 - 15.7 g/dL    Hematocrit 43.3 35.0 - 47.0 %    MCV 86 78 - 100 fl    MCH 28.0 26.5 - 33.0 pg    MCHC 32.6 31.5 - 36.5 g/dL    RDW 15.9 (H) 10.0 - 15.0 %    Platelet Count 154 150 - 450 10e9/L    Diff Method Automated Method     % Neutrophils 63.9 %    % Lymphocytes 14.8 %    % Monocytes 16.4 %    % Eosinophils 4.2 %    % Basophils 0.7 %    % Immature Granulocytes 0.0 %    Absolute Neutrophil 3.5 1.6 - 8.3 10e9/L    Absolute Lymphocytes 0.8 0.8 - 5.3 10e9/L    Absolute Monocytes 0.9 0.0 - 1.3  10e9/L    Absolute Eosinophils 0.2 0.0 - 0.7 10e9/L    Absolute Basophils 0.0 0.0 - 0.2 10e9/L    Abs Immature Granulocytes 0.0 0 - 0.4 10e9/L   Comprehensive metabolic panel   Result Value Ref Range    Sodium 137 133 - 144 mmol/L    Potassium 4.5 3.4 - 5.3 mmol/L    Chloride 103 94 - 109 mmol/L    Carbon Dioxide 27 20 - 32 mmol/L    Anion Gap 7 3 - 14 mmol/L    Glucose 91 70 - 99 mg/dL    Urea Nitrogen 31 (H) 7 - 30 mg/dL    Creatinine 1.63 (H) 0.52 - 1.04 mg/dL    GFR Estimate 30 (L) >60 mL/min/1.7m2    GFR Estimate If Black 36 (L) >60 mL/min/1.7m2    Calcium 12.8 (H) 8.5 - 10.1 mg/dL    Bilirubin Total 0.9 0.2 - 1.3 mg/dL    Albumin 3.8 3.4 - 5.0 g/dL    Protein Total 8.0 6.8 - 8.8 g/dL    Alkaline Phosphatase 158 (H) 40 - 150 U/L    ALT 21 0 - 50 U/L    AST 24 0 - 45 U/L   Troponin I   Result Value Ref Range    Troponin I ES <0.015 0.000 - 0.045 ug/L   UA reflex to Microscopic   Result Value Ref Range    Color Urine Yellow     Appearance Urine Clear     Glucose Urine Negative NEG^Negative mg/dL    Bilirubin Urine Negative NEG^Negative    Ketones Urine Negative NEG^Negative mg/dL    Specific Gravity Urine 1.009 1.003 - 1.035    Blood Urine Negative NEG^Negative    pH Urine 7.5 (H) 5.0 - 7.0 pH    Protein Albumin Urine Negative NEG^Negative mg/dL    Urobilinogen mg/dL Normal 0.0 - 2.0 mg/dL    Nitrite Urine Negative NEG^Negative    Leukocyte Esterase Urine Negative NEG^Negative    Source Catheterized Urine        Medications   cefTRIAXone IN D5W (ROCEPHIN) intermittent infusion 2 g (2 g Intravenous New Bag 1/3/18 1739)   azithromycin (ZITHROMAX) 500 mg in NaCl 0.9 % 250 mL intermittent infusion (not administered)   azithromycin (ZITHROMAX) 250 mg in NaCl 0.9 % 250 mL intermittent infusion (not administered)   lactated ringers BOLUS 1,000 mL (0 mLs Intravenous Stopped 1/3/18 1739)       15:24 Patient assessed, course of care outlined.      Assessments & Plan (with Medical Decision Making)     85-year-old female  with multiple significant medical problems including dementia and severe chronic congestive heart failure presented with altered mental status and generalized weakness.  She is too weak to walk at all.  Workup in the emergency department found an elevated calcium level of 12.8.  Ionized calcium level is pending.  This may be due to dehydration but if not, raises concern for a metastatic neoplastic process and may be contributing to her generalized weakness.  She was hydrated with an initial liter fluid bolus and will monitor the calcium.  In addition there was an airspace opacity on the lateral chest x-ray not previously present which could represent infection but neoplasm is not excluded.  This will be evaluated further with CT scan after admission and after adequate hydration.  We will initiate treatment with antibiotic therapy to cover for pneumonia until we have further information.  No evidence of UTI contributing to this with normal urine analysis.  Remainder of her blood chemistries were at baseline except for renal function which was slightly more impaired likely due to her recent poor oral intake and vomiting.  She will require admission for further evaluation and treatment.  Discussed the findings with her daughter and patient and they are agreeable to the plan for admission.  I discussed her case with clear he was in the hospital service and they will assume care on admission.    I have reviewed the nursing notes.    I have reviewed the findings, diagnosis, plan and need for follow up with the patient.       New Prescriptions    No medications on file       Final diagnoses:   Encephalopathy   Lung infiltrate   Serum calcium elevated   Dehydration   Nausea and vomiting, intractability of vomiting not specified, unspecified vomiting type     This document serves as a record of the services and decisions personally performed and made by Fadi Griggs MD. It was created on HIS/HER behalf by   jana Casey  trained medical scribe. The creation of this document is based the provider's statements to the medical scribe.  Andrea Segovia 3:48 PM 1/3/2018    Provider:   The information in this document, created by the medical scribe for me, accurately reflects the services I personally performed and the decisions made by me. I have reviewed and approved this document for accuracy prior to leaving the patient care area.  Fadi Griggs MD 3:48 PM 1/3/2018    1/3/2018   [facility] EMERGENCY DEPARTMENT     Fadi Griggs MD  01/03/18 1745

## 2018-01-03 NOTE — IP AVS SNAPSHOT
[facility]    [address] [address]    Phone:  [phone]    Fax:  [phone]                                       After Visit Summary   1/3/2018    Natalie Hair    MRN: [id number]           After Visit Summary Signature Page     I have received my discharge instructions, and my questions have been answered. I have discussed any challenges I see with this plan with the nurse or doctor.    ..........................................................................................................................................  Patient/Patient Representative Signature      ..........................................................................................................................................  Patient Representative Print Name and Relationship to Patient    ..................................................               ................................................  Date                                            Time    ..........................................................................................................................................  Reviewed by Signature/Title    ...................................................              ..............................................  Date                                                            Time

## 2018-01-03 NOTE — IP AVS SNAPSHOT
MRN:[id number]                      After Visit Summary   1/3/2018    Natalie Hair    MRN: [id number]           Thank you!     Thank you for choosing [facility] for your care. Our goal is always to provide you with excellent care. Hearing back from our patients is one way we can continue to improve our services. Please take a few minutes to complete the written survey that you may receive in the mail after you visit with us. Thank you!        Patient Information     Date Of Birth          8/28/1932        Designated Caregiver       Most Recent Value    Caregiver    Will someone help with your care after discharge? yes    Name of designated caregiver Digna    Phone number of caregiver [phone]    Caregiver address [address] [place] [address]      About your hospital stay     You were admitted on:  January 3, 2018 You last received care in the:  [facility]    You were discharged on:  January 7, 2018       Who to Call     For medical emergencies, please call 911.  For non-urgent questions about your medical care, please call your primary care provider or clinic, [phone]          Attending Provider     Provider Specialty    Fadi Griggs MD Emergency Medicine    [facility], David Philip MD Internal Medicine    [facility], Fan Clancy MD Family Practice       Primary Care Provider Office Phone # Fax #    Mimi Mcguire [phone]      After Care Instructions     Declined Transitions 30-Day Tel-Assurance       If you would like to enroll in the 30-day Tel-Assurance program, please call [phone].                  Your next 10 appointments already scheduled     Jan 12, 2018  1:00 PM CST   Office Visit with Mimi Mcguire MD   [facility]    [address]  [facility] [address] [phone]           Bring a current list of meds and any records pertaining to this visit. For Physicals, please bring  immunization records and any forms needing to be filled out. Please arrive 10 minutes early to complete paperwork.            Feb 09, 2018  9:30 AM CST   Return Visit with Emelyn Bingham MD   [facility] ([facility])    [address] [facility] [address] [phone]              Additional Services     Home Care Referral       ______________________________________________________________________    Your provider has referred you to: FMG: [facility] [phone]   [email or web address]    Extended Emergency Contact Information  Primary Emergency Contact: jose buckley  Address: [address] [place] [address] [address] [place] [address]  Home Phone: [phone]  Work Phone: none  Mobile Phone: [phone]  Relation: Daughter    Patient Anticipated Discharge Date: 1/6/2018   RN, PT, HHA to begin 24 - 48 hours after discharge.  PLEASE EVALUATE AND TREAT (Evaluation timeline is 24 - 48 hrs. Please call if there is need for a variance to this timeline).    REASON FOR REFERRAL: Assessment & Treatment: PT and RN    ADDITIONAL SERVICES NEEDED: OT    OTHER PERTINENT INFORMATION: Patient was last seen by provider on 1/4/2018 for CAP.    No current outpatient prescriptions on file.    Patient Active Problem List:     Heart valve replaced     Cardiovascular disease     History of T12 compression fracture     Backache     Vitamin D deficiency     Hyperlipidemia LDL goal <100     Advanced directives, counseling/discussion     Long term current use of anticoagulant therapy     Urinary incontinence     Unstable angina (H)     Benign essential hypertension, BP goal <150/90     Osteopenia     Primary pulmonary hypertension (H)     Lymphedema of both lower extremities     Tricuspid valve insufficiency, unspecified etiology     Chronic diastolic congestive heart failure (H)      Health Care Home     S/P CABG (coronary artery bypass graft) - 2002     Stented coronary artery - 5/4/2017 at [facility]     KYLE (acute kidney injury) (H)     Abnormal CXR     Chronic atrial fibrillation (H)     CAP (community acquired pneumonia)     Hypercalcemia     Nausea and vomiting     Weakness      Documentation of Face to Face and Certification for Home Health Services    I certify that patient, Natalie Hair is under my care and that I, or a Nurse Practitioner or Physician's Assistant working with me, had a face-to-face encounter that meets the physician face-to-face encounter requirements with this patient on: 1/4/2018.    This encounter with the patient was in whole, or in part, for the following medical condition, which is the primary reason for Home Health Care: CAP.    I certify that, based on my findings, the following services are medically necessary Home Health Services: Nursing, Occupational Therapy and Physical Therapy    My clinical findings support the need for the above services because: Nurse is needed: for resumption of care.    Further, I certify that my clinical findings support that this patient is homebound (i.e. absences from home require considerable and taxing effort and are for medical reasons or Hoahaoism services or infrequently or of short duration when for other reasons) because: Requires assistance of another person or specialized equipment to access medical services because patient: Requires supervision of another for safe transfer..    Based on the above findings, I certify that this patient is confined to the home and needs intermittent skilled nursing care, physical therapy and/or speech therapy.  The patient is under my care, and I have initiated the establishment of the plan of care.  This patient will be followed by a physician who will periodically review the plan of care.    Physician/Provider to provide follow up care: iMmi Mcguire  DARREL certified Physician at time of discharge: Fan Hernandez    Please be aware that coverage of these services is subject to the terms and limitations of your health insurance plan.  Call member services at your health plan with any benefit or coverage questions.            Home Care Referral       *_________________________________________________________________    Your provider has referred you to: FM[facility]: [facility] [place] [phone]   [email or web address]    Extended Emergency Contact Information  Primary Emergency Contact: jose buckley  Address: [address] [place] [address]  Home Phone: [phone]  Work Phone: none  Mobile Phone: [phone]  Relation: Daughter    Patient Anticipated Discharge Date: 1/7/17   RN, PT, HHA to begin 24 - 48 hours after discharge.  PLEASE EVALUATE AND TREAT (Evaluation timeline is 24 - 48 hrs. Please call if there is need for a variance to this timeline).    REASON FOR REFERRAL: Assessment & Treatment: PT and RN    ADDITIONAL SERVICES NEEDED: OT    OTHER PERTINENT INFORMATION: Patient was last seen by provider on 1/6/18 for pneumonia.    No current outpatient prescriptions on file.    Patient Active Problem List:     Heart valve replaced     Cardiovascular disease     History of T12 compression fracture     Backache     Vitamin D deficiency     Hyperlipidemia LDL goal <100     Advanced directives, counseling/discussion     Long term current use of anticoagulant therapy     Urinary incontinence     Unstable angina (H)     Benign essential hypertension, BP goal <150/90     Osteopenia     Primary pulmonary hypertension (H)     Lymphedema of both lower extremities     Tricuspid valve insufficiency, unspecified etiology     Chronic diastolic congestive heart failure (H)     Health Care Home     S/P CABG (coronary artery bypass graft) - 2002     Stented coronary artery - 5/4/2017 at [place]      Memory loss     KYLE (acute kidney injury) (H)     Abnormal CXR     Chronic atrial fibrillation (H)     CAP (community acquired pneumonia)     Hypercalcemia     Nausea and vomiting     Weakness      Documentation of Face to Face and Certification for Home Health Services    I certify that patient, Natalie Hair is under my care and that I, or a Nurse Practitioner or Physician's Assistant working with me, had a face-to-face encounter that meets the physician face-to-face encounter requirements with this patient on: 1/6/2018.    This encounter with the patient was in whole, or in part, for the following medical condition, which is the primary reason for Home Health Care: Pneumonia and weakness.    I certify that, based on my findings, the following services are medically necessary Home Health Services: Nursing, Occupational Therapy and Physical Therapy    My clinical findings support the need for the above services because: Nurse is needed: To assess respiratory status after changes in medications or other medical regimen..    Further, I certify that my clinical findings support that this patient is homebound (i.e. absences from home require considerable and taxing effort and are for medical reasons or Christianity services or infrequently or of short duration when for other reasons) because: Requires assistance of another person or specialized equipment to access medical services because patient: Requires supervision of another for safe transfer..    Based on the above findings, I certify that this patient is confined to the home and needs intermittent skilled nursing care, physical therapy and/or speech therapy.  The patient is under my care, and I have initiated the establishment of the plan of care.  This patient will be followed by a physician who will periodically review the plan of care.    Physician/Provider to provide follow up care: Mimi Mcguire certified Physician at time of  discharge: Dr. Hernandez    Please be aware that coverage of these services is subject to the terms and limitations of your health insurance plan.  Call member services at your health plan with any benefit or coverage questions.                  Further instructions from your care team       Stop oscal and vit D pills.  See Dr Mcguire in the next week talk about-  1) whether you want palliative or hospice care  2) whether you want to pursue the lung nodule (would typically need a three month CT followup)  3) whether you want to see urology   4) to follow the calcium and check the labs from the hospital that are not back    Warfarin Instructions  Your INR is 2.57 today. Goal 2.5-3.5  Please continue your home dose of warfarin:6 mg Tues, Sat, and 4 mg all other days of the week.  Please follow up with the coumadin clinic this week as you have been ill and to verify you are still in your goal range.    Pending Results     Date and Time Order Name Status Description    1/5/2018 1002 PTH Related Peptide Test In process     1/5/2018 1002 Protein electrophoresis random urine In process     1/5/2018 1002 Protein electrophoresis In process     1/4/2018 1114 25 Hydroxyvitamin D2 and D3 In process             Statement of Approval     Ordered          01/07/18 1221  I have reviewed and agree with all the recommendations and orders detailed in this document.  EFFECTIVE NOW     Approved and electronically signed by:  Fan Hernandez MD           01/07/18 1202  I have reviewed and agree with all the recommendations and orders detailed in this document.  EFFECTIVE NOW     Approved and electronically signed by:  Fan Hernandez MD             Admission Information     Date & Time Provider Department Dept. Phone    1/3/2018 Fan Hernandez MD [facility] Surgical [phone]      Your Vitals Were     Blood Pressure Pulse Temperature Respirations Weight Pulse Oximetry    122/66 (BP Location: Left arm) 72 96.3  " F (35.7  C) (Oral) 16 76.6 kg (168 lb 14 oz) 97%    BMI (Body Mass Index)                   27.26 kg/m2           "[employer] Information     [email or web address] lets you send messages to your doctor, view your test results, renew your prescriptions, schedule appointments and more. To sign up, go to [email or web address] . Click on \"Log in\" on the left side of the screen, which will take you to the Welcome page. Then click on \"Sign up Now\" on the right side of the page.     You will be asked to enter the access code listed below, as well as some personal information. Please follow the directions to create your username and password.     Your access code is: [email or web address]  Expires: 3/15/2018 12:13 PM     Your access code will  in 90 days. If you need help or a new code, please call your [facility] clinic or [phone].        Care EveryWhere ID     This is your Care EveryWhere ID. This could be used by other organizations to access your [facility] medical records  [id number]        Equal Access to Services     NIKI FOWLER : Hadausten Monroe, ariana ulloa, ari rodriges, lyric knapp. So [facility] [phone].    ATENCIÓN: Si habla español, tiene a simmons disposición servicios gratuitos de asistencia lingüística. Kevin al [phone].    We comply with applicable federal civil rights laws and [place] laws. We do not discriminate on the basis of race, color, national origin, age, disability, sex, sexual orientation, or gender identity.               Review of your medicines      CONTINUE these medicines which have NOT CHANGED        Dose / Directions    * ACETAMINOPHEN PO        Dose:  1000 mg   Take 1,000 mg by mouth every 8 hours as needed for pain   Refills:  0       * acetaminophen 325 MG tablet   Commonly known as:  TYLENOL   Used for:  Back pain, unspecified back location, unspecified back pain laterality, unspecified chronicity, Chronic bilateral low back pain without " sciatica        Dose:  325-650 mg   Take 1-2 tablets (325-650 mg) by mouth every 6 hours as needed for mild pain or pain   Quantity:  100 tablet   Refills:  1       alendronate 70 MG tablet   Commonly known as:  FOSAMAX   Used for:  Osteopenia        TAKE 1 TABLET BY MOUTH ONCE WEEKLY ON EMPTY STOMACH   Quantity:  12 tablet   Refills:  0       cetirizine 5 MG tablet   Commonly known as:  zyrTEC   Used for:  Acute urticaria        Dose:  5 mg   Take 1 tablet (5 mg) by mouth daily   Quantity:  30 tablet   Refills:  3       clopidogrel 75 MG tablet   Commonly known as:  PLAVIX   Used for:  Congestive heart failure, unspecified congestive heart failure chronicity, unspecified congestive heart failure type (H), Lymphedema of both lower extremities        Dose:  75 mg   Take 1 tablet (75 mg) by mouth daily   Quantity:  90 tablet   Refills:  0       donepezil 5 MG tablet   Commonly known as:  ARIcept   Used for:  Dementia without behavioral disturbance, unspecified dementia type        TAKE 1 TABLET(5 MG) BY MOUTH AT BEDTIME   Quantity:  90 tablet   Refills:  0       ezetimibe 10 MG tablet   Commonly known as:  ZETIA   Used for:  Hyperlipidemia LDL goal <100        Dose:  10 mg   Take 1 tablet (10 mg) by mouth daily   Quantity:  90 tablet   Refills:  3       metoprolol 25 MG tablet   Commonly known as:  LOPRESSOR        Dose:  12.5 mg   Take 0.5 tablets (12.5 mg) by mouth 2 times daily   Quantity:  90 tablet   Refills:  3       Multi-vitamin Tabs tablet        Dose:  1 tablet   Take 1 tablet by mouth daily   Refills:  0       nitroGLYcerin 0.4 MG sublingual tablet   Commonly known as:  NITROSTAT   Used for:  Unspecified cardiovascular disease        Dose:  0.4 mg   Place 1 tablet (0.4 mg) under the tongue every 5 minutes as needed for chest pain   Quantity:  25 tablet   Refills:  1       omega 3 1000 MG Caps   Used for:  Health Care Home        Dose:  1 g   Take 1 g by mouth daily   Quantity:  90 capsule   Refills:  3        * order for DME   Used for:  Acute on chronic diastolic congestive heart failure (H), At risk for falling        Equipment being ordered: Walker with a seat   Quantity:  1 Package   Refills:  0       * order for DME   Used for:  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), Lymphedema of both lower extremities, Right-sided congestive heart failure, Tricuspid valve insufficiency, unspecified etiology, Congestive heart failure, unspecified congestive heart failure chronicity, unspecified congestive heart failure type (H), History of pressure ulcer        Equipment being ordered: Hospital Bed 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 -ongoing symptoms of lower extremity edema,shortness of breath,cough sacral pressure sores within last 6 months   An electric hospital bed to allow for increase in head of bed elevation and for ease in wheelchair transfers  Length of need: lifelong NPI - [id number]   Quantity:  1 Device   Refills:  0       * order for DME   Used for:  Decubitus ulcer of sacral region, unspecified ulcer stage        Mepilex sacral dressings   Quantity:  10 Units   Refills:  3       * order for DME   Used for:  Urinary incontinence, unspecified type, History of pressure ulcer        always discreet underwear - 2 pairs/day  - 60/month flushable cleansing cloths ( wipes)  - several packages/month   For incontinence and primitivo-care   Quantity:  1 Month   Refills:  3       polyethylene glycol powder   Commonly known as:  MIRALAX/GLYCOLAX        Dose:  1 capful   Take 1 capful by mouth every morning Takes 3/4 of a capful   Refills:  0       potassium chloride 10 MEQ tablet   Commonly known as:  K-TAB,KLOR-CON   Used for:  Shortness of breath on exertion, Essential hypertension        TAKE 1 TABLET(10 MEQ) BY MOUTH TWICE DAILY   Quantity:  180 tablet   Refills:  1       SENNA S 8.6-50 MG per tablet   Generic drug:  senna-docusate        Dose:  2 tablet   Take 2 tablets by mouth 2  times daily   Refills:  0       spironolactone 25 MG tablet   Commonly known as:  ALDACTONE   Used for:  Congestive heart failure, unspecified congestive heart failure chronicity, unspecified congestive heart failure type (H), Lymphedema of both lower extremities        Dose:  25 mg   Take 1 tablet (25 mg) by mouth 2 times daily   Quantity:  60 tablet   Refills:  1       torsemide 20 MG tablet   Commonly known as:  DEMADEX   Used for:  Lymphedema of both lower extremities, Pleural effusion        Dose:  20 mg   Take 1 tablet (20 mg) by mouth 2 times daily Patient needs labs before next refill.   Quantity:  60 tablet   Refills:  0       triamcinolone 0.1 % cream   Commonly known as:  KENALOG   Used for:  Other eczema        Apply sparingly to affected area two times daily for 7 days.   Quantity:  30 g   Refills:  0       TYLENOL PM EXTRA STRENGTH PO        Dose:  2-3 tablet   Take 2-3 tablets by mouth nightly as needed   Refills:  0       warfarin 4 MG tablet   Commonly known as:  COUMADIN   Used for:  Congestive heart failure, unspecified congestive heart failure chronicity, unspecified congestive heart failure type (H)        Take 6 mg on Tue, Sat; 4 mg all other days or as directed by the Anticoagulation Clinic.   Quantity:  40 tablet   Refills:  2       ZZZQUIL PO        Dose:  50 mg   Take 50 mg by mouth nightly as needed Alternates with Tylenol PM   Refills:  0       * Notice:  This list has 6 medication(s) that are the same as other medications prescribed for you. Read the directions carefully, and ask your doctor or other care provider to review them with you.      STOP taking     Calcium carb-Vitamin D 500 mg Saxman-200 units 500-200 MG-UNIT per tablet   Commonly known as:  OSCAL with D;Oyster Shell Calcium           vitamin D 2000 UNITS Caps                    Protect others around you: Learn how to safely use, store and throw away your medicines at www.disposemymeds.org.             Medication List: This is  a list of all your medications and when to take them. Check marks below indicate your daily home schedule. Keep this list as a reference.      Medications           Morning Afternoon Evening Bedtime As Needed    * ACETAMINOPHEN PO   Take 1,000 mg by mouth every 8 hours as needed for pain                                * acetaminophen 325 MG tablet   Commonly known as:  TYLENOL   Take 1-2 tablets (325-650 mg) by mouth every 6 hours as needed for mild pain or pain                                alendronate 70 MG tablet   Commonly known as:  FOSAMAX   TAKE 1 TABLET BY MOUTH ONCE WEEKLY ON EMPTY STOMACH                                cetirizine 5 MG tablet   Commonly known as:  zyrTEC   Take 1 tablet (5 mg) by mouth daily   Last time this was given:  5 mg on 1/6/2018  9:00 PM                                clopidogrel 75 MG tablet   Commonly known as:  PLAVIX   Take 1 tablet (75 mg) by mouth daily   Last time this was given:  75 mg on 1/7/2018 10:59 AM                                donepezil 5 MG tablet   Commonly known as:  ARIcept   TAKE 1 TABLET(5 MG) BY MOUTH AT BEDTIME   Last time this was given:  5 mg on 1/6/2018  8:59 PM                                ezetimibe 10 MG tablet   Commonly known as:  ZETIA   Take 1 tablet (10 mg) by mouth daily   Last time this was given:  10 mg on 1/7/2018 10:59 AM                                metoprolol 25 MG tablet   Commonly known as:  LOPRESSOR   Take 0.5 tablets (12.5 mg) by mouth 2 times daily   Last time this was given:  12.5 mg on 1/7/2018 10:59 AM                                Multi-vitamin Tabs tablet   Take 1 tablet by mouth daily                                nitroGLYcerin 0.4 MG sublingual tablet   Commonly known as:  NITROSTAT   Place 1 tablet (0.4 mg) under the tongue every 5 minutes as needed for chest pain                                omega 3 1000 MG Caps   Take 1 g by mouth daily                                * order for DME   Equipment being ordered: Walker  with a seat                                * order for DME   Equipment being ordered: Hospital Bed 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 -ongoing symptoms of lower extremity edema,shortness of breath,cough sacral pressure sores within last 6 months   An electric hospital bed to allow for increase in head of bed elevation and for ease in wheelchair transfers  Length of need: lifelong NPI - [id number]                                * order for DME   Mepilex sacral dressings                                * order for DME   always discreet underwear - 2 pairs/day  - 60/month flushable cleansing cloths ( wipes)  - several packages/month   For incontinence and primitivo-care                                polyethylene glycol powder   Commonly known as:  MIRALAX/GLYCOLAX   Take 1 capful by mouth every morning Takes 3/4 of a capful                                potassium chloride 10 MEQ tablet   Commonly known as:  K-TAB,KLOR-CON   TAKE 1 TABLET(10 MEQ) BY MOUTH TWICE DAILY   Last time this was given:  10 mEq on 1/7/2018 10:59 AM                                SENNA S 8.6-50 MG per tablet   Take 2 tablets by mouth 2 times daily   Last time this was given:  2 tablets on 1/6/2018  9:03 PM   Generic drug:  senna-docusate                                spironolactone 25 MG tablet   Commonly known as:  ALDACTONE   Take 1 tablet (25 mg) by mouth 2 times daily   Last time this was given:  25 mg on 1/7/2018 10:59 AM                                torsemide 20 MG tablet   Commonly known as:  DEMADEX   Take 1 tablet (20 mg) by mouth 2 times daily Patient needs labs before next refill.   Last time this was given:  20 mg on 1/7/2018 11:00 AM                                triamcinolone 0.1 % cream   Commonly known as:  KENALOG   Apply sparingly to affected area two times daily for 7 days.                                TYLENOL PM EXTRA STRENGTH PO   Take 2-3 tablets by mouth nightly as needed                                warfarin 4  MG tablet   Commonly known as:  COUMADIN   Take 6 mg on Tue, Sat; 4 mg all other days or as directed by the Anticoagulation Clinic.   Last time this was given:  4 mg on 1/6/2018  5:09 PM                                ZZZQUIL PO   Take 50 mg by mouth nightly as needed Alternates with Tylenol PM                                * Notice:  This list has 6 medication(s) that are the same as other medications prescribed for you. Read the directions carefully, and ask your doctor or other care provider to review them with you.

## 2018-01-03 NOTE — ED NOTES
Generalized weakness that started yesterday, unable to get up from chair.   Vomiting yesterday, denies vomiting today.  Pt ate oatmeal for breakfast.   Increased weakness throughout today. Pt's legs weak and fell on toilet seat this am and pt was carried back to bed, increased confusion.   Pt lives with daughter who is pt's caregiver.

## 2018-01-03 NOTE — LETTER
Transition Communication Hand-off for Care Transitions to Next Level of Care Provider    Name: Natalie Hair  MRN #: [id number]  Primary Care Provider: Mimi Mcguire  Primary Care MD Name: Mimi Mcguire  Primary Clinic: [id number] [place] [address]  Primary Care Clinic Name: [facility]  Reason for Hospitalization:  Dehydration [E86.0]  Serum calcium elevated [E83.52]  Encephalopathy [G93.40]  Lung infiltrate [R91.8]  Nausea and vomiting, intractability of vomiting not specified, unspecified vomiting type [R11.2]  Admit Date/Time: 1/3/2018  3:20 PM  Discharge Date: 1/8/18  Payor Source: Payor: MEDICA / Plan: MEDICA PRIME SOLUTION / Product Type: Indemnity /     Readmission Assessment Measure (NUNO) Risk Score/category: Average        Reason for Communication Hand-off Referral: Admission diagnoses: PN    Discharge Plan:  Return home with daughter, Digna and family with [facility] ([phone] Fax: [phone]).  Family will provide 24/7 care.       Concern for non-adherence with plan of care:   Y/N No  Discharge Needs Assessment:  Needs       Most Recent Value    Equipment Currently Used at Home hospital bed, raised toilet, walker, rolling, wheelchair, manual, grab bar, shower chair    Transportation Available family or friend will provide    Home Care [facility] [phone], Fax: [phone]        Follow-up plan:  Future Appointments  Date Time Provider Department Center   2/9/2018 9:30 AM Emelyn Bingham MD [facility]       Key Recommendations:  Provided resources for palliative home care and hospice.      Catia Jensen RN, Care Coordinator    AVS/Discharge Summary is the source of truth; this is a helpful guide for improved communication of patient story

## 2018-01-03 NOTE — TELEPHONE ENCOUNTER
Reason for call:  Patient reporting a symptom    Symptom or request: Brionna from [facility] LEFT MESSAGE:  For the past 2 days, Natalie has had increased weakness, diarrhea, vomiting and foul smelling urine.  Dtr is reporting that she was so weak she couldn't sit up by herself, so has been keeping her in bed.  She got her mother up to use the bathroom and she fell, but there was no injury.  Tried to be seen at any clinic, [facility], [place] or [place] and all schedules were full.  Vitals are stable, lung are clear and she's a fibrile.  Orally was at 96.8.  Looked peaked.  Daughter is taking her to ED.  If you have any questions please call  Thank you..Claire Carroll    Duration (how long have symptoms been present): past 2 days    Have you been treated for this before? Not known    Additional comments: none    Phone Number patient can be reached at:  Other phone number:  Brionna [phone]*    Best Time:  Any time    Can we leave a detailed message on this number:  YES    Call taken on 1/3/2018 at 1:32 PM by Claire Carroll

## 2018-01-04 NOTE — PLAN OF CARE
Problem: Patient Care Overview  Goal: Plan of Care/Patient Progress Review  Outcome: Improving  Pt denies pain or difficulty breathing.  No cough.  O2 sat good on room air.  Lungs are clear/diminished.  Pt ambulating in room with walker and assist of one.  Down for chest CT this afternoon.  Confused to place and situation, alarms in use, family has been at bedside.

## 2018-01-04 NOTE — PLAN OF CARE
Problem: Patient Care Overview  Goal: Plan of Care/Patient Progress Review  Outcome: No Change  Pt is alert and oriented x3 with some forgetfulness at times to place; pt is easily reoriented.   Reports SOB at baseline. LS are clear diminished. O2 sat's in mid 90s on RA.   Pt has a non-blanchable quarter sized pressure injury on right buttock. Barrier cream applied and pt kept off that side. Denies pain, N/V. Up to bed side commode by an assist of one with a walker. Continent of B&B. Able to use the call light appropriately. VSS.

## 2018-01-04 NOTE — PROGRESS NOTES
[facility]      Subjective:  Anorexia and severe weight loss this year  Increased weakness and somulence in past 1-2 weeks  n and b two days ago  No fever, chills , cough , uri    Review of Systems:  CONSTITUTIONAL:weak, somulent  I: NEGATIVE for worrisome rashes, moles or lesions  E: NEGATIVE for vision changes or irritation  E/M: NEGATIVE for ear, mouth and throat problems  RESP:dubois  B: NEGATIVE for masses, tenderness or discharge  CV: NEGATIVE for chest pain, palpitations or peripheral edema  GI: anorexia, recent vomiting  : NEGATIVE for frequency, dysuria, or hematuria  M: NEGATIVE for significant arthralgias or myalgia  NEURO: weak, chronic short term memory loss   E: NEGATIVE for temperature intolerance, skin/hair changes  H: NEGATIVE for bleeding problems  P: NEGATIVE for changes in mood or affect    Physical Exam:  Vitals Were Reviewed    Patient Vitals for the past 16 hrs:   BP Temp Temp src Pulse Resp SpO2 Weight   01/04/18 0725 113/55 96.6  F (35.9  C) Oral 71 16 91 % -   01/04/18 0410 - - - - - - 74.5 kg (164 lb 3.9 oz)   01/04/18 0343 116/41 97.1  F (36.2  C) Oral - - 96 % -   01/03/18 2333 97/55 97.1  F (36.2  C) Oral 70 16 95 % -   01/03/18 2055 114/46 97.5  F (36.4  C) Oral 73 16 98 % -   01/03/18 1930 99/66 - - - - 98 % -   01/03/18 1900 117/67 - - - - 99 % -         Intake/Output Summary (Last 24 hours) at 01/04/18 1037  Last data filed at 01/04/18 0800   Gross per 24 hour   Intake              987 ml   Output              650 ml   Net              337 ml       GENERAL APPEARANCE: pale , weak, mildly confused  EYES: conjunctiva clear, eyes grossly normal  RESP: clear  CV: regular rate and rhythm, normal S1 S2, no S3 or S4 and no murmur, click or rub   ABDOMEN: soft, nontender, no HSM or masses and bowel sounds normal  MS: no clubbing, cyanosis; tr edema  SKIN: clear without significant rashes or lesions    Lab:  Recent Labs   Lab Test  01/04/18   0610  01/03/18   1614   NA   139  137   POTASSIUM  4.0  4.5   CHLORIDE  107  103   CO2  25  27   ANIONGAP  7  7   GLC  90  91   BUN  31*  31*   CR  1.48*  1.63*   SHANICE  11.2*  12.8*     CBC RESULTS:   Recent Labs   Lab Test  01/03/18   1614  09/01/17   1112   WBC  5.5  8.1   RBC  5.03  4.96   HGB  14.1  13.0   HCT  43.3  40.8   PLT  154  222       Results for orders placed or performed during the hospital encounter of 01/03/18 (from the past 24 hour(s))   XR Chest 2 Views    Narrative    XR CHEST 2 VW 1/3/2018 4:04 PM    HISTORY: Weakness.    COMPARISON: 11/14/2017    FINDINGS: Left basilar airspace opacity. Right lung is relatively  clear. No pneumothorax. Stable cardiomegaly and cardiac hardware.      Impression    IMPRESSION: Left basilar opacity suggests pneumonia.    ROWDY GARIBAY MD   CBC with platelets differential   Result Value Ref Range    WBC 5.5 4.0 - 11.0 10e9/L    RBC Count 5.03 3.8 - 5.2 10e12/L    Hemoglobin 14.1 11.7 - 15.7 g/dL    Hematocrit 43.3 35.0 - 47.0 %    MCV 86 78 - 100 fl    MCH 28.0 26.5 - 33.0 pg    MCHC 32.6 31.5 - 36.5 g/dL    RDW 15.9 (H) 10.0 - 15.0 %    Platelet Count 154 150 - 450 10e9/L    Diff Method Automated Method     % Neutrophils 63.9 %    % Lymphocytes 14.8 %    % Monocytes 16.4 %    % Eosinophils 4.2 %    % Basophils 0.7 %    % Immature Granulocytes 0.0 %    Absolute Neutrophil 3.5 1.6 - 8.3 10e9/L    Absolute Lymphocytes 0.8 0.8 - 5.3 10e9/L    Absolute Monocytes 0.9 0.0 - 1.3 10e9/L    Absolute Eosinophils 0.2 0.0 - 0.7 10e9/L    Absolute Basophils 0.0 0.0 - 0.2 10e9/L    Abs Immature Granulocytes 0.0 0 - 0.4 10e9/L   Comprehensive metabolic panel   Result Value Ref Range    Sodium 137 133 - 144 mmol/L    Potassium 4.5 3.4 - 5.3 mmol/L    Chloride 103 94 - 109 mmol/L    Carbon Dioxide 27 20 - 32 mmol/L    Anion Gap 7 3 - 14 mmol/L    Glucose 91 70 - 99 mg/dL    Urea Nitrogen 31 (H) 7 - 30 mg/dL    Creatinine 1.63 (H) 0.52 - 1.04 mg/dL    GFR Estimate 30 (L) >60 mL/min/1.7m2    GFR Estimate If Black  36 (L) >60 mL/min/1.7m2    Calcium 12.8 (H) 8.5 - 10.1 mg/dL    Bilirubin Total 0.9 0.2 - 1.3 mg/dL    Albumin 3.8 3.4 - 5.0 g/dL    Protein Total 8.0 6.8 - 8.8 g/dL    Alkaline Phosphatase 158 (H) 40 - 150 U/L    ALT 21 0 - 50 U/L    AST 24 0 - 45 U/L   Troponin I   Result Value Ref Range    Troponin I ES <0.015 0.000 - 0.045 ug/L   INR   Result Value Ref Range    INR 3.40 (H) 0.86 - 1.14   Calcium ionized whole blood   Result Value Ref Range    Calcium Ionized Whole Blood 6.5 (H) 4.4 - 5.2 mg/dL   UA reflex to Microscopic   Result Value Ref Range    Color Urine Yellow     Appearance Urine Clear     Glucose Urine Negative NEG^Negative mg/dL    Bilirubin Urine Negative NEG^Negative    Ketones Urine Negative NEG^Negative mg/dL    Specific Gravity Urine 1.009 1.003 - 1.035    Blood Urine Negative NEG^Negative    pH Urine 7.5 (H) 5.0 - 7.0 pH    Protein Albumin Urine Negative NEG^Negative mg/dL    Urobilinogen mg/dL Normal 0.0 - 2.0 mg/dL    Nitrite Urine Negative NEG^Negative    Leukocyte Esterase Urine Negative NEG^Negative    Source Catheterized Urine    Urine Culture   Result Value Ref Range    Specimen Description Catheterized Urine     Special Requests Specimen received in preservative     Culture Micro PENDING    Glucose by meter   Result Value Ref Range    Glucose 97 70 - 99 mg/dL   Basic metabolic panel   Result Value Ref Range    Sodium 139 133 - 144 mmol/L    Potassium 4.0 3.4 - 5.3 mmol/L    Chloride 107 94 - 109 mmol/L    Carbon Dioxide 25 20 - 32 mmol/L    Anion Gap 7 3 - 14 mmol/L    Glucose 90 70 - 99 mg/dL    Urea Nitrogen 31 (H) 7 - 30 mg/dL    Creatinine 1.48 (H) 0.52 - 1.04 mg/dL    GFR Estimate 33 (L) >60 mL/min/1.7m2    GFR Estimate If Black 41 (L) >60 mL/min/1.7m2    Calcium 11.2 (H) 8.5 - 10.1 mg/dL   INR   Result Value Ref Range    INR 3.56 (H) 0.86 - 1.14       Assessment and Plan:    Natalie Hair is a 85 year old female with PMH significant for HTN, CAD, chronic diastolic CHF, severe  "right sided heart failure, hx of AVR, HLD, primary pulmonary HTN, Severe tricuspid insufficiency, and dementia who now presents with nausea, vomiting and weakness.     Possible Community Acquired Pneumonia  Presents with nausea, vomiting and weakness. VSS on arrival. CXR on admission revealed possible infiltrate on lateral view.  WBC normal.  - sputum cultures ordered, pending  - empiric antibiotic coverage with Ceftriaxone plus azithromycin  January 4, 2018 sat 91 % room air, flu pcr ordered      Weakness/weight loss        Nausea and vomiting  - nausea/vomiting protocol in place       Hypercalcemia  Calcium 12.8 on arrival. This was 10.4 on 12/14/2017. Weight curve trending down, but not dramatically so in the last 2 months.  Daughter notes 90 pound weight loss in 15 months.  DDx: malignancy related versus volume contraction due to above  January 4, 2018 calcium 12.8-11.2, will continue saline and give one dose iv lasix, pth , mag, ph ordered.      KYLE (acute kidney injury) (H)  Baseline creatinine 1.0 - 1.2.  1.63 on arrival with BUN of 31-now creat 1.48       Chronic atrial fibrillation (H)  Pacemaker in Situ placed 12/2016, s/p AV nehemias ablation  - continue PTA warfarin, pharmacy to dose  - continue PTA metoprolol     Cardiovascular disease  Severe Right Heart Failure  Thoracentesis x3 for treatment of recurrent pleural effusion  Follow with Dr. Emelyn Bingham; last seen 11/21/2017.  Has also had second multiple opinions at [facility]. S/p CABG in 2002 for preop for AVR.  Had additional stent placed at [facility] 05/04/2017. Last Echo in [facility] system 07/27/2017; \"severe tricuspid valve regurgitation, severe right ventricular enlargement, severe decrease in right ventricular systolic function, left EF of 64%.\"  Patient has hx of recurrent pleural effusion s/p thoracentesis x3  - continue PTA Plavix  - consider palliative care IP consult given complexities of the patient's care/health status.     Chronic diastolic " congestive heart failure (H)   Aortic Valve Replacement, 2002 (JAEL Rodríguez)  Tricuspid valve insufficiency, unspecified etiology, s/p FORMA valve placement [place] 07/2017  Note from previous records-This had led to a series of testing/hospitalizations/procedures starting with the discovery of atrial fibrillation eventually needing a pacemaker implant as well as AV nehemias ablation. This did not improve her dyspnea on exertion. She underwent AV replacement. She was finally referred to palliative cares and comfort cares. her daughter sought another opinion and she went to the [facility] and recommended tricuspid valve repair using Latif FORMA spacer device . Before the definitive treatment, she had to undergo PCI and stent placement. This happened on May 4. She finally underwent tricuspid valve repair on June 1. She also underwent pocket revision and fixation of the forma system.       Has had multiple admission over the last year for diastolic CHF exacerbations  Will give one dose lasix today     Hyperlipidemia LDL goal <100  - continue PTA Zetia      Benign essential hypertension, BP goal <150/90  - hold PTA torsemide, spironolactone      Primary pulmonary hypertension (H)         Memory loss  - continue PTA Aricept     F: 125cc/hr of NS  N: regular diet  DVT Prophylaxis: on coumadin     Code Status: DNR / DNI     Discussion  Pt with dementia and chronic heart issues with severe weight loss in past year, left basilar opacity and hypercalcemia.  Also has KYLE. She doesn't have typical pn symptoms-so will do CT chest abd. Worried about malignancy. Continue atb's for now.       4:04 PM  Ct  1.  Previously seen patchy, nodular, and consolidative infiltrates in  both perihilar and infrahilar regions have essentially resolved.  However, there is a new 13 mm nodule in the right lower lobe. This  could be inflammatory but neoplasm is not completely excluded. A  follow-up CT in 3 months is suggested in reassessment.  2. A  left pleural effusion is present,  smaller compared to the right  compared to the prior study. There is a new trace right pleural  effusion.  3. A generalized edematous state is present.  4. There is new moderate pelvicalyceal dilatation involving the left  kidney. This raises the possibility of a UPJ obstruction. No stone is  seen to account for this.     Will discuss with urology tomorrow-I don't think they are onsite  Will need pulmonary fu  Will need to discuss effusion with radiology-unclear if big enough to tap    6:19 PM   I is greater than O  Give iv lasix now  Start demadex in am  Holding spironolactone and k now

## 2018-01-04 NOTE — PROGRESS NOTES
01/04/18 1243   Quick Adds   Type of Visit Initial Occupational Therapy Evaluation   Living Environment   Lives With child(faina), adult   Living Arrangements house   Home Accessibility stairs (1 railing present)   Number of Stairs to Enter Home 1   Number of Stairs Within Home 0   Stair Railings at Home outside, present at both sides   Transportation Available family or friend will provide   Living Environment Comment Pt. lives with family in a house with daughter and son in law. Daughter is PCA   Self-Care   Equipment Currently Used at Home hospital bed;raised toilet;walker, rolling;wheelchair, manual;grab bar;shower chair   Functional Level Prior   Ambulation 1-->assistive equipment   Transferring 2-->assistive person   Toileting 2-->assistive person   Bathing 2-->assistive person   Dressing 2-->assistive person   Eating 0-->independent   Communication 0-->understands/communicates without difficulty   Swallowing 0-->swallows foods/liquids without difficulty   Cognition 1 - attention or memory deficits   Prior Functional Level Comment WC for distances greater the 75'; uses walker inside home; has lift chair and hospital bed; baseline is assist sit to stand, amount of assist if variable       Present no   General Information   Onset of Illness/Injury or Date of Surgery - Date 01/03/18   Referring Physician Lorraine Bruce   Patient/Family Goals Statement Patient would like to return home and continue PCA services   Additional Occupational Profile Info/Pertinent History of Current Problem Natalie Hair is a 85 year old female with PMH significant for HTN, CAD, chronic diastolic CHF, severe right sided heart failure, hx of AVR, HLD, primary pulmonary HTN, Severe tricuspid insufficiency, and dementia who now presents with nausea, vomiting and weakness.   Precautions/Limitations fall precautions   Cognitive Status Examination   Orientation person   Level of Consciousness alert   Able to Follow  Commands mild impairment   Personal Safety (Cognitive) mild impairment   Visual Perception   Visual Perception Comments wears glasses   Pain Assessment   Patient Currently in Pain No   Range of Motion (ROM)   ROM Comment WFL   Strength   Strength Comments generalized weakness   Hand Strength   Hand Strength Comments quick screen  WFL   Transfer Skill: Sit to Stand   Level of McKean: Sit/Stand minimum assist (75% patients effort)   Physical Assist/Nonphysical Assist: Sit/Stand 1 person assist   Transfer Skill: Sit to Stand full weight-bearing   Assistive Device for Transfer: Sit/Stand rolling walker   Transfer Safety Analysis Sit/Stand   Transfer Safety Concerns Noted: Sit/Stand losing balance backward   Impaired Transfers: Sit/Stand impaired balance;decreased strength   Lower Body Dressing   Level of McKean: Dress Lower Body maximum assist (25% patients effort)   Physical Assist/Nonphysical Assist: Dress Lower Body 1 person assist;verbal cues   Grooming   Level of McKean: Grooming contact guard   Physical Assist/Nonphysical Assist: Grooming verbal cues;1 person assist   Instrumental Activities of Daily Living (IADL)   IADL Comments Pt. has PCA, family was completing, cooking, cleaning, laundry and driving   Activities of Daily Living Analysis   ADL Comments Pt. attempted to complete LB dressing. Pt. stated she was unable to complete it. Family stated that pt. was able to complete whole body dressing sometimes   General Therapy Interventions   Planned Therapy Interventions ADL retraining;strengthening   Clinical Impression   Criteria for Skilled Therapeutic Interventions Met yes, treatment indicated   OT Diagnosis generalized muscle weakness   Influenced by the following impairments decreased strength, decreased balance, decreased activity tolerance   Assessment of Occupational Performance 1-3 Performance Deficits   Identified Performance Deficits difficulty with completing grooming activities  "while standing. Decreased ability to complete LB dressing with independence.    Clinical Decision Making (Complexity) Low complexity   Therapy Frequency daily   Predicted Duration of Therapy Intervention (days/wks) 2-3   Anticipated Discharge Disposition Transitional Care Facility   Risks and Benefits of Treatment have been explained. Yes   Patient, Family & other staff in agreement with plan of care Yes   [facility] TM \"6 Clicks\"   2016, Trustees of [facility], under license to [employer].  All rights reserved.   6 Clicks Short Forms Daily Activity Inpatient Short Form   [facility]  \"6 Clicks\" Daily Activity Inpatient Short Form   1. Putting on and taking off regular lower body clothing? 2 - A Lot   2. Bathing (including washing, rinsing, drying)? 2 - A Lot   3. Toileting, which includes using toilet, bedpan or urinal? 2 - A Lot   4. Putting on and taking off regular upper body clothing? 3 - A Little   5. Taking care of personal grooming such as brushing teeth? 3 - A Little   6. Eating meals? 4 - None   Daily Activity Raw Score (Score out of 24.Lower scores equate to lower levels of function) 16   Total Evaluation Time   Total Evaluation Time (Minutes) 11     "

## 2018-01-04 NOTE — PROGRESS NOTES
Clinic Care Coordination Contact  Care Coordination Transition Communication    Referral Source: ED Follow-Up    Clinical Data: Patient was hospitalized at [facility] from 1-3-18 to present with diagnosis of weakness, possible pneumonia and n/v.  Pt has been follwwed by SWCC at the [facility].  Pt lives with daughter, Digna & son-in-law, in their home in [place].  Pt now receives 10 hours of PCA cares per day, funded by [facility] and Digna is paid to be pt's PCA.  Pt's [facility] SW is Celestino Ortiz, [phone].      Digna also shared that she has 7 siblings; Digna said that she has asked each sibling to give her 1-4 hour chunk of time each month to spend with their mom.  This will allow Digna 28 hours to take a break away from caregiving, even if she wants to nap in her home.  HIRAL and Digna discussed that there are excellent caregiver support groups in the community, but Digna is unsure if she would attend.  SW has encouraged Digna to impress the importance of how much caregivers need the support of their siblings, which she says she will do.      Transition to Facility:              Facility Name: TBD              Contact name and phone number/fax: TBD    Plan: RN/SW Care Coordinator will await notification from facility staff informing RN/SW Care Coordinator of patient's discharge plans/needs. RN/SW Care Coordinator will review chart and outreach to facility staff every 2-4 days and as needed.     Jessie Gan  Social Work Care Coordinator  [facility]  [phone]

## 2018-01-04 NOTE — PROGRESS NOTES
[place] [facility] ADMISSION NOTE    Patient admitted to room 2208 at approximately 2050 via cart from emergency room. Patient was accompanied by transport tech and daughter.     Verbal SBAR report received from Roney WHEELER RN prior to patient arrival.     Patient trasferred to bed via pt moved from bed to bed by assist of 2. Patient alert and oriented X 3. The patient is not having any pain.  . Admission vital signs: Blood pressure 114/46, pulse 73, temperature 97.5  F (36.4  C), temperature source Oral, resp. rate 16, weight 75 kg (165 lb 5.5 oz), SpO2 98 %, not currently breastfeeding. Patient and daughter were oriented to plan of care, call light, bed controls, tv, telephone, bathroom and visiting hours.     The following safety risks were identified during admission: fall. Yellow risk band applied: YES.     Bridgette Sharp

## 2018-01-04 NOTE — PROGRESS NOTES
Natalie Hair  8/28/1932 01/04/18 0914   Quick Adds   Type of Visit Initial PT Evaluation   Living Environment   Home Accessibility stairs (1 railing present)   Number of Stairs to Enter Home 1   Number of Stairs Within Home 0   Stair Railings at Home outside, present at both sides   Transportation Available family or friend will provide   Living Environment Comment lives with daughter who provides PCA hrs; 24 hr supervision   Functional Level Prior   Ambulation 1-->assistive equipment   Transferring 2-->assistive person   Dressing 2-->assistive person   Cognition 1 - attention or memory deficits   Which of the above functional risks had a recent onset or change? ambulation;transferring   Prior Functional Level Comment WC for distances greater the 75'; uses walker inside home; has lift chair and hospital bed; baseline is assist sit to stand, amount of assist if variable   General Information   Patient/Family Goals Statement return home   Pertinent History of Current Problem (include personal factors and/or comorbidities that impact the POC) Natalie Hair is a 85 year old female with PMH significant for HTN, CAD, chronic diastolic CHF, severe right sided heart failure, hx of AVR, HLD, primary pulmonary HTN, Severe tricuspid insufficiency s/p cardiac pacer Dec 2016; recurrent pulmonary effusion with thoracocentsisi, most recent 11/21/17, and dementia. Admitted due to nausea, vomiting and weakness   Precautions/Limitations fall precautions   Cognitive Status Examination   Orientation person;place   Level of Consciousness alert;other (see comments)  (sleepy)   Follows Commands and Answers Questions able to follow single-step instructions   Personal Safety and Judgment impaired   Memory impaired   Cognitive Comment not orientated to date/year   Range of Motion (ROM)   ROM Comment WNL for age   Strength   Strength Comments WNL for age   Bed Mobility   Bed Mobility Comments min assist to lift LEs into bed;  "did not do supine to sit   Transfer Skills   Transfer Comments max assist sit to stand; baseline is needing variable amount of assist    Gait   Gait Comments very limited distance (15') inside room with RW   General Therapy Interventions   Planned Therapy Interventions balance training;bed mobility training;gait training;manual therapy;strengthening;transfer training   Clinical Impression   Criteria for Skilled Therapeutic Intervention yes, treatment indicated   PT Diagnosis generalized weakness; instability of gait   Influenced by the following impairments short of air; generalized weakness; baseline assist with sit to stand   Functional limitations due to impairments increased need for physical assist and increased caregiver burden   Clinical Presentation Stable/Uncomplicated   Clinical Presentation Rationale comorbidities cardiac, dementia   Clinical Decision Making (Complexity) Low complexity   Predicted Duration of Therapy Intervention (days/wks) 3 days   Anticipated Discharge Disposition Other (see comments)  (TCU vs home with home therapy)   Risk & Benefits of therapy have been explained Yes   Patient, Family & other staff in agreement with plan of care Yes   Clinical Impression Comments currently requires more assist than at baseline for mobility and transfers   [facility] [employer] TM \"6 Clicks\"   2016, Trustees of [facility], under license to [employer].  All rights reserved.   6 Clicks Short Forms Basic Mobility Inpatient Short Form   [facility] AM-PAC  \"6 Clicks\" V.2 Basic Mobility Inpatient Short Form   1. Turning from your back to your side while in a flat bed without using bedrails? 3 - A Little   2. Moving from lying on your back to sitting on the side of a flat bed without using bedrails? 2 - A Lot   3. Moving to and from a bed to a chair (including a wheelchair)? 2 - A Lot   4. Standing up from a chair using your arms (e.g., wheelchair, or bedside chair)? 2 - A Lot   5. To walk " in hospital room? 3 - A Little   6. Climbing 3-5 steps with a railing? 2 - A Lot   Basic Mobility Raw Score (Score out of 24.Lower scores equate to lower levels of function) 14   Total Evaluation Time   Total Evaluation Time (Minutes) 10

## 2018-01-04 NOTE — PROGRESS NOTES
"CLINICAL NUTRITION SERVICES  -  ASSESSMENT NOTE     REASON FOR ASSESSMENT  Natalie Hair is a 85 year old female seen by Registered Dietitian for Admission Nutrition Risk Screen - unintentional weight loss of 10 or more pounds in 2 months       NUTRITION HISTORY  - Information obtained from patient and daughter. Patient confirmed she was experiencing weight loss that it is unintentional but she is not concerned about her weight loss. Daughter expressed concern about her mother's rate of weight loss and stated she has lost 100# in the last year and a half. Patient reported that she does not think she is eating less than she used to; daughter stated she is eating about 1/2-2/3 of what she used to. Patient's food is prepared by her daughters throughout the day as patient does not remember/will not eat unless it is coordinated for her.     Patient reports she consumes orange juice, water and oatmeal for breakfast, and a home cooked meal from her daughters for lunch and dinner. Patient sometimes has a small snack before lunch or after dinner, usually a cheese stick, cashews or a fruit cup. Patient consumes 2 glasses of milk throughout the day and is willing to consume yogurt with breakfast. Patient has tried Ensure and other nutritional liquid supplements previously and did not like them, but is willing to try again. Patient is following a low sodium diet as advised previously by RD 8/2016 with adequate compliance.       CURRENT NUTRITION ORDERS  Diet Order:     Regular     Current Intake/Tolerance:  50% per patient and daughter report      PHYSICAL FINDINGS  Observed  No nutrition-related physical findings observed  Obtained from Chart/Interdisciplinary Team  Edema 2+ pretibial pitting per provider   Pressure Ulcers non-blanchable on right buttock per nurse    ANTHROPOMETRICS  Height: 5' 6\"  Weight: 164 lbs 3.88 oz  Body mass index is 26.51 kg/(m^2).  Weight Status:  Overweight BMI 25-29.9  IBW: 130#  % IBW: " 126%  Weight History:   Wt Readings from Last 20 Encounters:   01/04/18 74.5 kg (164 lb 3.9 oz)   12/14/17 75.4 kg (166 lb 4.8 oz)   11/21/17 78.9 kg (174 lb)   11/14/17 77.6 kg (171 lb 1.6 oz)   11/07/17 77.7 kg (171 lb 4.8 oz)   11/03/17 76.3 kg (168 lb 4.8 oz)   11/02/17 77.6 kg (171 lb)   11/01/17 77.6 kg (171 lb)   10/25/17 77.6 kg (171 lb)   09/19/17 74.6 kg (164 lb 6.4 oz)   09/08/17 71.2 kg (157 lb)   08/07/17 74.4 kg (164 lb)   07/21/17 77.4 kg (170 lb 11.2 oz)   07/13/17 79.3 kg (174 lb 14.4 oz)   07/11/17 80.7 kg (178 lb)   06/27/17 79.7 kg (175 lb 12.8 oz)   05/26/17 80.6 kg (177 lb 12.8 oz)   03/14/17 87.9 kg (193 lb 12.8 oz)   01/12/17 88 kg (194 lb)   01/12/17 86.6 kg (191 lb)         LABS  Labs reviewed: calcium  Calcium   Date Value Ref Range Status   01/04/2018 11.2 (H) 8.5 - 10.1 mg/dL Final       MEDICATIONS  Medications reviewed:   mL/hr    Dosing Weight 63 kg    ASSESSED NUTRITION NEEDS PER APPROVED PRACTICE GUIDELINES:  Estimated Energy Needs: 5121-7784 kcals (25-30 Kcal/Kg)  Justification: maintenance and repletion  Estimated Protein Needs: 75-95 grams protein (1.2-1.5 g pro/Kg)  Justification: maintenance, wound healing and preservation of lean body mass  Estimated Fluid Needs: (1 mL/Kcal)  Justification: maintenance and per provider pending fluid status    MALNUTRITION:  % Weight Loss:  Up to 20% in 1 year (non-severe malnutrition) - 15.5% weight loss in one year  % Intake:  Decreased intake does not meet criteria for malnutrition  Subcutaneous Fat Loss:  Unable to assess  Muscle Loss:  Unable to assess  Fluid Retention:  2+ pre tibial pitting edema    Malnutrition Diagnosis: Non-Severe malnutrition  In Context of:  Chronic illness or disease    NUTRITION DIAGNOSIS:  Unintended weight loss related to dementia as evidenced by 15.5% weight loss in one year and patient family reported decreased appetite for one year.      NUTRITION INTERVENTIONS  Recommendations / Nutrition  Prescription  Patient to increase snack frequency to 2 times a day to consume a total of 3 meals and 2 snacks a day to increase energy intake and prevent further weight loss.   Patient to consume one Ensure Plus nutrition liquid supplement per day to increase energy and protein intake to prevent further weight loss.   Patient to increase protein intake to prevent advancement of non-blanchable pressure injury on right buttock.   .      Implementation  Nutrition education: Provided education on important of protein for wound healing and pressure injury prevention  Composition of Meals and Snacks  .      Nutrition Goals  Patient to increase intake to 100% and consume 1 Ensure Plus a day within 1-2 days.   .      MONITORING AND EVALUATION:  Progress towards goals will be monitored and evaluated per protocol and Practice Guidelines, Food intake and Liquid meal replacement or supplement        Vaishali Calix  Dietetic Intern

## 2018-01-04 NOTE — PLAN OF CARE
Problem: Patient Care Overview  Goal: Plan of Care/Patient Progress Review  Discharge Planner OT   Patient plan for discharge: Home with homecare  Current status: Patient required minimal assist to complete sit to stand from bedside chair. Patient completed grooming activity while standing for less than 1 minute and then required a chair to sit during task due to decreased activity tolerance.  Barriers to return to prior living situation: decreased independence with transfers and ADL's, decreased activity tolerance, weakness  Recommendations for discharge: TCU versus Homecare depending on progress.  Rationale for recommendations: Requires increased assistance with daily cares resulting in increased caregiver burden and decreased activity tolerance for completing ambulating to toilet or sink.       Entered by: Jody Schrader 01/04/2018 1:14 PM

## 2018-01-04 NOTE — PHARMACY-ANTICOAGULATION SERVICE
Clinical Pharmacy - Warfarin Dosing Consult     Pharmacy has been consulted to manage this patient s warfarin therapy.  Indication: Mechanical Aortic Valve Replacement  Therapy Goal: INR 2.5-3.5  Provider/Team: Dr Mimi Mcguire -  family practice  OP Anticoag Clinic: [facility]  Warfarin Prior to Admission: Yes  Warfarin PTA Regimen: 6 mg Tues, Sat, 4 mg all other days of the week.  Significant drug interactions: Started Azithromycin and Ceftriaxone today.  Dose Comments: 2 mg for INR = 3.4 and started on Azithromycin    INR   Date Value Ref Range Status   01/03/2018 3.40 (H) 0.86 - 1.14 Final   12/27/2017 3.3  Final       Recommend warfarin 2 mg today.  Pharmacy will monitor Natalie Hair daily and order warfarin doses to achieve specified goal.      Please contact pharmacy as soon as possible if the warfarin needs to be held for a procedure or if the warfarin goals change.      Vanessa Kilgore, Pharm.D.

## 2018-01-04 NOTE — PLAN OF CARE
Problem: Patient Care Overview  Goal: Plan of Care/Patient Progress Review  Discharge Planner PT   Patient plan for discharge: family is discussing TCU;    Current status: min assist sit to supine; max assist sit to stand (baseline is variable assist needed, has lift chair and hospital bed at home); limited walking due to shortness of breath  Barriers to return to prior living situation: general weakness, need for additional physical assist  Recommendations for discharge: TCU vs home with homebound PT  Rationale for recommendations: variable assist for mobility; increased shortness of air; increased burden of care.       Entered by: Margarita Haque 01/04/2018 1:04 PM

## 2018-01-05 NOTE — PLAN OF CARE
Problem: Patient Care Overview  Goal: Plan of Care/Patient Progress Review  Outcome: Improving  Pt denies any pain, SOB, or N/V this shift. Vital signs stable, afebrile, O2 sat 97% on room air. Lung sounds diminished. Has a fair, non-productive cough. Voiding without difficulty. IV fluids running at 125 ml/hr. Requires assist 1-2 with a walker. Bed alarm on for safety.

## 2018-01-05 NOTE — PLAN OF CARE
Problem: Patient Care Overview  Goal: Plan of Care/Patient Progress Review  Discharge Planner OT   Patient plan for discharge: Home with homecare  Current status: Patient requires CGA with ambulating. Patient continues to struggle with shortness of breath upon activity. Pt. Requires verbal cues and assistance to complete toilet transfers. Pt. Continues to demonstrate whole body weakness  Barriers to return to prior living situation: decreased activity tolerance, whole body weakness  Recommendations for discharge: TCU versus home with homecare pending progress with therapy  Rationale for recommendations: Patient continues to struggle with decreased activity tolerance and becomes fatigued with simple tasks such as toileting.        Entered by: Jody Schrader 01/05/2018 3:54 PM

## 2018-01-05 NOTE — PROGRESS NOTES
Care Coordinator Progress Note     Admission Date/Time:  1/3/2018  Attending MD:  Fan Hernandez MD     Data  Chart reviewed, discussed with interdisciplinary team.   Patient was admitted for:    Encephalopathy  Lung infiltrate  Serum calcium elevated  Dehydration  Nausea and vomiting, intractability of vomiting not specified, unspecified vomiting type  Chronic diastolic congestive heart failure (H)  Pulmonary infiltrate  Hypercalcemia.    Concerns with insurance coverage for discharge needs: None.  Current Living Situation: Patient lives with family.  Support System: Supportive  Services Involved: Home Care  Transportation: Family or Friend will provide  Barriers to Discharge: dependent with mobility/activities of daily living    Coordination of Care and Referrals: Provided patient/family with options for Acute Rehab.        Assessment  Spoke with family regarding discharge plan. Patients daughter that patient lives with would still like patient to come home with home therapies however she understands that she may need TCU. Patient was provided with Medicare certified nursing home list. Pts choices are as follows [facility] (Admissions phone: [phone] RN Report: [phone]  Main Phone: [phone] Fax: [phone]), [facility] (Admission phone: [phone] Main Phone: [phone] Fax: [phone]), [facility] (Phone: [phone] Fax: [phone])     Plan  Anticipated Discharge Date:  1/6/2018  Anticipated Discharge Plan:  Home with Home care vs TCU    DAYNA ALVA RN

## 2018-01-05 NOTE — PLAN OF CARE
Problem: Patient Care Overview  Goal: Plan of Care/Patient Progress Review  Outcome: Improving  Pt denies any pain.  Reports SOB which is her baseline.  O2 sat is good on room and lungs are clear/diminished.  No cough.  Ambulating short distances with walker and assist of one.  Family has been present with patient throughout the day, pt does become confused to place and situation but calls appropriately.  Down to nuc med for renal scan this afternoon.

## 2018-01-05 NOTE — PROGRESS NOTES
[facility]      Subjective:  She is not sure if she is stronger but she was able to stand up with assist and transfer  She has chronic sob    Review of Systems:  CONSTITUTIONAL:weak, wt loss  I: NEGATIVE for worrisome rashes, moles or lesions  E: NEGATIVE for vision changes or irritation  E/M: NEGATIVE for ear, mouth and throat problems  RESP: chronic sob  B: NEGATIVE for masses, tenderness or discharge  CV: NEGATIVE for chest pain, palpitations or peripheral edema  GI: NEGATIVE for nausea, abdominal pain, heartburn, or change in bowel habits  : NEGATIVE for frequency, dysuria, or hematuria  M: NEGATIVE for significant arthralgias or myalgia  NEURO: weak  E: NEGATIVE for temperature intolerance, skin/hair changes  H: NEGATIVE for bleeding problems  P: NEGATIVE for changes in mood or affect    Physical Exam:  Vitals Were Reviewed    Patient Vitals for the past 16 hrs:   BP Temp Temp src Pulse Resp SpO2   01/05/18 0713 120/61 96.1  F (35.6  C) Oral 70 18 96 %   01/05/18 0308 132/61 97.8  F (36.6  C) Oral 70 18 97 %   01/04/18 2214 121/65 97.8  F (36.6  C) Oral 70 18 98 %   01/04/18 2200 - - - - - 98 %   01/04/18 2006 126/67 97  F (36.1  C) Oral 70 20 98 %         Intake/Output Summary (Last 24 hours) at 01/05/18 0953  Last data filed at 01/05/18 0830   Gross per 24 hour   Intake             4056 ml   Output             2550 ml   Net             1506 ml       GENERAL APPEARANCE: weak , but was able to stand with my assist  EYES: conjunctiva clear, eyes grossly normal  RESP: decreased a bit left base  CV: regular rate and rhythm, normal S1 S2, no S3 or S4 and no murmur, click or rub   ABDOMEN: soft, nontender, no HSM or masses and bowel sounds normal  MS: no clubbing, cyanosis; tr edema  SKIN: clear without significant rashes or lesions  NEURO: Normal strength and tone, sensory exam grossly normal, mildly confused and speech normal    Lab:  Recent Labs   Lab Test  01/05/18   0730  01/04/18    0610   NA  142  139   POTASSIUM  3.4  4.0   CHLORIDE  108  107   CO2  25  25   ANIONGAP  9  7   GLC  89  90   BUN  26  31*   CR  1.36*  1.48*   SHANICE  10.1  11.2*     CBC RESULTS:   Recent Labs   Lab Test  01/05/18   0730  01/03/18   1614   WBC  5.2  5.5   RBC  4.61  5.03   HGB  12.6  14.1   HCT  39.7  43.3   PLT  138*  154       Results for orders placed or performed during the hospital encounter of 01/03/18 (from the past 24 hour(s))   Influenza A and B and RSV PCR   Result Value Ref Range    Specimen Description Nasal     Influenza A PCR Negative NEG^Negative    Influenza B PCR Negative NEG^Negative    Resp Syncytial Virus Negative NEG^Negative   CT Chest Abodmen w/o Contrast    Narrative    CT CHEST ABDOMEN W/O CONTRAST 1/4/2018 2:04 PM    HISTORY: Weight loss. Hypercalcemia. Assess for occult malignancy.    TECHNIQUE: CT of the chest and abdomen are performed without IV  contrast. The patient's GFR is 33.    Routine evaluated structures in the chest include the lungs,  mediastinal structures, pleura, and chest wall.    Routine assessed structures in the abdomen include the liver, spleen,  pancreas, adrenal glands, and kidneys. Also assessed are the  retroperitoneum, gastrointestinal tract, and the abdominal wall.    Radiation dose for this scan is reduced using automated exposure  control, adjustment of the mA and/or kV according to patient size, or  iterative reconstruction technique.    COMPARISON: Chest CT dated 7/26/2017.    FINDINGS:    Chest: There is a new 13 mm noncalcified right lower lobe pulmonary  nodule on image #34. Other subcentimeter noncalcified nodules in the  right upper lobe on image #25 and in the right lower lobe on image #33  are stable. A left pleural effusion is present as well as trace  pleural fluid on the right. Cardiomegaly is again seen. An enlarged  pretracheal lymph node is seen on image #21 and measures 1.4 cm. It  still has a partial fatty hilum. It is unchanged from the prior  study.  Borderline enlarged bilateral hilar lymph nodes measuring 0.9 cm in  short axis dimension are also again suspected and stable.    Abdomen: Small gallstones are suggested in the gallbladder. There is a  3 mm nonobstructing calculus in the right kidney on image #60. There  is moderate dated dilatation involving the left intrarenal collecting  system and left renal pelvis. No dilated ureter is seen emanating off  of this. The upper visualized left gonadal vein is prominent in  caliber but similar to the prior study. A very small amount of  perihepatic fluid is present. A generalized edematous state is  present.      Impression    IMPRESSION:  1.  Previously seen patchy, nodular, and consolidative infiltrates in  both perihilar and infrahilar regions have essentially resolved.  However, there is a new 13 mm nodule in the right lower lobe. This  could be inflammatory but neoplasm is not completely excluded. A  follow-up CT in 3 months is suggested in reassessment.  2. A left pleural effusion is present,  smaller compared to the right  compared to the prior study. There is a new trace right pleural  effusion.  3. A generalized edematous state is present.  4. There is new moderate pelvicalyceal dilatation involving the left  kidney. This raises the possibility of a UPJ obstruction. No stone is  seen to account for this.    JOESPH GUILLEN MD   INR   Result Value Ref Range    INR 3.04 (H) 0.86 - 1.14   CBC with platelets   Result Value Ref Range    WBC 5.2 4.0 - 11.0 10e9/L    RBC Count 4.61 3.8 - 5.2 10e12/L    Hemoglobin 12.6 11.7 - 15.7 g/dL    Hematocrit 39.7 35.0 - 47.0 %    MCV 86 78 - 100 fl    MCH 27.3 26.5 - 33.0 pg    MCHC 31.7 31.5 - 36.5 g/dL    RDW 15.8 (H) 10.0 - 15.0 %    Platelet Count 138 (L) 150 - 450 10e9/L   Basic metabolic panel   Result Value Ref Range    Sodium 142 133 - 144 mmol/L    Potassium 3.4 3.4 - 5.3 mmol/L    Chloride 108 94 - 109 mmol/L    Carbon Dioxide 25 20 - 32 mmol/L    Anion Gap 9  3 - 14 mmol/L    Glucose 89 70 - 99 mg/dL    Urea Nitrogen 26 7 - 30 mg/dL    Creatinine 1.36 (H) 0.52 - 1.04 mg/dL    GFR Estimate 37 (L) >60 mL/min/1.7m2    GFR Estimate If Black 45 (L) >60 mL/min/1.7m2    Calcium 10.1 8.5 - 10.1 mg/dL       Assessment and Plan:    Natalie Hair is a 85 year old female with PMH significant for HTN, CAD, chronic diastolic CHF, severe right sided heart failure, hx of AVR, HLD, primary pulmonary HTN, Severe tricuspid insufficiency, and dementia who now presents with nausea, vomiting and weakness.      Left pleural effusion -doubt  Community Acquired Pneumonia  Presents with nausea, vomiting and weakness. VSS on arrival. CXR on admission revealed possible infiltrate on lateral view.  WBC normal.  Started on antibiotic coverage with Ceftriaxone plus azithromycin  Ct shows pleural effusion (history of recurrent effusions), procalcitonin pending, wbc normal, afebrile, continue atb until PCT back      Weakness/weight loss--? Secondary to hyercalcemia, malignancy is possible  Mild-Moderate (protein-calorie) Malnutrition       Nausea and vomiting  resolved      Hypercalcemia/decrease parathyroid hormone  Calcium 12.8 on arrival. This was 10.4 on 12/14/2017. Weight curve trending down, but not dramatically so in the last 2 months.  Daughter notes 90 pound weight loss in 15 months.  DDx: malignancy related versus volume contraction due to above  January 4, 2018 calcium 12.8-11.2-10.1---stop iv fluids, restart demedex, check parathyroid hormone related peptide, spep, upep, pt does have lung nodule-discussed with DR Marion might be just post inflammatory-he doesn't recommend bx, repeat ct three months.    13 mm nodule in the right lower lobe  Discussed with Dr Marion, he thinks maybe post inflammatory, doesn't recommend bx now, recommends three month fu    new moderate pelvicalyceal dilatation involving the left kidney. This raises the possibility of a UPJ obstruction.  Also reviewed with   "Marion who feels there is a renal lesion-suggests US  Will discuss with urology      KYLE (acute kidney injury) (H)  Baseline creatinine 1.0 - 1.2.   1.63 on arrival with BUN of 31- creat 1.48-1.36     Chronic atrial fibrillation (H)  Pacemaker in Situ placed 12/2016, s/p AV nehemias ablation  - continue PTA warfarin, pharmacy to dose  - continue PTA metoprolol      Cardiovascular disease  Severe Right Heart Failure  Thoracentesis x3 for treatment of recurrent pleural effusion  Follow with Dr. Emelyn Bingham; last seen 11/21/2017.  Has also had second multiple opinions at [facility]. S/p CABG in 2002 for preop for AVR.  Had additional stent placed at [facility] 05/04/2017. Last Echo in [facility] system 07/27/2017; \"severe tricuspid valve regurgitation, severe right ventricular enlargement, severe decrease in right ventricular systolic function, left EF of 64%.\"  Patient has hx of recurrent pleural effusion s/p thoracentesis x3  - continue PTA Plavix  - consider palliative care IP consult given complexities of the patient's care/health status.      Chronic diastolic congestive heart failure (H)   Aortic Valve Replacement, 2002 ([facility])  Tricuspid valve insufficiency, unspecified etiology, s/p FORMA valve placement [facility] 07/2017  Note from previous records-This had led to a series of testing/hospitalizations/procedures starting with the discovery of atrial fibrillation eventually needing a pacemaker implant as well as AV nehemias ablation. This did not improve her dyspnea on exertion. She underwent AV replacement. She was finally referred to palliative cares and comfort cares. her daughter sought another opinion and she went to the [facility] and recommended tricuspid valve repair using Latif FORMA spacer device . Before the definitive treatment, she had to undergo PCI and stent placement. This happened on May 4. She finally underwent tricuspid valve repair on June 1. She also underwent pocket revision and fixation of the " forma system.  Has had multiple admission over the last year for diastolic CHF exacerbations      Hyperlipidemia LDL goal <100  - continue PTA Zetia     Benign essential hypertension, BP goal <150/90  Restarting demadex, spironolactone and k      Primary pulmonary hypertension (H)        Memory loss  - continue PTA Aricept      F: stopping fluids, restart oral demadex  N: regular diet  DVT Prophylaxis: on coumadin      Code Status: DNR / DNI      Discussion  Complex case   Pt with dementia and chronic heart issues with severe weight loss in past year, left pleural effusion (history of recurrent pleural effusions) and hypercalcemia.  Parathyroid hormone is low--awaiting parathyroid related peptide.  Also has KYLE which is improving.   New lung nodule- may be post inffammatory  New new moderate pelvicalyceal dilatation- significance unclear, urology is reviewing and will call me back.   Dr Marion thinks new left kidney lesion on review--US recommended and ordered  Pt will need tcu  Need to follow calcium--stopping iv fluids, and restart demadex  Restarting demadex , spironolactone and kcl.  Spep, upep, esr, ck pending    11:16 AM  Discussed with Dr Ellis urology  She felt the kidney issue is likely not serious  Recommend nm renogram, will get US  Probably just needs out pt fu

## 2018-01-06 NOTE — PROGRESS NOTES
Discharge Planner OT   Patient plan for discharge: home with HH  Current status: Discussed with oldest daughter Samir states plan is for pt to return home with PCA assist from other sister with HH services. Pt required CGA to amb to/from bathroom and for transfer and primitivo care and clothing management. Yellow t band 4 ex 7-8 ex with c/o fatigue and requiring frequent rests. On room air with SATs 97% and SOB  Barriers to return to prior living situation: easily fatigue  Recommendations for discharge: home with HH  Rationale for recommendations: stated above       Entered by: Marizol Saeed 01/06/2018 1:13 PM

## 2018-01-06 NOTE — PLAN OF CARE
Problem: Patient Care Overview  Goal: Plan of Care/Patient Progress Review  Discharge Planner PT   Patient plan for discharge: Home vs TCU  Current status: pt agreed to exer only today, tolerated well but needing short rest breaks due to SOB.  Barriers to return to prior living situation: Poor endurance  Recommendations for discharge: TCU  Rationale for recommendations: poor endurance       Entered by: Jennifer Quigley 01/06/2018 9:11 AM

## 2018-01-06 NOTE — PROGRESS NOTES
Care Management Progress Note:    Reason for Follow up:  Discharge Planning.  Met with the patient and her daughterMariana regarding the discharge plan.  Discussed TCU vs Home Care.  The patient will return home with her daughter, Digna upon discharge.  Digna will provide 24/7 care.  Pt was provided with Medicare certified home care list. Pt chooses to use [facility] ([phone] Fax: [phone]).  A referral was made for home care. Provided resource for home care vs palliative vs hospice care.    Discharge Plan:  Home    Discharge Planner   Discharge Plans in progress: Home  Barriers to discharge plan: None  Follow up plan: Referral to clinic care coordinator       Entered by: Catia Jensen 01/06/2018 12:41 PM         Catia Jensen RN, Care Coordinator  [phone]

## 2018-01-06 NOTE — PLAN OF CARE
Problem: Patient Care Overview  Goal: Plan of Care/Patient Progress Review  Outcome: Improving  Patient Alert with some confusion regarding situation and time. Needs reminding at times where she is, unaware of year. Very cooperative with cares. Use of bedside commode many times through night. Denies pain. SOB baseline, has had this her whole life. Unable to fall asleep; prn Benadryl given. Able to get some sleep.     Continue to monitor and implement poc.

## 2018-01-06 NOTE — PROGRESS NOTES
[facility]      Subjective:  Maybe stronger  Chronic confusion reported by family    Review of Systems:  CONSTITUTIONAL:weakness is better  I: NEGATIVE for worrisome rashes, moles or lesions  E: NEGATIVE for vision changes or irritation  E/M: NEGATIVE for ear, mouth and throat problems  R: chronic sob  B: NEGATIVE for masses, tenderness or discharge  CV: NEGATIVE for chest pain, palpitations or peripheral edema  GI: NEGATIVE for nausea, abdominal pain, heartburn, or change in bowel habits  : NEGATIVE for frequency, dysuria, or hematuria  M: NEGATIVE for significant arthralgias or myalgia  NEURO: diffusely weak, confused  E: NEGATIVE for temperature intolerance, skin/hair changes  H: NEGATIVE for bleeding problems  P: NEGATIVE for changes in mood or affect    Physical Exam:  Vitals Were Reviewed    Patient Vitals for the past 16 hrs:   BP Temp Temp src Heart Rate Resp SpO2 Weight   01/06/18 0719 146/60 96.2  F (35.7  C) Oral 68 18 92 % -   01/06/18 0631 - - - - - - 74.1 kg (163 lb 5.8 oz)   01/05/18 2244 133/73 97.5  F (36.4  C) Oral 70 18 97 % -   01/05/18 2026 - - - - - 98 % -   01/05/18 1920 100/49 98.7  F (37.1  C) Oral 70 18 98 % -         Intake/Output Summary (Last 24 hours) at 01/06/18 1103  Last data filed at 01/06/18 0844   Gross per 24 hour   Intake              400 ml   Output             1800 ml   Net            -1400 ml       GENERAL APPEARANCE: pale , weak , mildly confused , pleasant  EYES: conjunctiva clear, eyes grossly normal  RESP: lungs clear to auscultation - no rales, rhonchi or wheezes  CV: regular rate and rhythm, normal S1 S2, no S3 or S4 and no murmur, click or rub   ABDOMEN: soft, nontender, no HSM or masses and bowel sounds normal  MS: no clubbing, cyanosis; tr edema  SKIN: clear without significant rashes or lesions  Neuro -muscular strength is better than upon admit    Lab:  Recent Labs   Lab Test  01/06/18   0542  01/05/18   0730   NA  140  142   POTASSIUM   3.4  3.4   CHLORIDE  104  108   CO2  27  25   ANIONGAP  9  9   GLC  93  89   BUN  28  26   CR  1.27*  1.36*   SHANICE  9.7  10.1     CBC RESULTS:   Recent Labs   Lab Test  01/06/18   0542  01/05/18   0730   WBC  6.0  5.2   RBC  4.54  4.61   HGB  12.6  12.6   HCT  38.8  39.7   PLT  148*  138*       Results for orders placed or performed during the hospital encounter of 01/03/18 (from the past 24 hour(s))   Protein electrophoresis   Result Value Ref Range    Albumin Fraction PENDING 3.7 - 5.1 g/dL    Alpha 1 Fraction PENDING 0.2 - 0.4 g/dL    Alpha 2 Fraction PENDING 0.5 - 0.9 g/dL    Beta Fraction PENDING 0.6 - 1.0 g/dL    Gamma Fraction PENDING 0.7 - 1.6 g/dL    Monoclonal Peak PENDING 0.0 g/dL    ELP Interpretation: PENDING    NM Renal Scan w Flw & Fnct w/o Phrm Int    Narrative    NM RENOGRAM 1/5/2018 3:49 PM    HISTORY: Possible left UPJ obstruction on CT.    TECHNIQUE: 7.5 mCi of technetium 99m MAG3 is given intravenously for  this study. 40 mg of Lasix is also given intravenously, approximately  16 minutes into the study.  Qualitative assessment is made of blood  flow to each kidney as well as uptake and excretion by each kidney.  Quantitative calculations are also made.    COMPARISON: CT dated 1/4/2018.    FINDINGS: Qualitative analysis shows better blood flow to the right  kidney compared to the left. The uptake curves of both kidneys are  slightly prolonged, especially the left kidney. The excretory curve is  within normal limits for the right kidney and flat/prolonged for the  left kidney.    Peak function of the right kidney is 7 minutes and peak function of  the left kidney is 9 minutes.     Split function is 53% on the right and 47% on the left.    T-1/2 pre Lasix on the right is 17 minutes and on the left 53 minutes.    T-1/2 post Lasix on the right is 17 minutes and on the left 55  minutes.      Impression    IMPRESSION: Component of obstruction is suggested in the left kidney.    JOESPH GUILLEN MD   US  Retroperitoneal complete    Narrative    COMPLETE RENAL ULTRASOUND   1/5/2018 4:15 PM    HISTORY: Left hydronephrosis on recent CT.    COMPARISON: A CT on 1/4/2018.    FINDINGS: The right kidney measures 11.3 x 3.9 x 4.8 cm in the left  measures 13.4 x 5.2 x 5.0 cm. Renal cortical thickness is 0.9 cm on  the right and 1.2 cm on the left. There is a 1.4 cm cyst in the  inferior pole the left kidney. There is also moderate left  hydronephrosis. A cause of obstruction is not seen. The kidneys are  otherwise unremarkable. No bladder pathology is identified.      Impression    IMPRESSION: Moderate left hydronephrosis. A cause of obstruction is  not identified.     PALOMA HOYOS MD   INR   Result Value Ref Range    INR 2.61 (H) 0.86 - 1.14   CBC with platelets   Result Value Ref Range    WBC 6.0 4.0 - 11.0 10e9/L    RBC Count 4.54 3.8 - 5.2 10e12/L    Hemoglobin 12.6 11.7 - 15.7 g/dL    Hematocrit 38.8 35.0 - 47.0 %    MCV 86 78 - 100 fl    MCH 27.8 26.5 - 33.0 pg    MCHC 32.5 31.5 - 36.5 g/dL    RDW 15.8 (H) 10.0 - 15.0 %    Platelet Count 148 (L) 150 - 450 10e9/L   Basic metabolic panel   Result Value Ref Range    Sodium 140 133 - 144 mmol/L    Potassium 3.4 3.4 - 5.3 mmol/L    Chloride 104 94 - 109 mmol/L    Carbon Dioxide 27 20 - 32 mmol/L    Anion Gap 9 3 - 14 mmol/L    Glucose 93 70 - 99 mg/dL    Urea Nitrogen 28 7 - 30 mg/dL    Creatinine 1.27 (H) 0.52 - 1.04 mg/dL    GFR Estimate 40 (L) >60 mL/min/1.7m2    GFR Estimate If Black 48 (L) >60 mL/min/1.7m2    Calcium 9.7 8.5 - 10.1 mg/dL       Assessment and Plan:    Natalie Hair is a 85 year old female with PMH significant for HTN, CAD, chronic diastolic CHF, severe right sided heart failure, hx of AVR, HLD, primary pulmonary HTN, Severe tricuspid insufficiency, and dementia who now presents with nausea, vomiting and weakness.      Left pleural effusion -doubt  Community Acquired Pneumonia  Presents with nausea, vomiting and weakness. VSS on arrival. CXR  on admission revealed possible infiltrate on lateral view.  WBC normal.  Started on antibiotic coverage with Ceftriaxone plus azithromycin  Ct shows pleural effusion (history of recurrent effusions), procalcitonin is low-stop      Weakness/weight loss--? Secondary to hyercalcemia, malignancy is possible, no sign hyper parathyroid, possibly aggravated by po calcium intake  Mild-Moderate (protein-calorie) Malnutrition      Nausea and vomiting  resolved      Hypercalcemia/decrease parathyroid hormone  Calcium 12.8 on arrival. This was 10.4 on 12/14/2017. Weight curve trending down, but not dramatically so in the last 2 months.  Daughter notes 90 pound weight loss in 15 months.  DDx: malignancy related versus volume contraction due to above  January 5 2018 calcium 12.8-11.2-10.1-9.7 back on  Demedex. Parathyroid hormone related peptide, spep, upep-pending, pt does have lung nodule-discussed with DR Marion might be just post inflammatory-he doesn't recommend bx, repeat ct three months.     13 mm nodule in the right lower lobe  Discussed with Dr Marion, he thinks maybe post inflammatory, doesn't recommend bx now, recommends three month fu     new moderate pelvicalyceal dilatation involving the left kidney. This raises the possibility of a UPJ obstruction.  Discussed with urology yesterday, US and lasix renogram ordered--call placed to urology to discuss.      KYLE (acute kidney injury) (H)  Baseline creatinine 1.0 - 1.2.   1.63 on arrival with BUN of 31- creat 1.48-1.36-1.27      Chronic atrial fibrillation (H)  Pacemaker in Situ placed 12/2016, s/p AV nehemias ablation  - continue PTA warfarin, pharmacy to dose  - continue PTA metoprolol      Cardiovascular disease  Severe Right Heart Failure  Thoracentesis x3 for treatment of recurrent pleural effusion  Follow with Dr. Emelyn Bingham; last seen 11/21/2017.  Has also had second multiple opinions at [facility]. S/p CABG in 2002 for preop for AVR.  Had additional stent  "placed at [place] 05/04/2017. Last Echo in [place] system 07/27/2017; \"severe tricuspid valve regurgitation, severe right ventricular enlargement, severe decrease in right ventricular systolic function, left EF of 64%.\"  Patient has hx of recurrent pleural effusion s/p thoracentesis x3  - continue PTA Plavix        Chronic diastolic congestive heart failure (H)   Aortic Valve Replacement, 2002 ([facility])  Tricuspid valve insufficiency, unspecified etiology, s/p FORMA valve placement [place] 07/2017  Note from previous records-This had led to a series of testing/hospitalizations/procedures starting with the discovery of atrial fibrillation eventually needing a pacemaker implant as well as AV nehemias ablation. This did not improve her dyspnea on exertion. She underwent AV replacement. She was finally referred to palliative cares and comfort cares. her daughter sought another opinion and she went to the [facility] and recommended tricuspid valve repair using Latif FORMA spacer device . Before the definitive treatment, she had to undergo PCI and stent placement. This happened on May 4. She finally underwent tricuspid valve repair on June 1. She also underwent pocket revision and fixation of the forma system.  Has had multiple admission over the last year for diastolic CHF exacerbations        Hyperlipidemia LDL goal <100  - continue PTA Zetia      Benign essential hypertension, BP goal <150/90  Restarting demadex, spironolactone and k      Primary pulmonary hypertension (H)      Memory loss-probable Alzheimers  - continue PTA Aricept      F: locked  N: regular diet  DVT Prophylaxis: on coumadin      Code Status: DNR / DNI      Discussion  Complex case   Pt with dementia and chronic heart issues with severe weight loss in past year, left pleural effusion (history of recurrent pleural effusions) and hypercalcemia.  Parathyroid hormone is low--awaiting parathyroid related peptide.  Also has KYLE which is improving.   New lung " nodule- may be post inffammatory-needs three month recheck  New new moderate pelvicalyceal dilatation- awaiting repeat discussion with urology  Restarting demadex , spironolactone and kcl.  Spep, upep, esr, PTH related peptide pending    Ultimately pt is in declining health.  Daughter who cares for pt wants her to back home with her.  I think palliative and or hospice reasonalble  Will dc when dc plan is formulated  I can't ro malignancy as a cause of high calcium yet.    Will try to determine best dc plan---probably will initiate hospice or palliative upon dc--need to sit down with primary to determine what levels of care desired  ? Should they proceed with endo or urology evals, lung nodule fu etc.    I have asked pharmacy to see if spironolactone increases calcium    11:30 AM discussed with Dr Ellis urology  She thinks renal findings may not be clinically significant  Family can pursue  Urology out pt consult if necessary

## 2018-01-06 NOTE — PLAN OF CARE
Problem: Patient Care Overview  Goal: Plan of Care/Patient Progress Review  Outcome: Improving  Pt still feels weak.  No complaints of pain.  Ambulating in room with walker and assist of one.  Vitals are stable.  Pt is working with therapies.  Family decided they would like to take pt home with home care at discharge, likely tomorrow.

## 2018-01-07 NOTE — PLAN OF CARE
Problem: Pneumonia (Adult)  Goal: Signs and Symptoms of Listed Potential Problems Will be Absent, Minimized or Managed (Pneumonia)  Signs and symptoms of listed potential problems will be absent, minimized or managed by discharge/transition of care (reference Pneumonia (Adult) CPG).   Pt remains extremely weak.  When pt needs to use the bathroom earlier, she asked for the commode.  I suggested we walk to the bathroom with her walker and she was able to do it well.  She is steady, just feels so weak.  Pt sat in chair for 2 hours without difficulty.  Encouraging her to try a little more every time to work up her strength.  Pt is now positioning herself in bed, doing well.  Very little coughing tonight.  REMINGTON Odonnell RN

## 2018-01-07 NOTE — PROGRESS NOTES
[facility]      Subjective:  Stronger  No new complaints    Review of Systems:  C: NEGATIVE for fever, chills, change in weight  E/M: NEGATIVE for ear, mouth and throat problems  RESP:chronic sob  CV: NEGATIVE for chest pain, palpitations or peripheral edema    Physical Exam:  Vitals Were Reviewed    Patient Vitals for the past 16 hrs:   BP Temp Temp src Heart Rate Resp SpO2 Weight   01/07/18 0739 119/48 97.6  F (36.4  C) Oral 70 18 97 % -   01/07/18 0605 - - - - - - 76.6 kg (168 lb 14 oz)   01/07/18 0349 146/68 97  F (36.1  C) Oral 71 18 97 % -   01/07/18 0000 120/55 97.4  F (36.3  C) Oral 66 18 95 % -         Intake/Output Summary (Last 24 hours) at 01/07/18 1200  Last data filed at 01/07/18 0958   Gross per 24 hour   Intake              520 ml   Output             1550 ml   Net            -1030 ml       GENERAL APPEARANCE: healthy, alert and no distress  RESP: decrease left base  CV: regular rate and rhythm, normal S1 S2, no S3 or S4 and no murmur, click or rub   ABDOMEN: soft, nontender, no HSM or masses and bowel sounds normal  SKIN: clear without significant rashes or lesions  NEURO: stronger    Lab:  Recent Labs   Lab Test  01/07/18   0620  01/06/18   0542   NA  139  140   POTASSIUM  3.6  3.4   CHLORIDE  102  104   CO2  27  27   ANIONGAP  10  9   GLC  94  93   BUN  25  28   CR  1.24*  1.27*   SHANICE  9.7  9.7     CBC RESULTS:   Recent Labs   Lab Test  01/07/18   0620  01/06/18   0542   WBC  6.7  6.0   RBC  4.58  4.54   HGB  12.6  12.6   HCT  38.8  38.8   PLT  153  148*       Results for orders placed or performed during the hospital encounter of 01/03/18 (from the past 24 hour(s))   INR   Result Value Ref Range    INR 2.57 (H) 0.86 - 1.14   CBC with platelets   Result Value Ref Range    WBC 6.7 4.0 - 11.0 10e9/L    RBC Count 4.58 3.8 - 5.2 10e12/L    Hemoglobin 12.6 11.7 - 15.7 g/dL    Hematocrit 38.8 35.0 - 47.0 %    MCV 85 78 - 100 fl    MCH 27.5 26.5 - 33.0 pg    MCHC 32.5 31.5 - 36.5  g/dL    RDW 15.9 (H) 10.0 - 15.0 %    Platelet Count 153 150 - 450 10e9/L   Basic metabolic panel   Result Value Ref Range    Sodium 139 133 - 144 mmol/L    Potassium 3.6 3.4 - 5.3 mmol/L    Chloride 102 94 - 109 mmol/L    Carbon Dioxide 27 20 - 32 mmol/L    Anion Gap 10 3 - 14 mmol/L    Glucose 94 70 - 99 mg/dL    Urea Nitrogen 25 7 - 30 mg/dL    Creatinine 1.24 (H) 0.52 - 1.04 mg/dL    GFR Estimate 41 (L) >60 mL/min/1.7m2    GFR Estimate If Black 50 (L) >60 mL/min/1.7m2    Calcium 9.7 8.5 - 10.1 mg/dL       Assessment and Plan:    Natalie Hair is a 85 year old female with PMH significant for HTN, CAD, chronic diastolic CHF, severe right sided heart failure, hx of AVR, HLD, primary pulmonary HTN, Severe tricuspid insufficiency, and dementia who now presents with nausea, vomiting and weakness.      Left pleural effusion -doubt  Community Acquired Pneumonia  Presents with nausea, vomiting and weakness. VSS on arrival. CXR on admission revealed possible infiltrate on lateral view.  WBC normal.  Started on antibiotic coverage with Ceftriaxone plus azithromycin  Ct shows pleural effusion (history of recurrent effusions), procalcitonin is low-stop      Weakness/weight loss--? Secondary to hyercalcemia, malignancy is possible, no sign hyper parathyroid, possibly aggravated by po calcium intake  Mild-Moderate (protein-calorie) Malnutrition  improved      Nausea and vomiting  resolved      Hypercalcemia/decrease parathyroid hormone  Calcium 12.8 on arrival. This was 10.4 on 12/14/2017. Weight curve trending down, but not dramatically so in the last 2 months.  Daughter notes 90 pound weight loss in 15 months.  DDx: malignancy related versus volume contraction due to above  January 5 2018 calcium 12.8-11.2-10.1-9.7 back on  Demedex. Parathyroid hormone related peptide, spep, upep-pending, pt does have lung nodule-discussed with DR Marion might be just post inflammatory-he doesn't recommend bx, repeat ct three  "months.  January 7, 2018 parathyroid hormone related peptide -pend, calcium normal      13 mm nodule in the right lower lobe  Discussed with Dr Marion, he thinks maybe post inflammatory, doesn't recommend bx now, recommends three month fu      new moderate pelvicalyceal dilatation involving the left kidney. This raises the possibility of a UPJ obstruction.  Discussed with urology yesterday, US and lasix renogram ordered--call placed to urology to discuss.  January 7, 2018 urology felt this could be followed, out pt fu if desired      KYLE (acute kidney injury) (H)  Baseline creatinine 1.0 - 1.2.   1.63 on arrival with BUN of 31- creat 1.48-1.36-1.27-1.24      Chronic atrial fibrillation (H)  Pacemaker in Situ placed 12/2016, s/p AV nehemias ablation  - continue PTA warfarin, pharmacy to dose  - continue PTA metoprolol      Cardiovascular disease  Severe Right Heart Failure  Thoracentesis x3 for treatment of recurrent pleural effusion  Follow with Dr. Emelyn Bingham; last seen 11/21/2017.  Has also had second multiple opinions at [facility]. S/p CABG in 2002 for preop for AVR.  Had additional stent placed at [place] 05/04/2017. Last Echo in [place] system 07/27/2017; \"severe tricuspid valve regurgitation, severe right ventricular enlargement, severe decrease in right ventricular systolic function, left EF of 64%.\"  Patient has hx of recurrent pleural effusion s/p thoracentesis x3  - continue PTA Plavix         Chronic diastolic congestive heart failure (H)   Aortic Valve Replacement, 2002 ([facility])  Tricuspid valve insufficiency, unspecified etiology, s/p FORMA valve placement [place] 07/2017  Note from previous records-This had led to a series of testing/hospitalizations/procedures starting with the discovery of atrial fibrillation eventually needing a pacemaker implant as well as AV nehemias ablation. This did not improve her dyspnea on exertion. She underwent AV replacement. She was finally referred to palliative cares " and comfort cares. her daughter sought another opinion and she went to the [facility] and recommended tricuspid valve repair using Latif FORMA spacer device . Before the definitive treatment, she had to undergo PCI and stent placement. This happened on May 4. She finally underwent tricuspid valve repair on June 1. She also underwent pocket revision and fixation of the forma system.  Has had multiple admission over the last year for diastolic CHF exacerbations          Hyperlipidemia LDL goal <100  - continue PTA Zetia      Benign essential hypertension, BP goal <150/90  Restarting demadex, spironolactone and k      Primary pulmonary hypertension (H)      Memory loss-probable Alzheimers  - continue PTA Aricept      F: locked  N: regular diet  DVT Prophylaxis: on coumadin      Code Status: DNR / DNI      Discussion  Complex case   Pt with dementia and chronic heart issues with severe weight loss in past year, left pleural effusion (history of recurrent pleural effusions) and hypercalcemia.  Parathyroid hormone is low--awaiting parathyroid related peptide.  Also has KYLE which is improving.   New lung nodule- may be post inffammatory-needs three month recheck  New new moderate pelvicalyceal dilatation- urology feels this could be followed  Spep, upep,  PTH related peptide pending     Ultimately pt is in declining health.  Daughter who cares for pt wants her to back home with her.  They are thinking about palliative or hospice care  I can't ro malignancy as a cause of high calcium yet.     I have asked pharmacy to see if spironolactone increases calcium--they tell me it decreases ca levels

## 2018-01-07 NOTE — PROGRESS NOTES
[facility] DISCHARGE NOTE    Patient discharged to home at 1:45 PM via wheel chair. Accompanied by daughter and staff. Discharge instructions reviewed with patient and daughter, opportunity offered to ask questions. Prescriptions - None ordered for discharge. All belongings sent with patient.    Brooke Gordillo

## 2018-01-07 NOTE — DISCHARGE INSTRUCTIONS
Stop oscal and vit D pills.  See Dr Mcguire in the next week talk about-  1) whether you want palliative or hospice care  2) whether you want to pursue the lung nodule (would typically need a three month CT followup)  3) whether you want to see urology   4) to follow the calcium and check the labs from the hospital that are not back    Warfarin Instructions  Your INR is 2.57 today. Goal 2.5-3.5  Please continue your home dose of warfarin:6 mg Tues, Sat, and 4 mg all other days of the week.  Please follow up with the coumadin clinic this week as you have been ill and to verify you are still in your goal range.

## 2018-01-07 NOTE — PLAN OF CARE
Problem: Patient Care Overview  Goal: Plan of Care/Patient Progress Review  Occupational Therapy Discharge Summary    Reason for therapy discharge:    Discharged to home with home therapy.    Progress towards therapy goal(s). See goals on Care Plan in [facility] electronic health record for goal details.  Goals partially met.  Barriers to achieving goals:   limited tolerance for therapy.    Therapy recommendation(s):    Continued therapy is recommended.  Rationale/Recommendations:  for increased overall act julienne and strenght for ind with transfers.   Marizol Saeed OTR/L

## 2018-01-07 NOTE — PLAN OF CARE
Problem: Patient Care Overview  Goal: Plan of Care/Patient Progress Review  Physical Therapy Discharge Summary    Reason for therapy discharge:    Discharged to home with home therapy.    Progress towards therapy goal(s). See goals on Care Plan in [facility] electronic health record for goal details.  Goals partially met.  Barriers to achieving goals:   limited tolerance for therapy.    Therapy recommendation(s):    possible home PT for strengthening to reduce burden of care

## 2018-01-07 NOTE — PHARMACY-ANTICOAGULATION SERVICE
Clinical Pharmacy- Warfarin Discharge Note      Anticoagulation Dose History     Recent Dosing and Labs Latest Ref Rng & Units 12/13/2017 12/27/2017 1/3/2018 1/4/2018 1/5/2018 1/6/2018 1/7/2018    Warfarin 1 mg - - - - 1 mg - - -    Warfarin 2 mg - - - 2 mg - 2 mg - -    Warfarin 4 mg - - - - - - 4 mg -    INR 0.86 - 1.14 - 3.3 3.40(H) 3.56(H) 3.04(H) 2.61(H) 2.57(H)    INR 0.86 - 1.14 2.5(A) - - - - - -    INR Point of Care 0.86 - 1.14 - - - - - - -              Discharged patient on home dose  The patient should have an INR checked this week.

## 2018-01-07 NOTE — PROGRESS NOTES
Pt appears stronger this near end of my shift, as she is stronger, more articulate ans her voice is stronger.  Pt felt itching at abd and groin folds so antifungal powder washed off and ABD fold and groin folds have primitivo cream on them.  No change in size of fissure.  The fissure was charted on on 1/3/18 so would have been preexisting.  REMINGTON Coley=katina SAGE

## 2018-01-07 NOTE — PLAN OF CARE
Problem: Patient Care Overview  Goal: Plan of Care/Patient Progress Review  Outcome: Improving  Patient is alert and oriented to self. Needs reminders to place and time. She had demedex at bedtime and has been up to bathroom every hour to urinate. Had bowel movement this am also.

## 2018-01-07 NOTE — PROGRESS NOTES
Transition Communication Hand-off for Care Transitions to Next Level of Care Provider    Name: Natalie Hair  MRN #: [id number]  Primary Care Provider: Mimi Mcguire  Primary Care MD Name: Mimi Mcguire  [facility] [address]  Primary Care Clinic Name: [facility]  Reason for Hospitalization:  Dehydration [E86.0]  Serum calcium elevated [E83.52]  Encephalopathy [G93.40]  Lung infiltrate [R91.8]  Nausea and vomiting, intractability of vomiting not specified, unspecified vomiting type [R11.2]  Admit Date/Time: 1/3/2018  3:20 PM  Discharge Date: 1/8/18  Payor Source: Payor: MEDICA / Plan: MEDICA PRIME SOLUTION / Product Type: Indemnity /     Readmission Assessment Measure (NUNO) Risk Score/category: Average        Reason for Communication Hand-off Referral: Admission diagnoses: PN    Discharge Plan:  Return home with daughter, Digna and family with [facility] ([phone] Fax: [phone]).  Family will provide 24/7 care.       Concern for non-adherence with plan of care:   Y/N No  Discharge Needs Assessment:  Needs       Most Recent Value    Equipment Currently Used at Home hospital bed, raised toilet, walker, rolling, wheelchair, manual, grab bar, shower chair    Transportation Available family or friend will provide    Home Care [facility] [phone], Fax: [phone]        Follow-up plan:  Future Appointments  Date Time Provider Department Center   2/9/2018 9:30 AM Emelyn Bingham MD [facility]       Key Recommendations:  Provided resources for palliative home care and hospice.      Catia Jensen RN, Care Coordinator

## 2018-01-07 NOTE — PROGRESS NOTES
Discharge Planner OT   Patient plan for discharge: Home with assist from daughter and HH  Current status: Pt fatigues easily. Daughter who is pts PCA and lives with Pt present during tx. Daughter observed transferring pt with good safety and tech. Daughter wanting to take mother home today. Min A sit to stand with CGA to amb to bathroom and for transfer and toileting  skills. Pt julienne 3 1/2 min stand at sink with CGA to brush teeth needing to sit due to fatigue and c/o back pain  Barriers to return to prior living situation: pt easily fatigued  Recommendations for discharge: Home with family with HH  Rationale for recommendations: Pt would benefit from continued TH for increased overall strength and act julienne and transfer safety and ind       Entered by: Marizol Saeed 01/07/2018 1:36 PM

## 2018-01-08 NOTE — MR AVS SNAPSHOT
Natalie Hair   1/8/2018   Anticoagulation Therapy Visit    Description:  85 year old female   Provider:  Susan Caruso, RN   Department:  [facility]           INR as of 1/8/2018     Today's INR No new INR was available at the time of this encounter.      Anticoagulation Summary as of 1/8/2018     INR goal 2.5-3.5   Today's INR No new INR was available at the time of this encounter.   Full instructions 6 mg on Tue, Sat; 4 mg all other days   Next INR check 1/10/2018    Indications   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]  Long term current use of anticoagulant therapy [Z79.01]         Description     'Discharged patient on home dose  The patient should have an INR checked this week.       Electronically signed by Demetria Mullen [facility] at 1/7/2018  2:28 PM'          January 2018 Details    Sun Mon Tue Wed Thu Fri Sat      1               2               3               4               5               6                 7               8      4 mg   See details      9      6 mg         10            11               12               13                 14               15               16               17               18               19               20                 21               22               23               24               25               26               27                 28               29               30               31                   Date Details   01/08 This INR check       Date of next INR:  1/10/2018         How to take your warfarin dose     To take:  4 mg Take 1 of the 4 mg tablets.    To take:  6 mg Take 1.5 of the 4 mg tablets.

## 2018-01-08 NOTE — PROGRESS NOTES
Clinic Care Coordination Contact  OUTREACH    Referral Information:  Referral Source: CTS  Reason for Contact: Pt was hospitalized due to possible community acquired pneumonia, weakness, Nausea and vomiting, and hypercalcemia.  Care Conference: No.  Andrea placed call and spoke with pt's daughter and care giver, Digna.     Universal Utilization:   ED Visits in last year: 5  Hospital visits in last year: 2  Last PCP appointment: 12/14/17  Missed Appointments: 2  Concerns: yes  Multiple Providers or Specialists: yes    Clinical Concerns:  Current Medical Concerns: Per Digna, pt was a bit nauseous this morning, but had oatmeal for lunch is doing better now and wants her fingernails painted.    Current Behavioral Concerns:  Pt has anxiety at times, especially when she is having a bad day due to her dementia/memory loss    Education Provided to patient: ANDREA and Digna discussed how Digna moved the pt into her home almost 2 years ago and became her full time caregiver, but that some of Digna's siblings do not appreciate her work and dedication to her mom.  ANDREA and Digna discussed how another person's perception can ruin a good day, so truly focus on the good work & remind yourself daily of that good work.  Digna provided examples of:  Pt never goes to bed at night without a back rub.  Pt gets out of the shower to dryer-warmed clothing each time (unless it's summertime) pt always has pretty nails as pt says that indicates someone who is well taken care of.   By the end of our conversation, Digna shared she felt much better about how her eldest sibling was treating her and she would try to ignore her comments.    Clinical Pathway Name: None    Medication Management:  Digna sets up and provided medications daily     Functional Status:  Mobility Status: Dependent/Assisted by Another  Equipment Currently Used at Home: hospital bed, raised toilet, walker, rolling, wheelchair, manual, grab bar, shower chair, commode (Digna to purchase  a commode/bed pan this week)  Transportation: Digna or her  provide transportation.         Psychosocial:  Current living arrangement:: I live in a private home with family (Son and Daughter In law, Digna, in [place])  Financial/Insurance: Social Security/Medica & [place] (pt's MA pays for full time PCA services that employs Digna)       Resources and Interventions:  Current Resources: Home Care; County Worker  Senior Linkage Line Referral Placed: 05/26/17  Advanced Care Plans/Directives on file:: No     Goals:   Goal 1 Statement: I will follow up with my Home PT plan ongoing.  Goal 1 Supportive Steps: SW and Digna discussed options such as Home Care, RN, PT and OT are ordered and will be calling soon.  Goal 1 Progression Percent: 70%  Goal 1 Progression Date: 01/08/18     Barriers: Pt's ability to engage with PT/OT services to recoup as much strength as she can, per daughterDigna.  Strengths: Pt has amazing daughter & son in law for support.    Patient/Caregiver understanding: Digna knows she can call this writer for support.  Frequency of Care Coordination: monthly, PRN (pending home care input)  Upcoming appointment: 01/12/18     Plan: SW to continue to assist with social support in the setting of daughter caring for an elderly parent in her home.    Jessie Gan  Social Work Care Coordinator  [facility]  [phone]

## 2018-01-08 NOTE — DISCHARGE SUMMARY
HISTORY OF PRESENTING ILLNESS:  Natalie Hair is a complex female with hypertension, coronary artery disease, chronic diastolic congestive failure, severe right-sided heart failure, recurrent pleural effusions that required drainage, aortic valve replacement, hyperlipidemia, primary pulmonary hypertension, severe tricuspid insufficiency, and worsening dementia.  The patient developed vomiting and diffuse weakness prior to admission.  She has had a 90-pound weight loss since 09/2016.  She has been losing 1-2 pounds a week.      HOSPITAL COURSE:  Upon admission, the patient was found to have an opacity at the left base.  It was assumed that this might be pneumonia, and she was started on CAP antibiotics.  She had profound weakness, and was found to have a calcium of 12.8; it had been 10.4 on 12/14/2017.  She had some acute kidney injury with a creatinine of 1.63 on arrival.      The patient was admitted.  In terms of her pulmonary status, CT showed that she had a small left pleural effusion.  She has been having recurrent effusions, apparently due to right heart failure, and has had thoracentesis done.  This effusion looked small enough that it did not need thoracentesis, and she did not need oxygen.  She was initially started on antibiotics, but these were discontinued.  Procalcitonin was low, so there was no sign of infection.      In terms of her hypercalcemia, her parathyroid hormone came back low.  This would suggest that her hypercalcemia was not due to hyperparathyroidism.  Malignancy was thought to be a possibility.  It is also possible that she developed this from vitamin D intoxication.  She is on vitamin D, and on Os-Paul which has vitamin D and calcium.  These were stopped.  Vitamin D level is still pending.  PTH-related peptide test, which becomes elevated in patients with certain malignancies, is also pending.      On her CT scan, she does have a nodule in her right chest.  She had a highly abnormal CT  scan of her lungs six months ago, and I have reviewed this with Dr. Marion who felt that it most likely was some type of postinflammatory nodule.  We cannot rule out malignancy.  I talked to the patient's family about this.  If they want to pursue this, a 3-month CT follow-up would be in order.  We found really no other overt evidence of malignancy.  Her calcium went down with saline infusion and some Lasix, and ultimately stayed down, and was 9.7 at the time of discharge.  Her strength did improve to a good degree, so I think her loss of strength was related to hypercalcemia.      We did a CT scan to look for any evidence of malignancy, and we did find a new pelvicalyceal dilatation involving the left kidney.  I talked to Urology, and they recommended a Lasix nuclear medicine renogram.  This also showed a component of obstruction suggested in the left kidney.  An ultrasound again showed moderate left hydronephrosis.  The cause of the obstruction was unclear.  I discussed this on two occasions with Urology.  They were not convinced that this was really causing clinical problems, and they felt that an outpatient Urology follow-up would be reasonable if the family wanted to pursue this.      The patient's clinical status improved in the hospital.  She became stronger.  Her daughter has been caring for her and wants to take her home.  She is interested in more of a palliative or comfort approach, and possibly even hospice care, so I am going to her follow up with Dr. iMmi Mcguire to talk about this, and to talk about how aggressive they want to be on future evaluations.      ASSESSMENT:   1.  Small left pleural effusion, probably related to right heart failure.  Community-acquired pneumonia was ruled out.   2.  Hypercalcemia causing weakness, etiology unclear, does not appear to be due to hyperparathyroidism.  Malignancy is still possible.  Vitamin D intoxication is possible also.   3.  13 mm nodule in the right lower  lobe of the lung.  Three-month CT follow-up suggested if family wants to pursue this.   4.  New moderate pelvicalyceal dilatation involving the left kidney.  Urology is not sure if this is clinically significant.  Outpatient Urology follow-up suggested if family wants to pursue this.   5.  Acute kidney injury.  Discharge creatinine 1.24.   6.  Chronic atrial fibrillation, with in situ pacemaker, AV nehemias ablation, on warfarin and metoprolol.   7.  Severe right heart failure with pulmonary hypertension and history of recurrent thoracentesis for recurrent pleural effusions.   8.  Chronic diastolic congestive heart failure.   9.  Aortic valve replacement.   10.  Tricuspid valve insufficiency.   11.  Hyperlipidemia.   12.  Benign essential hypertension.   13.  Worsening dementia, probably Alzheimer's.   14.  mild protein calorie malnutrition.      PLAN:  This patient is complex, and she has a worsening clinical status in terms of weight loss.  She has hypercalcemia; vitamin D intoxication is certainly possible here, but hypercalcemia malignancy is also possible.  The family understands that.  We are still waiting on parathyroid hormone-related peptide.  If this is elevated, it might suggest malignancy.  She is going to stay off her Os-Paul, and also her additional vitamin D supplementation.  Ultimately, the family is really interested more in comfort, palliative, or even hospice care.  They are going to talk to her primary doctor about this.  I think a Urology follow-up is optional, follow-up of her right chest nodule is optional, and I think other evaluation of her hypercalcemia is optional.  It appears to me that this patient is declining in multiple systems, and her long-term prognosis may not be good, and I did explain this to the family.  They seemed to be understanding of this.  She will continue on all of her regular medications other than the Os-Paul and the vitamin D.  She is to follow up with primary care next  week.     Discharge Medication List as of 1/7/2018  1:34 PM      CONTINUE these medications which have NOT CHANGED    Details   potassium chloride (K-TAB,KLOR-CON) 10 MEQ tablet TAKE 1 TABLET(10 MEQ) BY MOUTH TWICE DAILY, Disp-180 tablet, R-1, E-Prescribe      warfarin (COUMADIN) 4 MG tablet Take 6 mg on Tue, Sat; 4 mg all other days or as directed by the Anticoagulation Clinic., Disp-40 tablet, R-2, Historical      torsemide (DEMADEX) 20 MG tablet Take 1 tablet (20 mg) by mouth 2 times daily Patient needs labs before next refill., Disp-60 tablet, R-0, E-Prescribe      donepezil (ARICEPT) 5 MG tablet TAKE 1 TABLET(5 MG) BY MOUTH AT BEDTIME, Disp-90 tablet, R-0, E-Prescribe      !! order for DME always discreet underwear - 2 pairs/day  - 60/month  flushable cleansing cloths ( wipes)  - several packages/month     For incontinence and peuq-paoeUmbm-3 Month, R-3, Local Print      !! acetaminophen (TYLENOL) 325 MG tablet Take 1-2 tablets (325-650 mg) by mouth every 6 hours as needed for mild pain or pain, Disp-100 tablet, R-1, Local Print      ezetimibe (ZETIA) 10 MG tablet Take 1 tablet (10 mg) by mouth daily, Disp-90 tablet, R-3, E-Prescribe      triamcinolone (KENALOG) 0.1 % cream Apply sparingly to affected area two times daily for 7 days.Disp-30 g, [id number]      !! order for DME Mepilex sacral dressingsDisp-10 Units, R-3, Local Print      clopidogrel (PLAVIX) 75 MG tablet Take 1 tablet (75 mg) by mouth daily, Disp-90 tablet, R-0, E-Prescribe      spironolactone (ALDACTONE) 25 MG tablet Take 1 tablet (25 mg) by mouth 2 times daily, Disp-60 tablet, R-1, No Print Out      DiphenhydrAMINE HCl, Sleep, (ZZZQUIL PO) Take 50 mg by mouth nightly as needed Alternates with Tylenol PM, Historical      Diphenhydramine-APAP, sleep, (TYLENOL PM EXTRA STRENGTH PO) Take 2-3 tablets by mouth nightly as needed , Historical      alendronate (FOSAMAX) 70 MG tablet TAKE 1 TABLET BY MOUTH ONCE WEEKLY ON EMPTY STOMACH, Disp-12  tablet, R-0, E-Prescribe      omega 3 1000 MG CAPS Take 1 g by mouth daily, Disp-90 capsule, R-3, E-Prescribe      multivitamin, therapeutic with minerals (MULTI-VITAMIN) TABS tablet Take 1 tablet by mouth daily, Historical      metoprolol (LOPRESSOR) 25 MG tablet Take 0.5 tablets (12.5 mg) by mouth 2 times daily, Disp-90 tablet, R-3, E-Prescribe      !! order for DME Equipment being ordered: Hospital Bed  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 -ongoing symptoms of lower extremity edema,shortness of breath,cough  sacral pressure sores within last 6 months    An electric hospital bed to allow for increase in head of  bed elevation and for ease in wheelchair transfers   Length of need: lifelong  NPI - [id number] Device, R-0, Local Print      cetirizine (ZYRTEC) 5 MG tablet Take 1 tablet (5 mg) by mouth daily, Disp-30 tablet, R-3, E-Prescribe      polyethylene glycol (MIRALAX/GLYCOLAX) powder Take 1 capful by mouth every morning Takes 3/4 of a capful, Historical      senna-docusate (SENNA S) 8.6-50 MG per tablet Take 2 tablets by mouth 2 times daily, Historical      !! order for DME Equipment being ordered: Walker with a seatDisp-1 Package, R-0, Local Print      nitroglycerin (NITROSTAT) 0.4 MG SL tablet Place 1 tablet (0.4 mg) under the tongue every 5 minutes as needed for chest pain, Disp-25 tablet, R-1, Local Print      !! ACETAMINOPHEN PO Take 1,000 mg by mouth every 8 hours as needed for pain , Historical       !! - Potential duplicate medications found. Please discuss with provider.      STOP taking these medications       calcium carb 1250 mg, 500 mg [place],/vitamin D 200 units (OSCAL WITH D) 500-200 MG-UNIT per tablet Comments:   Reason for Stopping:         Cholecalciferol (VITAMIN D) 2000 UNITS CAPS Comments:   Reason for Stopping:             Unresulted Labs Ordered in the Past 30 Days of this Admission     Date and Time Order Name Status Description    1/5/2018 1002 PTH Related Peptide Test In  process     2018 1002 Protein electrophoresis random urine In process     2018 1002 Protein electrophoresis In process     2018 1114 25 Hydroxyvitamin D2 and D3 In process                TIME SPENT:  Greater than 30 minutes spent on this.         TONY MORGAN MD             D: 2018 12:20   T: 2018 08:31   MT: SULEMAN#[id number]      Name:     ANA RILEY   MRN:      [id number]        Account:        [id number]   :      1932           Admit Date:                                       Discharge Date: 2018      Document: [id number]       cc: Mimi Mcguire MD

## 2018-01-08 NOTE — PROGRESS NOTES
ANTICOAGULATION FOLLOW-UP CLINIC VISIT    Patient Name:  Natalie Hair  Date:  1/8/2018  Contact Type:  hospital discharge/chart review    SUBJECTIVE:     Patient Findings     Positives Hospital admission (1/3 - 1/7), OTC meds (OSCAL WITH D and Vit D discontinued)    Comments 1/7/18: 'Patient discharging today.     Warfarin Instructions     Your INR is 2.57 today. Goal 2.5-3.5   Please continue your home dose of warfarin:6 mg Tues, Sat, and 4 mg all other days of the week.     Please follow up with the coumadin clinic this week as you have been ill and to verify you are still in your goal range.           OBJECTIVE    INR   Date Value Ref Range Status   01/07/2018 2.57 (H) 0.86 - 1.14 Final       ASSESSMENT / PLAN  No question data found.  Anticoagulation Summary as of 1/8/2018     INR goal 2.5-3.5   Today's INR No new INR was available at the time of this encounter.   Maintenance plan 6 mg (4 mg x 1.5) on Tue, Sat; 4 mg (4 mg x 1) all other days   Full instructions 6 mg on Tue, Sat; 4 mg all other days   Weekly total 32 mg   No change documented Susan Caruso RN   Plan last modified Christa Guzman RN (12/6/2017)   Next INR check 1/10/2018   Priority INR   Target end date Indefinite    Indications   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]  Long term current use of anticoagulant therapy [Z79.01]         Anticoagulation Episode Summary     INR check location     Preferred lab     Send INR reminders to [facility] [facility] POOL    Comments * no bandaid. Home Care as of 12/18/17      Anticoagulation Care Providers     Provider Role Specialty Phone number    Mimi Mcguire MD [facility] [phone]            See the Encounter Report to view Anticoagulation Flowsheet and Dosing Calendar (Go to Encounters tab in chart review, and find the Anticoagulation Therapy Visit)      Susan Caruso RN

## 2018-01-09 NOTE — PROGRESS NOTES
ANTICOAGULATION FOLLOW-UP CLINIC VISIT    Patient Name:  Natalie Hair  Date:  1/9/2018  Contact Type:  Telephone/ writer spoke with Santos at [facility]    SUBJECTIVE:     Patient Findings     Positives Other complaints (family stressors)    Comments Pt was in the hospital recently (see prior [facility] notes). No major med changes but it was presumed she might have pneumonia. No abx upon discharge. Pt has only had 23 mg/week at this point and INR increased 1.3 points in 2 days when back on maintenance dose so will decrease dosing the next few days and recheck on Friday.           OBJECTIVE    INR   Date Value Ref Range Status   01/09/2018 3.9  Final       ASSESSMENT / PLAN  INR assessment SUPRA    Recheck INR In: 2 DAYS    INR Location Homecare INR      Anticoagulation Summary as of 1/9/2018     INR goal 2.5-3.5   Today's INR 3.9!   Maintenance plan 6 mg (4 mg x 1.5) on Tue, Sat; 4 mg (4 mg x 1) all other days   Full instructions 1/9: 2 mg; 1/11: 2 mg; Otherwise 6 mg on Tue, Sat; 4 mg all other days   Weekly total 32 mg   Plan last modified Christa Guzman RN (12/6/2017)   Next INR check 1/12/2018   Priority INR   Target end date Indefinite    Indications   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]  Long term current use of anticoagulant therapy [Z79.01]         Anticoagulation Episode Summary     INR check location     Preferred lab     Send INR reminders to [facility] POOL    Comments * no bandaid. Home Care as of 12/18/17      Anticoagulation Care Providers     Provider Role Specialty Phone number    Mimi Mcguire MD [facility] [phone]            See the Encounter Report to view Anticoagulation Flowsheet and Dosing Calendar (Go to Encounters tab in chart review, and find the Anticoagulation Therapy Visit)        Brianna Morley RN

## 2018-01-09 NOTE — MR AVS SNAPSHOT
Natalie GOMEZ Hair   1/9/2018   Anticoagulation Therapy Visit    Description:  85 year old female   Provider:  Brianna Morley, RN   Department:  [facility]           INR as of 1/9/2018     Today's INR 3.9!      Anticoagulation Summary as of 1/9/2018     INR goal 2.5-3.5   Today's INR 3.9!   Full instructions 1/9: 2 mg; 1/11: 2 mg; Otherwise 6 mg on Tue, Sat; 4 mg all other days   Next INR check 1/12/2018    Indications   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]  Long term current use of anticoagulant therapy [Z79.01]         January 2018 Details    Sun Mon Tue Wed Thu Fri Sat      1               2               3               4               5               6                 7               8               9      2 mg   See details      10      4 mg         11      2 mg         12            13                 14               15               16               17               18               19               20                 21               22               23               24               25               26               27                 28               29               30               31                   Date Details   01/09 This INR check       Date of next INR:  1/12/2018         How to take your warfarin dose     To take:  2 mg Take 0.5 of a 4 mg tablet.    To take:  4 mg Take 1 of the 4 mg tablets.

## 2018-01-10 NOTE — PROGRESS NOTES
[facility] now requests orders and shares plan of care/discharge summaries for some patients through [employer].  Please REPLY TO THIS MESSAGE in order to give authorization for orders when needed.  This is considered a verbal order, you will still receive a faxed copy of orders for signature.  Thank you for your assistance in improving collaboration for our patients.    ORDERS    SN 2wk2, 1w3, 3 PRNs for med management and teaching for new/changed medications, CHF teaching related to management of the diagnosis and S/S of exacerbation and when to report to MD, S/S of pneumonia and prevention measures, and home safety.     PT 1d1 to eval and treat for strength and gait training, safe transfers, and comprehensive falls risk assessment.    OT 1d1 to eval and treat for energy conservation techniques, safety with ADL/iADLs including dressing and bathing, assessment of equipment needs such as no slip bathtub mat, and assessment of memory/cognitive deficits.     MSW 1d1 to assess and follow for assessment of community resources, pt advocacy needs and advanced end of life planning.    MARGARITO SUMMARY TO MD    Pt is 85 year old female seen for resumption of home care following hospitalization related to weakness, nausea, vomiting, and possible pneumonia. Pt received IV antibiotics in hospital, but was not dischared with any new meds. Pt lives in private home with daughter and son in law. DaughterEemly is primary caregiver and a PCA with [employer]. Pt has 10 hrs PCA per day. Home is clean and free of clutter. Wood floors present fall risk. Pt instructed to wear proper footware to prevent falls as pt is a fall risk. MAHC score 8. Pt ambulatory by use of standard walker. Pt presents weak, unsteady gait. Poor balance. Alert and oriented x2. Short and long term memory are affected. Pt has episodes where she forgets where she is. Vision and hearing intact. Pt wears glasses. No hearing aides. Speech  adequate. Pt has poor appetite and does not like nutritional supplements.  No dysphagia. No dietary restrictions. Lung sounds clear in all lobes. Pt has history of thoracentesis due to fluid retention. Pt reports SOB at night, rest and with exertion during ADLs/iADLs and walking approx 20 feet. O2 WNL and on room air at MARGARITO visit. Pt does not use O2. Pt to benefit from OT assesment for need of PRN O2 or CPAP due to dyspnea and confusion noted at nighttime. Pt reports dizziness often. Heart rate regular. Pt has pacemaker. Caregiver also states pt has pacemaker machine at bedtime that MD tracks. Pt has episodes of stress incontinence. Wears adult pullups. Continent of bowel. BS active x4. Last BM 1/9/18. Skin assessment revealed nonblanchable redness of sacral area. Instructed pt and caregivers to ensure pt repositions and ambulates as tolerated to increase a tissue perfusion and prevent breakdown. Pitting edema present in bilateral lower extremities and feet. Skin intact. Pt not wearing compression stockings. Educated pt and caregiver of importance to wear stockings during day to decrease workload of heart. Caregiver records pts weight 2x/wk. Instructed caregiver to document weight daily and educated when to report to MD. Further instruction needed on S/S of CHF and management. Changes in medications include stopping calcium and vitamin D3. Pt also on long term coumadin therapy. INR via fingerstick is 3.9. See new coumadin orders. Pt is compliant with medications when setup and administered by caregiver. This has been effective.   Daughter Emely may be experiencing caregiver role strain secondary to backlash received from siblings who are less involved with pts every day needs. Caregiver would benefit from collaborative conference with home care clinicians and her siblings to discuss the physical limitations of the pt, goals, and overall prognosis. VS at MARGARITO /81, P 71, R 18, T 96.8, O2 96 on Room Air. Pt  denies pain.   Gini Amado MA Coordinator.

## 2018-01-11 NOTE — PROGRESS NOTES
Sarah is waiting for approval of orders from 1/9/18.  Could you please review and approve.  Thank you..Claire Carroll

## 2018-01-12 NOTE — PROGRESS NOTES
SUBJECTIVE:   Natalie Hair is a 85 year old female who presents to clinic today for the following health issues:      Hospital Follow-up Visit:    Hospital/Nursing Home/IP Rehab Facility: [facility]  Date of Admission: 01/03/2018  Date of Discharge: 01/07/2018  Reason(s) for Admission: weakness and vomiting             Problems taking medications regularly:  None       Medication changes since discharge: See medication list        Problems adhering to non-medication therapy:  Yes- Patient's daughet said she has not heard from physical therapy.     Summary of hospitalization:  [facility] discharge summary reviewed  Diagnostic Tests/Treatments reviewed.  Follow up needed: possibly CT scan at 3 months   Other Healthcare Providers Involved in Patient s Care:         Homecare and Hospice  Update since discharge: improved.     Post Discharge Medication Reconciliation: discharge medications reconciled and changed, per note/orders (see AVS).  Plan of care communicated with patient and family     Coding guidelines for this visit:  Type of Medical   Decision Making Face-to-Face Visit       within 7 Days of discharge Face-to-Face Visit        within 14 days of discharge   Moderate Complexity 66031 [id number]   High Complexity 00252 [id number]            1/3/18 - vomiting and weak at home - hypercalcemia and dehydration   Here with her daughter, jose, who is caregiver.     To ER - admitted - concern for pneumonia and cancer.   Ultimately no pneumonia, thought to be dehydrated secondary to vomiting  Thought to NOT be cancer but tests were pending at the time of discharge.     DC summary : reviewed in epic    Right lung nodule  Single new nodule, other enlarged lymph nodes were present before   Has ongoing left pleural effusion since forma device placement at [facility]     Hypercalcemia  Resolved during hospitalization     Urology  Some renal dilation seen on scan but no obstruction.   Discussed with urology by  trung ALCALA and was told not to be concerned   They don't want urology f/u     Labs reviewed   - multiple pending labs - spep, procalcitonin, pth peptide, esr, ck etc. All reviewed in epic. Negative findings     Weight loss  Ongoing since procedure at [facility]. Likely secondary to chronic illness     Calcium and vit d discontinued in hospital     Sundowning   Daughter says she gets confused at night  Hospital suggested a TCU and jose was worried about her going to one       Family meeting   jose's siblings are being very critical of her care of their mother.   She is going to have a family meeting.     Component      Latest Ref Rng & Units 1/6/2018 1/7/2018   Sodium      133 - 144 mmol/L 140 139   Potassium      3.4 - 5.3 mmol/L 3.4 3.6   Chloride      94 - 109 mmol/L 104 102   Carbon Dioxide      20 - 32 mmol/L 27 27   Anion Gap      3 - 14 mmol/L 9 10   Glucose      70 - 99 mg/dL 93 94   Urea Nitrogen      7 - 30 mg/dL 28 25   Creatinine      0.52 - 1.04 mg/dL 1.27 (H) 1.24 (H)   GFR Estimate      >60 mL/min/1.7m2 40 (L) 41 (L)   GFR Estimate If Black      >60 mL/min/1.7m2 48 (L) 50 (L)   Calcium      8.5 - 10.1 mg/dL 9.7 9.7   WBC      4.0 - 11.0 10e9/L 6.0 6.7   RBC Count      3.8 - 5.2 10e12/L 4.54 4.58   Hemoglobin      11.7 - 15.7 g/dL 12.6 12.6   Hematocrit      35.0 - 47.0 % 38.8 38.8   MCV      78 - 100 fl 86 85   MCH      26.5 - 33.0 pg 27.8 27.5   MCHC      31.5 - 36.5 g/dL 32.5 32.5   RDW      10.0 - 15.0 % 15.8 (H) 15.9 (H)   Platelet Count      150 - 450 10e9/L 148 (L) 153       Review of systems:  No f/c   Shortness of breath is stable  No cp  No n/v   No d/c       Problem list and histories reviewed & adjusted, as indicated.  Additional history: as documented         Patient Active Problem List   Diagnosis     Heart valve replaced     Cardiovascular disease     History of T12 compression fracture     Backache     Vitamin D deficiency     Hyperlipidemia LDL goal <100     Advanced directives,  counseling/discussion     Long term current use of anticoagulant therapy     Urinary incontinence     Unstable angina (H)     Benign essential hypertension, BP goal <150/90     Osteopenia     Primary pulmonary hypertension (H)     Lymphedema of both lower extremities     Tricuspid valve insufficiency, unspecified etiology     Chronic diastolic congestive heart failure (H)     Health Care Home     S/P CABG (coronary artery bypass graft) - 2002     Stented coronary artery - 5/4/2017 at [facility]     Memory loss     KYLE (acute kidney injury) (H)     Abnormal CXR     Chronic atrial fibrillation (H)     CAP (community acquired pneumonia)     Hypercalcemia     Nausea and vomiting     Weakness     Current Outpatient Prescriptions   Medication     potassium chloride (K-TAB,KLOR-CON) 10 MEQ tablet     warfarin (COUMADIN) 4 MG tablet     torsemide (DEMADEX) 20 MG tablet     donepezil (ARICEPT) 5 MG tablet     ezetimibe (ZETIA) 10 MG tablet     triamcinolone (KENALOG) 0.1 % cream     clopidogrel (PLAVIX) 75 MG tablet     spironolactone (ALDACTONE) 25 MG tablet     Diphenhydramine-APAP, sleep, (TYLENOL PM EXTRA STRENGTH PO)     alendronate (FOSAMAX) 70 MG tablet     omega 3 1000 MG CAPS     multivitamin, therapeutic with minerals (MULTI-VITAMIN) TABS tablet     metoprolol (LOPRESSOR) 25 MG tablet     cetirizine (ZYRTEC) 5 MG tablet     polyethylene glycol (MIRALAX/GLYCOLAX) powder     senna-docusate (SENNA S) 8.6-50 MG per tablet     order for DME     acetaminophen (TYLENOL) 325 MG tablet     order for DME     DiphenhydrAMINE HCl, Sleep, (ZZZQUIL PO)     order for DME     order for DME     nitroglycerin (NITROSTAT) 0.4 MG SL tablet     ACETAMINOPHEN PO     No current facility-administered medications for this visit.         Allergies   Allergen Reactions     Lorazepam Nausea and Vomiting     Morphine Sulfate Cr [Morphine Sulfate] Nausea and Vomiting     Crestor [Rosuvastatin] Other (See Comments)     Abdominal/Back pain      "Lidocaine GI Disturbance     Cozaar [Losartan] Rash     Rash        /60 (BP Location: Right arm, Patient Position: Sitting, Cuff Size: Adult Regular)  Pulse 69  Temp 96.2  F (35.7  C) (Tympanic)  Ht 5' 6\" (1.676 m)  Wt 166 lb 4.8 oz (75.4 kg)  SpO2 95%  BMI 26.84 kg/m2   Wt Readings from Last 4 Encounters:   01/12/18 166 lb 4.8 oz (75.4 kg)   01/07/18 168 lb 14 oz (76.6 kg)   12/14/17 166 lb 4.8 oz (75.4 kg)   11/21/17 174 lb (78.9 kg)       GENERAL - Pt is alert and oriented in no acute distress.  Affect is appropriate. Good eye contact.  Appears comfortable. Answers questions appropriately   NECK - Neck is supple w/o LA or thyromegaly  RESPIRATORY - distant breath sounds. No wheezing or crackling noted   CV-RRR, no murmurs, rubs, gallops.   EXTREM - wearing mikaela hose. Similar appearance to previous exams in clinic      Assessment/Plan -    (I50.32) Chronic diastolic congestive heart failure (H)  (primary encounter diagnosis)  Comment: severe chf - treated with experimental device this summer at [facility] for CHF \"forma\". She has been losing weight and struggling with her memory since then. Besides weight loss and ongoing shortness of breath from chf, there are no red flag symptoms for cancer.  I believe her weight loss is due to her chronic illness and not to cancer. We reviewed her hospital work up and all results together. The only possible f/u will be chest CT in 3 months, but daughter doubts they will want to do that. Natalie really wants to be comfortable and safe. She is currently pain free. She doesn't want more intervention and more hospitalizations. She and her daughter agree that a hospice consult is appropriate.  Continue all current meds. Recheck lytes today. The patient indicates understanding of these issues and agrees with the plan.   Plan: Comprehensive metabolic panel (BMP + Alb, Alk         Phos, ALT, AST, Total. Bili, TP), CANCELED:         Pneumococcal vaccine 23 valent PPSV23          " (Pneumovax) [60022], CANCELED: ADMIN: Vaccine,         Initial (22742)            (R53.1) Weakness  Comment: improved.   Plan:     (R63.4) Weight loss  Comment: I believe due to chronic disease   Plan:     (R41.3) Memory loss  Comment: likely alzheimers. They tried aricept but didn't like it and she is not on any memory aid meds at this time   Plan:     (Z23) Need for vaccination  Comment:   Plan: Pneumococcal vaccine 23 valent PPSV23          (Pneumovax) [48682], ADMIN MEDICARE:         Pneumococcal Vaccine ()            (I25.10) Cardiovascular disease  Comment:   Plan:     (Z95.2) Heart valve replaced  Comment:   Plan:     (Z79.01) Long term current use of anticoagulant therapy  Comment:   Plan:     (I89.0) Lymphedema of both lower extremities  Comment:   Plan:     (N17.9) KYLE (acute kidney injury) (H)  Comment:   Plan:       TT =  45 minutes, more than half of the time was spent discussing situation with patient and her daughter, reviewing hospital studies and labs and discussing pros/cons of further evaluation, plan, and coordinating care.      ROZINA Mcguire MD

## 2018-01-12 NOTE — PROGRESS NOTES
I approve these orders.  Also, daughter and I had discussed a hospice consult and it appears this was discussed again during her recent hospitalization.   Has this been completed and is hospice involved?     ROZINA Mcguire MD

## 2018-01-12 NOTE — MR AVS SNAPSHOT
"              After Visit Summary   1/12/2018    Natalie Hair    MRN: [id number]           Patient Information     Date Of Birth          8/28/1932        Visit Information        Provider Department      1/12/2018 1:00 PM Mimi Mcguire MD [facility]        Today's Diagnoses     Chronic diastolic congestive heart failure (H)    -  1    Weakness        Weight loss        Memory loss        Need for vaccination        Cardiovascular disease        Heart valve replaced        Long term current use of anticoagulant therapy        Lymphedema of both lower extremities        KYLE (acute kidney injury) (H)           Follow-ups after your visit        Your next 10 appointments already scheduled     Feb 09, 2018  9:30 AM CST   Return Visit with Emelyn Bingham MD   [facility] ([facility])    [address] [address] [phone]              Who to contact     Normal or non-critical lab and imaging results will be communicated to you by Ik[employer], letter or phone within 4 business days after the clinic has received the results. If you do not hear from us within 7 days, please contact the clinic through MyChart or phone. If you have a critical or abnormal lab result, we will notify you by phone as soon as possible.  Submit refill requests through [employer] or call your pharmacy and they will forward the refill request to us. Please allow 3 business days for your refill to be completed.          If you need to speak with a  for additional information , please call: [phone]             Additional Information About Your Visit        [employer] Information     [employer] lets you send messages to your doctor, view your test results, renew your prescriptions, schedule appointments and more. To sign up, go to [email or web address] . Click on \"Log in\" on the left side of the screen, which will take you to the Welcome " "page. Then click on \"Sign up Now\" on the right side of the page.     You will be asked to enter the access code listed below, as well as some personal information. Please follow the directions to create your username and password.     Your access code is: [email or web address]  Expires: 3/15/2018 12:13 PM     Your access code will  in 90 days. If you need help or a new code, please call your [place] clinic or [phone].        Care EveryWhere ID     This is your Care EveryWhere ID. This could be used by other organizations to access your [place] medical records  [id number]        Your Vitals Were     Pulse Temperature Height Pulse Oximetry BMI (Body Mass Index)       69 96.2  F (35.7  C) (Tympanic) 5' 6\" (1.676 m) 95% 26.84 kg/m2        Blood Pressure from Last 3 Encounters:   18 113/60   18 122/66   17 111/53    Weight from Last 3 Encounters:   18 166 lb 4.8 oz (75.4 kg)   18 168 lb 14 oz (76.6 kg)   17 166 lb 4.8 oz (75.4 kg)              We Performed the Following     ADMIN MEDICARE: Pneumococcal Vaccine ()     Comprehensive metabolic panel (BMP + Alb, Alk Phos, ALT, AST, Total. Bili, TP)     Pneumococcal vaccine 23 valent PPSV23  (Pneumovax) [01608]        Primary Care Provider Office Phone # Fax #    Mimi Kamron Mcguire [phone] [address]  [facility] [address]        Equal Access to Services     [facility] : Hadii pily [place], waaxda luqflorecita, qaybta elenaallyric albarado. So [facility] [phone].    ATENCIÓN: Si habla español, tiene a simmons disposición servicios gratuitos de asistencia lingüística. Llame al [phone].    We comply with applicable federal civil rights laws and [place] laws. We do not discriminate on the basis of race, color, national origin, age, disability, sex, sexual orientation, or gender identity.            Thank you!     Thank you for choosing [facility] " [facility]  for your care. Our goal is always to provide you with excellent care. Hearing back from our patients is one way we can continue to improve our services. Please take a few minutes to complete the written survey that you may receive in the mail after your visit with us. Thank you!             Your Updated Medication List - Protect others around you: Learn how to safely use, store and throw away your medicines at www.disposemymeds.org.          This list is accurate as of: 1/12/18 11:59 PM.  Always use your most recent med list.                   Brand Name Dispense Instructions for use Diagnosis    * ACETAMINOPHEN PO      Take 1,000 mg by mouth every 8 hours as needed for pain        * acetaminophen 325 MG tablet    TYLENOL    100 tablet    Take 1-2 tablets (325-650 mg) by mouth every 6 hours as needed for mild pain or pain    Back pain, unspecified back location, unspecified back pain laterality, unspecified chronicity, Chronic bilateral low back pain without sciatica       alendronate 70 MG tablet    FOSAMAX    12 tablet    TAKE 1 TABLET BY MOUTH ONCE WEEKLY ON EMPTY STOMACH    Osteopenia       cetirizine 5 MG tablet    zyrTEC    30 tablet    Take 1 tablet (5 mg) by mouth daily    Acute urticaria       clopidogrel 75 MG tablet    PLAVIX    90 tablet    Take 1 tablet (75 mg) by mouth daily    Congestive heart failure, unspecified congestive heart failure chronicity, unspecified congestive heart failure type (H), Lymphedema of both lower extremities       donepezil 5 MG tablet    ARIcept    90 tablet    TAKE 1 TABLET(5 MG) BY MOUTH AT BEDTIME    Dementia without behavioral disturbance, unspecified dementia type       ezetimibe 10 MG tablet    ZETIA    90 tablet    Take 1 tablet (10 mg) by mouth daily    Hyperlipidemia LDL goal <100       metoprolol tartrate 25 MG tablet    LOPRESSOR    90 tablet    Take 0.5 tablets (12.5 mg) by mouth 2 times daily        Multi-vitamin Tabs tablet      Take 1 tablet by mouth  daily        nitroGLYcerin 0.4 MG sublingual tablet    NITROSTAT    25 tablet    Place 1 tablet (0.4 mg) under the tongue every 5 minutes as needed for chest pain    Unspecified cardiovascular disease       omega 3 1000 MG Caps     90 capsule    Take 1 g by mouth daily    Health Care Home       * order for DME     1 Package    Equipment being ordered: Walker with a seat    Acute on chronic diastolic congestive heart failure (H), At risk for falling       * order for DME     1 Device    Equipment being ordered: Hospital Bed 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 -ongoing symptoms of lower extremity edema,shortness of breath,cough sacral pressure sores within last 6 months   An electric hospital bed to allow for increase in head of bed elevation and for ease in wheelchair transfers  Length of need: lifelong NPI - [id number]    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), Lymphedema of both lower extremities, Right-sided congestive heart failure, Tricuspid valve insufficiency, unspecified etiology, Congestive heart failure, unspecified congestive heart failure chronicity, unspecified congestive heart failure type (H), History of pressure ulcer       * order for DME     10 Units    Mepilex sacral dressings    Decubitus ulcer of sacral region, unspecified ulcer stage       * order for DME     1 Month    always discreet underwear - 2 pairs/day  - 60/month flushable cleansing cloths ( wipes)  - several packages/month   For incontinence and primitivo-care    Urinary incontinence, unspecified type, History of pressure ulcer       polyethylene glycol powder    MIRALAX/GLYCOLAX     Take 1 capful by mouth every morning Takes 3/4 of a capful        potassium chloride 10 MEQ tablet    K-TAB,KLOR-CON    180 tablet    TAKE 1 TABLET(10 MEQ) BY MOUTH TWICE DAILY    Shortness of breath on exertion, Essential hypertension       SENNA S 8.6-50 MG per tablet   Generic drug:  senna-docusate      Take 2  tablets by mouth 2 times daily        spironolactone 25 MG tablet    ALDACTONE    60 tablet    Take 1 tablet (25 mg) by mouth 2 times daily    Congestive heart failure, unspecified congestive heart failure chronicity, unspecified congestive heart failure type (H), Lymphedema of both lower extremities       torsemide 20 MG tablet    DEMADEX    60 tablet    Take 1 tablet (20 mg) by mouth 2 times daily Patient needs labs before next refill.    Lymphedema of both lower extremities, Pleural effusion       triamcinolone 0.1 % cream    KENALOG    30 g    Apply sparingly to affected area two times daily for 7 days.    Other eczema       TYLENOL PM EXTRA STRENGTH PO      Take 2-3 tablets by mouth nightly as needed        warfarin 4 MG tablet    COUMADIN    40 tablet    Take 6 mg on Tue, Sat; 4 mg all other days or as directed by the Anticoagulation Clinic.    Congestive heart failure, unspecified congestive heart failure chronicity, unspecified congestive heart failure type (H)       ZZZQUIL PO      Take 50 mg by mouth nightly as needed Alternates with Tylenol PM        * Notice:  This list has 6 medication(s) that are the same as other medications prescribed for you. Read the directions carefully, and ask your doctor or other care provider to review them with you.

## 2018-01-12 NOTE — NURSING NOTE
"Chief Complaint   Patient presents with     Hospital F/U       Initial /60 (BP Location: Right arm, Patient Position: Sitting, Cuff Size: Adult Regular)  Pulse 69  Temp 96.2  F (35.7  C) (Tympanic)  Ht 5' 6\" (1.676 m)  Wt 166 lb 4.8 oz (75.4 kg)  SpO2 95%  BMI 26.84 kg/m2 Estimated body mass index is 26.84 kg/(m^2) as calculated from the following:    Height as of this encounter: 5' 6\" (1.676 m).    Weight as of this encounter: 166 lb 4.8 oz (75.4 kg).  Medication Reconciliation: complete   Margie Abarca LPN    "

## 2018-01-12 NOTE — NURSING NOTE
Screening Questionnaire for Adult Immunization    Are you sick today?   No   Do you have allergies to medications, food, a vaccine component or latex?   No   Have you ever had a serious reaction after receiving a vaccination?   No   Do you have a long-term health problem with heart disease, lung disease, asthma, kidney disease, metabolic disease (e.g. diabetes), anemia, or other blood disorder?   Yes   Do you have cancer, leukemia, HIV/AIDS, or any other immune system problem?   No   In the past 3 months, have you taken medications that affect  your immune system, such as prednisone, other steroids, or anticancer drugs; drugs for the treatment of rheumatoid arthritis, Crohn s disease, or psoriasis; or have you had radiation treatments?   No   Have you had a seizure, or a brain or other nervous system problem?   No   During the past year, have you received a transfusion of blood or blood     products, or been given immune (gamma) globulin or antiviral drug?   No   For women: Are you pregnant or is there a chance you could become        pregnant during the next month?   No   Have you received any vaccinations in the past 4 weeks?   No     Immunization questionnaire was positive for at least one answer.  Notified Dr. Mimi Mcguire.        Per orders of Dr. Mimi Mcguire, injection of Pneumovax 23 given by Margie WELSH LPN. Patient instructed to remain in clinic for 15 minutes afterwards, and to report any adverse reaction to me immediately.     Prior to injection verified patient identity using patient's name and date of birth.    Screening performed by Margie Abarca on 1/12/2018 at 2:34 PM.

## 2018-01-13 NOTE — PHARMACY-ANTICOAGULATION SERVICE
Pt was to have an INR drawn on Friday, Jan 12th, level not drawn, I recommended they get an INR today. INR today was 2.0. I suggested that they give 6 mg today, 4 mg tomorrow and  Monday, then have an INR drawn on Tuesday, January 16th.

## 2018-01-14 PROBLEM — R93.89 ABNORMAL CXR: Status: RESOLVED | Noted: 2018-01-01 | Resolved: 2018-01-01

## 2018-01-14 PROBLEM — J18.9 CAP (COMMUNITY ACQUIRED PNEUMONIA): Status: RESOLVED | Noted: 2018-01-01 | Resolved: 2018-01-01

## 2018-01-14 PROBLEM — R11.2 NAUSEA AND VOMITING: Status: RESOLVED | Noted: 2018-01-01 | Resolved: 2018-01-01

## 2018-01-15 NOTE — PROGRESS NOTES
"  ANTICOAGULATION FOLLOW-UP CLINIC VISIT    Patient Name:  Natalie Hair  Date:  1/15/2018  Contact Type:  Chart update/Homecare spoke to Pharmacy over weekend    SUBJECTIVE:     Patient Findings     Comments Per Elaine Hernandez RPh:\" Pt was to have an INR drawn on Friday, Jan 12th, level not drawn, I recommended they get an INR today. INR today was 2.0. I suggested that they give 6 mg today, 4 mg tomorrow and  Monday, then have an INR drawn on Tuesday, January 16th.\"           OBJECTIVE    INR   Date Value Ref Range Status   01/13/2018 2.0  Final       ASSESSMENT / PLAN  No question data found.  Anticoagulation Summary as of 1/15/2018     INR goal 2.5-3.5   Today's INR 2.0! 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 Home Care as of 12/18/17      Anticoagulation Care Providers     Provider Role Specialty Phone number    Mimi Mcguire MD [facility] [phone]            See the Encounter Report to view Anticoagulation Flowsheet and Dosing Calendar (Go to Encounters tab in chart review, and find the Anticoagulation Therapy Visit)    INR sub-therapeutic, Elaine Hernandez RPh recommended warfarin 6mg 1/13/18 and then 4mg daily until recheck on 1/16/18.    Letitia Aquino RN               "

## 2018-01-15 NOTE — PROGRESS NOTES
Clinic Care Coordination Contact  Care Team Conversations    HIRAL received phone call from pt's daughter, Digna Gonzalez, requesting that a SW and RN from Home Care be present at a family care conference taking place at her home on Friday, January 15 at 1PM.    HIRAL explained to the pt that this writer does not go to pt's homes, but would be willing to meet with family in the clinic, if needed.  Digna declined, stating she wanted the meeting to happen in her home where her siblings can see her mom in action.  Digna said she wanted the Home Care RN, Felicia, to be present, as she is most familiar with her mom's cares & declining health.  HIRAL asked Digna for her to call Felicia directly and request her to be at the meeting, as this writer does not control Felicia's schedule.  Digna said she would call Felicia, but also requested that this writer call her as well.    HIRAL placed a call to Felicia, CHUCC with [facility], [phone], who shared she is unable to attend the care conference due to scheduling conflicts, but suggested that a Hospice consult occur for this pt.    HIRAL placed call to [facility], spoke with Mary, [phone], who was most helpful and said she would call Digna to inform her that a [facility] RN will be present at the family conference on Friday at 1PM.    HIRAL called Digna to inform her of above.    HIRAL routing this note to pt's PCP to request an order for [facility] to [facility].    Jessie Gan  Social Work Care Coordinator  [facility]  [phone]

## 2018-01-15 NOTE — MR AVS SNAPSHOT
Natalie PATRICIA Hair   1/15/2018   Anticoagulation Therapy Visit    Description:  85 year old female   Provider:  Letitia Aquino, RN   Department:  [facility]           INR as of 1/15/2018     Today's INR 2.0! (1/13/2018)      Anticoagulation Summary as of 1/15/2018     INR goal 2.5-3.5   Today's INR 2.0! 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]  Long term current use of anticoagulant therapy [Z79.01]         January 2018 Details    Sun Mon Tue Wed Thu Fri Sat      1               2               3               4               5               6                 7               8               9               10               11               12               13                 14               15      4 mg   See details      16            17               18               19               20                 21               22               23               24               25               26               27                 28               29               30               31                   Date Details   01/15 This INR check       Date of next INR:  1/16/2018         How to take your warfarin dose     To take:  4 mg Take 1 of the 4 mg tablets.    To take:  6 mg Take 1.5 of the 4 mg tablets.

## 2018-01-16 NOTE — TELEPHONE ENCOUNTER
I couldn't order from the in-patient encounter.   I have placed the hospice referral here.     ROZINA Mcguire MD

## 2018-01-16 NOTE — MR AVS SNAPSHOT
Natalie GOMEZ Hair   1/16/2018   Anticoagulation Therapy Visit    Description:  85 year old female   Provider:  Brianna Morley, RN   Department:  [place]           INR as of 1/16/2018     Today's INR 2.1!      Anticoagulation Summary as of 1/16/2018     INR goal 2.5-3.5   Today's INR 2.1!   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]  Long term current use of anticoagulant therapy [Z79.01]         January 2018 Details    Sun Mon Tue Wed Thu Fri Sat      1               2               3               4               5               6                 7               8               9               10               11               12               13                 14               15               16      6 mg   See details      17      4 mg         18      4 mg         19      4 mg         20      6 mg           21      4 mg         22      4 mg         23            24               25               26               27                 28               29               30               31                   Date Details   01/16 This INR check       Date of next INR:  1/23/2018         How to take your warfarin dose     To take:  4 mg Take 1 of the 4 mg tablets.    To take:  6 mg Take 1.5 of the 4 mg tablets.

## 2018-01-16 NOTE — PROGRESS NOTES
ANTICOAGULATION FOLLOW-UP CLINIC VISIT    Patient Name:  Natalie Hair  Date:  1/16/2018  Contact Type:  Telephone/ writer spoke with Suzette at [facility]    SUBJECTIVE:     Patient Findings     Positives No Problem Findings    Comments No changes or concerns. Pt will continue maintenance warfarin dosing. She does have a larger dose today anyway, which will help get her more in goal range. Pt has had 26mg/week right now due to elevated INR after pneumonia and hospitalization. She can resume usual dosing at this point.             OBJECTIVE    INR   Date Value Ref Range Status   01/16/2018 2.1  Final       ASSESSMENT / PLAN  INR assessment SUB    Recheck INR In: 1 WEEK    INR Location [facility] INR      Anticoagulation Summary as of 1/16/2018     INR goal 2.5-3.5   Today's INR 2.1!   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 Home Care as of 12/18/17      Anticoagulation Care Providers     Provider Role Specialty Phone number    Mimi Mcguire MD [facility] [phone]            See the Encounter Report to view Anticoagulation Flowsheet and Dosing Calendar (Go to Encounters tab in chart review, and find the Anticoagulation Therapy Visit)        Brianna Morley RN

## 2018-01-18 NOTE — TELEPHONE ENCOUNTER
I gave Emely the results and she states understanding. She did say that last night Natalie was complaining of itching under the skin. She gave her an anti-histamine and Tylenol PM. A few hours later gave her second dose of both medications. She applied Cetaphil lotion. Tried oatmeal packs since it not safe to get her into the bathtub. Emely states there is no rash on the skin and is wondering what could have caused this.   Advise she won't get call back until at least tomorrow and to call us if it occurs again or anything worsens. Natalie has not complained of itching today. They do have nurse coming tomorrow at 100pm to do a family care meeting.   What would you like daughter to be advised to do?     Aneta Eid, CMA

## 2018-01-19 PROBLEM — R63.4 WEIGHT LOSS: Status: ACTIVE | Noted: 2018-01-01

## 2018-01-19 NOTE — TELEPHONE ENCOUNTER
"Gisela is going to be meeting with the whole family today. She is going to be accompanied by all the at have been involved with her. The family is convinced that she will get better and will be able to take care of herself. Gisela is going to \"lay everything out\" for the family and try to help them plan for patient's future, what their goals are. Patient is not going to get better. Her health is declining and she wants to have a heart to heart talk with the whole family. Digna is the daughter that is taking care of her all the time. She is needing help and support.   Gisela is requesting a one time visit for . Please advise. Thank you.  Amy Gama RN    "

## 2018-01-19 NOTE — TELEPHONE ENCOUNTER
I think she did all the right things. I'm happy to hear that the itching resolved. Continue to use daily emollient on her skin.    Thanks, ROZINA Mcguire MD

## 2018-01-19 NOTE — TELEPHONE ENCOUNTER
I don't know what this means. I have placed several orders for home care and for hospice. Jessie goyal, our  is already involved. Can you get specifics - exactly what order should I place?     ROZINA Mcguire MD

## 2018-01-19 NOTE — TELEPHONE ENCOUNTER
Reason for Call: Request for an order or referral:    Order or referral being requested: Gisela requesting a one time order for social work.  They are having a family meeting today.  Please review and advise.  Thank you..Claire Carroll    Date needed: as soon as possible today    Has the patient been seen by the PCP for this problem? YES    Phone number Patient can be reached at:  Other phone number:  [phone]*    Best Time:  Any time    Can we leave a detailed message on this number?  YES please leave detailed message.    Call taken on 1/19/2018 at 9:38 AM by Claire Carroll

## 2018-01-24 NOTE — PROGRESS NOTES
ANTICOAGULATION FOLLOW-UP CLINIC VISIT    Patient Name:  Natalie Hair  Date:  1/24/2018  Contact Type:  Telephone/ writer spoke with Jaelyn at [facility] ([phone])    SUBJECTIVE:     Patient Findings     Positives Unexplained INR or factor level change    Comments No changes or concerns. Pt has not missed any doses or eaten more greens than usual. She does not drink boost or ensure. No medication changes. She was resumed on usual maintenance dose after last INR (prior dosing was lowered after hospitalization and illness) but INR still sub therapeutic today. Will temporarily increase dosing from 32 mg to 36 mg/week. Will need to re-evaluate at next recheck.             OBJECTIVE    INR   Date Value Ref Range Status   01/24/2018 2.2  Final       ASSESSMENT / PLAN  INR assessment SUB    Recheck INR In: 6 DAYS    INR Location Homecare INR      Anticoagulation Summary as of 1/24/2018     INR goal 2.5-3.5   Today's INR 2.2!   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  Home Care as of 12/18/17      Anticoagulation Care Providers     Provider Role Specialty Phone number    Mimi Mcguire MD [facility] [phone]            See the Encounter Report to view Anticoagulation Flowsheet and Dosing Calendar (Go to Encounters tab in chart review, and find the Anticoagulation Therapy Visit)        Brianna Morley RN

## 2018-01-24 NOTE — MR AVS SNAPSHOT
Natalie GOMEZ Hair   1/24/2018   Anticoagulation Therapy Visit    Description:  85 year old female   Provider:  Brianna Morley, RN   Department:  [place] [facility]           INR as of 1/24/2018     Today's INR 2.2!      Anticoagulation Summary as of 1/24/2018     INR goal 2.5-3.5   Today's INR 2.2!   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]  Long term current use of anticoagulant therapy [Z79.01]         January 2018 Details    Sun Mon Tue Wed Thu Fri Sat      1               2               3               4               5               6                 7               8               9               10               11               12               13                 14               15               16               17               18               19               20                 21               22               23               24      6 mg   See details      25      4 mg         26      4 mg         27      6 mg           28      6 mg         29      4 mg         30            31                   Date Details   01/24 This INR check       Date of next INR:  1/30/2018         How to take your warfarin dose     To take:  4 mg Take 1 of the 4 mg tablets.    To take:  6 mg Take 1.5 of the 4 mg tablets.

## 2018-01-29 NOTE — TELEPHONE ENCOUNTER
"METOPROLOL TARTRATE 25MG TABLETS        Last Written Prescription Date:  1/12/17  Last Fill Quantity: 90,   # refills: 3  Last Office Visit: 1/12/18  Future Office visit:    Next 5 appointments (look out 90 days)     Feb 09, 2018  9:30 AM CST   Return Visit with Emelyn Bingham MD   [facility] ([facility])    [address] [place] [id number] [phone]                   Requested Prescriptions   Pending Prescriptions Disp Refills     metoprolol tartrate (LOPRESSOR) 25 MG tablet [Pharmacy Med Name: METOPROLOL TARTRATE 25MG TABLETS] 90 tablet 0     Sig: TAKE 1/2 TABLET(12.5 MG) BY MOUTH TWICE DAILY    Beta-Blockers Protocol Passed    1/28/2018 11:55 AM       Passed - Blood pressure under 140/90    BP Readings from Last 3 Encounters:   01/12/18 113/60   01/07/18 122/66   12/14/17 111/53                Passed - Patient is age 6 or older       Passed - Recent or future visit with authorizing provider's specialty    Patient had office visit in the last year or has a visit in the next 30 days with authorizing provider.  See \"Patient Info\" tab in inbasket, or \"Choose Columns\" in Meds & Orders section of the refill encounter.               "

## 2018-01-30 NOTE — PROGRESS NOTES
ANTICOAGULATION FOLLOW-UP CLINIC VISIT    Patient Name:  Natalie Hair  Date:  1/30/2018  Contact Type:  Telephone/ [facility]  homecare    SUBJECTIVE:     Patient Findings     Positives Unexplained INR or factor level change    Comments [facility] [place] from  homecare denies changes in pt medications, diet, activity or health, reports pt is taking warfarin as directed. Pt INR decreased despite 11% increase in dose. Patient had 36mg in the previous 7 days, will increase dose to 38 mg by next INR check in 7 days (~6% increase).               OBJECTIVE    INR   Date Value Ref Range Status   01/30/2018 1.9  Final       ASSESSMENT / PLAN  No question data found.  Anticoagulation Summary as of 1/30/2018     INR goal 2.5-3.5   Today's INR 1.9!   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 POOL    Comments * no bandaid.  Home Care as of 12/18/17      Anticoagulation Care Providers     Provider Role Specialty Phone number    Mimi Mcguire MD [facility] [phone]            See the Encounter Report to view Anticoagulation Flowsheet and Dosing Calendar (Go to Encounters tab in chart review, and find the Anticoagulation Therapy Visit)        Marizol Santoro RN

## 2018-01-30 NOTE — MR AVS SNAPSHOT
Natalie Hair   1/30/2018   Anticoagulation Therapy Visit    Description:  85 year old female   Provider:  Marizol Santoro RN   Department:  [facility]           INR as of 1/30/2018     Today's INR 1.9!      Anticoagulation Summary as of 1/30/2018     INR goal 2.5-3.5   Today's INR 1.9!   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]  Long term current use of anticoagulant therapy [Z79.01]         January 2018 Details    Sun Mon Tue Wed Thu Fri Sat      1               2               3               4               5               6                 7               8               9               10               11               12               13                 14               15               16               17               18               19               20                 21               22               23               24               25               26               27                 28               29               30      8 mg   See details      31      6 mg             Date Details   01/30 This INR check               How to take your warfarin dose     To take:  6 mg Take 1.5 of the 4 mg tablets.    To take:  8 mg Take 2 of the 4 mg tablets.           February 2018 Details    Sun Mon Tue Wed Thu Fri Sat         1      4 mg         2      4 mg         3      6 mg           4      6 mg         5      4 mg         6            7               8               9               10                 11               12               13               14               15               16               17                 18               19               20               21               22               23               24                 25               26               27               28                   Date Details   No additional details    Date of next INR:   2/6/2018         How to take your warfarin dose     To take:  4 mg Take 1 of the 4 mg tablets.    To take:  6 mg Take 1.5 of the 4 mg tablets.

## 2018-01-31 NOTE — TELEPHONE ENCOUNTER
Prescription approved per [facility] Refill Protocol.  Anam Dejesus RN

## 2018-02-06 NOTE — PROGRESS NOTES
ANTICOAGULATION FOLLOW-UP CLINIC VISIT    Patient Name:  Natalie Hair  Date:  2/6/2018  Contact Type:  Telephone/ writer spoke with Felicia at [facility]    SUBJECTIVE:     Patient Findings     Positives No Problem Findings    Comments No changes or concerns. Pt had taken 38 mg/week (recently increased) and INR just slightly elevated. Will decrease to 36 mg/week and recheck in one week.            OBJECTIVE    INR   Date Value Ref Range Status   02/06/2018 3.6  Final       ASSESSMENT / PLAN  INR assessment THER    Recheck INR In: 1 WEEK    INR Location Home[facility] INR      Anticoagulation Summary as of 2/6/2018     INR goal 2.5-3.5   Today's INR 3.6!   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  Home Care as of 12/18/17      Anticoagulation Care Providers     Provider Role Specialty Phone number    Mimi Mcguire MD [facility] [phone]            See the Encounter Report to view Anticoagulation Flowsheet and Dosing Calendar (Go to Encounters tab in chart review, and find the Anticoagulation Therapy Visit)        Brianna Morley RN

## 2018-02-06 NOTE — TELEPHONE ENCOUNTER
Reason for Call: Request for an order or referral:    Order or referral being requested: DME order is being requested by Digna (daughter) for her mother to have a commode and bed pan.  She'd like to go through [facility].  Please review and order if appropriate.  Thank you..Claire Carroll    Date needed: as soon as possible    Has the patient been seen by the PCP for this problem? NO      Phone number Patient can be reached at:  Other phone number:  Digna Gonzalez - [phone]*    Best Time:  Any time    Can we leave a detailed message on this number?  YES    Call taken on 2/6/2018 at 2:25 PM by Claire Carroll

## 2018-02-06 NOTE — TELEPHONE ENCOUNTER
Reason for Call: Request for an order or referral:    Order or referral being requested: Brionna from [facility] callin)  Natalie's re-certification is expiring 18.  She is becoming more snf care.  She is not receiving PT any longer.  INR is not a skilled nursing need.  Wondering if you could consider putting her on aspirin therapy instead of coumadin?  Please review and advise.   Thank you..Claire Carroll    Date needed: as soon as possible    Has the patient been seen by the PCP for this problem? YES    Phone number Patient can be reached at:  Other phone number:  Brionna - [phone]*    Best Time:  Any time    Can we leave a detailed message on this number?  YES    Call taken on 2018 at 1:32 PM by Claire Carroll

## 2018-02-06 NOTE — MR AVS SNAPSHOT
Natalie PATRICIA Hair   2/6/2018   Anticoagulation Therapy Visit    Description:  85 year old female   Provider:  Brianna Morley, RN   [facility]  [facility]           INR as of 2/6/2018     Today's INR 3.6!      Anticoagulation Summary as of 2/6/2018     INR goal 2.5-3.5   Today's INR 3.6!   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]  Long term current use of anticoagulant therapy [Z79.01]         February 2018 Details    Sun Mon Tue Wed Thu Fri Sat         1               2               3                 4               5               6      4 mg   See details      7      6 mg         8      6 mg         9      4 mg         10      6 mg           11      6 mg         12      4 mg         13            14               15               16               17                 18               19               20               21               22               23               24                 25               26               27               28                   Date Details   02/06 This INR check       Date of next INR:  2/13/2018         How to take your warfarin dose     To take:  4 mg Take 1 of the 4 mg tablets.    To take:  6 mg Take 1.5 of the 4 mg tablets.

## 2018-02-06 NOTE — TELEPHONE ENCOUNTER
"Spoke with Brionna. She said that she already told Digna to talk to the cardiologist about the coumadin or the ASA therapy. Brionna is requesting a one time RN visit to go out to see Natalie on 2/13/18 \"because the homecare services ended 2/10/18\". Natalie will need to be discharged from Homecare at the 2/13/18 home visit. Brionna reports,  \"Natalie is becoming longterm care and that is not covered by Medicare. INR checks are not considered a skilled homecare that is covered by Medicare. Family has not been helping Digna like they said they would. One sister is not speaking to Digna and is telling the other siblings that Digna is doing a terrible job. If Susy needs to do INR checks, Digna would need to bring her to the clinic once a week.  There was  a meeting about 2 weeks ago with the family about plan of care including Hospice. There has not been a decision for hospice at this time.\"   Anam Dejesus RN    "

## 2018-02-06 NOTE — TELEPHONE ENCOUNTER
I think she continues to require coumadin but I would ask her daughter to discuss with cardiology - they have a f/u appointment this Friday with Dr Bingham.   She has a cardiac device in place, plus stents.     Please call homecare back as well as daughter, jose, regarding this issue.     ROZINA Mcguire MD

## 2018-02-07 NOTE — TELEPHONE ENCOUNTER
"WARFARIN SOD 4MG TABLETS (BLUE) **Duplicate**        Last Written Prescription Date:  2/6/18  Last Fill Quantity: 40,   # refills: 2  Last Office Visit: 1/12/18  Future Office visit:    Next 5 appointments (look out 90 days)     Feb 09, 2018  9:30 AM CST   Return Visit with Emelyn Bingham MD   [facility])    [address] [address] [phone]                   Requested Prescriptions   Pending Prescriptions Disp Refills     warfarin (COUMADIN) 4 MG tablet [Pharmacy Med Name: WARFARIN SOD 4MG TABLETS (BLUE)] 40 tablet 0     Sig: TAKE 6 MG ON SUNDAY, TUESDAY, THURSDAY AND 4 MG ALL OTHER DAYS OR AS DIRECTED    Vitamin K Antagonists Failed    2/7/2018  2:33 PM       Failed - INR is within goal in the past 6 weeks    Confirm INR is within goal in the past 6 weeks.     Recent Labs   Lab Test 02/06/18   INR  3.6                      Passed - Recent or future visit with authorizing provider's specialty    Patient had office visit in the last year or has a visit in the next 30 days with authorizing provider.  See \"Patient Info\" tab in inbasket, or \"Choose Columns\" in Meds & Orders section of the refill encounter.            Passed - Patient is 18 years of age or older       Passed - Patient is not pregnant       Passed - No positive pregnancy on file in past 12 months          "

## 2018-02-09 NOTE — LETTER
2/9/2018    Mimi Mcguire MD  [address] [facility] [address]    RE: Natalie Hair       Dear Colleague,    I had the pleasure of seeing Natalie Hair in the [facility].       Cardiology     I had the pleasure to follow up with your patient, Ms. Natalie Hair, along with her daughter in the Cardiovascular Medicine Clinic.  She is a patient well known to me.  She unfortunately has a very complex recent and remote cardiac history.  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  She has had multiple admissions over the last year and a half for diastolic heart failure.  She also has had periods of time where she was in paroxysmal atrial fibrillation and flutter.      This past summer, she was admitted to [facility] with an attempted ablation; however, this was unsuccessful.  She ultimately underwent AV node ablation with a dual-chamber pacemaker placement at that point.  She unfortunately has had progressive symptoms related to severe tricuspid regurgitation.      During her previous admissions, she has had a formal evaluation by the Cardiothoracic Surgery Service at the [facility].  The consensus opinion was that she would be prohibitively high risk and we have recommended that she go to [facility] for a second opinion.  The patient does have an appointment scheduled on 12/09/2016.  The patient has been on a variety of diuretics and her regimen has been relatively stable.       She states that her p.o. intake is quite poor.  She has no desire or taste for food.  This is confirmed by her daughter.  She has been seen at [place] for potential transcatheter approach for her severe TR (Latif FORMA valve).  They will be contacting her shortly whether she is a good candidate.      She was accepted ultimately and underwent device placement on June 1, 2017 patient remained in the hospital for approximately 9  days. She unfortunately has had admissions for shortness of breath and pleural effusions. She did require thoracentesis and unfortunately will require one next week. She did derive some benefit from the valve procedure. Patient's family states that she was only able to walk 10-15 feet prior to the procedure. At her best she is now able to walk 250 feet with a walker and with assistance. Her appetite again is also an issue and she has lost a considerable amount of weight. Her daughter attempts to supplement her diet as best as she can. She does have an appointment with her [facility] doctors later this month. Records of the procedure and follow-up have been scanned in the chart for review including follow-up echocardiograms.     Her main issues unfortunate abdomen pleural effusions and repeat thoracentesis. Please see outside notes for additional details. More recently she had a hospital visit for pleural effusion and had subsequent imaging. It appears upon review of imaging after discussion with primary care physician and her other physicians conservative management was recommended. Patient's family again accompanies her. She has main issues and difficulty with memory. From a symptom standpoint her daughter states that her weight and breathing are comfortable. She does not report of any significant changes above her baseline. Her appetite is good and she is a been able to maintain her weight.    PHYSICAL EXAMINATION:   VITAL SIGNS:  Blood pressure 118/64, pulse 70, weight 72.9 kg (160 lb 12.8 oz), SpO2 98 %, not currently breastfeeding.  GENERAL:  The patient is alert, oriented, appears older than stated age.   HEENT:  Oropharynx is clear.   NECK:  JVP is 7-8 cm at a seated position with pulsations of her jugular veins noted.   CARDIOVASCULAR:  Distant heart tones, irregular with a 1-2/6 systolic ejection murmur best heard at the right upper sternal border.   LUNGS:  Diminished, however, no rales are noted.    ABDOMEN:  Obese, soft, nontender.     EXTREMITIES:  Dressings are in place.      ASSESSMENT:   1.  Severe tricuspid regurgitation with primarily right-sided heart failure symptoms.  S/P Latif FORMA valve at [facility]  2.  Congestive heart failure with diastolic dysfunction and valvular heart disease.    3.  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    4.  Status post pacemaker placement for atrial fibrillation/flutter with AV node ablation.      RECOMMENDATIONS:      Patient from a mental and social aspect is doing well. Her daughter states that there taken off items from her bucket list. She has seen various shows and seems to be in a good mood this morning. Patient states family did ask whether continuing Coumadin would be appropriate. She has been on this medication for the last 13-14 years. The indication appears to be atrial fibrillation/flutter with AV node ablation with pacemaker placement in the past. I've advised her given her comorbidities it would be preferable to continue with Coumadin. They understand this and will get INR checks locally. Presently she was getting a home health nurse however this will not be available after next week. She does have a [place] clinic nearby or she will seek a [facility] clinic that may be closer to her in [place]. Overall her cardiac status appears to be stable. She appears to have a good outlook and is looking forward to watching's some movies and springtime with her family. We will make no changes to her medications and have her follow-up in summer 2018.            Thank you for allowing me to participate in the care of your patient.    Sincerely,     Emelyn Bingham MD     [facility]

## 2018-02-09 NOTE — PROGRESS NOTES
Cardiology     I had the pleasure to follow up with your patient, Ms. Natalie Hair, along with her daughter in the Cardiovascular Medicine Clinic.  She is a patient well known to me.  She unfortunately has a very complex recent and remote cardiac history.  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  She has had multiple admissions over the last year and a half for diastolic heart failure.  She also has had periods of time where she was in paroxysmal atrial fibrillation and flutter.      This past summer, she was admitted to [facility] with an attempted ablation; however, this was unsuccessful.  She ultimately underwent AV node ablation with a dual-chamber pacemaker placement at that point.  She unfortunately has had progressive symptoms related to severe tricuspid regurgitation.      During her previous admissions, she has had a formal evaluation by the Cardiothoracic Surgery Service at the [facility].  The consensus opinion was that she would be prohibitively high risk and we have recommended that she go to [facility] for a second opinion.  The patient does have an appointment scheduled on 12/09/2016.  The patient has been on a variety of diuretics and her regimen has been relatively stable.       She states that her p.o. intake is quite poor.  She has no desire or taste for food.  This is confirmed by her daughter.  She has been seen at [place] for potential transcatheter approach for her severe TR (Latif FORMA valve).  They will be contacting her shortly whether she is a good candidate.      She was accepted ultimately and underwent device placement on June 1, 2017 patient remained in the hospital for approximately 9 days. She unfortunately has had admissions for shortness of breath and pleural effusions. She did require thoracentesis and unfortunately will require one next week. She did derive some benefit from the valve procedure. Patient's  family states that she was only able to walk 10-15 feet prior to the procedure. At her best she is now able to walk 250 feet with a walker and with assistance. Her appetite again is also an issue and she has lost a considerable amount of weight. Her daughter attempts to supplement her diet as best as she can. She does have an appointment with her [facility] doctors later this month. Records of the procedure and follow-up have been scanned in the chart for review including follow-up echocardiograms.     Her main issues unfortunate abdomen pleural effusions and repeat thoracentesis. Please see outside notes for additional details. More recently she had a hospital visit for pleural effusion and had subsequent imaging. It appears upon review of imaging after discussion with primary care physician and her other physicians conservative management was recommended. Patient's family again accompanies her. She has main issues and difficulty with memory. From a symptom standpoint her daughter states that her weight and breathing are comfortable. She does not report of any significant changes above her baseline. Her appetite is good and she is a been able to maintain her weight.    PHYSICAL EXAMINATION:   VITAL SIGNS:  Blood pressure 118/64, pulse 70, weight 72.9 kg (160 lb 12.8 oz), SpO2 98 %, not currently breastfeeding.  GENERAL:  The patient is alert, oriented, appears older than stated age.   HEENT:  Oropharynx is clear.   NECK:  JVP is 7-8 cm at a seated position with pulsations of her jugular veins noted.   CARDIOVASCULAR:  Distant heart tones, irregular with a 1-2/6 systolic ejection murmur best heard at the right upper sternal border.   LUNGS:  Diminished, however, no rales are noted.   ABDOMEN:  Obese, soft, nontender.     EXTREMITIES:  Dressings are in place.      ASSESSMENT:   1.  Severe tricuspid regurgitation with primarily right-sided heart failure symptoms.  S/P Latif FORMA valve at [facility]  2.   Congestive heart failure with diastolic dysfunction and valvular heart disease.    3.  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    4.  Status post pacemaker placement for atrial fibrillation/flutter with AV node ablation.      RECOMMENDATIONS:      Patient from a mental and social aspect is doing well. Her daughter states that there taken off items from her bucket list. She has seen various shows and seems to be in a good mood this morning. Patient states family did ask whether continuing Coumadin would be appropriate. She has been on this medication for the last 13-14 years. The indication appears to be atrial fibrillation/flutter with AV node ablation with pacemaker placement in the past. I've advised her given her comorbidities it would be preferable to continue with Coumadin. They understand this and will get INR checks locally. Presently she was getting a home health nurse however this will not be available after next week. She does have a [place] clinic nearby or she will seek a [facility] clinic that may be closer to her in [place]. Overall her cardiac status appears to be stable. She appears to have a good outlook and is looking forward to watching's some movies and springtime with her family. We will make no changes to her medications and have her follow-up in summer 2018.          Emelyn Bingham MD

## 2018-02-09 NOTE — LETTER
2/9/2018    Mimi Mcguire MD  [address] [facility] [address]    RE: Natalie Hair       Dear Colleague,    I had the pleasure of seeing Natalie Hair in the [facility].       Cardiology     I had the pleasure to follow up with your patient, Ms. Natalie Hair, along with her daughter in the Cardiovascular Medicine Clinic.  She is a patient well known to me.  She unfortunately has a very complex recent and remote cardiac history.  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  She has had multiple admissions over the last year and a half for diastolic heart failure.  She also has had periods of time where she was in paroxysmal atrial fibrillation and flutter.      This past summer, she was admitted to [facility] with an attempted ablation; however, this was unsuccessful.  She ultimately underwent AV node ablation with a dual-chamber pacemaker placement at that point.  She unfortunately has had progressive symptoms related to severe tricuspid regurgitation.      During her previous admissions, she has had a formal evaluation by the Cardiothoracic Surgery Service at the [facility].  The consensus opinion was that she would be prohibitively high risk and we have recommended that she go to [facility] for a second opinion.  The patient does have an appointment scheduled on 12/09/2016.  The patient has been on a variety of diuretics and her regimen has been relatively stable.       She states that her p.o. intake is quite poor.  She has no desire or taste for food.  This is confirmed by her daughter.  She has been seen at [place] for potential transcatheter approach for her severe TR (Latif FORMA valve).  They will be contacting her shortly whether she is a good candidate.      She was accepted ultimately and underwent device placement on June 1, 2017 patient remained in the hospital for approximately 9  days. She unfortunately has had admissions for shortness of breath and pleural effusions. She did require thoracentesis and unfortunately will require one next week. She did derive some benefit from the valve procedure. Patient's family states that she was only able to walk 10-15 feet prior to the procedure. At her best she is now able to walk 250 feet with a walker and with assistance. Her appetite again is also an issue and she has lost a considerable amount of weight. Her daughter attempts to supplement her diet as best as she can. She does have an appointment with her [facility] doctors later this month. Records of the procedure and follow-up have been scanned in the chart for review including follow-up echocardiograms.     Her main issues unfortunate abdomen pleural effusions and repeat thoracentesis. Please see outside notes for additional details. More recently she had a hospital visit for pleural effusion and had subsequent imaging. It appears upon review of imaging after discussion with primary care physician and her other physicians conservative management was recommended. Patient's family again accompanies her. She has main issues and difficulty with memory. From a symptom standpoint her daughter states that her weight and breathing are comfortable. She does not report of any significant changes above her baseline. Her appetite is good and she is a been able to maintain her weight.    PHYSICAL EXAMINATION:   VITAL SIGNS:  Blood pressure 118/64, pulse 70, weight 72.9 kg (160 lb 12.8 oz), SpO2 98 %, not currently breastfeeding.  GENERAL:  The patient is alert, oriented, appears older than stated age.   HEENT:  Oropharynx is clear.   NECK:  JVP is 7-8 cm at a seated position with pulsations of her jugular veins noted.   CARDIOVASCULAR:  Distant heart tones, irregular with a 1-2/6 systolic ejection murmur best heard at the right upper sternal border.   LUNGS:  Diminished, however, no rales are noted.    ABDOMEN:  Obese, soft, nontender.     EXTREMITIES:  Dressings are in place.      ASSESSMENT:   1.  Severe tricuspid regurgitation with primarily right-sided heart failure symptoms.  S/P Latif FORMA valve at [facility]  2.  Congestive heart failure with diastolic dysfunction and valvular heart disease.    3.  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    4.  Status post pacemaker placement for atrial fibrillation/flutter with AV node ablation.      RECOMMENDATIONS:      Patient from a mental and social aspect is doing well. Her daughter states that there taken off items from her bucket list. She has seen various shows and seems to be in a good mood this morning. Patient states family did ask whether continuing Coumadin would be appropriate. She has been on this medication for the last 13-14 years. The indication appears to be atrial fibrillation/flutter with AV node ablation with pacemaker placement in the past. I've advised her given her comorbidities it would be preferable to continue with Coumadin. They understand this and will get INR checks locally. Presently she was getting a home health nurse however this will not be available after next week. She does have a [place] clinic nearby or she will seek a [facility] clinic that may be closer to her in [place]. Overall her cardiac status appears to be stable. She appears to have a good outlook and is looking forward to watching's some movies and springtime with her family. We will make no changes to her medications and have her follow-up in summer 2018.          Emelyn Bingham MD      Thank you for allowing me to participate in the care of your patient.      Sincerely,     Emelyn Bingham MD     [facility]    cc:   Emelyn Bingham MD  [address] [facility] [address] [place] [id number]

## 2018-02-09 NOTE — MR AVS SNAPSHOT
After Visit Summary   2/9/2018    Ntaalie Hair    MRN: [id number]           Patient Information     Date Of Birth          8/28/1932        Visit Information        Provider Department      2/9/2018 9:30 AM Emelyn Bingham MD [facility]        Today's Diagnoses     Acute on chronic diastolic congestive heart failure (H)          Care Instructions    Thank you for your [facility] visit today. Your provider has recommended the following:  Medication Changes:  No Medication Changes at your Heart Care appointment today 2/9/2018  Recommendations:  No further recommendations at this time  Follow-up:  See Dr. Bingham for cardiology follow up in July 2018    We kindly ask that you call cardiology scheduling at [phone] three months prior to requested revisit date to schedule future cardiology appointments.  Reminder:  1. Please bring in your current medication list or your medication, over the counter supplements and vitamin bottles as we will review these at each office visit.               [facility]~[address]~[place], [place]~[address]  Questions about your visit today?  Call your Cardiology Clinic RN's-Hattie De Leon and/or Olimpia Cuevas at [phone].          Follow-ups after your visit        Additional Services     Follow-Up with Cardiologist                 Future tests that were ordered for you today     Open Future Orders        Priority Expected Expires Ordered    Follow-Up with Cardiologist Routine 7/1/2018 2/9/2019 2/9/2018            Who to contact     If you have questions or need follow up information about today's clinic visit or your schedule please contact [facility] directly at [phone].  Normal or non-critical lab and imaging results will be communicated to you by MyChart, letter or phone within 4 business days after the  "clinic has received the results. If you do not hear from us within 7 days, please contact the clinic through [employer] or phone. If you have a critical or abnormal lab result, we will notify you by phone as soon as possible.  Submit refill requests through [employer] or call your pharmacy and they will forward the refill request to us. Please allow 3 business days for your refill to be completed.          Additional Information About Your Visit        [employer] Information     [employer] lets you send messages to your doctor, view your test results, renew your prescriptions, schedule appointments and more. To sign up, go to [email or web address] . Click on \"Log in\" on the left side of the screen, which will take you to the Welcome page. Then click on \"Sign up Now\" on the right side of the page.     You will be asked to enter the access code listed below, as well as some personal information. Please follow the directions to create your username and password.     Your access code is: [email or web address]  Expires: 3/15/2018 12:13 PM     Your access code will  in 90 days. If you need help or a new code, please call your [facility] clinic or [phone].        Care EveryWhere ID     This is your Care EveryWhere ID. This could be used by other organizations to access your [facility] medical records  [id number]        Your Vitals Were     Pulse Pulse Oximetry BMI (Body Mass Index)             70 98% 25.95 kg/m2          Blood Pressure from Last 3 Encounters:   18 118/64   18 113/60   18 122/66    Weight from Last 3 Encounters:   18 72.9 kg (160 lb 12.8 oz)   18 75.4 kg (166 lb 4.8 oz)   18 76.6 kg (168 lb 14 oz)              We Performed the Following     Follow-Up with Cardiologist        Primary Care Provider Office Phone # Fax #    Mimi Mcguire [phone] [address] [facility] [facility] [address]        Equal Access to Services     JA FOWLER AH: Hadii aad rosina " adrian [place], waaxda luqadaha, qaybta kaalmada zahira, lyric arthur marcoquynh moffettfrantz ra. So [facility] [phone].    ATENCIÓN: Si saminala arturo, tiene a simmons disposición servicios gratuitos de asistencia lingüística. Kevin al [phone].    We comply with applicable federal civil rights laws and [place] laws. We do not discriminate on the basis of race, color, national origin, age, disability, sex, sexual orientation, or gender identity.            Thank you!     Thank you for choosing [facility]  for your care. Our goal is always to provide you with excellent care. Hearing back from our patients is one way we can continue to improve our services. Please take a few minutes to complete the written survey that you may receive in the mail after your visit with us. Thank you!             Your Updated Medication List - Protect others around you: Learn how to safely use, store and throw away your medicines at www.disposemymeds.org.          This list is accurate as of 2/9/18  9:42 AM.  Always use your most recent med list.                   Brand Name Dispense Instructions for use Diagnosis    * ACETAMINOPHEN PO      Take 1,000 mg by mouth every 8 hours as needed for pain        * acetaminophen 325 MG tablet    TYLENOL    100 tablet    Take 1-2 tablets (325-650 mg) by mouth every 6 hours as needed for mild pain or pain    Back pain, unspecified back location, unspecified back pain laterality, unspecified chronicity, Chronic bilateral low back pain without sciatica       alendronate 70 MG tablet    FOSAMAX    12 tablet    TAKE 1 TABLET BY MOUTH ONCE WEEKLY ON EMPTY STOMACH    Osteopenia       cetirizine 5 MG tablet    zyrTEC    30 tablet    Take 1 tablet (5 mg) by mouth daily    Acute urticaria       clopidogrel 75 MG tablet    PLAVIX    90 tablet    Take 1 tablet (75 mg) by mouth daily    Congestive heart failure, unspecified congestive heart failure chronicity,  unspecified congestive heart failure type (H), Lymphedema of both lower extremities       donepezil 5 MG tablet    ARIcept    90 tablet    TAKE 1 TABLET(5 MG) BY MOUTH AT BEDTIME    Dementia without behavioral disturbance, unspecified dementia type       ezetimibe 10 MG tablet    ZETIA    90 tablet    Take 1 tablet (10 mg) by mouth daily    Hyperlipidemia LDL goal <100       metoprolol tartrate 25 MG tablet    LOPRESSOR    90 tablet    TAKE 1/2 TABLET(12.5 MG) BY MOUTH TWICE DAILY    Benign essential hypertension       Multi-vitamin Tabs tablet      Take 1 tablet by mouth daily        nitroGLYcerin 0.4 MG sublingual tablet    NITROSTAT    25 tablet    Place 1 tablet (0.4 mg) under the tongue every 5 minutes as needed for chest pain    Unspecified cardiovascular disease       omega 3 1000 MG Caps     90 capsule    Take 1 g by mouth daily    Health Care Home       * order for DME     1 Package    Equipment being ordered: Walker with a seat    Acute on chronic diastolic congestive heart failure (H), At risk for falling       * order for DME     1 Device    Equipment being ordered: Hospital Bed 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 -ongoing symptoms of lower extremity edema,shortness of breath,cough sacral pressure sores within last 6 months   An electric hospital bed to allow for increase in head of bed elevation and for ease in wheelchair transfers  Length of need: lifelong NPI - [id number]    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), Lymphedema of both lower extremities, Right-sided congestive heart failure, Tricuspid valve insufficiency, unspecified etiology, Congestive heart failure, unspecified congestive heart failure chronicity, unspecified congestive heart failure type (H), History of pressure ulcer       * order for DME     10 Units    Mepilex sacral dressings    Decubitus ulcer of sacral region, unspecified ulcer stage       * order for DME     1 Month    always  discreet underwear - 2 pairs/day  - 60/month flushable cleansing cloths ( wipes)  - several packages/month   For incontinence and primitivo-care    Urinary incontinence, unspecified type, History of pressure ulcer       * order for DME     1 Device    Equipment being ordered: bedside commode and bed pan    Lymphedema of both lower extremities, Chronic diastolic congestive heart failure (H), Urinary incontinence, unspecified type, Memory loss, Weakness, Weight loss       polyethylene glycol powder    MIRALAX/GLYCOLAX     Take 1 capful by mouth every morning Takes 3/4 of a capful        potassium chloride 10 MEQ tablet    K-TAB,KLOR-CON    180 tablet    TAKE 1 TABLET(10 MEQ) BY MOUTH TWICE DAILY    Shortness of breath on exertion, Essential hypertension       SENNA S 8.6-50 MG per tablet   Generic drug:  senna-docusate      Take 2 tablets by mouth 2 times daily        spironolactone 25 MG tablet    ALDACTONE    60 tablet    Take 1 tablet (25 mg) by mouth 2 times daily    Congestive heart failure, unspecified congestive heart failure chronicity, unspecified congestive heart failure type (H), Lymphedema of both lower extremities       torsemide 20 MG tablet    DEMADEX    60 tablet    Take 1 tablet (20 mg) by mouth 2 times daily Patient needs labs before next refill.    Lymphedema of both lower extremities, Pleural effusion       triamcinolone 0.1 % cream    KENALOG    30 g    Apply sparingly to affected area two times daily for 7 days.    Other eczema       TYLENOL PM EXTRA STRENGTH PO      Take 2-3 tablets by mouth nightly as needed        * warfarin 4 MG tablet    COUMADIN    40 tablet    Take 4 mg on MWF; 6 mg all other days or as directed by the Anticoagulation Clinic.    Congestive heart failure, unspecified congestive heart failure chronicity, unspecified congestive heart failure type (H)       * warfarin 4 MG tablet    COUMADIN    110 tablet    Take 4 mg on Mon, Wed, Fri; 6 mg all other days or as directed by  Anticoagulation Clinic    Congestive heart failure, unspecified congestive heart failure chronicity, unspecified congestive heart failure type (H)       ZZZQUIL PO      Take 50 mg by mouth nightly as needed Alternates with Tylenol PM        * Notice:  This list has 9 medication(s) that are the same as other medications prescribed for you. Read the directions carefully, and ask your doctor or other care provider to review them with you.

## 2018-02-09 NOTE — PATIENT INSTRUCTIONS
Thank you for your [facility] visit today. Your provider has recommended the following:  Medication Changes:  No Medication Changes at your Heart Care appointment today 2/9/2018  Recommendations:  No further recommendations at this time  Follow-up:  See Dr. Bingham for cardiology follow up in July 2018    We kindly ask that you call cardiology scheduling at [phone] three months prior to requested revisit date to schedule future cardiology appointments.  Reminder:  1. Please bring in your current medication list or your medication, over the counter supplements and vitamin bottles as we will review these at each office visit.               [facility]  [facility]~[address]~[place], [place]~[address]  Questions about your visit today?  Call your Cardiology Clinic RN's-Hattie De Leon and/or Olimpia Cuevas at [phone].

## 2018-02-13 NOTE — PROGRESS NOTES
ANTICOAGULATION FOLLOW-UP CLINIC VISIT    Patient Name:  Natalie Hair  Date:  2018  Contact Type:  Telephone/ Spoke with CHU Duarte  homecare & daughter Digna    SUBJECTIVE:     Patient Findings     Comments CHU Duarte from  homecare left vm reporting pt INR, stated pt is being discharged from home care today & further contacts should be made with daughter, Digna ([phone]). Writer spoke with Digna who reports pt has been under a lot of stress because her son  yesterday.  Writer had pt take 1 time decreased dose (~6%)today, then return to maintenance dose, made appt for pt to have INR checked at [facility].  Digna denies other changes in diet, meds, activity or health.           OBJECTIVE    INR   Date Value Ref Range Status   2018 3.8  Final       ASSESSMENT / PLAN  No question data found.  Anticoagulation Summary as of 2018     INR goal 2.5-3.5   Today's INR 3.8!   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       Anticoagulation Care Providers     Provider Role Specialty Phone number    Mimi Mcguire MD [facility] [phone]            See the Encounter Report to view Anticoagulation Flowsheet and Dosing Calendar (Go to Encounters tab in chart review, and find the Anticoagulation Therapy Visit)        Marizol Santoro RN

## 2018-02-13 NOTE — MR AVS SNAPSHOT
Natalie PATRICIA Hair   2/13/2018   Anticoagulation Therapy Visit    Description:  85 year old female   Provider:  Marizol Santoro, RN   Department:  [facility]           INR as of 2/13/2018     Today's INR 3.8!      Anticoagulation Summary as of 2/13/2018     INR goal 2.5-3.5   Today's INR 3.8!   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]  Long term current use of anticoagulant therapy [Z79.01]         Your next Anticoagulation Clinic appointment(s)     Feb 21, 2018 11:45 AM CST   [facility]    [address] [address]   [phone]              February 2018 Details    Sun Mon Tue Wed Thu Fri Sat         1               2               3                 4               5               6               7               8               9               10                 11               12               13      4 mg   See details      14      4 mg         15      6 mg         16      4 mg         17      6 mg           18      6 mg         19      4 mg         20      6 mg         21            22               23               24                 25               26               27               28                   Date Details   02/13 This INR check       Date of next INR:  2/21/2018         How to take your warfarin dose     To take:  4 mg Take 1 of the 4 mg tablets.    To take:  6 mg Take 1.5 of the 4 mg tablets.

## 2018-02-21 NOTE — PROGRESS NOTES
ANTICOAGULATION FOLLOW-UP CLINIC VISIT    Patient Name:  Natalie Hair  Date:  2/21/2018  Contact Type:  Face to Face, accompanied by daughter Digna and Digna's     SUBJECTIVE:     Patient Findings     Positives Unexplained INR or factor level change    Comments Patient was hospitalized at the beginning of January, had homecare until last week (she was discharged last Tuesday). Patient has not had any medication changes, she has been feeling well. There are some family stressors going on, however this has been ongoing - their family has had many stressful events over the past few months. Patient denies any issues with bleeding or increased bruising.     Patient had 36mg in the previous 7 days, will decrease dose to 32mg by the next INR check in 1 weeks (~11% decrease). This was the dose patient was taking prior to her hospitalization.              OBJECTIVE    INR Protime   Date Value Ref Range Status   02/21/2018 4.1 (A) 0.86 - 1.14 Final       ASSESSMENT / PLAN  INR assessment SUPRA    Recheck INR In: 1 WEEK    INR Location Clinic      Anticoagulation Summary as of 2/21/2018     INR goal 2.5-3.5   Today's INR 4.1!   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       Anticoagulation Care Providers     Provider Role Specialty Phone number    Mimi Mcguire MD [facility] [phone]            See the Encounter Report to view Anticoagulation Flowsheet and Dosing Calendar (Go to  Encounters tab in chart review, and find the Anticoagulation Therapy Visit)        Christa Guzman RN

## 2018-02-21 NOTE — MR AVS SNAPSHOT
Natalie GOMEZ Hair   2/21/2018 11:45 AM   Anticoagulation Therapy Visit    Description:  85 year old female   Provider:  MATT ANTI JOE   Department:  [facility]           INR as of 2/21/2018     Today's INR 4.1!      Anticoagulation Summary as of 2/21/2018     INR goal 2.5-3.5   Today's INR 4.1!   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    If you need to cancel and/or reschedule your appointment please call one of the following numbers:  [facility] [phone]  [facility] - [phone]  [facility] [phone]  [place] - [phone]  [place] - [phone]            February 2018 Details    Sun Mon Tue Wed Thu Fri Sat         1               2               3                 4               5               6               7               8               9               10                 11               12               13               14               15               16               17                 18               19               20               21      4 mg   See details      22      4 mg         23      4 mg         24      6 mg           25      4 mg         26      4 mg         27      6 mg         28                Date Details   02/21 This INR check       Date of next INR:  2/28/2018         How to take your warfarin dose     To take:  4 mg Take 1 of the 4 mg tablets.    To take:  6 mg Take 1.5 of the 4 mg tablets.

## 2018-02-28 NOTE — MR AVS SNAPSHOT
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]  Long term current use of anticoagulant therapy [Z79.01]         Your next Anticoagulation Clinic appointment(s)     Feb 28, 2018  1:00 PM CST   An[facility]agulation [facility] LL ANTI COAG   [facility]    [address] [address] [phone]            Mar 07, 2018  1:15 PM CST   Anticoagulation Visit with LL ANTI COAG   [facility]    [address] [address] [phone]              Contact Numbers     Please call [phone] with any problems or questions regarding your therapy.    If you need to cancel and/or reschedule your appointment please call one of the following numbers:  [facility] - [phone]  [place] - [phone]  [place] - [phone]  [place] - [phone]  [place] - [phone]            February 2018 Details    Sun Mon Tue Wed Thu Fri Sat         1               2               3                 4               5               6               7               8               9               10                 11               12               13               14               15               16               17                 18               19               20               21               22               23               24                 25               26               27               28      4 mg   See details          Date Details   02/28 This INR check               How to take your warfarin dose     To take:  4 mg  Take 1 of the 4 mg tablets.           March 2018 Details    Sun Mon Tue Wed Thu Fri Sat         1      4 mg         2      4 mg         3      6 mg           4      4 mg         5      4 mg         6      6 mg         7            8               9               10                 11               12               13               14               15               16               17                 18               19               20               21               22               23               24                 25               26               27               28               29               30               31                Date Details   No additional details    Date of next INR:  3/7/2018         How to take your warfarin dose     To take:  4 mg Take 1 of the 4 mg tablets.    To take:  6 mg Take 1.5 of the 4 mg tablets.

## 2018-02-28 NOTE — PROGRESS NOTES
ANTICOAGULATION FOLLOW-UP CLINIC VISIT    Patient Name:  Natalie Hair  Date:  2/28/2018  Contact Type:  Face to Face/Accompanied by her daughter.    SUBJECTIVE:     Patient Findings     Positives No Problem Findings    Comments Stressors r/t family death and illness. Patient will continue weekly maintenance dose. INR is therapeutic.              OBJECTIVE    INR Protime   Date Value Ref Range Status   02/28/2018 2.6 (A) 0.86 - 1.14 Final       ASSESSMENT / PLAN  INR assessment THER    Recheck INR In: 1 WEEK    INR Location Clinic      Anticoagulation Summary as of 2/28/2018     INR goal 2.5-3.5   Today's INR 2.6   Maintenance plan 6 mg (4 mg x 1.5) on Tue, Sat; 4 mg (4 mg x 1) all other days   Full instructions 6 mg on Tue, Sat; 4 mg all other days   Weekly total 32 mg   No change documented Jeanna Barber, CHU   Plan last modified Christa Guzman RN (2/21/2018)   Next INR check 3/7/2018   Priority INR   Target end date Indefinite    Indications   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       Anticoagulation Care Providers     Provider Role Specialty Phone number    Mimi Mcguire MD [facility] [phone]            See the Encounter Report to view Anticoagulation Flowsheet and Dosing Calendar (Go to Encounters tab in chart review, and find the Anticoagulation Therapy Visit)        Jeanna Barber, RN

## 2018-03-07 NOTE — PROGRESS NOTES
ANTICOAGULATION FOLLOW-UP CLINIC VISIT    Patient Name:  Natalie Hair  Date:  3/7/2018  Contact Type:  Face to Face    SUBJECTIVE:     Patient Findings     Positives No Problem Findings    Comments No changes noted. Patient will continue weekly maintenance dose. INR is therapeutic.              OBJECTIVE    INR Protime   Date Value Ref Range Status   03/07/2018 3.0 (A) 0.86 - 1.14 Final       ASSESSMENT / PLAN  INR assessment THER    Recheck INR In: 2 WEEKS    INR Location Clinic      Anticoagulation Summary as of 3/7/2018     INR goal 2.5-3.5   Today's INR 3.0   Maintenance plan 6 mg (4 mg x 1.5) on Tue, Sat; 4 mg (4 mg x 1) all other days   Full instructions 6 mg on Tue, Sat; 4 mg all other days   Weekly total 32 mg   No change documented Jeanna Barber RN   Plan last modified Christa Guzman RN (2/21/2018)   Next INR check 3/21/2018   Priority INR   Target end date Indefinite    Indications   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       Anticoagulation Care Providers     Provider Role Specialty Phone number    Mimi Mcguire MD [facility] [phone]            See the Encounter Report to view Anticoagulation Flowsheet and Dosing Calendar (Go to Encounters tab in chart review, and find the Anticoagulation Therapy Visit)        Jeanna Barber, RN

## 2018-03-07 NOTE — MR AVS SNAPSHOT
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    If you need to cancel and/or reschedule your appointment please call one of the following numbers:  [facility] - [phone]  [place] - [phone]  [place] - [phone]  [place] - [phone]  [place] - [phone]            March 2018 Details    Sun Mon Tue Wed Thu Fri Sat         1               2               3                 4               5               6               7      4 mg   See details      8      4 mg         9      4 mg         10      6 mg           11      4 mg         12      4 mg         13      6 mg         14      4 mg         15      4 mg         16      4 mg         17      6 mg           18      4 mg         19      4 mg         20      6 mg         21            22               23               24                 25               26               27               28               29               30               31                Date  Details   03/07 This INR check       Date of next INR:  3/21/2018         How to take your warfarin dose     To take:  4 mg Take 1 of the 4 mg tablets.    To take:  6 mg Take 1.5 of the 4 mg tablets.

## 2018-03-15 NOTE — TELEPHONE ENCOUNTER
Patient's daughter Digna is concerned. Patient has been tired and having some shortness of breath. She is not wheezing, has not had any weight gain. She already has an appointment. Appointment moved to an earlier time. If symptoms worsening, she should be seen in ED.  Amy Gama RN

## 2018-03-16 NOTE — PROGRESS NOTES
SUBJECTIVE:   Natalie Hair is a 85 year old female who presents to clinic today for the following health issues:      ENT Symptoms             Symptoms: cc Present Absent Comment   Fever/Chills   x    Fatigue  x     Muscle Aches   x    Eye Irritation   x    Sneezing  x     Nasal Stewart/Drg  x     Sinus Pressure/Pain   x    Loss of smell   x    Dental pain   x    Sore Throat   x    Swollen Glands   x    Ear Pain/Fullness       Cough  x  Mild    Wheeze   x    Chest Pain   x    Shortness of breath  x     Rash   x    Other   x      Symptom duration:  1 week    Symptom severity:  moderate   Treatments tried:  None   Contacts:  None       More fatigued over the last week  Not wanting to get out of bed, get dressed, or go anywhere  Answering questions with only 2 or 3 words because she gets winded   No cough  No congestion  No fevers  No urinary symptoms     Had a pneumonia in Jan   Has been home since then and doing okay  Has had 3 thoracentesis but none this year so far      Problem list and histories reviewed & adjusted, as indicated.  Additional history: as documented    Current Outpatient Prescriptions   Medication Sig Dispense Refill     warfarin (COUMADIN) 4 MG tablet Take 6 mg on Tue, Sat; 4 mg all other days or as directed by Anticoagulation Clinic 80 tablet 0     metoprolol tartrate (LOPRESSOR) 25 MG tablet TAKE 1/2 TABLET(12.5 MG) BY MOUTH TWICE DAILY 90 tablet 3     potassium chloride (K-TAB,KLOR-CON) 10 MEQ tablet TAKE 1 TABLET(10 MEQ) BY MOUTH TWICE DAILY 180 tablet 1     torsemide (DEMADEX) 20 MG tablet Take 1 tablet (20 mg) by mouth 2 times daily Patient needs labs before next refill. 60 tablet 0     donepezil (ARICEPT) 5 MG tablet TAKE 1 TABLET(5 MG) BY MOUTH AT BEDTIME 90 tablet 0     acetaminophen (TYLENOL) 325 MG tablet Take 1-2 tablets (325-650 mg) by mouth every 6 hours as needed for mild pain or pain 100 tablet 1     ezetimibe (ZETIA) 10 MG tablet Take 1 tablet (10 mg) by mouth daily 90 tablet  3     triamcinolone (KENALOG) 0.1 % cream Apply sparingly to affected area two times daily for 7 days. 30 g 0     clopidogrel (PLAVIX) 75 MG tablet Take 1 tablet (75 mg) by mouth daily 90 tablet 0     spironolactone (ALDACTONE) 25 MG tablet Take 1 tablet (25 mg) by mouth 2 times daily 60 tablet 1     DiphenhydrAMINE HCl, Sleep, (ZZZQUIL PO) Take 50 mg by mouth nightly as needed Alternates with Tylenol PM       Diphenhydramine-APAP, sleep, (TYLENOL PM EXTRA STRENGTH PO) Take 2-3 tablets by mouth nightly as needed        alendronate (FOSAMAX) 70 MG tablet TAKE 1 TABLET BY MOUTH ONCE WEEKLY ON EMPTY STOMACH 12 tablet 0     omega 3 1000 MG CAPS Take 1 g by mouth daily 90 capsule 3     multivitamin, therapeutic with minerals (MULTI-VITAMIN) TABS tablet Take 1 tablet by mouth daily       cetirizine (ZYRTEC) 5 MG tablet Take 1 tablet (5 mg) by mouth daily 30 tablet 3     polyethylene glycol (MIRALAX/GLYCOLAX) powder Take 1 capful by mouth every morning Takes 3/4 of a capful       senna-docusate (SENNA S) 8.6-50 MG per tablet Take 2 tablets by mouth 2 times daily       order for DME Equipment being ordered: Walker with a seat 1 Package 0     nitroglycerin (NITROSTAT) 0.4 MG SL tablet Place 1 tablet (0.4 mg) under the tongue every 5 minutes as needed for chest pain 25 tablet 1     ACETAMINOPHEN PO Take 1,000 mg by mouth every 8 hours as needed for pain        order for DME Equipment being ordered: bedside commode and bed pan (Patient not taking: Reported on 2/9/2018) 1 Device 0     order for DME always discreet underwear - 2 pairs/day  - 60/month  flushable cleansing cloths ( wipes)  - several packages/month     For incontinence and primitivo-care (Patient not taking: Reported on 2/9/2018) 1 Month 3     order for DME Mepilex sacral dressings (Patient not taking: Reported on 2/9/2018) 10 Units 3     order for DME Equipment being ordered: Hospital Bed  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 -ongoing symptoms of lower extremity  "edema,shortness of breath,cough  sacral pressure sores within last 6 months    An electric hospital bed to allow for increase in head of bed elevation and for ease in wheelchair transfers   Length of need: lifelong  NPI - [id number] (Patient not taking: Reported on 2/9/2018) 1 Device 0     Allergies   Allergen Reactions     Lorazepam Nausea and Vomiting     Morphine Sulfate Cr [Morphine Sulfate] Nausea and Vomiting     Crestor [Rosuvastatin] Other (See Comments)     Abdominal/Back pain     Lidocaine GI Disturbance     Cozaar [Losartan] Rash     Rash        Reviewed and updated as needed this visit by clinical staff       Reviewed and updated as needed this visit by Provider         ROS:  Remainder of ROS obtained and found to be negative other than that which was documented above      OBJECTIVE:     /68  Pulse 70  Temp 98.1  F (36.7  C) (Tympanic)  Ht 5' 6\" (1.676 m)  SpO2 97%  There is no height or weight on file to calculate BMI.  GENERAL: quiet, pale, NAD - breathing comfortably on room air  EYES: Eyes grossly normal to inspection  HENT: ear canals and TM's normal, nose and mouth without ulcers or lesions  RESP: lungs with good breath sounds throughout, slightly diminished in left lower side   CV: regular rates and rhythm, normal S1 S2, no S3 or S4 and no murmur, click or rub    Diagnostic Test Results:  Recent Results (from the past 744 hour(s))   XR Chest 2 Views    Narrative    XR CHEST 2 VW 3/16/2018 10:22 AM    HISTORY: Short of breath.    COMPARISON: Several prior studies, the most recent one being dated  1/3/2018.      Impression    IMPRESSION: 2 views of the chest show a new very small right pleural  effusion and the previously seen small left pleural effusion to be  modestly larger. Cardiomegaly and transvenous pacer are stable.  Pulmonary vascular congestion is again seen with some interstitial  prominence in the lungs. This interstitial prominence could at least  in part relate to scarring " but some ongoing chronic interstitial  pulmonary edema related to CHF also needs to be considered.    JOESPH GUILLEN MD           ASSESSMENT/PLAN:     (R06.02) Shortness of breath  (primary encounter diagnosis)  Comment: in no acute distress on exam but slightly more noticeable per daughter. Xray with slight increase in effusions - has had thoracentesis in the past (none this year) but do not think it is indicated at this time however will need to monitor closely  Plan: XR Chest 2 Views, **UA reflex to Microscopic         FUTURE anytime          No evidence of pneumonia  Sight increase in effusions  Monitor for now  REturn to clinic or ED if SOB or other symptoms worsen    (I07.1) Tricuspid valve insufficiency, unspecified etiology  Comment:   Plan:     (Z79.01) Long term current use of anticoagulant therapy  Comment: \  Plan:     (Z95.5) Stented coronary artery - 5/4/2017 at [place]  Comment:   Plan:     (I48.2) Chronic atrial fibrillation (H)  Comment:   Plan:     (R53.1) Weakness  Comment: chronic issue  - not worse per daugher so much as SOB is more of a concern. Would like UA given her history but unable to go in clinic. Sent home with supplies to collect sample at home  Plan: **UA reflex to Microscopic FUTURE anytime                Leilani Whitman PA-C  [facility]

## 2018-03-16 NOTE — MR AVS SNAPSHOT
After Visit Summary   3/16/2018    Natalie Hair    MRN: [id number]           Patient Information     Date Of Birth          8/28/1932        Visit Information        Provider Department      3/16/2018 9:40 AM Leilani Whitman PA-C [facility]        Today's Diagnoses     Shortness of breath    -  1    Tricuspid valve insufficiency, unspecified etiology        Long term current use of anticoagulant therapy        Stented coronary artery - 5/4/2017 at [place]        Chronic atrial fibrillation (H)        Weakness           Follow-ups after your visit        Your next 10 appointments already scheduled     Mar 21, 2018 11:45 AM CDT   [facility]   [facility]    [address]  [facility] [address] [phone]              Future tests that were ordered for you today     Open Future Orders        Priority Expected Expires Ordered    **UA reflex to Microscopic FUTURE anytime Routine 3/16/2018 3/16/2019 3/16/2018            Who to contact     Normal or non-critical lab and imaging results will be communicated to you by [employer], letter or phone within 4 business days after the clinic has received the results. If you do not hear from us within 7 days, please contact the clinic through [employer] or phone. If you have a critical or abnormal lab result, we will notify you by phone as soon as possible.  Submit refill requests through [employer] or call your pharmacy and they will forward the refill request to us. Please allow 3 business days for your refill to be completed.          If you need to speak with a  for additional information , please call: [phone]             Additional Information About Your Visit        [employer] Information     [employer] lets you send messages to your doctor, view your test results, renew your prescriptions, schedule appointments and more. To sign up, go to  [email or web address] . Click on \"Log in\" on the left side of the screen, which will take you to the Welcome page. Then click on \"Sign up Now\" on the right side of the page.     You will be asked to enter the access code listed below, as well as some personal information. Please follow the directions to create your username and password.     Your access code is: [email or web address]  Expires: 2018 10:52 AM     Your access code will  in 90 days. If you need help or a new code, please call your [place] clinic or [phone].        Care EveryWhere ID     This is your Care EveryWhere ID. This could be used by other organizations to access your [place] medical records  [id number]        Your Vitals Were     Pulse Temperature Height Pulse Oximetry          70 98.1  F (36.7  C) (Tympanic) 5' 6\" (1.676 m) 97%         Blood Pressure from Last 3 Encounters:   18 120/68   18 118/64   18 113/60    Weight from Last 3 Encounters:   18 160 lb 12.8 oz (72.9 kg)   18 166 lb 4.8 oz (75.4 kg)   18 168 lb 14 oz (76.6 kg)               Primary Care Provider Office Phone # Fax #    Mimi Kamron Mcguire [phone] [address] [facility] [address]        Equal Access to Services     [facility] Hospital[facility]RIEN AH: Hadii aad ku hadasho Soomaali, waaxda luqadaha, qaybta kaalmada adeegyada, lyric sanchez . So [facility] [phone].    ATENCIÓN: Si habla español, tiene a simmons disposición servicios gratuitos de asistencia lingüística. Llame al [phone].    We comply with applicable federal civil rights laws and [place] laws. We do not discriminate on the basis of race, color, national origin, age, disability, sex, sexual orientation, or gender identity.            Thank you!     Thank you for choosing [facility]  for your care. Our goal is always to provide you with excellent care. Hearing back from our patients is one way we can continue to improve " our services. Please take a few minutes to complete the written survey that you may receive in the mail after your visit with us. Thank you!             Your Updated Medication List - Protect others around you: Learn how to safely use, store and throw away your medicines at www.disposemymeds.org.          This list is accurate as of 3/16/18 10:52 AM.  Always use your most recent med list.                   Brand Name Dispense Instructions for use Diagnosis    * ACETAMINOPHEN PO      Take 1,000 mg by mouth every 8 hours as needed for pain        * acetaminophen 325 MG tablet    TYLENOL    100 tablet    Take 1-2 tablets (325-650 mg) by mouth every 6 hours as needed for mild pain or pain    Back pain, unspecified back location, unspecified back pain laterality, unspecified chronicity, Chronic bilateral low back pain without sciatica       alendronate 70 MG tablet    FOSAMAX    12 tablet    TAKE 1 TABLET BY MOUTH ONCE WEEKLY ON EMPTY STOMACH    Osteopenia       cetirizine 5 MG tablet    zyrTEC    30 tablet    Take 1 tablet (5 mg) by mouth daily    Acute urticaria       clopidogrel 75 MG tablet    PLAVIX    90 tablet    Take 1 tablet (75 mg) by mouth daily    Congestive heart failure, unspecified congestive heart failure chronicity, unspecified congestive heart failure type (H), Lymphedema of both lower extremities       donepezil 5 MG tablet    ARIcept    90 tablet    TAKE 1 TABLET(5 MG) BY MOUTH AT BEDTIME    Dementia without behavioral disturbance, unspecified dementia type       ezetimibe 10 MG tablet    ZETIA    90 tablet    Take 1 tablet (10 mg) by mouth daily    Hyperlipidemia LDL goal <100       metoprolol tartrate 25 MG tablet    LOPRESSOR    90 tablet    TAKE 1/2 TABLET(12.5 MG) BY MOUTH TWICE DAILY    Benign essential hypertension       Multi-vitamin Tabs tablet      Take 1 tablet by mouth daily        nitroGLYcerin 0.4 MG sublingual tablet    NITROSTAT    25 tablet    Place 1 tablet (0.4 mg) under the tongue  every 5 minutes as needed for chest pain    Unspecified cardiovascular disease       omega 3 1000 MG Caps     90 capsule    Take 1 g by mouth daily    Health Care Home       * order for DME     1 Package    Equipment being ordered: Walker with a seat    Acute on chronic diastolic congestive heart failure (H), At risk for falling       * order for DME     1 Device    Equipment being ordered: Hospital Bed 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 -ongoing symptoms of lower extremity edema,shortness of breath,cough sacral pressure sores within last 6 months   An electric hospital bed to allow for increase in head of bed elevation and for ease in wheelchair transfers  Length of need: lifelong NPI - [id number]    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), Lymphedema of both lower extremities, Right-sided congestive heart failure, Tricuspid valve insufficiency, unspecified etiology, Congestive heart failure, unspecified congestive heart failure chronicity, unspecified congestive heart failure type (H), History of pressure ulcer       * order for DME     10 Units    Mepilex sacral dressings    Decubitus ulcer of sacral region, unspecified ulcer stage       * order for DME     1 Month    always discreet underwear - 2 pairs/day  - 60/month flushable cleansing cloths ( wipes)  - several packages/month   For incontinence and primitivo-care    Urinary incontinence, unspecified type, History of pressure ulcer       * order for DME     1 Device    Equipment being ordered: bedside commode and bed pan    Lymphedema of both lower extremities, Chronic diastolic congestive heart failure (H), Urinary incontinence, unspecified type, Memory loss, Weakness, Weight loss       polyethylene glycol powder    MIRALAX/GLYCOLAX     Take 1 capful by mouth every morning Takes 3/4 of a capful        potassium chloride 10 MEQ tablet    K-TAB,KLOR-CON    180 tablet    TAKE 1 TABLET(10 MEQ) BY MOUTH TWICE DAILY     Shortness of breath on exertion, Essential hypertension       SENNA S 8.6-50 MG per tablet   Generic drug:  senna-docusate      Take 2 tablets by mouth 2 times daily        spironolactone 25 MG tablet    ALDACTONE    60 tablet    Take 1 tablet (25 mg) by mouth 2 times daily    Congestive heart failure, unspecified congestive heart failure chronicity, unspecified congestive heart failure type (H), Lymphedema of both lower extremities       torsemide 20 MG tablet    DEMADEX    60 tablet    Take 1 tablet (20 mg) by mouth 2 times daily Patient needs labs before next refill.    Lymphedema of both lower extremities, Pleural effusion       triamcinolone 0.1 % cream    KENALOG    30 g    Apply sparingly to affected area two times daily for 7 days.    Other eczema       TYLENOL PM EXTRA STRENGTH PO      Take 2-3 tablets by mouth nightly as needed        warfarin 4 MG tablet    COUMADIN    80 tablet    Take 6 mg on Tue, Sat; 4 mg all other days or as directed by Anticoagulation Clinic    Congestive heart failure, unspecified congestive heart failure chronicity, unspecified congestive heart failure type (H)       ZZZQUIL PO      Take 50 mg by mouth nightly as needed Alternates with Tylenol PM        * Notice:  This list has 7 medication(s) that are the same as other medications prescribed for you. Read the directions carefully, and ask your doctor or other care provider to review them with you.

## 2018-03-16 NOTE — NURSING NOTE
"Chief Complaint   Patient presents with     Shortness of Breath       Initial /68  Pulse 70  Ht 5' 6\" (1.676 m)  SpO2 97% Estimated body mass index is 25.95 kg/(m^2) as calculated from the following:    Height as of 1/12/18: 5' 6\" (1.676 m).    Weight as of 2/9/18: 160 lb 12.8 oz (72.9 kg).  Medication Reconciliation: andrés Escobar CMA    "

## 2018-03-19 NOTE — MR AVS SNAPSHOT
After Visit Summary   3/19/2018    Natalie Hair    MRN: [id number]           Patient Information     Date Of Birth          8/28/1932        Visit Information        Provider Department      3/19/2018 4:30 PM COOKIE BARCENAS [facility]        Today's Diagnoses     Cardiac pacemaker in situ    -  1       Follow-ups after your visit        Your next 10 appointments already scheduled     Mar 19, 2018  4:30 PM CDT   Remote PPM Check with GARY TECH1   [facility] ([facility])    [address]  [facility] [place] [address] [phone]           This appointment is for a remote check of your pacemaker.  This is not an appointment at the office.            Mar 21, 2018 11:45 AM CDT   Anticoagulation Visit with LL ANTI COAG   [facility] ([facility]    [address] [address] [phone]              Who to contact     If you have questions or need follow up information about today's clinic visit or your schedule please contact [facility] directly at [phone].  Normal or non-critical lab and imaging results will be communicated to you by [employer]ome[employer]art, letter or phone within 4 business days after the clinic has received the results. If you do not hear from us within 7 days, please contact the clinic through Taomeehart or phone. If you have a critical or abnormal lab result, we will notify you by phone as soon as possible.  Submit refill requests through [employer] or call your pharmacy and they will forward the refill request to us. Please allow 3 business days for your refill to be completed.          Additional Information About Your Visit        MyChart Information     [employer] lets you send messages to your doctor, view your test results, renew your prescriptions, schedule appointments and more. To sign up, go to  [email or web address] . Click on \"Log in\" on the left side of the screen, which will take you to the Welcome page. Then click on \"Sign up Now\" on the right side of the page.     You will be asked to enter the access code listed below, as well as some personal information. Please follow the directions to create your username and password.     Your access code is: [email or web address]  Expires: 2018 10:52 AM     Your access code will  in 90 days. If you need help or a new code, please call your [place] clinic or [phone].        Care EveryWhere ID     This is your Care EveryWhere ID. This could be used by other organizations to access your [place] medical records  [id number]         Blood Pressure from Last 3 Encounters:   18 120/68   18 118/64   18 113/60    Weight from Last 3 Encounters:   18 72.9 kg (160 lb 12.8 oz)   18 75.4 kg (166 lb 4.8 oz)   18 76.6 kg (168 lb 14 oz)              We Performed the Following     INTERROGATION DEVICE EVAL REMOTE, PACER/ICD (29020)     PM DEVICE INTERROGATE REMOTE (27307)        Primary Care Provider Office Phone # Fax #    Mimi Kamron Mcguire [phone] [address] [facility] [address]        Equal Access to Services     NIKI FOWLER AH: Hadii aad ku hadasho Soomaali, waaxda luqadaha, qaybta kaalmada adeegyada, lyric knapp. So [facility] [phone].    ATENCIÓN: Si habla español, tiene a simmons disposición servicios gratuitos de asistencia lingüística. Kevin al [phone].    We comply with applicable federal civil rights laws and [place] laws. We do not discriminate on the basis of race, color, national origin, age, disability, sex, sexual orientation, or gender identity.            Thank you!     Thank you for choosing [facility]  for your care. Our goal is always to provide you with excellent care. Hearing back from our patients is one " way we can continue to improve our services. Please take a few minutes to complete the written survey that you may receive in the mail after your visit with us. Thank you!             Your Updated Medication List - Protect others around you: Learn how to safely use, store and throw away your medicines at www.disposemymeds.org.          This list is accurate as of 3/19/18  8:58 AM.  Always use your most recent med list.                   Brand Name Dispense Instructions for use Diagnosis    * ACETAMINOPHEN PO      Take 1,000 mg by mouth every 8 hours as needed for pain        * acetaminophen 325 MG tablet    TYLENOL    100 tablet    Take 1-2 tablets (325-650 mg) by mouth every 6 hours as needed for mild pain or pain    Back pain, unspecified back location, unspecified back pain laterality, unspecified chronicity, Chronic bilateral low back pain without sciatica       alendronate 70 MG tablet    FOSAMAX    12 tablet    TAKE 1 TABLET BY MOUTH ONCE WEEKLY ON EMPTY STOMACH    Osteopenia       cetirizine 5 MG tablet    zyrTEC    30 tablet    Take 1 tablet (5 mg) by mouth daily    Acute urticaria       clopidogrel 75 MG tablet    PLAVIX    90 tablet    Take 1 tablet (75 mg) by mouth daily    Congestive heart failure, unspecified congestive heart failure chronicity, unspecified congestive heart failure type (H), Lymphedema of both lower extremities       donepezil 5 MG tablet    ARIcept    90 tablet    TAKE 1 TABLET(5 MG) BY MOUTH AT BEDTIME    Dementia without behavioral disturbance, unspecified dementia type       ezetimibe 10 MG tablet    ZETIA    90 tablet    Take 1 tablet (10 mg) by mouth daily    Hyperlipidemia LDL goal <100       metoprolol tartrate 25 MG tablet    LOPRESSOR    90 tablet    TAKE 1/2 TABLET(12.5 MG) BY MOUTH TWICE DAILY    Benign essential hypertension       Multi-vitamin Tabs tablet      Take 1 tablet by mouth daily        nitroGLYcerin 0.4 MG sublingual tablet    NITROSTAT    25 tablet    Place 1  tablet (0.4 mg) under the tongue every 5 minutes as needed for chest pain    Unspecified cardiovascular disease       omega 3 1000 MG Caps     90 capsule    Take 1 g by mouth daily    Health Care Home       * order for DME     1 Package    Equipment being ordered: Walker with a seat    Acute on chronic diastolic congestive heart failure (H), At risk for falling       * order for DME     1 Device    Equipment being ordered: Hospital Bed 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 -ongoing symptoms of lower extremity edema,shortness of breath,cough sacral pressure sores within last 6 months   An electric hospital bed to allow for increase in head of bed elevation and for ease in wheelchair transfers  Length of need: lifelong NPI - [id number]    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), Lymphedema of both lower extremities, Right-sided congestive heart failure, Tricuspid valve insufficiency, unspecified etiology, Congestive heart failure, unspecified congestive heart failure chronicity, unspecified congestive heart failure type (H), History of pressure ulcer       * order for DME     10 Units    Mepilex sacral dressings    Decubitus ulcer of sacral region, unspecified ulcer stage       * order for DME     1 Month    always discreet underwear - 2 pairs/day  - 60/month flushable cleansing cloths ( wipes)  - several packages/month   For incontinence and primitivo-care    Urinary incontinence, unspecified type, History of pressure ulcer       * order for DME     1 Device    Equipment being ordered: bedside commode and bed pan    Lymphedema of both lower extremities, Chronic diastolic congestive heart failure (H), Urinary incontinence, unspecified type, Memory loss, Weakness, Weight loss       polyethylene glycol powder    MIRALAX/GLYCOLAX     Take 1 capful by mouth every morning Takes 3/4 of a capful        potassium chloride 10 MEQ tablet    K-TAB,KLOR-CON    180 tablet    TAKE 1 TABLET(10  MEQ) BY MOUTH TWICE DAILY    Shortness of breath on exertion, Essential hypertension       SENNA S 8.6-50 MG per tablet   Generic drug:  senna-docusate      Take 2 tablets by mouth 2 times daily        spironolactone 25 MG tablet    ALDACTONE    60 tablet    Take 1 tablet (25 mg) by mouth 2 times daily    Congestive heart failure, unspecified congestive heart failure chronicity, unspecified congestive heart failure type (H), Lymphedema of both lower extremities       torsemide 20 MG tablet    DEMADEX    60 tablet    Take 1 tablet (20 mg) by mouth 2 times daily Patient needs labs before next refill.    Lymphedema of both lower extremities, Pleural effusion       triamcinolone 0.1 % cream    KENALOG    30 g    Apply sparingly to affected area two times daily for 7 days.    Other eczema       TYLENOL PM EXTRA STRENGTH PO      Take 2-3 tablets by mouth nightly as needed        warfarin 4 MG tablet    COUMADIN    80 tablet    Take 6 mg on Tue, Sat; 4 mg all other days or as directed by Anticoagulation Clinic    Congestive heart failure, unspecified congestive heart failure chronicity, unspecified congestive heart failure type (H)       ZZZQUIL PO      Take 50 mg by mouth nightly as needed Alternates with Tylenol PM        * Notice:  This list has 7 medication(s) that are the same as other medications prescribed for you. Read the directions carefully, and ask your doctor or other care provider to review them with you.

## 2018-03-19 NOTE — PROGRESS NOTES
[employer] (S) Remote PPM Device Check  : >99 %, chronic AFib, taking Warfarin   Mode: VVIR        Presenting Rhythm:   Heart Rate: Adequate rates per histogram  Sensing: Stable    Pacing Threshold: Stable    Impedance: Stable  Battery Status: 10.8-11.5 years  Atrial Arrhythmia: N/A  Ventricular Arrhythmia: None     Care Plan: F/u PPM Merlin q 3 months. Gave patient results over the phone. Curly,CVT

## 2018-03-20 NOTE — TELEPHONE ENCOUNTER
Rx declined- D/C'd per PCP, removed from med list.  alaina Sawyerr, informed.  TURNER Elliott RN

## 2018-03-20 NOTE — TELEPHONE ENCOUNTER
"Requested Prescriptions   Pending Prescriptions Disp Refills     omega-3 acid ethyl esters (LOVAZA) 1 G capsule [Pharmacy Med Name: OMEGA-3-ACID 1GM CAPSULES (RX)] 90 capsule 0     Sig: TAKE 1 CAPSULE BY MOUTH DAILY.    Antihyperlipidemic agents Failed    3/20/2018  1:57 PM       Failed - Lipid panel on file in past 12 mos    Recent Labs   Lab Test  12/28/16   0927  07/22/15   0840   CHOL  177  275*   TRIG  76  153*   HDL  41*  67   LDL  121*  177*   NHDL  136*   --    VLDL   --   31*   CHOLHDLRATIO   --   4.1              Passed - Normal serum ALT on record in past 12 mos    Recent Labs   Lab Test  01/12/18   1433   ALT  40            Passed - Recent (12 mo) or future (30 days) visit within the authorizing provider's specialty    Patient had office visit in the last 12 months or has a visit in the next 30 days with authorizing provider or within the authorizing provider's specialty.  See \"Patient Info\" tab in inbasket, or \"Choose Columns\" in Meds & Orders section of the refill encounter.           Passed - Patient is age 18 years or older       Passed - No active pregnancy on record       Passed - No positive pregnancy test in past 12 mos        Last Written Prescription Date:  2/22/2017  Last Fill Quantity: 90,  # refills: 3   Last office visit: 3/16/2018 with prescribing provider:  Maynor   Future Office Visit:      "

## 2018-03-20 NOTE — TELEPHONE ENCOUNTER
I don't think she needs this med. I don't think she needs to continue getting lipid panels done.     ROZINA Mcguire MD

## 2018-03-21 NOTE — PROGRESS NOTES
"  ANTICOAGULATION FOLLOW-UP CLINIC VISIT    Patient Name:  Natalie Hair  Date:  3/21/2018  Contact Type:  Face to Face, accompanied by her daughter    SUBJECTIVE:     Patient Findings     Positives Inflammation (fluid build up by her lungs -- chronic issues, has had 3 thoracentesis completed in the last year)    Comments Patient has fluid building up in her lungs again. Patient's newest x-ray stated the following, \"new very small right pleural effusion and the previously seen small left pleural effusion to be modestly larger. Cardiomegaly and transvenous pacer are stable. Pulmonary vascular congestion is again seen with some interstitial prominence in the lungs. This interstitial prominence could at least in part relate to scarring but some ongoing chronic interstitial pulmonary edema related to CHF also needs to be considered\"    She was told by provider to watch for worsening symptoms and go into the ED if needed. Patient will likely need another thoracentesis in the future. As of now they are going to monitor it.     Patient will be having lunch in [place] next Wednesday so she will go to that clinic for her next INR check.            OBJECTIVE    INR Protime   Date Value Ref Range Status   03/21/2018 4.2 (A) 0.86 - 1.14 Final       ASSESSMENT / PLAN  INR assessment SUPRA    Recheck INR In: 1 WEEK    INR Location Clinic      Anticoagulation Summary as of 3/21/2018     INR goal 2.5-3.5   Today's INR 4.2!   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       Anticoagulation Care Providers     Provider Role Specialty Phone number    Mimi Mcguire MD [facility] [phone]            See the Encounter Report to view Anticoagulation Flowsheet and Dosing Calendar (Go to Encounters tab in chart review, and find the Anticoagulation Therapy Visit)        Christa Guzman RN

## 2018-03-21 NOTE — MR AVS SNAPSHOT
Natalie PATRICIA Hair   3/21/2018 11:45 AM   Anticoagulation Therapy Visit    Description:  85 year old female   Provider:  LL ANTI COAG   Department:  [facility]           INR as of 3/21/2018     Today's INR 4.2!      Anticoagulation Summary as of 3/21/2018     INR goal 2.5-3.5   Today's INR 4.2!   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    If you need to cancel and/or reschedule your appointment please call one of the following numbers:  [facility] [phone]  [place] - [phone]  [place] - [phone]  [facility] [phone]  [place] - [phone]            March 2018 Details    Sun Mon Tue Wed Thu Fri Sat         1               2               3                 4               5               6               7               8               9               10                 11               12               13               14               15               16               17                 18               19               20               21      Hold   See details      22      4 mg         23      4 mg         24      6 mg           25      4 mg         26      4 mg         27      6 mg         28            29               30               31                Date Details   03/21 This INR check       Date of next INR:  3/28/2018         How to take your warfarin dose     To take:  4 mg Take 1 of the 4 mg tablets.    To take:  6 mg Take 1.5 of the 4 mg tablets.    Hold  Do not take your warfarin dose. See the Details table to the right for additional instructions.

## 2018-03-26 NOTE — DISCHARGE INSTRUCTIONS
Return if symptoms worsen or new symptoms develop.  Follow-up with primary care physician next available.  Drink plenty of fluids.  If increased shortness of breath difficulty getting around weakness or other symptoms present please return for recheck.  He felt comfortable going home at this time.  A urine culture was sent and we will call you with results if positive.  He can consider increasing dose of torsemide slightly adding 30 mg in the morning and 20 at night for 1 day to see if this helps.  Talk with your primary about stopping Coumadin and set up a thoracentesis if continued shortness of breath.    Shortness of Breath (Dyspnea)  Shortness of breath is the feeling that you can't catch your breath or get enough air. It is also known as dyspnea.  Dyspnea can be caused by many different conditions. They include:    Acute asthma attack.    Worsening of chronic lung diseases such as chronic bronchitis and emphysema.    Heart failure. This is when weak heart muscle allows extra fluid to collect in the lungs.    Panic attacks or anxiety. Fear can cause rapid breathing (hyperventilation).    Pneumonia, or an infection in the lung tissue.    Exposure to toxic substances, fumes, smoke, or certain medicines.    Blood clot in the lung (pulmonary embolism). This is often from a piece of blood clot in a deep vein of the leg (deep vein thrombosis) that breaks off and travels to the lungs.    Heart attack or heart-related chest pain (angina).    Anemia.    Collapsed lung (pneumothorax).    Dehydration.    Pregnancy.  Based on your visit today, the exact cause of your shortness of breath is not certain. Your tests don t show any of the serious causes of dyspnea. You may need other tests to find out if you have a serious problem. It s important to watch for any new symptoms or symptoms that get worse. Follow up with your healthcare provider as directed.  Home care  Follow these tips to take care of yourself at home:    When  your symptoms are better, go back to your usual activities.    If you smoke, you should stop. Join a quit-smoking program or ask your healthcare provider for help.    Eat a healthy diet and get plenty of sleep.    Get regular exercise. Talk with your healthcare provider before starting to exercise, especially if you have other medical problems.    Cut down on the amount of caffeine and stimulants you consume.  Follow-up care  Follow up with your healthcare provider, or as advised.  If tests were done, you will be told if your treatment needs to be changed. You can call as directed for the results.  (Note: If an X-ray was taken, a specialist will review it. You will be notified of any new findings that may affect your care.)  Call 911 or get immediate medical care  Shortness of breath may be a sign of a serious medical problem. For example, it may be a problem with your heart or lungs. Call 911 if you have worsening shortness of breath or trouble breathing, especially with any of the symptoms below:    You are confused or it s difficult to wake you.    You faint or lose consciousness.    You have a fast heartbeat, or your heartbeat is irregular.    You are coughing up blood.    You have pain in your chest, arm, shoulder, neck, or upper back.    You break out in a sweat.  When to seek medical advice  Call your healthcare provider right away if any of these occur:    Slight shortness of breath or wheezing    Redness, pain or swelling in your leg, arm, or other body area    Swelling in both legs or ankles    Fast weight gain    Dizziness or weakness    Fever of 100.4 F (38 C) or higher, or as directed by your healthcare provider  Date Last Reviewed: 9/13/2015 2000-2017 [employer]. [address] [place] [address]. All rights reserved. This information is not intended as a substitute for professional medical care. Always follow your healthcare professional's instructions.        What are common  causes of dyspnea?  There are many causes of dyspnea. It can occur due to lung problems such as chronic obstructive pulmonary disease (COPD). It can be a symptom of other health problems such as heart disease, cancer or neuromuscular disease. And it may develop as any terminal illness progresses. Dyspnea often causes anxiety, which in turn makes breathing problems worse.  How is dyspnea treated?  The main goal of treatment is to help the person breathe more comfortably. To do this, medicines and other therapies are often used.  Medicines  Medicines can include:    Oxygen therapy. This treatment helps improve breathing. It may be prescribed if tests show low oxygen in the blood. It may also be prescribed if it adds to the person s comfort.    Opioids. These medicines are most often used to relieve pain, but they can also help ease shortness of breath.    Other medicines. These can include medicines to relieve specific problems that can occur with dyspnea, like anxiety.  Other therapies  Other therapies can include:    Breathing exercises. People are often taught pursed-lip breathing and diaphragmatic breathing to help them breathe better. These techniques can also help relieve dyspnea and anxiety.    Techniques to conserve energy. People are taught the best ways to move and use their bodies. This helps them conserve strength and makes breathing easier when doing normal tasks.    Body positioning. For some people, sitting upright may increase comfort and improve breathing. It may also help to raise the head of the bed when resting.    Relaxation. This can help reduce stress and anxiety, which can make dyspnea worse. Some common methods include meditation and visualization. Simple activities can also help people relax and be distracted from shortness of breath. These include things such as reading a book, watching a movie, or listening to music.    Breathing devices. These can be helpful in opening a person s airways  to decrease breathing problems. A respiratory therapist can help decide if a breathing device would be helpful.  How can loved ones help?  A person s loved ones often aren t sure what to do to help relieve breathing problems. Begin by asking the healthcare provider for advice on how to help. These tips may help as well:    Keep your loved one comfortable and safe:    Maintain a cool temperature in the person's room. Lower the thermostat, or place a fan where it can blow gently on the person s cheek. Keep a window open to let in fresh air, if weather permits.    Help your loved one conserve energy. Put items like medicines and walking aids within reach.    If the person uses oxygen, keep sources of flame away from where the oxygen unit is stored. Check that the oxygen unit is turned off when it is not in use. And be sure to keep a fire extinguisher in the house.    Don t let breathing trouble isolate your loved one. Spend time with him or her doing normal activities. Eat meals, watch movies, and go outdoors with your loved one.    Provide calm support. Try to stay calm and relaxed around your loved one. People can often sense when family members and caregivers are worried about them. This can add to their anxiety, and make their dyspnea worse.  Date Last Reviewed: 3/1/2017    9316-2516 [employer]. [address], [place] [address]. All rights reserved. This information is not intended as a substitute for professional medical care. Always follow your healthcare professional's instructions.

## 2018-03-26 NOTE — ED AVS SNAPSHOT
[facility] Emergency Department    [address] [address]    Phone:  [phone]    Fax:  [phone]                                       Natalie Hair   MRN: [id number]    Department:  [facility] Emergency Department   Date of Visit:  3/26/2018           After Visit Summary Signature Page     I have received my discharge instructions, and my questions have been answered. I have discussed any challenges I see with this plan with the nurse or doctor.    ..........................................................................................................................................  Patient/Patient Representative Signature      ..........................................................................................................................................  Patient Representative Print Name and Relationship to Patient    ..................................................               ................................................  Date                                            Time    ..........................................................................................................................................  Reviewed by Signature/Title    ...................................................              ..............................................  Date                                                            Time

## 2018-03-26 NOTE — ED PROVIDER NOTES
History     Chief Complaint   Patient presents with     Shortness of Breath     HPI  Natalie Hair is a 85 year old female with a history of hypertension, atrial fibrillation, chronic diastolic congestive heart failure, tricuspid valve insufficiency, aortic valve replacement, s/p CABG and pulmonary hypertension, who presents to the Emergency Department with concern for worsening shortness of breath. Patient's daughter provides history. Patient was seen in clinic 10 days ago for shortness of breath. Chest xray had slightly increase in effusions. Patient has been noted to have increased shortness of breath recently. Daughter reports she is only speaking 1-2 words at a time due to shortness of breath. She also had difficulty ambulating from bathroom to bed last night due to shortness of breath. Daughter is concerned patient has increased fluid on her lungs. She reports patient has required three previous thoracentesis in the past year. Patient is on torsemide and has been complaint with this. Her right leg is noted to have increased swelling, and left leg is also swollen    Patient's daughter reports a history of Alzheimer's which she feels is slightly worse, as patient is unable to answer typical questions such as how old she is and where she lives. Daughter also feels patient is more weak than typical. Patient denies abdominal pain or urinary symptoms. She has recently been complaining of a back ache.     Problem List:    Patient Active Problem List    Diagnosis Date Noted     Weight loss 01/19/2018     Priority: Medium     KYLE (acute kidney injury) (H) 01/03/2018     Priority: Medium     Chronic atrial fibrillation (H) 01/03/2018     Priority: Medium     Hypercalcemia 01/03/2018     Priority: Medium     Weakness 01/03/2018     Priority: Medium     Memory loss 07/23/2017     Priority: Medium     S/P CABG (coronary artery bypass graft) - 2002 05/26/2017     Priority: Medium     Stented coronary artery -  "5/4/2017 at [place] 05/26/2017     Priority: Medium     Health Care Home 12/23/2016     Priority: Medium     *See Letters for [facility] Care Plan: My Access Plan         Chronic diastolic congestive heart failure (H) 12/01/2016     Priority: Medium     \"forma\" device   Experimental device placed at [facility] summer of 2017 - one of the first of its kind placed in the  for treatment of severe CHF.        Tricuspid valve insufficiency, unspecified etiology 09/27/2016     Priority: Medium     Primary pulmonary hypertension (H) 09/09/2016     Priority: Medium     Lymphedema of both lower extremities 09/09/2016     Priority: Medium     Osteopenia 03/02/2016     Priority: Medium     osteopenia on dexa scan 2/10- right hip -2, left hip -1.3, spine -0.8  FRAX score- hip 4.7%, osteo 19%       Benign essential hypertension, BP goal <150/90 09/08/2015     Priority: Medium     Long term current use of anticoagulant therapy 04/09/2014     Priority: Medium     9/2/15Take plavix and Coumadin indefinitely.Follow-up with [facility] in 2-4 weeks. Phone [phone]  INR goal 2.5- 3.5 Mitral valve           Urinary incontinence 04/09/2014     Priority: Medium     Advanced directives, counseling/discussion 12/26/2011     Priority: Medium     Advance Directive Problem List Overview:   Name Relationship Phone    Primary Health Care Agent            Alternative Health Care Agent          Discussed advance care planning with patient; information given to patient to review. 12/26/2011          Hyperlipidemia LDL goal <100 10/31/2010     Priority: Medium     Vitamin D deficiency 01/02/2008     Priority: Medium     Problem list name updated by automated process. Provider to review       History of T12 compression fracture 01/24/2007     Priority: Medium     osteopenia on dexa scan 2/10- right hip -2, left hip -1.3, spine -0.8  FRAX score- hip 4.7%, osteo 19%       Backache 01/24/2007     Priority: Medium     CT scan 8/06 shows degeneration in facet joints " and DDD  Problem list name updated by automated process. Provider to review       Cardiovascular disease      Priority: Medium     CAD status post 3 vessel CABG (LIMA to LAD, SVG to RCA, Radial graft to first OM) and mechanical AVR in 12/2002 in [facility]         Heart valve replaced 02/15/2006     Priority: Medium     AVR 12/02 [facility]- #20 ATS mechanical aortic valve, on coumadin  Problem list name updated by automated process. Provider to review          Past Medical History:    Past Medical History:   Diagnosis Date     Backache      H/O aortic valve replacement in 2002     Hyperlipidemia LDL goal < 100      Hypertension      T12 compression fracture (H)      Unspecified cardiovascular disease      Urinary incontinence      Vitamin D deficiency        Past Surgical History:    Past Surgical History:   Procedure Laterality Date     ARTHROPLASTY KNEE BILATERAL       SURGICAL HISTORY OF -   12/2002    Triple bypass w/valve replacement - aortic       Family History:    Family History   Problem Relation Age of Onset     HEART DISEASE Mother      Lipids Son      HEART DISEASE Son      Lipids Daughter        Social History:  Marital Status:   [5]  Social History   Substance Use Topics     Smoking status: Former Smoker     Types: Cigarettes     Smokeless tobacco: Never Used      Comment: 40 years ago     Alcohol use No        Medications:      warfarin (COUMADIN) 4 MG tablet   order for DME   metoprolol tartrate (LOPRESSOR) 25 MG tablet   potassium chloride (K-TAB,KLOR-CON) 10 MEQ tablet   torsemide (DEMADEX) 20 MG tablet   donepezil (ARICEPT) 5 MG tablet   order for DME   acetaminophen (TYLENOL) 325 MG tablet   ezetimibe (ZETIA) 10 MG tablet   triamcinolone (KENALOG) 0.1 % cream   order for DME   clopidogrel (PLAVIX) 75 MG tablet   spironolactone (ALDACTONE) 25 MG tablet   DiphenhydrAMINE HCl, Sleep, (ZZZQUIL PO)   Diphenhydramine-APAP, sleep, (TYLENOL PM EXTRA STRENGTH PO)   alendronate (FOSAMAX)  "70 MG tablet   multivitamin, therapeutic with minerals (MULTI-VITAMIN) TABS tablet   order for DME   cetirizine (ZYRTEC) 5 MG tablet   polyethylene glycol (MIRALAX/GLYCOLAX) powder   senna-docusate (SENNA S) 8.6-50 MG per tablet   order for DME   nitroglycerin (NITROSTAT) 0.4 MG SL tablet   ACETAMINOPHEN PO     Review of Systems   Constitutional: Negative for chills, diaphoresis and fever.   Eyes: Negative for visual disturbance.   Respiratory: Positive for shortness of breath. Negative for cough and chest tightness.    Cardiovascular: Positive for leg swelling. Negative for chest pain.   Gastrointestinal: Negative for abdominal pain.   Genitourinary: Negative for dysuria, frequency and urgency.   Musculoskeletal: Negative for back pain and neck pain.   Skin: Negative for rash.   Neurological: Positive for weakness (increasaed from baseline). Negative for dizziness, light-headedness, numbness and headaches.   Hematological: Does not bruise/bleed easily.   Psychiatric/Behavioral: Positive for confusion.       Physical Exam   BP: 117/65  Heart Rate: 70  Temp: 96.3  F (35.7  C)  Resp: (!) 7  Height: 165.1 cm (5' 5\")  Weight: 73.5 kg (162 lb)  SpO2: 95 %    Physical Exam   Constitutional: She appears well-developed and well-nourished. No distress.   Eyes: Conjunctivae and EOM are normal. Pupils are equal, round, and reactive to light.   Psychiatric: She has a normal mood and affect.   Nursing note and vitals reviewed.    HENT: Oral mucosa moist. No lesions.  Neck: Supple  Pulmonary/Chest: Lungs are clear to auscultation bilaterally. Slightly decreased at bases. No rhonchi , wheezing or rales are present.   Cardiovascular: Heart is regular rate and rhythm. No murmur. Systolic click noted.   Abdomen: Soft, non-distended, non-tender.   Musculoskeletal: Moving all extremities well. Bilateral peripheral edema +2  Neurological: Alert. No focal neurologic deficit. Cn intact  Skin: No rash.      ED Course     ED Course "     Procedures          ekg ; interpreted by Josesito: electronic ventricular pacemaker with a wide complex rhythm and non specific st twave abnormality. No change from 1/18.    Critical Care time:  none               Labs Ordered and Resulted from Time of ED Arrival Up to the Time of Departure from the ED   CBC WITH PLATELETS DIFFERENTIAL - Abnormal; Notable for the following:        Result Value    RBC Count 5.28 (*)     RDW 15.4 (*)     All other components within normal limits   COMPREHENSIVE METABOLIC PANEL - Abnormal; Notable for the following:     Glucose 132 (*)     Creatinine 1.34 (*)     GFR Estimate 38 (*)     GFR Estimate If Black 45 (*)     Alkaline Phosphatase 188 (*)     All other components within normal limits   NT PROBNP INPATIENT - Abnormal; Notable for the following:     N-Terminal Pro BNP Inpatient 1817 (*)     All other components within normal limits   URINE MACROSCOPIC WITH REFLEX TO MICRO - Abnormal; Notable for the following:     Leukocyte Esterase Urine Small (*)     WBC Urine 10 (*)     Bacteria Urine Few (*)     Hyaline Casts 7 (*)     All other components within normal limits   INR - Abnormal; Notable for the following:     INR 3.31 (*)     All other components within normal limits   TROPONIN I     Results for orders placed or performed during the hospital encounter of 03/26/18   Chest XR,  PA & LAT    Narrative    XR CHEST 2 VW 3/26/2018 12:06 PM    HISTORY: Short of breath.    COMPARISON: 3/16/2018.      Impression    IMPRESSION: 2 views of the chest show stable bilateral pleural  effusions, left greater than right. Cardiomegaly and pulmonary  vascular congestion are again seen. There is slightly less pulmonary  vascular congestion compared to the prior study.     JOESPH GUILLEN MD     Medications - No data to display    Medications - No data to display    11:22 AM Patient assessed.     Assessments & Plan (with Medical Decision Making)records were reviewed Labs ekg and cxr were obtained.  White count was not elevated and there was not a L shift. Comprehensive metabolic panel with a creatinine of 1.34 and an alk phos of 188 which appears to be near baseline for the patient. INR is elevated at 3.31. UA with trace leuk and 10 wbc's. Urine culture was obtained. Troponin within normal limtis and bnp was elevated but near baseline at 1817. CXR with revealed a stable bilateral pleural effusions with cardiomegaly and pulmonary vascular congestion which is improved from previous . Findings were discussed with the patient and the daughter . She ambulated to the bathroom and did not desat. ON re-ascultation there is no rhonchi or wheezing. I discussed possible admission but patient felt comfortable going home at this time with his daughter who is in agreement with the plan. She will follow up with her primary care. She feels stronger and want to go home at this time.      I have reviewed the nursing notes.    I have reviewed the findings, diagnosis, plan and need for follow up with the patient.       Discharge Medication List as of 3/26/2018  2:18 PM          Final diagnoses:   Shortness of breath   Generalized muscle weakness     This document serves as a record of the services and decisions personally performed and made by Tono Zapata MD. It was created on his behalf by Violetta Currie, a trained medical scribe. The creation of this document is based the provider's statements to the medical scribe.  Violetta Currie 11:10 AM 3/26/2018    Provider:   The information in this document, created by the medical scribe for me, accurately reflects the services I personally performed and the decisions made by me. I have reviewed and approved this document for accuracy prior to leaving the patient care area.  Tono Zapata MD 11:10 AM 3/26/2018    3/26/2018   [facility] EMERGENCY DEPARTMENT     Tono Zapata MD  03/28/18 2022

## 2018-03-26 NOTE — ED AVS SNAPSHOT
[facility] Emergency Department    [address] [address]    Phone:  [phone]    Fax:  [phone]                                       Natalie Hair   MRN: [id number]    Department:  [facility] Emergency Department   Date of Visit:  3/26/2018           Patient Information     Date Of Birth          8/28/1932        Your diagnoses for this visit were:     Shortness of breath        You were seen by Tono Zapata MD.      Follow-up Information     Follow up with Mimi Mcguire MD.    Specialty:  Family Practice    Why:  This week for recheck    Contact information:    [address] [facility] [id number] [phone]          Follow up with [facility] Emergency Department.    Specialty:  EMERGENCY MEDICINE    Why:  If symptoms worsen    Contact information:    [address] [place] [address] [id number] [phone]    Additional information:    The medical center is located at   [address] [facility]. (between [facility] and   [facility] in [place], [place] north   of [place]).        Discharge Instructions       Return if symptoms worsen or new symptoms develop.  Follow-up with primary care physician next available.  Drink plenty of fluids.  If increased shortness of breath difficulty getting around weakness or other symptoms present please return for recheck.  He felt comfortable going home at this time.  A urine culture was sent and we will call you with results if positive.  He can consider increasing dose of torsemide slightly adding 30 mg in the morning and 20 at night for 1 day to see if this helps.  Talk with your primary about stopping Coumadin and set up a thoracentesis if continued shortness of breath.    Shortness of Breath (Dyspnea)  Shortness of breath is the feeling that you can't catch your breath or get enough air. It is also known as dyspnea.  Dyspnea can be caused by many different conditions. They include:    Acute asthma  attack.    Worsening of chronic lung diseases such as chronic bronchitis and emphysema.    Heart failure. This is when weak heart muscle allows extra fluid to collect in the lungs.    Panic attacks or anxiety. Fear can cause rapid breathing (hyperventilation).    Pneumonia, or an infection in the lung tissue.    Exposure to toxic substances, fumes, smoke, or certain medicines.    Blood clot in the lung (pulmonary embolism). This is often from a piece of blood clot in a deep vein of the leg (deep vein thrombosis) that breaks off and travels to the lungs.    Heart attack or heart-related chest pain (angina).    Anemia.    Collapsed lung (pneumothorax).    Dehydration.    Pregnancy.  Based on your visit today, the exact cause of your shortness of breath is not certain. Your tests don t show any of the serious causes of dyspnea. You may need other tests to find out if you have a serious problem. It s important to watch for any new symptoms or symptoms that get worse. Follow up with your healthcare provider as directed.  Home care  Follow these tips to take care of yourself at home:    When your symptoms are better, go back to your usual activities.    If you smoke, you should stop. Join a quit-smoking program or ask your healthcare provider for help.    Eat a healthy diet and get plenty of sleep.    Get regular exercise. Talk with your healthcare provider before starting to exercise, especially if you have other medical problems.    Cut down on the amount of caffeine and stimulants you consume.  Follow-up care  Follow up with your healthcare provider, or as advised.  If tests were done, you will be told if your treatment needs to be changed. You can call as directed for the results.  (Note: If an X-ray was taken, a specialist will review it. You will be notified of any new findings that may affect your care.)  Call 911 or get immediate medical care  Shortness of breath may be a sign of a serious medical problem. For  example, it may be a problem with your heart or lungs. Call 911 if you have worsening shortness of breath or trouble breathing, especially with any of the symptoms below:    You are confused or it s difficult to wake you.    You faint or lose consciousness.    You have a fast heartbeat, or your heartbeat is irregular.    You are coughing up blood.    You have pain in your chest, arm, shoulder, neck, or upper back.    You break out in a sweat.  When to seek medical advice  Call your healthcare provider right away if any of these occur:    Slight shortness of breath or wheezing    Redness, pain or swelling in your leg, arm, or other body area    Swelling in both legs or ankles    Fast weight gain    Dizziness or weakness    Fever of 100.4 F (38 C) or higher, or as directed by your healthcare provider  Date Last Reviewed: 9/13/2015 2000-2017 [employer]. [address] [place] [address]. All rights reserved. This information is not intended as a substitute for professional medical care. Always follow your healthcare professional's instructions.        What are common causes of dyspnea?  There are many causes of dyspnea. It can occur due to lung problems such as chronic obstructive pulmonary disease (COPD). It can be a symptom of other health problems such as heart disease, cancer or neuromuscular disease. And it may develop as any terminal illness progresses. Dyspnea often causes anxiety, which in turn makes breathing problems worse.  How is dyspnea treated?  The main goal of treatment is to help the person breathe more comfortably. To do this, medicines and other therapies are often used.  Medicines  Medicines can include:    Oxygen therapy. This treatment helps improve breathing. It may be prescribed if tests show low oxygen in the blood. It may also be prescribed if it adds to the person s comfort.    Opioids. These medicines are most often used to relieve pain, but they can also help ease  shortness of breath.    Other medicines. These can include medicines to relieve specific problems that can occur with dyspnea, like anxiety.  Other therapies  Other therapies can include:    Breathing exercises. People are often taught pursed-lip breathing and diaphragmatic breathing to help them breathe better. These techniques can also help relieve dyspnea and anxiety.    Techniques to conserve energy. People are taught the best ways to move and use their bodies. This helps them conserve strength and makes breathing easier when doing normal tasks.    Body positioning. For some people, sitting upright may increase comfort and improve breathing. It may also help to raise the head of the bed when resting.    Relaxation. This can help reduce stress and anxiety, which can make dyspnea worse. Some common methods include meditation and visualization. Simple activities can also help people relax and be distracted from shortness of breath. These include things such as reading a book, watching a movie, or listening to music.    Breathing devices. These can be helpful in opening a person s airways to decrease breathing problems. A respiratory therapist can help decide if a breathing device would be helpful.  How can loved ones help?  A person s loved ones often aren t sure what to do to help relieve breathing problems. Begin by asking the healthcare provider for advice on how to help. These tips may help as well:    Keep your loved one comfortable and safe:    Maintain a cool temperature in the person's room. Lower the thermostat, or place a fan where it can blow gently on the person s cheek. Keep a window open to let in fresh air, if weather permits.    Help your loved one conserve energy. Put items like medicines and walking aids within reach.    If the person uses oxygen, keep sources of flame away from where the oxygen unit is stored. Check that the oxygen unit is turned off when it is not in use. And be sure to keep a  fire extinguisher in the house.    Don t let breathing trouble isolate your loved one. Spend time with him or her doing normal activities. Eat meals, watch movies, and go outdoors with your loved one.    Provide calm support. Try to stay calm and relaxed around your loved one. People can often sense when family members and caregivers are worried about them. This can add to their anxiety, and make their dyspnea worse.  Date Last Reviewed: 3/1/2017    7270-0851 [employer]. [address] [place] [address]. All rights reserved. This information is not intended as a substitute for professional medical care. Always follow your healthcare professional's instructions.          Your next 10 appointments already scheduled     Mar 28, 2018  2:45 PM CDT   [facility] with [place] [facility]    [address] [facility] [address]   [phone]            Jun 25, 2018  3:30 PM CDT   Remote PPM Check with [facility] [place] [address] ([facility])    [address] [place] [address] [address]  [facility] [id number] [phone] OPT 2           This appointment is for a remote check of your pacemaker.  This is not an appointment at the office.              24 Hour Appointment Hotline       To make an appointment at any [facility], call [phone] ([phone]). If you don't have a family doctor or clinic, we will help you find one. [facility] are conveniently located to serve the needs of you and your family.             Review of your medicines      Our records show that you are taking the medicines listed below. If these are incorrect, please call your family doctor or clinic.        Dose / Directions Last dose taken    * ACETAMINOPHEN PO   Dose:  1000 mg        Take 1,000 mg by mouth every 8 hours as needed for pain   Refills:  0        * acetaminophen 325 MG tablet   Commonly known as:   TYLENOL   Dose:  325-650 mg   Quantity:  100 tablet        Take 1-2 tablets (325-650 mg) by mouth every 6 hours as needed for mild pain or pain   Refills:  1        alendronate 70 MG tablet   Commonly known as:  FOSAMAX   Quantity:  12 tablet        TAKE 1 TABLET BY MOUTH ONCE WEEKLY ON EMPTY STOMACH   Refills:  0        cetirizine 5 MG tablet   Commonly known as:  zyrTEC   Dose:  5 mg   Quantity:  30 tablet        Take 1 tablet (5 mg) by mouth daily   Refills:  3        clopidogrel 75 MG tablet   Commonly known as:  PLAVIX   Dose:  75 mg   Quantity:  90 tablet        Take 1 tablet (75 mg) by mouth daily   Refills:  0        donepezil 5 MG tablet   Commonly known as:  ARIcept   Quantity:  90 tablet        TAKE 1 TABLET(5 MG) BY MOUTH AT BEDTIME   Refills:  0        ezetimibe 10 MG tablet   Commonly known as:  ZETIA   Dose:  10 mg   Quantity:  90 tablet        Take 1 tablet (10 mg) by mouth daily   Refills:  3        metoprolol tartrate 25 MG tablet   Commonly known as:  LOPRESSOR   Quantity:  90 tablet        TAKE 1/2 TABLET(12.5 MG) BY MOUTH TWICE DAILY   Refills:  3        Multi-vitamin Tabs tablet   Dose:  1 tablet        Take 1 tablet by mouth daily   Refills:  0        nitroGLYcerin 0.4 MG sublingual tablet   Commonly known as:  NITROSTAT   Dose:  0.4 mg   Quantity:  25 tablet        Place 1 tablet (0.4 mg) under the tongue every 5 minutes as needed for chest pain   Refills:  1        * order for DME   Quantity:  1 Package        Equipment being ordered: Walker with a seat   Refills:  0        * order for DME   Quantity:  1 Device        Equipment being ordered: Hospital Bed 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 -ongoing symptoms of lower extremity edema,shortness of breath,cough sacral pressure sores within last 6 months   An electric hospital bed to allow for increase in head of bed elevation and for ease in wheelchair transfers  Length of need: lifelong NPI - [id number]   Refills:  0        * order for  DME   Quantity:  10 Units        Mepilex sacral dressings   Refills:  3        * order for DME   Quantity:  1 Month        always discreet underwear - 2 pairs/day  - 60/month flushable cleansing cloths ( wipes)  - several packages/month   For incontinence and primitivo-care   Refills:  3        * order for DME   Quantity:  1 Device        Equipment being ordered: bedside commode and bed pan   Refills:  0        polyethylene glycol powder   Commonly known as:  MIRALAX/GLYCOLAX   Dose:  0.5 capful        Take 0.5 capfuls by mouth every morning   Refills:  0        potassium chloride 10 MEQ tablet   Commonly known as:  K-TAB,KLOR-CON   Quantity:  180 tablet        TAKE 1 TABLET(10 MEQ) BY MOUTH TWICE DAILY   Refills:  1        SENNA S 8.6-50 MG per tablet   Dose:  2 tablet   Generic drug:  senna-docusate        Take 2 tablets by mouth 2 times daily   Refills:  0        spironolactone 25 MG tablet   Commonly known as:  ALDACTONE   Dose:  25 mg   Quantity:  60 tablet        Take 1 tablet (25 mg) by mouth 2 times daily   Refills:  1        torsemide 20 MG tablet   Commonly known as:  DEMADEX   Dose:  20 mg   Quantity:  60 tablet        Take 1 tablet (20 mg) by mouth 2 times daily Patient needs labs before next refill.   Refills:  0        triamcinolone 0.1 % cream   Commonly known as:  KENALOG   Quantity:  30 g        Apply sparingly to affected area two times daily for 7 days.   Refills:  0        TYLENOL PM EXTRA STRENGTH PO   Dose:  2-3 tablet        Take 2-3 tablets by mouth nightly as needed   Refills:  0        warfarin 4 MG tablet   Commonly known as:  COUMADIN   Quantity:  80 tablet        Take 6 mg on Tue, Sat; 4 mg all other days or as directed by Anticoagulation Clinic   Refills:  0        ZZZQUIL PO   Dose:  50 mg        Take 50 mg by mouth nightly as needed ((Taking liquid form about every 2 weeks)) Alternates with Tylenol PM   Refills:  0        * Notice:  This list has 7 medication(s) that are the same as other  medications prescribed for you. Read the directions carefully, and ask your doctor or other care provider to review them with you.            Procedures and tests performed during your visit     CBC with platelets differential    Chest XR,  PA & LAT    Comprehensive metabolic panel    EKG 12 lead    INR    NT pro BNP    Troponin I    UA reflex to Microscopic    Urine Culture Aerobic Bacterial      Orders Needing Specimen Collection     None      Pending Results     Date and Time Order Name Status Description    3/26/2018 1355 Urine Culture Aerobic Bacterial In process             Pending Culture Results     Date and Time Order Name Status Description    3/26/2018 1355 Urine Culture Aerobic Bacterial In process             Pending Results Instructions     If you had any lab results that were not finalized at the time of your Discharge, you can call the ED Lab Result RN at [phone]. You will be contacted by this team for any positive Lab results or changes in treatment. The nurses are available 7 days a week from 10A to 6:30P.  You can leave a message 24 hours per day and they will return your call.        Test Results From Your Hospital Stay        3/26/2018 11:34 AM      Component Results     Component Value Ref Range & Units Status    WBC 5.2 4.0 - 11.0 10e9/L Final    RBC Count 5.28 (H) 3.8 - 5.2 10e12/L Final    Hemoglobin 14.6 11.7 - 15.7 g/dL Final    Hematocrit 45.0 35.0 - 47.0 % Final    MCV 85 78 - 100 fl Final    MCH 27.7 26.5 - 33.0 pg Final    MCHC 32.4 31.5 - 36.5 g/dL Final    RDW 15.4 (H) 10.0 - 15.0 % Final    Platelet Count 193 150 - 450 10e9/L Final    Diff Method Automated Method  Final    % Neutrophils 66.9 % Final    % Lymphocytes 14.7 % Final    % Monocytes 14.3 % Final    % Eosinophils 3.1 % Final    % Basophils 0.8 % Final    % Immature Granulocytes 0.2 % Final    Absolute Neutrophil 3.5 1.6 - 8.3 10e9/L Final    Absolute Lymphocytes 0.8 0.8 - 5.3 10e9/L Final    Absolute Monocytes 0.8 0.0  - 1.3 10e9/L Final    Absolute Eosinophils 0.2 0.0 - 0.7 10e9/L Final    Absolute Basophils 0.0 0.0 - 0.2 10e9/L Final    Abs Immature Granulocytes 0.0 0 - 0.4 10e9/L Final         3/26/2018 11:47 AM      Component Results     Component Value Ref Range & Units Status    Sodium 137 133 - 144 mmol/L Final    Potassium 4.4 3.4 - 5.3 mmol/L Final    Chloride 102 94 - 109 mmol/L Final    Carbon Dioxide 26 20 - 32 mmol/L Final    Anion Gap 9 3 - 14 mmol/L Final    Glucose 132 (H) 70 - 99 mg/dL Final    Urea Nitrogen 28 7 - 30 mg/dL Final    Creatinine 1.34 (H) 0.52 - 1.04 mg/dL Final    GFR Estimate 38 (L) >60 mL/min/1.7m2 Final    Non  GFR Calc    GFR Estimate If Black 45 (L) >60 mL/min/1.7m2 Final    African American GFR Calc    Calcium 9.2 8.5 - 10.1 mg/dL Final    Bilirubin Total 1.3 0.2 - 1.3 mg/dL Final    Albumin 3.7 3.4 - 5.0 g/dL Final    Protein Total 8.3 6.8 - 8.8 g/dL Final    Alkaline Phosphatase 188 (H) 40 - 150 U/L Final    ALT 23 0 - 50 U/L Final    AST 27 0 - 45 U/L Final         3/26/2018 11:47 AM      Component Results     Component Value Ref Range & Units Status    Troponin I ES <0.015 0.000 - 0.045 ug/L Final    The 99th percentile for upper reference range is 0.045 ug/L.  Troponin values   in the range of 0.045 - 0.120 ug/L may be associated with risks of adverse   clinical events.           3/26/2018 11:47 AM      Component Results     Component Value Ref Range & Units Status    N-Terminal Pro BNP Inpatient 1817 (H) 0 - 1800 pg/mL Final       Reference range shown and results flagged as abnormal are suggested inpatient   cut points for confirming diagnosis if CHF in an acute setting. Establishing a   baseline value for each individual patient is useful for follow-up. An   inpatient or emergency department NT-proPBNP <300 pg/mL effectively rules out   acute CHF, with 99% negative predictive value.  The outpatient non-acute reference range for ruling out CHF is:   0-125 pg/mL (age 18  to less than 75)   0-450 pg/mL (age 75 yrs and older)           3/26/2018  1:42 PM      Component Results     Component Value Ref Range & Units Status    Color Urine Yellow  Final    Appearance Urine Slightly Cloudy  Final    Glucose Urine Negative NEG^Negative mg/dL Final    Bilirubin Urine Negative NEG^Negative Final    Ketones Urine Negative NEG^Negative mg/dL Final    Specific Gravity Urine 1.009 1.003 - 1.035 Final    Blood Urine Negative NEG^Negative Final    pH Urine 7.0 5.0 - 7.0 pH Final    Protein Albumin Urine Negative NEG^Negative mg/dL Final    Urobilinogen mg/dL 0.0 0.0 - 2.0 mg/dL Final    Nitrite Urine Negative NEG^Negative Final    Leukocyte Esterase Urine Small (A) NEG^Negative Final    Source Unspecified Urine  Final    RBC Urine 1 0 - 2 /HPF Final    WBC Urine 10 (H) 0 - 5 /HPF Final    Bacteria Urine Few (A) NEG^Negative /HPF Final    Squamous Epithelial /HPF Urine 1 0 - 1 /HPF Final    Hyaline Casts 7 (H) 0 - 2 /LPF Final         3/26/2018 12:44 PM      Narrative     XR CHEST 2 VW 3/26/2018 12:06 PM    HISTORY: Short of breath.    COMPARISON: 3/16/2018.        Impression     IMPRESSION: 2 views of the chest show stable bilateral pleural  effusions, left greater than right. Cardiomegaly and pulmonary  vascular congestion are again seen. There is slightly less pulmonary  vascular congestion compared to the prior study.     JOESPH GUILLEN MD         3/26/2018 12:09 PM      Component Results     Component Value Ref Range & Units Status    INR 3.31 (H) 0.86 - 1.14 Final         3/26/2018  1:59 PM                Thank you for choosing [facility]       Thank you for choosing [facility] for your care. Our goal is always to provide you with excellent care. Hearing back from our patients is one way we can continue to improve our services. Please take a few minutes to complete the written survey that you may receive in the mail after you visit with us. Thank you!        MyChart Information     [employer] lets you  "send messages to your doctor, view your test results, renew your prescriptions, schedule appointments and more. To sign up, go to [email or web address] . Click on \"Log in\" on the left side of the screen, which will take you to the Welcome page. Then click on \"Sign up Now\" on the right side of the page.     You will be asked to enter the access code listed below, as well as some personal information. Please follow the directions to create your username and password.     Your access code is: [email or web address]  Expires: 2018 10:52 AM     Your access code will  in 90 days. If you need help or a new code, please call your [facility] clinic or [phone].        Care EveryWhere ID     This is your Care EveryWhere ID. This could be used by other organizations to access your [facility] medical records  [id number]        Equal Access to Services     JA FOWLER : Hadii pily Monroe, waaxda artemio, qaybta kaalmada zahira, lyric knapp. So [facility] [phone].    ATENCIÓN: Si habla español, tiene a simmons disposición servicios gratuitos de asistencia lingüística. Llame al [phone].    We comply with applicable federal civil rights laws and [place] laws. We do not discriminate on the basis of race, color, national origin, age, disability, sex, sexual orientation, or gender identity.            After Visit Summary       This is your record. Keep this with you and show to your community pharmacist(s) and doctor(s) at your next visit.                  "

## 2018-03-28 NOTE — PROGRESS NOTES
ANTICOAGULATION FOLLOW-UP CLINIC VISIT    Patient Name:  Natalie Hair  Date:  3/28/2018  Contact Type:  Face to Face    SUBJECTIVE:     Patient Findings     Positives Inflammation (pleural effusions), Intentional hold of therapy (3-21-18)    Comments Patient did go to ED on 3-26 for shortness of breath. She has pleural effusions and will need a thoracentesis again at some point. She was instructed by ED to contact PCP when symptoms worsen to get this scheduled. She will need to hold her warfarin. Patient came with daughter and they were instructed to let [facility] know when a thoracentesis has been scheduled. Otherwise, will lower maintenance dose of warfarin since last two INRs have been elevated.           OBJECTIVE    INR Protime   Date Value Ref Range Status   03/28/2018 4.4 (A) 0.86 - 1.14 Final       ASSESSMENT / PLAN  INR assessment SUPRA    Recheck INR In: 2 WEEKS    INR Location Clinic      Anticoagulation Summary as of 3/28/2018     INR goal 2.5-3.5   Today's INR 4.4!   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       Anticoagulation Care Providers     Provider Role Specialty Phone number    Mimi Mcguire MD [facility] [phone]            See the Encounter Report to view Anticoagulation Flowsheet and Dosing Calendar (Go to Encounters tab in chart review, and find the Anticoagulation Therapy Visit)        Brianna Morley RN

## 2018-03-28 NOTE — MR AVS SNAPSHOT
Natalie Hair   3/28/2018 2:45 PM   Anticoagulation Therapy Visit    Description:  85 year old female   Provider:  [place] [facility]   Department:  [facility]           INR as of 3/28/2018     Today's INR 4.4!      Anticoagulation Summary as of 3/28/2018     INR goal 2.5-3.5   Today's INR 4.4!   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 Start taking 4 mg daily as of tomorrow.       Your next Anticoagulation Clinic appointment(s)     Apr 11, 2018  1:00 PM CDT   Anticoagulation Visit with [facility] ANTI COAG   [facility]    [address] [address] [phone]              Contact Numbers     Please call [phone] with any problems or questions regarding your therapy.    If you need to cancel and/or reschedule your appointment please call one of the following numbers:  [facility] [phone]  [place] - [phone]  [place] - [phone]  [facility] [phone]  [place] - [phone]            March 2018 Details    Sun Mon Tue Wed Thu Fri Sat         1               2               3                 4               5               6               7               8               9               10                 11               12               13               14               15               16               17                 18               19               20               21               22               23               24                 25               26               27               28      Hold   See details      29      4 mg         30      4 mg         31      4 mg          Date Details   03/28 This INR check               How to take your warfarin dose     To take:  4 mg Take 1 of the 4 mg tablets.    Hold Do not take your warfarin  dose. See the Details table to the right for additional instructions.                April 2018 Details    Sun Mon Tue Wed Thu Fri Sat     1      4 mg         2      4 mg         3      4 mg         4      4 mg         5      4 mg         6      4 mg         7      4 mg           8      4 mg         9      4 mg         10      4 mg         11            12               13               14                 15               16               17               18               19               20               21                 22               23               24               25               26               27               28                 29               30                     Date Details   No additional details    Date of next INR:  4/11/2018         How to take your warfarin dose     To take:  4 mg Take 1 of the 4 mg tablets.

## 2018-03-28 NOTE — LETTER
[place]  [address] [place] [address]  [phone]      March 28, 2018      Digna Gonzalez  [address] [facility] [address]      Dear Digna,    I am the clinic care coordinator who works with [facility] and Dr. Mimi Mcguire MD at [facility] and I want to thank you so much for speaking with me today on the phone.      As we discussed, I have asked Dr. Kamron Mcguire to place an order with [facility] for your mom, Natalie.  I have enclosed a one-page document on what Palliative Home Care services may entail.    Please feel free to contact me at [phone], with any questions or concerns. We at [facility] are focused on providing you with the highest-quality healthcare experience possible and that all starts with you.     Sincerely,         Jessie Larson    Enclosed: I have enclosed helpful educational material. Please review and call me with any questions.

## 2018-03-28 NOTE — PROGRESS NOTES
"Clinic Care Coordination Contact    OUTREACH    Referral Information:  Referral Source: IP list/ ED List.  Pt was seen at [facility] on 3.26.18.  ED notes are not complete at time of phone call to pt's daughter.    Primary Diagnosis: SOB    Chief Complaint   Patient presents with     Clinic Care Coordination - [facility]     social work        Universal Utilization:   Utilization    Last refreshed: 3/28/2018  4:48 PM:  No Show Count (past year) 0       Last refreshed: 3/28/2018  4:48 PM:  ED visits 4       Last refreshed: 3/28/2018  4:48 PM:  Hospital admissions 1          Current as of: 3/28/2018  4:48 PM           Clinical Concerns:  Current Medical Concerns:  Pt with SOB, weakness and FTT    Current Behavioral Concerns: None    Education Provided to patient: SW and daughter, Digna, discussed that pt continues to remain SOB and weak at home.  Digna states she is nervous about her mom, but does not want to appear over zealous and bring her into the ED again, but also shared the pt remains the same, perhaps has some improvement.  Digna shared that she is aware that her mom has a disease where her mom is dying, but that there is so much discord amongst her 6 siblings, where some of them say things like, \"mom should be up walking, you baby her too much.\"      Clinical Pathway Name: Heart Failure  Health Maintenance Reviewed: Due/Overdue, there are many areas that are overdue     Medication Management:  Digna sets up pt's medications and gives them as prescribed.     Functional Status:  Mobility Status: Dependent/Assisted by Another     Transportation:  Pt is dependent upon family for all rides  Transportation means:: Family, Medical transport     Psychosocial:  Current living arrangement:: I live in a private home with family ( daughter Digna retired from the [employer] to care for her mom 18 months ago)  Financial/Insurance: Social Security & MA.  Pt is on an Elderly Waiver via [facility], which allows her to " keep a small portion of her Social Security and the rest of her money goes to MA as a spend down.  Daughter, Digna, gets paid to be the pt's PCA for 10 hours/day via the pt's Elderly Waiver.      Digna states that all 7 of the pt's children do not agree on pt's plan of care for end of life discussions.  No Health Care Directive on file and no decision maker listed.     Resources and Interventions:  Current Resources: [facility] Elderly Waiver , Celestino Ortiz at [phone]  Advanced Care Plans/Directives on file:: No  Referrals Placed: Palliative Care     Goals: Per discussion with pt's daughter, a goal of the pt and family will be to come to a decision on the pt's plan of care for her end of life cares between the patient's 7 children.    As of today's date 3/28/2018 goal is met at 0 - 25%.   Goal Status:  Ongoing  Barriers: All 7 children do not understand the pt's medical conditions and prognosis.  Strengths: Pt has 7 children who care about her well being  Patient/Caregiver understanding: Digna will speak with [facility] team when they call to do an intake assessment.  Outreach Frequency: monthly  Future Appointments              In 2 weeks LL ANTI COAG [facility]    In 2 months GARY TECH1 [facility] Heart [facility] PSA CLIN           Plan: SW to continue to follow.    Jessie Gan  Social Work Care Coordinator  [facility]  [phone]

## 2018-03-29 NOTE — PROGRESS NOTES
"Clinic Care Coordination Contact  Care Team Conversations    SW received return call from  Home Care RN, Felicia Xiong, who shared that the pt is not eligible for Palliative Care as she has no skilled need.    Felicia shared that this patient is Hospice eligible and was informed of that from the [facility] team during a family care conference back in January, but the family declined at that time.    SW placed call to pt's daughter, Digna, and explained above.  Digna stated she understood, and when given the option of signing onto hospice at this time stated, \"we are not ready for that yet, despite yesterday saying I know she may die in 6 months, we are just not ready yet.\"     HIRAL and Digna discussed that pt's MA may cover an at home INR machine.  Digna said she has to call her mom's [facility] SW today anyway, so will ask about that.    SW to continue to follow for emotional support.  Pt is getting maximum support from [facility] resources at this time.    Jessie Gan  Social Work Care Coordinator  [facility]  [phone]            "

## 2018-04-06 PROBLEM — J90 PLEURAL EFFUSION: Status: ACTIVE | Noted: 2018-01-01

## 2018-04-06 NOTE — MR AVS SNAPSHOT
After Visit Summary   4/6/2018    Natalie Hair    MRN: [id number]           Patient Information     Date Of Birth          8/28/1932        Visit Information        Provider Department      4/6/2018 11:30 AM Mimi Mcguire MD [facility]        Today's Diagnoses     Chronic diastolic congestive heart failure (H)    -  1    Chronic atrial fibrillation (H)        Pleural effusion           Follow-ups after your visit        Your next 10 appointments already scheduled     Apr 11, 2018  1:00 PM CDT   Anticoagulation Visit with LL ANTI COAG   [facility]    [address] [address]   [phone]            Jun 25, 2018  3:30 PM CDT   Remote PPM Check with GARY TECH1   [facility] ([facility]    [address]  [place] [id number] [phone] OPT 2           This appointment is for a remote check of your pacemaker.  This is not an appointment at the office.              Future tests that were ordered for you today     Open Future Orders        Priority Expected Expires Ordered    US Thoracentesis Routine  4/6/2019 4/6/2018            Who to contact     Normal or non-critical lab and imaging results will be communicated to you by [employer] ENThart, letter or phone within 4 business days after the clinic has received the results. If you do not hear from us within 7 days, please contact the clinic through [employer] ENThart or phone. If you have a critical or abnormal lab result, we will notify you by phone as soon as possible.  Submit refill requests through [employer] or call your pharmacy and they will forward the refill request to us. Please allow 3 business days for your refill to be completed.          If you need to speak with a  for additional information , please call: [phone]             Additional Information About Your Visit        [facility]  "Information     eelusion lets you send messages to your doctor, view your test results, renew your prescriptions, schedule appointments and more. To sign up, go to [email or web address] . Click on \"Log in\" on the left side of the screen, which will take you to the Welcome page. Then click on \"Sign up Now\" on the right side of the page.     You will be asked to enter the access code listed below, as well as some personal information. Please follow the directions to create your username and password.     Your access code is: [email or web address]  Expires: 2018 10:52 AM     Your access code will  in 90 days. If you need help or a new code, please call your [place] clinic or [phone].        Care EveryWhere ID     This is your Care EveryWhere ID. This could be used by other organizations to access your [place] medical records  [id number]        Your Vitals Were     Pulse Temperature Height Pulse Oximetry BMI (Body Mass Index)       70 97.3  F (36.3  C) (Tympanic) 5' 6\" (1.676 m) 96% 26.1 kg/m2        Blood Pressure from Last 3 Encounters:   18 129/49   18 122/73   18 120/68    Weight from Last 3 Encounters:   18 161 lb 11.2 oz (73.3 kg)   18 162 lb (73.5 kg)   18 160 lb 12.8 oz (72.9 kg)                 Today's Medication Changes          These changes are accurate as of 18  2:24 PM.  If you have any questions, ask your nurse or doctor.               Start taking these medicines.        Dose/Directions    metolazone 2.5 MG tablet   Commonly known as:  ZAROXOLYN   Used for:  Chronic diastolic congestive heart failure (H)   Started by:  Mimi Mcguire MD        Dose:  2.5 mg   Take 1 tablet (2.5 mg) by mouth daily For two days   Quantity:  2 tablet   Refills:  1            Where to get your medicines      These medications were sent to [facility] [address] [place] [address] [address] [place] [address]  [address], [place] " [address]     Phone:  [phone]     metolazone 2.5 MG tablet                Primary Care Provider Office Phone # Fax #    Mimi Mcguire [phone] [address] [facility] [address]        Goals        General    Psychosocial (pt-stated)     Notes - Note created  3/28/2018  5:16 PM by Jessie Larson LSW    Per discussion with pt's daughter, a goal of the pt and family will be to come to a decision on the pt's plan of care for her end of life cares between the patient's 7 children.    As of today's date 3/28/2018 goal is met at 0 - 25%.   Goal Status:  Ongoing          Equal Access to Services     JA FOWLER : Lilian browneo Somanuel, waaxda luqadaha, qaybta kaalmada adeegyada, lyric sanchez . So [facility] [phone].    ATENCIÓN: Si habla español, tiene a simmons disposición servicios gratuitos de asistencia lingüística. Llame al [phone].    We comply with applicable federal civil rights laws and [place] laws. We do not discriminate on the basis of race, color, national origin, age, disability, sex, sexual orientation, or gender identity.            Thank you!     Thank you for choosing [facility]  for your care. Our goal is always to provide you with excellent care. Hearing back from our patients is one way we can continue to improve our services. Please take a few minutes to complete the written survey that you may receive in the mail after your visit with us. Thank you!             Your Updated Medication List - Protect others around you: Learn how to safely use, store and throw away your medicines at www.disposemymeds.org.          This list is accurate as of 4/6/18  2:24 PM.  Always use your most recent med list.                   Brand Name Dispense Instructions for use Diagnosis    * ACETAMINOPHEN PO      Take 1,000 mg by mouth every 8 hours as needed for pain        * acetaminophen 325 MG tablet    TYLENOL    100 tablet     Take 1-2 tablets (325-650 mg) by mouth every 6 hours as needed for mild pain or pain    Back pain, unspecified back location, unspecified back pain laterality, unspecified chronicity, Chronic bilateral low back pain without sciatica       alendronate 70 MG tablet    FOSAMAX    12 tablet    TAKE 1 TABLET BY MOUTH ONCE WEEKLY ON EMPTY STOMACH    Osteopenia       cetirizine 5 MG tablet    zyrTEC    30 tablet    Take 1 tablet (5 mg) by mouth daily    Acute urticaria       clopidogrel 75 MG tablet    PLAVIX    90 tablet    Take 1 tablet (75 mg) by mouth daily    Congestive heart failure, unspecified congestive heart failure chronicity, unspecified congestive heart failure type (H), Lymphedema of both lower extremities       donepezil 5 MG tablet    ARIcept    90 tablet    TAKE 1 TABLET(5 MG) BY MOUTH AT BEDTIME    Dementia without behavioral disturbance, unspecified dementia type       ezetimibe 10 MG tablet    ZETIA    90 tablet    Take 1 tablet (10 mg) by mouth daily    Hyperlipidemia LDL goal <100       metolazone 2.5 MG tablet    ZAROXOLYN    2 tablet    Take 1 tablet (2.5 mg) by mouth daily For two days    Chronic diastolic congestive heart failure (H)       metoprolol tartrate 25 MG tablet    LOPRESSOR    90 tablet    TAKE 1/2 TABLET(12.5 MG) BY MOUTH TWICE DAILY    Benign essential hypertension       Multi-vitamin Tabs tablet      Take 1 tablet by mouth daily        nitroGLYcerin 0.4 MG sublingual tablet    NITROSTAT    25 tablet    Place 1 tablet (0.4 mg) under the tongue every 5 minutes as needed for chest pain    Unspecified cardiovascular disease       * order for DME     1 Package    Equipment being ordered: Walker with a seat    Acute on chronic diastolic congestive heart failure (H), At risk for falling       * order for DME     1 Device    Equipment being ordered: Hospital Bed 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 -ongoing symptoms of lower extremity edema,shortness of breath,cough sacral pressure  sores within last 6 months   An electric hospital bed to allow for increase in head of bed elevation and for ease in wheelchair transfers  Length of need: lifelong NPI - [id number]    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), Lymphedema of both lower extremities, Right-sided congestive heart failure, Tricuspid valve insufficiency, unspecified etiology, Congestive heart failure, unspecified congestive heart failure chronicity, unspecified congestive heart failure type (H), History of pressure ulcer       * order for DME     10 Units    Mepilex sacral dressings    Decubitus ulcer of sacral region, unspecified ulcer stage       * order for DME     1 Month    always discreet underwear - 2 pairs/day  - 60/month flushable cleansing cloths ( wipes)  - several packages/month   For incontinence and primitivo-care    Urinary incontinence, unspecified type, History of pressure ulcer       * order for DME     1 Device    Equipment being ordered: bedside commode and bed pan    Lymphedema of both lower extremities, Chronic diastolic congestive heart failure (H), Urinary incontinence, unspecified type, Memory loss, Weakness, Weight loss       polyethylene glycol powder    MIRALAX/GLYCOLAX     Take 0.5 capfuls by mouth every morning        potassium chloride 10 MEQ tablet    K-TAB,KLOR-CON    180 tablet    TAKE 1 TABLET(10 MEQ) BY MOUTH TWICE DAILY    Shortness of breath on exertion, Essential hypertension       SENNA S 8.6-50 MG per tablet   Generic drug:  senna-docusate      Take 2 tablets by mouth 2 times daily        spironolactone 25 MG tablet    ALDACTONE    60 tablet    Take 1 tablet (25 mg) by mouth 2 times daily    Congestive heart failure, unspecified congestive heart failure chronicity, unspecified congestive heart failure type (H), Lymphedema of both lower extremities       torsemide 20 MG tablet    DEMADEX    60 tablet    Take 1 tablet (20 mg) by mouth 2 times daily Patient needs labs  before next refill.    Lymphedema of both lower extremities, Pleural effusion       triamcinolone 0.1 % cream    KENALOG    30 g    Apply sparingly to affected area two times daily for 7 days.    Other eczema       TYLENOL PM EXTRA STRENGTH PO      Take 2-3 tablets by mouth nightly as needed        warfarin 4 MG tablet    COUMADIN    80 tablet    Take 4 mg daily or as directed by Anticoagulation Clinic    Congestive heart failure, unspecified congestive heart failure chronicity, unspecified congestive heart failure type (H)       ZZZQUIL PO      Take 50 mg by mouth nightly as needed ((Taking liquid form about every 2 weeks)) Alternates with Tylenol PM        * Notice:  This list has 7 medication(s) that are the same as other medications prescribed for you. Read the directions carefully, and ask your doctor or other care provider to review them with you.

## 2018-04-06 NOTE — PROGRESS NOTES
SUBJECTIVE:   Natalie Hair is a 85 year old female who presents to clinic today for the following health issues:      ED/UC Followup:    Facility:  [facility]  Date of visit: 03/26/2018  Reason for visit: Shortness of breath.  Current Status: Still having shortness of breath, and is worse. She has no appetite and her memory is getting worse. She is feeling very weak. She is wanting her lungs looked at.      **She is also experiencing pain in her back and left lower side pain. Has been going on for about three weeks.     In the ER on 3/26/18 for SHORTNESS OF BREATH    - hard to walk and talk  Increased swelling in the right leg   Memory is worse   cxr in emergency room - stable pleural effusions bilaterally  Labs - stable elevated bnp, neg trop, normal urine, stable cmp, cbc  Urine cx - negative       Current symptoms:   Home for ten days - complaining of left lower back and left side aches   No fall  Weak   Harder for her to walk and transfer     Appetite is down     Review of systems:  No headache  No f/c   No n/v  No d/c        Problem list and histories reviewed & adjusted, as indicated.  Additional history: as documented      Patient Active Problem List   Diagnosis     Heart valve replaced     Cardiovascular disease     History of T12 compression fracture     Backache     Vitamin D deficiency     Hyperlipidemia LDL goal <100     Advanced directives, counseling/discussion     Long term current use of anticoagulant therapy     Urinary incontinence     Benign essential hypertension, BP goal <150/90     Osteopenia     Primary pulmonary hypertension (H)     Lymphedema of both lower extremities     Tricuspid valve insufficiency, unspecified etiology     Chronic diastolic congestive heart failure (H)     Health Care Home     S/P CABG (coronary artery bypass graft) - 2002     Stented coronary artery - 5/4/2017 at [facility]     KYLE (acute kidney injury) (H)     Chronic atrial fibrillation  "(H)     Hypercalcemia     Weakness     Weight loss     Current Outpatient Prescriptions   Medication     warfarin (COUMADIN) 4 MG tablet     metoprolol tartrate (LOPRESSOR) 25 MG tablet     potassium chloride (K-TAB,KLOR-CON) 10 MEQ tablet     torsemide (DEMADEX) 20 MG tablet     donepezil (ARICEPT) 5 MG tablet     order for DME     acetaminophen (TYLENOL) 325 MG tablet     ezetimibe (ZETIA) 10 MG tablet     order for DME     clopidogrel (PLAVIX) 75 MG tablet     spironolactone (ALDACTONE) 25 MG tablet     DiphenhydrAMINE HCl, Sleep, (ZZZQUIL PO)     Diphenhydramine-APAP, sleep, (TYLENOL PM EXTRA STRENGTH PO)     alendronate (FOSAMAX) 70 MG tablet     multivitamin, therapeutic with minerals (MULTI-VITAMIN) TABS tablet     order for DME     cetirizine (ZYRTEC) 5 MG tablet     polyethylene glycol (MIRALAX/GLYCOLAX) powder     senna-docusate (SENNA S) 8.6-50 MG per tablet     order for DME     ACETAMINOPHEN PO     order for DME     triamcinolone (KENALOG) 0.1 % cream     nitroglycerin (NITROSTAT) 0.4 MG SL tablet     No current facility-administered medications for this visit.         Allergies   Allergen Reactions     Lorazepam Nausea and Vomiting     Morphine Sulfate Cr [Morphine Sulfate] Nausea and Vomiting     Crestor [Rosuvastatin] Other (See Comments)     Abdominal/Back pain     Lidocaine GI Disturbance     Cozaar [Losartan] Rash       /49 (BP Location: Right arm, Patient Position: Sitting, Cuff Size: Adult Regular)  Pulse 70  Temp 97.3  F (36.3  C) (Tympanic)  Ht 5' 6\" (1.676 m)  Wt 161 lb 11.2 oz (73.3 kg)  SpO2 96%  BMI 26.1 kg/m2  GENERAL - Pt is alert and oriented in no acute distress.  Affect is appropriate. Good eye contact. In a wheelchair. Gets short of breath while speaking but is comfortable when quietly sitting   NECK - Neck is supple w/o LA or thyromegaly  RESPIRATORY - decreased breath sounds in left lower quadrant.   CV - RRR, no murmurs, rubs, gallops.       chest x-ray   I " independently visualized the xray and my findings were stable left sided pleural effusion.   Radiology review pending.      XR CHEST 2 VW 4/6/2018 11:30 AM  HISTORY: Short of breath. Follow-up pleural effusions.  COMPARISON: 3/26/2018.   IMPRESSION: 2 views of the chest show the left pleural effusion to be  overall slightly smaller. Cardiomegaly and the pulmonary vascular  congestion are again seen. Chronic interstitial prominence is again  demonstrated in the lungs and could at least in part relate to mild  scarring. However, a modest ongoing component of interstitial  pulmonary edema related to CHF also needs to be considered. There is  no convincing pneumonia.  JOESPH GUILLEN MD        Assessment/Plan -    (I50.32) Chronic diastolic congestive heart failure (H)  (primary encounter diagnosis)  Comment: per radiology read, the shortness of breath could be edema due to CHF. Discussed with daughter and will try two days of metolazone. This worked for her last year when she got volume overloaded. Will also schedule thoracentesis. The patient indicates understanding of these issues and agrees with the plan.   Plan: XR Chest 2 Views, US Thoracentesis, metolazone         (ZAROXOLYN) 2.5 MG tablet            (I48.2) Chronic atrial fibrillation (H)  Comment:   Plan: XR Chest 2 Views            (J90) Pleural effusion  Comment: It seems likely that left sided back and mid axillary pain is related to the pleural effusion in the lung. Will see if thoracentesis helps bring some pain relief.  The patient indicates understanding of these issues and agrees with the plan.   Plan: XR Chest 2 Views, US Thoracentesis            ROZINA Mcguire MD

## 2018-04-06 NOTE — TELEPHONE ENCOUNTER
S-(situation): Scheduled for thoracentesis Wed 4/11/18 and is taking Plavix and warfarin.    B-(background): Seen in clinic today and thoracentesis ordered. On warfarin for heart valve replacement.     A-(assessment): Spoke with Marlene BRENNAN RN from [facility] [phone].  Per Diagnostic Protocol, thoracentesis is a minimal bleed risk - Plavix does not need to be held.  Recommended pt warfarin hold x3 days or INR < 1.5.  Bridging to be determined by [facility] and PCP.    R-(recommendations): Sending to [facility] to collaborate plan with pt's PCP.    CHU Loya RN

## 2018-04-06 NOTE — NURSING NOTE
"No chief complaint on file.      Initial /49 (BP Location: Right arm, Patient Position: Sitting, Cuff Size: Adult Regular)  Pulse 70  Temp 97.3  F (36.3  C) (Tympanic)  Ht 5' 6\" (1.676 m)  Wt 161 lb 11.2 oz (73.3 kg)  SpO2 96%  BMI 26.1 kg/m2 Estimated body mass index is 26.1 kg/(m^2) as calculated from the following:    Height as of this encounter: 5' 6\" (1.676 m).    Weight as of this encounter: 161 lb 11.2 oz (73.3 kg).  Medication Reconciliation: complete   Zainab Tejada CMA  "

## 2018-04-11 NOTE — IP AVS SNAPSHOT
MRN:[id number]                      After Visit Summary   4/11/2018    Natalie Hair    MRN: [id number]           Visit Information        Provider Department      4/11/2018  1:15 PM [facility]; [facility]; [facility]           Review of your medicines      UNREVIEWED medicines. Ask your doctor about these medicines        Dose / Directions    * ACETAMINOPHEN PO        Dose:  1000 mg   Take 1,000 mg by mouth every 8 hours as needed for pain   Refills:  0       * acetaminophen 325 MG tablet   Commonly known as:  TYLENOL   Used for:  Back pain, unspecified back location, unspecified back pain laterality, unspecified chronicity, Chronic bilateral low back pain without sciatica        Dose:  325-650 mg   Take 1-2 tablets (325-650 mg) by mouth every 6 hours as needed for mild pain or pain   Quantity:  100 tablet   Refills:  1       alendronate 70 MG tablet   Commonly known as:  FOSAMAX   Used for:  Osteopenia        TAKE 1 TABLET BY MOUTH ONCE WEEKLY ON EMPTY STOMACH   Quantity:  12 tablet   Refills:  0       cetirizine 5 MG tablet   Commonly known as:  zyrTEC   Used for:  Acute urticaria        Dose:  5 mg   Take 1 tablet (5 mg) by mouth daily   Quantity:  30 tablet   Refills:  3       clopidogrel 75 MG tablet   Commonly known as:  PLAVIX   Used for:  Congestive heart failure, unspecified congestive heart failure chronicity, unspecified congestive heart failure type (H), Lymphedema of both lower extremities        Dose:  75 mg   Take 1 tablet (75 mg) by mouth daily   Quantity:  90 tablet   Refills:  0       donepezil 5 MG tablet   Commonly known as:  ARIcept   Used for:  Dementia without behavioral disturbance, unspecified dementia type        TAKE 1 TABLET(5 MG) BY MOUTH AT BEDTIME   Quantity:  90 tablet   Refills:  0       ezetimibe 10 MG tablet   Commonly known as:  ZETIA   Used for:  Hyperlipidemia LDL goal <100        Dose:  10 mg   Take 1 tablet (10 mg) by mouth  daily   Quantity:  90 tablet   Refills:  3       metolazone 2.5 MG tablet   Commonly known as:  ZAROXOLYN   Used for:  Chronic diastolic congestive heart failure (H)        Dose:  2.5 mg   Take 1 tablet (2.5 mg) by mouth daily For two days   Quantity:  2 tablet   Refills:  1       metoprolol tartrate 25 MG tablet   Commonly known as:  LOPRESSOR   Used for:  Benign essential hypertension        TAKE 1/2 TABLET(12.5 MG) BY MOUTH TWICE DAILY   Quantity:  90 tablet   Refills:  3       Multi-vitamin Tabs tablet        Dose:  1 tablet   Take 1 tablet by mouth daily   Refills:  0       nitroGLYcerin 0.4 MG sublingual tablet   Commonly known as:  NITROSTAT   Used for:  Unspecified cardiovascular disease        Dose:  0.4 mg   Place 1 tablet (0.4 mg) under the tongue every 5 minutes as needed for chest pain   Quantity:  25 tablet   Refills:  1       polyethylene glycol powder   Commonly known as:  MIRALAX/GLYCOLAX        Dose:  0.5 capful   Take 0.5 capfuls by mouth every morning   Refills:  0       potassium chloride 10 MEQ tablet   Commonly known as:  K-TAB,KLOR-CON   Used for:  Shortness of breath on exertion, Essential hypertension        TAKE 1 TABLET(10 MEQ) BY MOUTH TWICE DAILY   Quantity:  180 tablet   Refills:  1       SENNA S 8.6-50 MG per tablet   Generic drug:  senna-docusate        Dose:  2 tablet   Take 2 tablets by mouth 2 times daily   Refills:  0       spironolactone 25 MG tablet   Commonly known as:  ALDACTONE   Used for:  Congestive heart failure, unspecified congestive heart failure chronicity, unspecified congestive heart failure type (H), Lymphedema of both lower extremities        Dose:  25 mg   Take 1 tablet (25 mg) by mouth 2 times daily   Quantity:  60 tablet   Refills:  1       torsemide 20 MG tablet   Commonly known as:  DEMADEX   Used for:  Lymphedema of both lower extremities, Pleural effusion        Dose:  20 mg   Take 1 tablet (20 mg) by mouth 2 times daily Patient needs labs before next  refill.   Quantity:  60 tablet   Refills:  0       triamcinolone 0.1 % cream   Commonly known as:  KENALOG   Used for:  Other eczema        Apply sparingly to affected area two times daily for 7 days.   Quantity:  30 g   Refills:  0       TYLENOL PM EXTRA STRENGTH PO        Dose:  2-3 tablet   Take 2-3 tablets by mouth nightly as needed   Refills:  0       warfarin 4 MG tablet   Commonly known as:  COUMADIN   Used for:  Congestive heart failure, unspecified congestive heart failure chronicity, unspecified congestive heart failure type (H)        Take 4 mg daily or as directed by Anticoagulation Clinic   Quantity:  80 tablet   Refills:  0       ZZZQUIL PO        Dose:  50 mg   Take 50 mg by mouth nightly as needed ((Taking liquid form about every 2 weeks)) Alternates with Tylenol PM   Refills:  0       * Notice:  This list has 2 medication(s) that are the same as other medications prescribed for you. Read the directions carefully, and ask your doctor or other care provider to review them with you.      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), Lymphedema of both lower extremities, Right-sided congestive heart failure, Tricuspid valve insufficiency, unspecified etiology, Congestive heart failure, unspecified congestive heart failure chronicity, unspecified congestive heart failure type (H), History of pressure ulcer        Equipment being ordered: Hospital Bed 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 -ongoing symptoms of lower extremity edema,shortness of breath,cough sacral pressure sores within last 6 months   An electric hospital bed to allow for increase in head of bed  elevation and for ease in wheelchair transfers  Length of need: lifelong NPI - [id number]   Quantity:  1 Device   Refills:  0       * order for DME   Used for:  Decubitus ulcer of sacral region, unspecified ulcer stage        Mepilex sacral dressings   Quantity:  10 Units   Refills:  3       * order for DME   Used for:  Urinary incontinence, unspecified type, History of pressure ulcer        always discreet underwear - 2 pairs/day  - 60/month flushable cleansing cloths ( wipes)  - several packages/month   For incontinence and primitivo-care   Quantity:  1 Month   Refills:  3       * order for DME   Used for:  Lymphedema of both lower extremities, Chronic diastolic congestive heart failure (H), Urinary incontinence, unspecified type, Memory loss, Weakness, Weight loss        Equipment being ordered: bedside commode and bed pan   Quantity:  1 Device   Refills:  0       * Notice:  This list has 5 medication(s) that are the same as other medications prescribed for you. Read the directions carefully, and ask your doctor or other care provider to review them with you.             Protect others around you: Learn how to safely use, store and throw away your medicines at www.disposemymeds.org.         Follow-ups after your visit        Your next 10 appointments already scheduled     Apr 18, 2018  1:45 PM CDT   Anticoagulation Visit with LL ANTI COAG   [facility]    [address] [id number] [phone]            Jun 25, 2018  3:30 PM CDT   Remote PPM Check with GARY TECH1   [facility] ([facility])    [address] [place] [id number] [phone] OPT 2           This appointment is for a remote check of your pacemaker.  This is not an appointment at the office.               Care Instructions        Further instructions from your care team                         Radiology  Discharge  "Instructions for Thoracentesis    You have had a thoracentesis procedure today.  A needle or catheter was inserted into your chest to remove fluid for laboratory studies and/or to remove the excess fluid from your chest.    AFTER YOU ARE HOME:    Rest at home today.    Limit heavy physical activity such as lifting, straining, or exercise for 48 hours.  You may resume normal activity in 24 hours.    Resume previous diet and medications.    You may remove the bandage in 24 hours.    CALL YOUR PRIMARY PROVIDER IF:    You develop a temperature over 101oF, or redness at the drainage site.    You have any other questions or concerns.    AFTER HOURS CALL [facility] AT [phone]    COME TO EMERGENCY ROOM IF:    You develop chest pain or shortness of breath, heavy bleeding from the thoracentesis site, severe lightheadedness or fainting.  DO NOT DRIVE YOURSELF.    Your ordering physician will contact you with the laboratory results.      ADDITIONAL INSTRUCTIONS: Start taking your Coumadin again tonight, have INR checked as instructed.    I have reviewed and understand these instructions.      __________________________________________________  Natalie Hair      __________________________________________________     Nurse/Radiologist Signature      Date: 4/11/2018             Additional Information About Your Visit        MyChart Information     [facility] lets you send messages to your doctor, view your test results, renew your prescriptions, schedule appointments and more. To sign up, go to [email or web address] . Click on \"Log in\" on the left side of the screen, which will take you to the Welcome page. Then click on \"Sign up Now\" on the right side of the page.     You will be asked to enter the access code listed below, as well as some personal information. Please follow the directions to create your username and password.     Your access code is: [email or web address]  Expires: 6/14/2018 10:52 AM     Your " access code will  in 90 days. If you need help or a new code, please call your [facility] clinic or [phone].        Care EveryWhere ID     This is your Care EveryWhere ID. This could be used by other organizations to access your [facility] medical records  [id number]         Primary Care Provider Office Phone # Fax #    Mimi Kamron Mcguire [phone]      Equal Access to Services     JA FOWLER : Hadii aad ku hadasho Soomaali, waaxda luqadaha, qaybta kaalmada adeegyada, waxay scottin hayjoycen adeeg katyarosa maria laroxie . So [facility] [phone].    ATENCIÓN: Si saminala arturo, tiene a simmons disposición servicios gratuitos de asistencia lingüística. Brendaame al [phone].    We comply with applicable federal civil rights laws and [place] laws. We do not discriminate on the basis of race, color, national origin, age, disability, sex, sexual orientation, or gender identity.            Thank you!     Thank you for choosing [facility] for your care. Our goal is always to provide you with excellent care. Hearing back from our patients is one way we can continue to improve our services. Please take a few minutes to complete the written survey that you may receive in the mail after you visit with us. Thank you!             Medication List: This is a list of all your medications and when to take them. Check marks below indicate your daily home schedule. Keep this list as a reference.      Medications           Morning Afternoon Evening Bedtime As Needed    * ACETAMINOPHEN PO   Take 1,000 mg by mouth every 8 hours as needed for pain                                * acetaminophen 325 MG tablet   Commonly known as:  TYLENOL   Take 1-2 tablets (325-650 mg) by mouth every 6 hours as needed for mild pain or pain                                alendronate 70 MG tablet   Commonly known as:  FOSAMAX   TAKE 1 TABLET BY MOUTH ONCE WEEKLY ON EMPTY STOMACH                                cetirizine 5 MG tablet   Commonly  known as:  zyrTEC   Take 1 tablet (5 mg) by mouth daily                                clopidogrel 75 MG tablet   Commonly known as:  PLAVIX   Take 1 tablet (75 mg) by mouth daily                                donepezil 5 MG tablet   Commonly known as:  ARIcept   TAKE 1 TABLET(5 MG) BY MOUTH AT BEDTIME                                ezetimibe 10 MG tablet   Commonly known as:  ZETIA   Take 1 tablet (10 mg) by mouth daily                                metolazone 2.5 MG tablet   Commonly known as:  ZAROXOLYN   Take 1 tablet (2.5 mg) by mouth daily For two days                                metoprolol tartrate 25 MG tablet   Commonly known as:  LOPRESSOR   TAKE 1/2 TABLET(12.5 MG) BY MOUTH TWICE DAILY                                Multi-vitamin Tabs tablet   Take 1 tablet by mouth daily                                nitroGLYcerin 0.4 MG sublingual tablet   Commonly known as:  NITROSTAT   Place 1 tablet (0.4 mg) under the tongue every 5 minutes as needed for chest pain                                * order for DME   Equipment being ordered: Walker with a seat                                * order for DME   Equipment being ordered: Hospital Bed 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 -ongoing symptoms of lower extremity edema,shortness of breath,cough sacral pressure sores within last 6 months   An electric hospital bed to allow for increase in head of bed elevation and for ease in wheelchair transfers  Length of need: lifelong NPI - [id number]                                * order for DME   Mepilex sacral dressings                                * order for DME   always discreet underwear - 2 pairs/day  - 60/month flushable cleansing cloths ( wipes)  - several packages/month   For incontinence and primitivo-care                                * order for DME   Equipment being ordered: bedside commode and bed pan                                polyethylene glycol powder   Commonly known as:  MIRALAX/GLYCOLAX   Take  0.5 capfuls by mouth every morning                                potassium chloride 10 MEQ tablet   Commonly known as:  K-TAB,KLOR-CON   TAKE 1 TABLET(10 MEQ) BY MOUTH TWICE DAILY                                SENNA S 8.6-50 MG per tablet   Take 2 tablets by mouth 2 times daily   Generic drug:  senna-docusate                                spironolactone 25 MG tablet   Commonly known as:  ALDACTONE   Take 1 tablet (25 mg) by mouth 2 times daily                                torsemide 20 MG tablet   Commonly known as:  DEMADEX   Take 1 tablet (20 mg) by mouth 2 times daily Patient needs labs before next refill.                                triamcinolone 0.1 % cream   Commonly known as:  KENALOG   Apply sparingly to affected area two times daily for 7 days.                                TYLENOL PM EXTRA STRENGTH PO   Take 2-3 tablets by mouth nightly as needed                                warfarin 4 MG tablet   Commonly known as:  COUMADIN   Take 4 mg daily or as directed by Anticoagulation Clinic                                ZZZQUIL PO   Take 50 mg by mouth nightly as needed ((Taking liquid form about every 2 weeks)) Alternates with Tylenol PM                                * Notice:  This list has 7 medication(s) that are the same as other medications prescribed for you. Read the directions carefully, and ask your doctor or other care provider to review them with you.

## 2018-04-11 NOTE — IP AVS SNAPSHOT
[facility]    [address] [address]    Phone:  [phone]                                       After Visit Summary   4/11/2018    Natalie Hair    MRN: [id number]           After Visit Summary Signature Page     I have received my discharge instructions, and my questions have been answered. I have discussed any challenges I see with this plan with the nurse or doctor.    ..........................................................................................................................................  Patient/Patient Representative Signature      ..........................................................................................................................................  Patient Representative Print Name and Relationship to Patient    ..................................................               ................................................  Date                                            Time    ..........................................................................................................................................  Reviewed by Signature/Title    ...................................................              ..............................................  Date                                                            Time

## 2018-04-11 NOTE — DISCHARGE INSTRUCTIONS
Radiology  Discharge Instructions for Thoracentesis    You have had a thoracentesis procedure today.  A needle or catheter was inserted into your chest to remove fluid for laboratory studies and/or to remove the excess fluid from your chest.    AFTER YOU ARE HOME:    Rest at home today.    Limit heavy physical activity such as lifting, straining, or exercise for 48 hours.  You may resume normal activity in 24 hours.    Resume previous diet and medications.    You may remove the bandage in 24 hours.    CALL YOUR PRIMARY PROVIDER IF:    You develop a temperature over 101oF, or redness at the drainage site.    You have any other questions or concerns.    AFTER HOURS CALL [facility] AT [phone]    COME TO EMERGENCY ROOM IF:    You develop chest pain or shortness of breath, heavy bleeding from the thoracentesis site, severe lightheadedness or fainting.  DO NOT DRIVE YOURSELF.    Your ordering physician will contact you with the laboratory results.      ADDITIONAL INSTRUCTIONS: Start taking your Coumadin again tonight, have INR checked as instructed.    I have reviewed and understand these instructions.      __________________________________________________  Natalie Hair      __________________________________________________     Nurse/Radiologist Signature      Date: 4/11/2018

## 2018-04-11 NOTE — TELEPHONE ENCOUNTER
Thoracentesis done at 12:30 PM Got home at 2:40 PM. Reports chest pain and trouble breathing. She had this with the 1st one. The 2nd one was not painful. The 3rd one was painful and ended up with a collapased lung. Gave her two extra strength tylenol. She feels like she got hit like a truck. Panting very shallow. Hard for her to talk. Laying down now. Seems to settle down OK now for a nap.   Anam Dejesus RN

## 2018-04-11 NOTE — TELEPHONE ENCOUNTER
I talked to Daughter.  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    If she does not have a collapsed lung  And the throracenteses I and diuresis did not help it may be worth while doing a trial of a narcotic to reduce her respiratory drive  Such as 1/2 of a 5/325 norco twice a day.    Fan Chadwick

## 2018-04-11 NOTE — PROGRESS NOTES
Left sided thoracentesis performed by radiologist.  800   ml of clear yellow fluid removed. Pressure held at site after catheter removed.  Band aid applied. No bleeding noted. Post procedure CXR performed and read by radiologist. Discharge instructions given to patient's daughter, voiced understanding. Will wait for call from coagulation clinic today for instructions on dosing of coumadin.

## 2018-04-12 NOTE — MR AVS SNAPSHOT
Natalie PATRICIA Hair   4/12/2018   Anticoagulation Therapy Visit    Description:  85 year old female   Provider:  Christa Guzman, RN   Department:  [facility]           INR as of 4/12/2018     Today's INR 1.52! (4/11/2018)      Anticoagulation Summary as of 4/12/2018     INR goal 2.5-3.5   Today's INR 1.52! (4/11/2018)   Full instructions 4/12: 6 mg; 4/13: 6 mg; Otherwise 4 mg every day   Next INR check 4/18/2018    Indications   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]  Long term current use of anticoagulant therapy [Z79.01]         Your next Anticoagulation Clinic appointment(s)     Apr 18, 2018  1:45 PM CDT   Anticoagulation Visit with LL ANTI COAG   [facility] ([facility])    [address] [address]   [phone]              April 2018 Details    Sun Mon Tue Wed Thu Fri Sat     1               2               3               4               5               6               7                 8               9               10               11               12      6 mg   See details      13      6 mg         14      4 mg           15      4 mg         16      4 mg         17      4 mg         18            19               20               21                 22               23               24               25               26               27               28                 29               30                     Date Details   04/12 This INR check       Date of next INR:  4/18/2018         How to take your warfarin dose     To take:  4 mg Take 1 of the 4 mg tablets.    To take:  6 mg Take 1.5 of the 4 mg tablets.

## 2018-04-12 NOTE — PROGRESS NOTES
ANTICOAGULATION FOLLOW-UP CLINIC VISIT    Patient Name:  Natalie Hair  Date:  4/12/2018  Contact Type:  Telephone/ Digna, patient's daughter    SUBJECTIVE:     Patient Findings     Positives Change in medications (two days of metolazone, ordered on 4/6), Intentional hold of therapy (Held for 3 days per PCP recommendations, no bridging needed for thoracentesis)    Comments Patient's daughter called [facility] today to discuss warfarin dosing. She will give patient 6mg today (4/12) and tomorrow (4/13) then resume 4mg daily. Patient has an INR appointment already scheduled for next Wednesday, 4/18/18.            OBJECTIVE    INR   Date Value Ref Range Status   04/11/2018 1.52 (H) 0.86 - 1.14 Final       ASSESSMENT / PLAN  No question data found.  Anticoagulation Summary as of 4/12/2018     INR goal 2.5-3.5   Today's INR 1.52! (4/11/2018)   Maintenance plan 4 mg (4 mg x 1) every day   Full instructions 4/12: 6 mg; 4/13: 6 mg; Otherwise 4 mg every day   Weekly total 28 mg   Plan last modified Brianna Morley RN (3/28/2018)   Next INR check 4/18/2018   Priority INR   Target end date Indefinite    Indications   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]  Long term current use of anticoagulant therapy [Z79.01]         Anticoagulation Episode Summary     INR check location     Preferred lab     Send INR reminders to [facility]    Comments * no bandaid.       Anticoagulation Care Providers     Provider Role Specialty Phone number    Mimi Mcguire MD [facility] [phone]            See the Encounter Report to view Anticoagulation Flowsheet and Dosing Calendar (Go to Encounters tab in chart review, and find the Anticoagulation Therapy Visit)        Christa Guzman RN

## 2018-04-18 NOTE — MR AVS SNAPSHOT
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 Then 4 mg daily, starting tomorrow. Recheck in one week.      Your next Anticoagulation Clinic appointment(s)     Apr 25, 2018  1:30 PM CDT   Anticoagulation Visit with LL ANTI COAG   [facility] ([facility])    [address] [id number] [phone]              Contact Numbers     Please call [phone] with any problems or questions regarding your therapy.    If you need to cancel and/or reschedule your appointment please call one of the following numbers:  [facility] [phone]  [place] - [phone]  [facility] [phone]  [facility] [phone]  [place] - [phone]            April 2018 Details    Sun Mon Tue Wed Thu Fri Sat     1               2               3               4               5               6               7                 8               9               10               11               12               13               14                 15               16               17               18      6 mg   See details      19      4 mg         20      4 mg         21      4 mg           22      4 mg         23      4 mg         24      4 mg         25            26               27               28                 29               30                     Date Details   04/18 This INR check       Date of next INR:  4/25/2018         How to take your warfarin dose     To take:  4 mg Take 1 of the 4 mg tablets.     To take:  6 mg Take 1.5 of the 4 mg tablets.

## 2018-04-18 NOTE — PROGRESS NOTES
ANTICOAGULATION FOLLOW-UP CLINIC VISIT    Patient Name:  Natalie Hair  Date:  4/18/2018  Contact Type:  Face to Face    SUBJECTIVE:     Patient Findings     Positives Intentional hold of therapy (4-8 to 4-10)    Comments Patient had a thoracentesis on 4-11-18 and one liter of fluid was removed. She is feeling better since then. She might require additional warfarin dosing now that the extra fluid has been removed but she has also had 4 thoracentesis since last fall. There is no clear plan on what is being done to prevent further fluid build up. Patient is in range currently on 32 mg/week, which is 4 mg more than her current maintenance dose (reduced prior to procedure when INR was running high). Writer will try 30 mg/week and recheck next Wed. No other changes or concerns.           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       Anticoagulation Care Providers     Provider Role Specialty Phone number    Mimi Mcguire MD [facility] [phone]            See the Encounter Report to view Anticoagulation Flowsheet and Dosing Calendar (Go to Encounters tab in chart review, and find the Anticoagulation Therapy Visit)        Brianna Morley RN

## 2018-04-20 NOTE — PROGRESS NOTES
Clinic Care Coordination Contact  Care Team Conversations    SW received phone call from pt's daughter & primary care giver, Digna Gonzalez.  Digna shared that the pt has become very weak and is now needing to be lifted from her bed to the wheelchair and/or commode.  Pt does not want to leave her bed and join the rest of the family for meals, etc.    Per Digna, her siblings are meeting today at 3 PM to discuss Hospice cares for the pt.  Digna shared that 2 of her siblings are not 100% in agreement, but typically will go along with what the rest of her family decides, recognizing that it is ultimately Digna who is the person who is actually doing the daily cares for the pt.    SW routed an Epic in-box message to PCP and requested an order for Hospice, [facility], as this is the team who has worked with the patient in the past.      SW will continue to follow with pt until pt is established with [facility] and has a SW with them.    Jessie Gan  Social Work Care Coordinator  [facility]  [phone]

## 2018-04-20 NOTE — TELEPHONE ENCOUNTER
----- Message from LIBIA Campos sent at 4/20/2018  9:21 AM CDT -----  I just spoke with Natalie Sawyer's daughter, and they are having a family meeting today to inform all of the siblings that they are enrolling Susy in hospice.    Will you please place an order for [facility]--since they have provided cares for Natalie in the past, the family would like to stay with this team.    Thank you!    Jessie Gan, Clinic Care Coordination-Social Work  [phone]

## 2018-04-23 NOTE — TELEPHONE ENCOUNTER
Reason for Call:  Other Update and question    Detailed comments: Niru from  [facility] calling to update you and ask a question:  1)  Met with family and Natalie today about admitting to Hospice program.  Family has declined at this time and wanted more time to discuss.    2)  Niru is asking if you are willing to follow Natalie via phone and fax if and when she is admitted to hospice.  Please review and advise. Thank you..Claire Carroll=    Phone Number Patient can be reached at: Other phone number:  [phone]*    Best Time: any time    Can we leave a detailed message on this number? YES Secure Line    Call taken on 4/23/2018 at 3:25 PM by Claire Carroll

## 2018-04-25 NOTE — PROGRESS NOTES
ANTICOAGULATION FOLLOW-UP CLINIC VISIT    Patient Name:  Natalie Hair  Date:  4/25/2018  Contact Type:  Face to Face    SUBJECTIVE:     Patient Findings     Positives Other complaints (5 pound weight gain in two weeks)    Comments On April 11th, the patient weighed 150 lbs BEFORE her thoracentesis and she now weights 5 pounds more. No signs of swelling anywhere else so likely she is retaining fluid in her lungs again. Patient had nausea and did not eat dinner last night. She does not eat greens but family is trying to get her to drink protein supplements. She generally does not like the taste of them but her son brought over muscle milk for her to try. Writer is not certain if this contains vitamin K but it might be good if it did. She has had 30 mg in the last week and INR at top of goal range so will continue with 28 mg/week at this point and encourage a protein supplement. No bleeding concerns or any other changes.           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       Anticoagulation Care Providers     Provider Role Specialty Phone number    Mimi Mcguire MD [facility] [phone]            See the Encounter Report to view Anticoagulation Flowsheet and Dosing Calendar (Go  to Encounters tab in chart review, and find the Anticoagulation Therapy Visit)        Brianna Morley RN

## 2018-04-25 NOTE — MR AVS SNAPSHOT
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 Try to drink protein supplements that contain vitamin K. Recheck in two weeks.      Your next Anticoagulation Clinic appointment(s)     May 09, 2018 11:30 AM CDT   Anticoagulation Visit with LL ANTI COAG   [facility] ([facility])    [address] [address] [phone]              Contact Numbers     Please call [phone] with any problems or questions regarding your therapy.    If you need to cancel and/or reschedule your appointment please call one of the following numbers:  [facility] - [phone]  [place] - [phone]  [place] - [phone]  [place] - [phone]  [place] - [phone]            April 2018 Details    Sun Mon Tue Wed Thu Fri Sat     1               2               3               4               5               6               7                 8               9               10               11               12               13               14                 15               16               17               18               19               20               21                 22               23               24               25      4 mg   See details      26      4 mg         27      4 mg         28      4 mg           29      4 mg         30      4 mg               Date Details   04/25 This INR check               How to take your warfarin dose     To take:  4 mg Take 1 of the 4 mg tablets.           May 2018  Details    Sun Mon Tue Wed Thu Fri Sat       1      4 mg         2      4 mg         3      4 mg         4      4 mg         5      4 mg           6      4 mg         7      4 mg         8      4 mg         9            10               11               12                 13               14               15               16               17               18               19                 20               21               22               23               24               25               26                 27               28               29               30               31                  Date Details   No additional details    Date of next INR:  5/9/2018         How to take your warfarin dose     To take:  4 mg Take 1 of the 4 mg tablets.

## 2018-04-25 NOTE — TELEPHONE ENCOUNTER
Family is currently declining services. Niru will note Dr. Mcguire will follow patient when family decides to start hospice.    Juliana [place]   Clinic Station

## 2018-05-09 NOTE — MR AVS SNAPSHOT
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 [address] [phone]              Contact Numbers     Please call [phone] with any problems or questions regarding your therapy.    If you need to cancel and/or reschedule your appointment please call one of the following numbers:  [facility] - [phone]  [place] - [phone]  [place] - [phone]  [facility] [phone]  [place] - [phone]            May 2018 Details    Sun Mon Tue Wed Thu Fri Sat       1               2               3               4               5                 6               7               8               9      4 mg   See details      10      4 mg         11      4 mg         12      4 mg           13      4 mg         14      4 mg         15      4 mg         16      4 mg         17      4 mg         18      4 mg         19      4 mg           20      4 mg         21      4 mg         22      4 mg         23      4 mg         24      4 mg         25      4 mg         26      4 mg           27      4 mg         28      4 mg         29      4 mg         30            31                  Date Details   05/09 This INR check       Date of next INR:  5/30/2018         How to take your warfarin dose     To take:  4 mg Take 1 of the 4 mg tablets.

## 2018-05-09 NOTE — PROGRESS NOTES
ANTICOAGULATION FOLLOW-UP CLINIC VISIT    Patient Name:  Natalie Hair  Date:  5/9/2018  Contact Type:  Face to Face    SUBJECTIVE:     Patient Findings     Positives No Problem Findings    Comments Patient did not start muscle milk or any other protein drink. She has been eating okay at home and no further weight gain. No changes in medications, health or activity level. Will continue current maintenance dose for now. Recheck in 3 weeks just prior to her trip to [facility].            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       Anticoagulation Care Providers     Provider Role Specialty Phone number    Mimi Mcguire MD [facility] [phone]            See the Encounter Report to view Anticoagulation Flowsheet and Dosing Calendar (Go to Encounters tab in chart review, and find the Anticoagulation Therapy Visit)        Brianna Morley RN

## 2018-05-19 PROBLEM — K92.1 MELENA: Status: ACTIVE | Noted: 2018-01-01

## 2018-05-19 NOTE — ED NOTES
Pt had moderate soft maroon to black color stool.   Pt up to commode with sba, no complaints of dizziness at this time.

## 2018-05-19 NOTE — IP AVS SNAPSHOT
MRN:[id number]                      After Visit Summary   5/19/2018    Natalie Hair    MRN: [id number]           Thank you!     Thank you for choosing [facility] for your care. Our goal is always to provide you with excellent care. Hearing back from our patients is one way we can continue to improve our services. Please take a few minutes to complete the written survey that you may receive in the mail after you visit with us. Thank you!        Patient Information     Date Of Birth          8/28/1932        Designated Caregiver       Most Recent Value    Caregiver    Will someone help with your care after discharge? yes    Name of designated caregiver Emely    Phone number of caregiver [phone]    Caregiver address [place]      About your hospital stay     You were admitted on:  May 19, 2018 You last received care in the:  [place] [address] [facility]    You were discharged on:  May 24, 2018       Who to Call     For medical emergencies, please call 911.  For non-urgent questions about your medical care, please call your primary care provider or clinic, [phone]  For questions related to your surgery, please call your surgery clinic        Attending Provider     Provider Specialty    Ignacio Patterson MD Internal Medicine    Mckinley Mitchell MD Internal Medicine       Primary Care Provider Office Phone # Fax #    Mimi Mcguire [phone]      After Care Instructions     Activity       Your activity upon discharge: activity as tolerated            Diet       Follow this diet upon discharge: Regular                  Follow-up Appointments     Follow-up and recommended labs and tests        Follow up with primary care provider, Mimi Mcguire, within 7 days for hospital follow- up.  The following labs/tests are recommended: INR check on 5/25/18.                  Your next 10 appointments already scheduled     May 30, 2018 11:30 AM CDT  "  Anticoagulation Visit with LL ANTI COAG   [facility]    [address] [address]   [phone]            2018  3:30 PM CDT   Remote PPM Check with COOKIE BARCENAS   [facility]   [place] [facility]    [address]  [place] [id number] [phone] OPT 2           This appointment is for a remote check of your pacemaker.  This is not an appointment at the office.              Pending Results     No orders found from 2018 to 2018.            Statement of Approval     Ordered          18 1210  I have reviewed and agree with all the recommendations and orders detailed in this document.  EFFECTIVE NOW     Approved and electronically signed by:  Dorcas Valdovinos MD             Admission Information     Date & Time Provider Department Dept. Phone    2018 Mckinley Mitchell MD [place] [address] [facility] [phone]      Your Vitals Were     Blood Pressure Pulse Temperature Respirations Weight Pulse Oximetry    97/47 71 97.1  F (36.2  C) (Oral) 16 75.8 kg (167 lb 1.7 oz) 99%    BMI (Body Mass Index)                   27.81 kg/m2           MyChart Information     [facility] lets you send messages to your doctor, view your test results, renew your prescriptions, schedule appointments and more. To sign up, go to [email or web address] . Click on \"Log in\" on the left side of the screen, which will take you to the Welcome page. Then click on \"Sign up Now\" on the right side of the page.     You will be asked to enter the access code listed below, as well as some personal information. Please follow the directions to create your username and password.     Your access code is: [email or web address]  Expires: 2018 10:52 AM     Your access code will  in 90 days. If you need help or a new code, please call your [facility] or [phone].        Care EveryWhere " ID     This is your Care EveryWhere ID. This could be used by other organizations to access your [facility] medical records  [id number]        Equal Access to Services     JA FOWLER : Hadii pily [place], wadorotheajuliocesar ulloa, jenniferbev parkerlen rodriges, lyric scottin hayaafrantz laraquynh encinas lasegundofrantz gaurang So [facility] [phone].    ATENCIÓN: Si habla español, tiene a simmons disposición servicios gratuitos de asistencia lingüística. Llame al [phone].    We comply with applicable federal civil rights laws and [place] laws. We do not discriminate on the basis of race, color, national origin, age, disability, sex, sexual orientation, or gender identity.               Review of your medicines      START taking        Dose / Directions    atropine 1 % ophthalmic solution   Used for:  Hospice care patient        Dose:  2-4 drop   Take 2-4 drops by mouth, place under tongue or place inside cheek every 2 hours as needed for other (terminal respiratory secretions) Not for ophthalmic use.   Quantity:  5 mL   Refills:  1       bisacodyl 10 MG Suppository   Commonly known as:  DULCOLAX   Used for:  Hospice care patient        Unwrap and insert 1 suppository rectally twice daily as needed for constipation.   Quantity:  12 suppository   Refills:  1       haloperidol 0.5 MG tablet   Commonly known as:  HALDOL   Used for:  Hospice care patient        Dose:  0.5-1 mg   Take 1-2 tablets (0.5-1 mg) by mouth, place under tongue or insert rectally every 6 hours as needed for agitation (nausea)   Quantity:  30 tablet   Refills:  1       hyoscyamine 0.125 MG tablet   Commonly known as:  ANASPAZ/LEVSIN   Used for:  Hospice care patient        Dose:  0.125-0.25 mg   Take 1-2 tablets (125-250 mcg) by mouth every 4 hours as needed for other (secretions)   Quantity:  40 tablet   Refills:  1       MEDICATION INSTRUCTION   Used for:  Hospice care patient        If care facility cannot accept or use ranges, facility is instructed to use lower end  of dosing range   Refills:  0       pantoprazole 40 MG EC tablet   Commonly known as:  PROTONIX   Used for:  PUD (peptic ulcer disease)        Dose:  40 mg   Take 1 tablet (40 mg) by mouth 2 times daily (before meals)   Quantity:  60 tablet   Refills:  0       sucralfate 1 GM/10ML suspension   Commonly known as:  CARAFATE   Used for:  PUD (peptic ulcer disease)        Dose:  1 g   Take 10 mLs (1 g) by mouth 4 times daily (before meals and nightly)   Quantity:  840 mL   Refills:  0         CONTINUE these medicines which may have CHANGED, or have new prescriptions. If we are uncertain of the size of tablets/capsules you have at home, strength may be listed as something that might have changed.        Dose / Directions    * acetaminophen 325 MG tablet   Commonly known as:  TYLENOL   This may have changed:  Another medication with the same name was added. Make sure you understand how and when to take each.   Used for:  Back pain, unspecified back location, unspecified back pain laterality, unspecified chronicity, Chronic bilateral low back pain without sciatica        Dose:  325-650 mg   Take 1-2 tablets (325-650 mg) by mouth every 6 hours as needed for mild pain or pain   Quantity:  100 tablet   Refills:  1       * acetaminophen 650 MG Suppository   Commonly known as:  TYLENOL   This may have changed:  You were already taking a medication with the same name, and this prescription was added. Make sure you understand how and when to take each.   Used for:  Hospice care patient        Dose:  650 mg   Place 1 suppository (650 mg) rectally every 4 hours as needed for fever or mild pain (Do not exceed 4000 mg total acetaminophen per day.) Unwrap prior to insertion.   Quantity:  12 suppository   Refills:  1       * Notice:  This list has 2 medication(s) that are the same as other medications prescribed for you. Read the directions carefully, and ask your doctor or other care provider to review them with you.      CONTINUE  these medicines which have NOT CHANGED        Dose / Directions    clopidogrel 75 MG tablet   Commonly known as:  PLAVIX   Used for:  Congestive heart failure, unspecified congestive heart failure chronicity, unspecified congestive heart failure type (H), Lymphedema of both lower extremities        Dose:  75 mg   Take 1 tablet (75 mg) by mouth daily   Quantity:  90 tablet   Refills:  0       donepezil 5 MG tablet   Commonly known as:  ARIcept   Used for:  Dementia without behavioral disturbance, unspecified dementia type        TAKE 1 TABLET(5 MG) BY MOUTH AT BEDTIME   Quantity:  90 tablet   Refills:  3       metoprolol tartrate 25 MG tablet   Commonly known as:  LOPRESSOR   Used for:  Benign essential hypertension        TAKE 1/2 TABLET(12.5 MG) BY MOUTH TWICE DAILY   Quantity:  90 tablet   Refills:  3       nitroGLYcerin 0.4 MG sublingual tablet   Commonly known as:  NITROSTAT   Used for:  Unspecified cardiovascular disease        Dose:  0.4 mg   Place 1 tablet (0.4 mg) under the tongue every 5 minutes as needed for chest pain   Quantity:  25 tablet   Refills:  1       * order for DME   Used for:  Acute on chronic diastolic congestive heart failure (H), At risk for falling        Equipment being ordered: Walker with a seat   Quantity:  1 Package   Refills:  0       * order for DME   Used for:  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), Lymphedema of both lower extremities, Right-sided congestive heart failure, Tricuspid valve insufficiency, unspecified etiology, Congestive heart failure, unspecified congestive heart failure chronicity, unspecified congestive heart failure type (H), History of pressure ulcer        Equipment being ordered: Hospital Bed 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 -ongoing symptoms of lower extremity edema,shortness of breath,cough sacral pressure sores within last 6 months   An electric hospital bed to allow for increase in head of bed elevation and  for ease in wheelchair transfers  Length of need: lifelong NPI - [id number]   Quantity:  1 Device   Refills:  0       * order for DME   Used for:  Decubitus ulcer of sacral region, unspecified ulcer stage        Mepilex sacral dressings   Quantity:  10 Units   Refills:  3       * order for DME   Used for:  Urinary incontinence, unspecified type, History of pressure ulcer        always discreet underwear - 2 pairs/day  - 60/month flushable cleansing cloths ( wipes)  - several packages/month   For incontinence and primitivo-care   Quantity:  1 Month   Refills:  3       * order for DME   Used for:  Lymphedema of both lower extremities, Chronic diastolic congestive heart failure (H), Urinary incontinence, unspecified type, Memory loss, Weakness, Weight loss        Equipment being ordered: bedside commode and bed pan   Quantity:  1 Device   Refills:  0       polyethylene glycol powder   Commonly known as:  MIRALAX/GLYCOLAX        Dose:  0.5 capful   Take 0.5 capfuls by mouth every morning   Refills:  0       potassium chloride 10 MEQ tablet   Commonly known as:  K-TAB,KLOR-CON   Used for:  Shortness of breath on exertion, Essential hypertension        TAKE 1 TABLET(10 MEQ) BY MOUTH TWICE DAILY   Quantity:  180 tablet   Refills:  1       SENNA S 8.6-50 MG per tablet   Generic drug:  senna-docusate        Dose:  2 tablet   Take 2 tablets by mouth 2 times daily   Refills:  0       torsemide 20 MG tablet   Commonly known as:  DEMADEX   Used for:  Lymphedema of both lower extremities, Pleural effusion        Dose:  20 mg   Take 1 tablet (20 mg) by mouth 2 times daily Patient needs labs before next refill.   Quantity:  60 tablet   Refills:  0       warfarin 4 MG tablet   Commonly known as:  COUMADIN   Used for:  Congestive heart failure, unspecified congestive heart failure chronicity, unspecified congestive heart failure type (H)        Take 4 mg daily or as directed by Anticoagulation Clinic   Quantity:  80 tablet   Refills:  0        * Notice:  This list has 5 medication(s) that are the same as other medications prescribed for you. Read the directions carefully, and ask your doctor or other care provider to review them with you.      STOP taking     alendronate 70 MG tablet   Commonly known as:  FOSAMAX           cetirizine 5 MG tablet   Commonly known as:  zyrTEC           ezetimibe 10 MG tablet   Commonly known as:  ZETIA           Multi-vitamin Tabs tablet           spironolactone 25 MG tablet   Commonly known as:  ALDACTONE           TYLENOL PM EXTRA STRENGTH PO           ZZZQUIL PO                Where to get your medicines      These medications were sent to [facility] [place], [place] - [address]  [address] [facility] [address], [facility] [address]     Phone:  [phone]     pantoprazole 40 MG EC tablet    sucralfate 1 GM/10ML suspension         [facility] [address] [place] [address] [address] [place] [address]  [address] [facility] [address]     Phone:  [phone]     donepezil 5 MG tablet         Some of these will need a paper prescription and others can be bought over the counter. Ask your nurse if you have questions.     Bring a paper prescription for each of these medications     acetaminophen 650 MG Suppository    atropine 1 % ophthalmic solution    bisacodyl 10 MG Suppository    haloperidol 0.5 MG tablet       You don't need a prescription for these medications     hyoscyamine 0.125 MG tablet    MEDICATION INSTRUCTION                Protect others around you: Learn how to safely use, store and throw away your medicines at www.disposemymeds.org.             Medication List: This is a list of all your medications and when to take them. Check marks below indicate your daily home schedule. Keep this list as a reference.      Medications           Morning Afternoon Evening Bedtime As Needed    * acetaminophen 325 MG tablet   Commonly known as:   TYLENOL   Take 1-2 tablets (325-650 mg) by mouth every 6 hours as needed for mild pain or pain   Last time this was given:  650 mg on 5/24/2018  1:09 AM                                   * acetaminophen 650 MG Suppository   Commonly known as:  TYLENOL   Place 1 suppository (650 mg) rectally every 4 hours as needed for fever or mild pain (Do not exceed 4000 mg total acetaminophen per day.) Unwrap prior to insertion.                                   atropine 1 % ophthalmic solution   Take 2-4 drops by mouth, place under tongue or place inside cheek every 2 hours as needed for other (terminal respiratory secretions) Not for ophthalmic use.                                   bisacodyl 10 MG Suppository   Commonly known as:  DULCOLAX   Unwrap and insert 1 suppository rectally twice daily as needed for constipation.                                   clopidogrel 75 MG tablet   Commonly known as:  PLAVIX   Take 1 tablet (75 mg) by mouth daily   Next Dose Due:  Take tomorrow AM 5/25 5/25                       donepezil 5 MG tablet   Commonly known as:  ARIcept   TAKE 1 TABLET(5 MG) BY MOUTH AT BEDTIME   Last time this was given:  5 mg on 5/23/2018  9:00 PM   Next Dose Due:  Take at bedtime tonight 5/25 5/25           haloperidol 0.5 MG tablet   Commonly known as:  HALDOL   Take 1-2 tablets (0.5-1 mg) by mouth, place under tongue or insert rectally every 6 hours as needed for agitation (nausea)                                   hyoscyamine 0.125 MG tablet   Commonly known as:  ANASPAZ/LEVSIN   Take 1-2 tablets (125-250 mcg) by mouth every 4 hours as needed for other (secretions)                                   MEDICATION INSTRUCTION   If care facility cannot accept or use ranges, facility is instructed to use lower end of dosing range                                metoprolol tartrate 25 MG tablet   Commonly known as:  LOPRESSOR   TAKE 1/2 TABLET(12.5 MG) BY MOUTH TWICE DAILY   Last time  this was given:  12.5 mg on 5/24/2018  8:34 AM   Next Dose Due:  Take this evening 5/24 5/25 5/24               nitroGLYcerin 0.4 MG sublingual tablet   Commonly known as:  NITROSTAT   Place 1 tablet (0.4 mg) under the tongue every 5 minutes as needed for chest pain                                   * order for DME   Equipment being ordered: Walker with a seat                                * order for DME   Equipment being ordered: Hospital Bed 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 -ongoing symptoms of lower extremity edema,shortness of breath,cough sacral pressure sores within last 6 months   An electric hospital bed to allow for increase in head of bed elevation and for ease in wheelchair transfers  Length of need: lifelong NPI - [id number]                                * order for DME   Mepilex sacral dressings                                * order for DME   always discreet underwear - 2 pairs/day  - 60/month flushable cleansing cloths ( wipes)  - several packages/month   For incontinence and primitivo-care                                * order for DME   Equipment being ordered: bedside commode and bed pan                                pantoprazole 40 MG EC tablet   Commonly known as:  PROTONIX   Take 1 tablet (40 mg) by mouth 2 times daily (before meals)   Last time this was given:  40 mg on 5/24/2018  6:53 AM   Next Dose Due:  Take this evening 5/25 5/25 5/24               polyethylene glycol powder   Commonly known as:  MIRALAX/GLYCOLAX   Take 0.5 capfuls by mouth every morning   Next Dose Due:  Resume home schedule                                potassium chloride 10 MEQ tablet   Commonly known as:  K-TAB,KLOR-CON   TAKE 1 TABLET(10 MEQ) BY MOUTH TWICE DAILY   Next Dose Due:  Resume home schedule                                SENNA S 8.6-50 MG per tablet   Take 2 tablets by mouth 2 times daily   Generic drug:  senna-docusate   Next Dose Due:  Resume home schedule                                 sucralfate 1 GM/10ML suspension   Commonly known as:  CARAFATE   Take 10 mLs (1 g) by mouth 4 times daily (before meals and nightly)   Last time this was given:  1 g on 5/24/2018 12:16 PM            5/25 5/25 5/24 5/24           torsemide 20 MG tablet   Commonly known as:  DEMADEX   Take 1 tablet (20 mg) by mouth 2 times daily Patient needs labs before next refill.   Next Dose Due:  Resume home schedule                                warfarin 4 MG tablet   Commonly known as:  COUMADIN   Take 4 mg daily or as directed by Anticoagulation Clinic   Last time this was given:  4 mg on 5/23/2018  6:18 PM   Next Dose Due:  Take this evening 5/24 5/24               * Notice:  This list has 7 medication(s) that are the same as other medications prescribed for you. Read the directions carefully, and ask your doctor or other care provider to review them with you.

## 2018-05-19 NOTE — ED PROVIDER NOTES
History     Chief Complaint   Patient presents with     Rectal Bleeding     GI bleed, on coumadin.  began vomiting dark emesis this a.m.  pt has had one dark stool too.  ON COUMADIN     HPI  Natalie Hair is a 85 year old female who has a history of Alzheimer's, heart valve replacement, hyperlipidemia, hypertension, primary pulmonary hypertension, tricuspid valve insufficiency, s/p CABG, chronic diastolic congestive heart failure, chronic atrial fibrillation, chronic anticoagulation, and acute kidney injury who presents to the ED for evaluation of rectal bleeding. Patient presents with her two daughters from her home where she lives with her daughter, Digna.     Patient was last well yesterday, able to eat and drink well. Patient took her pills this morning with some orange juice but had immediate emesis. Patent had black-blood tinged emesis this morning. Patient's daughter waited 30 minutes and gave the patient two tablespoons of water, and the patient vomited again. At that time, shevomited about 2 cups of black and bloody vomit. Daughter brought the patient to the bathroom where she had a large bloody stool at 8:00 AM. She also reported back pain after vomiting this morning. Most recent INR was 2.9 on 5/9/2018. Patient had a normal bowel movement yesterday. Patient denies other pains. Patient is able to walk inside the house with a walker.     Problem List:    Patient Active Problem List    Diagnosis Date Noted     Pleural effusion 04/06/2018     Priority: Medium     Weight loss 01/19/2018     Priority: Medium     KYLE (acute kidney injury) (H) 01/03/2018     Priority: Medium     Chronic atrial fibrillation (H) 01/03/2018     Priority: Medium     Hypercalcemia 01/03/2018     Priority: Medium     Weakness 01/03/2018     Priority: Medium     Memory loss 07/23/2017     Priority: Medium     S/P CABG (coronary artery bypass graft) - 2002 05/26/2017     Priority: Medium     Stented coronary artery - 5/4/2017 at  "[place] 05/26/2017     Priority: Medium     Chronic diastolic congestive heart failure (H) 12/01/2016     Priority: Medium     \"forma\" device   Experimental device placed at [facility] summer of 2017 - one of the first of its kind placed in the  for treatment of severe CHF.        Tricuspid valve insufficiency, unspecified etiology 09/27/2016     Priority: Medium     Primary pulmonary hypertension (H) 09/09/2016     Priority: Medium     Lymphedema of both lower extremities 09/09/2016     Priority: Medium     Osteopenia 03/02/2016     Priority: Medium     osteopenia on dexa scan 2/10- right hip -2, left hip -1.3, spine -0.8  FRAX score- hip 4.7%, osteo 19%       Benign essential hypertension, BP goal <150/90 09/08/2015     Priority: Medium     Long term current use of anticoagulant therapy 04/09/2014     Priority: Medium     9/2/15Take plavix and Coumadin indefinitely.Follow-up with [facility] in 2-4 weeks. Phone [phone]  INR goal 2.5- 3.5 Mitral valve           Urinary incontinence 04/09/2014     Priority: Medium     Advanced directives, counseling/discussion 12/26/2011     Priority: Medium     Advance Directive Problem List Overview:   Name Relationship Phone    Primary Health Care Agent            Alternative Health Care Agent          Discussed advance care planning with patient; information given to patient to review. 12/26/2011          Hyperlipidemia LDL goal <100 10/31/2010     Priority: Medium     Vitamin D deficiency 01/02/2008     Priority: Medium     Problem list name updated by automated process. Provider to review       History of T12 compression fracture 01/24/2007     Priority: Medium     osteopenia on dexa scan 2/10- right hip -2, left hip -1.3, spine -0.8  FRAX score- hip 4.7%, osteo 19%       Backache 01/24/2007     Priority: Medium     CT scan 8/06 shows degeneration in facet joints and DDD  Problem list name updated by automated process. Provider to review       Cardiovascular disease      Priority: " Medium     CAD status post 3 vessel CABG (LIMA to LAD, SVG to RCA, Radial graft to first OM) and mechanical AVR in 12/2002 in  [place]         Heart valve replaced 02/15/2006     Priority: Medium     [facility], on coumadin  Problem list name updated by automated process. Provider to review          Past Medical History:    Past Medical History:   Diagnosis Date     Backache      H/O aortic valve replacement in 2002     Hyperlipidemia LDL goal < 100      Hypertension      T12 compression fracture (H)      Unspecified cardiovascular disease      Urinary incontinence      Vitamin D deficiency        Past Surgical History:    Past Surgical History:   Procedure Laterality Date     ARTHROPLASTY KNEE BILATERAL       SURGICAL HISTORY OF -   12/2002    Triple bypass w/valve replacement - aortic       Family History:    Family History   Problem Relation Age of Onset     HEART DISEASE Mother      Lipids Son      HEART DISEASE Son      Lipids Daughter        Social History:  Marital Status:   [5]  Social History   Substance Use Topics     Smoking status: Former Smoker     Types: Cigarettes     Smokeless tobacco: Never Used      Comment: 40 years ago     Alcohol use No        Medications:      acetaminophen (TYLENOL) 325 MG tablet   alendronate (FOSAMAX) 70 MG tablet   cetirizine (ZYRTEC) 5 MG tablet   clopidogrel (PLAVIX) 75 MG tablet   Diphenhydramine-APAP, sleep, (TYLENOL PM EXTRA STRENGTH PO)   donepezil (ARICEPT) 5 MG tablet   ezetimibe (ZETIA) 10 MG tablet   metolazone (ZAROXOLYN) 2.5 MG tablet   metoprolol tartrate (LOPRESSOR) 25 MG tablet   multivitamin, therapeutic with minerals (MULTI-VITAMIN) TABS tablet   polyethylene glycol (MIRALAX/GLYCOLAX) powder   potassium chloride (K-TAB,KLOR-CON) 10 MEQ tablet   senna-docusate (SENNA S) 8.6-50 MG per tablet   spironolactone (ALDACTONE) 25 MG tablet   torsemide (DEMADEX) 20 MG tablet   warfarin (COUMADIN) 4 MG  "tablet   ACETAMINOPHEN PO   DiphenhydrAMINE HCl, Sleep, (ZZZQUIL PO)   nitroglycerin (NITROSTAT) 0.4 MG SL tablet   order for DME   order for DME   order for DME   order for DME   order for DME   triamcinolone (KENALOG) 0.1 % cream         Review of Systems   Gastrointestinal: Positive for blood in stool, nausea and vomiting.   All other systems reviewed and are negative.      Physical Exam   BP: 125/57  Pulse: 70  Heart Rate: 70  Temp: 97.6  F (36.4  C)  Resp: 18  Height: 165.1 cm (5' 5\")  Weight: 70.3 kg (155 lb)  SpO2: 99 %      Physical Exam    /74  Pulse 70  Temp 97.6  F (36.4  C) (Oral)  Resp 29  Ht 1.651 m (5' 5\")  Wt 70.3 kg (155 lb)  SpO2 99%  BMI 25.79 kg/m2  General: alert, interactive, in no apparent distress  Head: atraumatic  Nose: no rhinorrhea or epistaxis  Ears: no external auditory canal discharge or bleeding.    Eyes: Sclera nonicteric. Conjunctiva noninjected. PERRL, EOMI  Mouth: no tonsillar erythema, edema, or exudate  Neck: supple, no palp LAD  Lungs: CTAB  CV: RRR, S1/S2; peripheral pulses palpable and symmetric  Abdomen: soft, nt, nd, no guarding or rebound. Positive bowel sounds  Extremities: no cyanosis or edema  Skin: no rash or diaphoresis  Neuro: CN II-XII grossly intact, strength 5/5 in UE and LEs bilaterally, sensation intact to light touch in UE and LEs bilaterally;       ED Course     ED Course     Procedures               EKG Interpretation:      Interpreted by Tono Wade  Time reviewed: 1042  Symptoms at time of EKG: GI bleed   Rhythm: normal sinus   Rate: Normal  Axis: left axis  Ectopy: none  Conduction: nonspecific interventricular conduction block  ST Segments/ T Waves: Non-specific ST-T wave changes  Comparison to prior: Unchanged from 3/26/18    Clinical Impression: no acute changes                Critical Care time:  none     Lactate is greater than 1.9 due to acute blood loss anemia, at this time there is no sign of severe sepsis or septic shock.     "     Results for orders placed or performed during the hospital encounter of 05/19/18 (from the past 24 hour(s))   CBC with platelets differential   Result Value Ref Range    WBC 9.1 4.0 - 11.0 10e9/L    RBC Count 4.20 3.8 - 5.2 10e12/L    Hemoglobin 11.6 (L) 11.7 - 15.7 g/dL    Hematocrit 35.8 35.0 - 47.0 %    MCV 85 78 - 100 fl    MCH 27.6 26.5 - 33.0 pg    MCHC 32.4 31.5 - 36.5 g/dL    RDW 16.4 (H) 10.0 - 15.0 %    Platelet Count 265 150 - 450 10e9/L    Diff Method Automated Method     % Neutrophils 65.8 %    % Lymphocytes 20.8 %    % Monocytes 11.3 %    % Eosinophils 1.1 %    % Basophils 0.7 %    % Immature Granulocytes 0.3 %    Absolute Neutrophil 6.0 1.6 - 8.3 10e9/L    Absolute Lymphocytes 1.9 0.8 - 5.3 10e9/L    Absolute Monocytes 1.0 0.0 - 1.3 10e9/L    Absolute Eosinophils 0.1 0.0 - 0.7 10e9/L    Absolute Basophils 0.1 0.0 - 0.2 10e9/L    Abs Immature Granulocytes 0.0 0 - 0.4 10e9/L   Comprehensive metabolic panel   Result Value Ref Range    Sodium 140 133 - 144 mmol/L    Potassium 5.1 3.4 - 5.3 mmol/L    Chloride 106 94 - 109 mmol/L    Carbon Dioxide 26 20 - 32 mmol/L    Anion Gap 8 3 - 14 mmol/L    Glucose 129 (H) 70 - 99 mg/dL    Urea Nitrogen 70 (H) 7 - 30 mg/dL    Creatinine 1.21 (H) 0.52 - 1.04 mg/dL    GFR Estimate 42 (L) >60 mL/min/1.7m2    GFR Estimate If Black 51 (L) >60 mL/min/1.7m2    Calcium 9.2 8.5 - 10.1 mg/dL    Bilirubin Total 1.2 0.2 - 1.3 mg/dL    Albumin 3.3 (L) 3.4 - 5.0 g/dL    Protein Total 7.2 6.8 - 8.8 g/dL    Alkaline Phosphatase 158 (H) 40 - 150 U/L    ALT 18 0 - 50 U/L    AST 28 0 - 45 U/L   INR   Result Value Ref Range    INR 3.30 (H) 0.86 - 1.14   Lactic acid whole blood   Result Value Ref Range    Lactic Acid 2.1 (H) 0.7 - 2.0 mmol/L   Lipase   Result Value Ref Range    Lipase 95 73 - 393 U/L   CT Abdomen Pelvis w Contrast    Narrative    Exam: CT ABDOMEN PELVIS W CONTRAST  5/19/2018 11:46 AM    History: Gastrointestinal bleeding with lower abdominal pain.    Comparison: CT  dated 1/4/2018    Technique: Volumetric acquisition with reconstruction in the axial,  coronal planes through the abdomen and pelvis with contrast. Radiation  dose for this scan was reduced using automated exposure control,  adjustment of the mA and/or kV according to patient size, or iterative  reconstruction technique.    Contrast: 75 ml's isovue 370    Findings:   Lung Bases: Small to moderate left pleural effusion with associated  atelectasis.    Abdomen: Cholelithiasis without signs of cholecystitis. Liver, spleen,  kidneys, adrenal glands appear normal. Atrophic pancreas.    Scattered colonic diverticula without signs of diverticulitis. No  areas of bowel wall thickening or bowel dilatation. Normal appendix.    Uterus and ovaries are unremarkable. No free fluid. No abdominal or  pelvic lymphadenopathy.    Bones: Degenerative changes in the thoracolumbar spine and hips.      Impression    Impression:   1. Scattered colonic diverticula without signs of diverticulitis.  2. Cholelithiasis without signs of cholecystitis.  3. No acute findings are seen in the abdomen or pelvis.  4. Small to moderate left pleural effusion.    CASEY THOMAS MD   Lactic acid whole blood   Result Value Ref Range    Lactic Acid 1.2 0.7 - 2.0 mmol/L     *Note: Due to a large number of results and/or encounters for the requested time period, some results have not been displayed. A complete set of results can be found in Results Review.       Medications   0.9% sodium chloride BOLUS (0 mLs Intravenous Stopped 5/19/18 1318)     Followed by   sodium chloride 0.9% infusion (1,000 mLs Intravenous New Bag 5/19/18 1318)   pantoprazole (PROTONIX) 80 mg in sodium chloride 0.9 % 100 mL infusion (8 mg/hr Intravenous New Bag 5/19/18 1112)   ondansetron (ZOFRAN) injection 4 mg (4 mg Intravenous Given 5/19/18 1035)   pantoprazole (PROTONIX) 80 mg in sodium chloride 0.9 % 100 mL intermittent infusion (0 mg Intravenous Stopped 5/19/18 1112)   iopamidol  (ISOVUE-370) solution 75 mL (75 mLs Intravenous Given 5/19/18 1127)   sodium chloride 0.9 % bag 500mL for CT scan flush use (75 mLs As instructed Given 5/19/18 1127)     10:19 AM Patient assessed.    Assessments & Plan (with Medical Decision Making)  85 year old female, with past medical history significant for Alzheimer's, hypertension, hyperlipidemia, history of mechanical aortic valve replacement, in addition to triple bypass surgery, anticoagulated on warfarin, presenting to the emergency department with family members after the patient was noted to have black vomit this morning, in addition to black tarry stool this morning as well.  Patient is difficult historian, and therefore daughter provides most of the history information.  Patient had reportedly been feeling well yesterday evening.  Patient then awoke this morning, and was given morning dose of medications with juice.  Approximately 15-30 minutes after taking these medications, patient had episode of black vomit.  Within the next hour, patient had episode of black, tarry, watery stool.  Therefore, patient was brought to the emergency department for further evaluation.  Previous records reviewed to obtain further information given valve repair.  On warfarin for these valves and h/o afib.  In sinus rhythm currently.    Patient arrives afebrile, normal vitals upon arrival.  Concern, given the black stool, with black vomit is for upper GI bleed upon arrival.  IV was established, and labs drawn.  IV fluids administered.    Laboratory workup returned with slightly elevated lactate at 2.1.  Do not feel that patient is septic and feel that this is secondary to the acute blood loss anemia.  Patient with hemoglobin of 11.6, down from her baseline of approximately 13.  Creatinine is approximately at baseline at 1.21, and BUN is elevated at 70, consistent with likely acute blood loss anemia.    CT scan of the abdomen was performed secondary to patient's abdominal  tenderness on physical examination.  Evidence of diverticulosis, without signs of diverticulitis, or other acute abnormality.    IV PPI drip was administered, in addition to 1 L IV normal saline.  Repeat lactate after 1 L IV normal saline shows resolution, with normalization at 1.2.    Initially, I had discussion with hospitalist, in addition to surgeon on-call at [facility], however given patient's INR of 3.3 in the context of history of valve repair, patient requires higher level of care than what can  be provided at [facility].  I had discussion with patient and family, who requested [facility].  [facility] hospitalist has agreed to assume care upon transfer.  Had discussion with Dr. Patterson.    At this point, patient has remained hemodynamically stable, and will not acutely reverse Coumadin with vitamin K or other medication at this point.  Patient is DNR.         I have reviewed the nursing notes.    I have reviewed the findings, diagnosis, plan and need for follow up with the patient.       Discharge Medication List as of 5/19/2018  2:37 PM          Final diagnoses:   GI bleed     This document serves as a record of the services and decisions personally performed and made by Tono Wade MD. It was created on HIS/HER behalf by   Olga Lynne, a trained medical scribe. The creation of this document is based the provider's statements to the medical scribe.  Olga Lynne 10:19 AM 5/19/2018    Provider:   The information in this document, created by the medical scribe for me, accurately reflects the services I personally performed and the decisions made by me. I have reviewed and approved this document for accuracy prior to leaving the patient care area.  Tono Wade MD 10:19 AM 5/19/2018 5/19/2018   [facility] EMERGENCY DEPARTMENT     Tono Wade MD  05/19/18 [id number]

## 2018-05-19 NOTE — CONSULTS
[facility]  Gastroenterology Consultation         Natalie Hair  [address] [place] [id number]  85 year old female    Admission Date/Time: 5/19/2018  Primary Care Provider: Mimi Mcguire  Referring / Attending Physician:  Niru Krause    We were asked to see the patient in consultation by Dr. Krause for evaluation of  Hematemesis and melena.      CC: hematemesis and melena.    HPI:  Natalie Hair is a 85 year old female who who was transferred to [facility] from [facility] due to acute onset of nausea vomiting hematemesis and melena patient had drop in her hemoglobin from range of 14-11 patient's INR was 3.3.  This morning her INR is down to 2.05 patient hemoglobin is dropped to 8.5.  Clinically patient is feeling well.  Patient has a long-standing history of extensive coronary artery disease three-vessel CABG dementia aortic valve replacement patient has a mechanical aortic valve patient has history of hypertension hyperlipidemia tricuspid insufficiency chronic diastolic heart failure history of pleural effusions requiring thoracentesis A. fib compression fraction of T12 causing significant back pain for which patient was using nonsteroidals.  Patient is denying any abdominal pain denying any further hematemesis patient had one small bowel movement since she is here in the hospital patient is denying any other new systemic complaints denying any chest pain palpitation any active cardiorespiratory problem hematological problem genitourinary problem rest of review of system is negative.    ROS: A comprehensive ten point review of systems was negative aside from those in mentioned in the HPI.      PAST MED HX:  I have reviewed this patient's medical history and updated it with pertinent information if needed.   Past Medical History:   Diagnosis Date     Backache      H/O aortic valve replacement in 2002    maintained on chronic anticoagulation with warfarin      Hyperlipidemia LDL goal < 100      Hypertension      T12 compression fracture (H)      Unspecified cardiovascular disease     hx of CAD with 3V CABG and AV replacement in 2002     Urinary incontinence      Vitamin D deficiency        MEDICATIONS:   Prior to Admission Medications   Prescriptions Last Dose Informant Patient Reported? Taking?   DiphenhydrAMINE HCl, Sleep, (ZZZQUIL PO) 5/18/2018 at Unknown time Daughter Yes Yes   Sig: Take 10 mg by mouth At Bedtime    Diphenhydramine-APAP, sleep, (TYLENOL PM EXTRA STRENGTH PO) 5/18/2018 at Unknown time Daughter Yes Yes   Sig: Take 1 tablet by mouth At Bedtime    acetaminophen (TYLENOL) 325 MG tablet  Daughter No Yes   Sig: Take 1-2 tablets (325-650 mg) by mouth every 6 hours as needed for mild pain or pain   alendronate (FOSAMAX) 70 MG tablet 5/15/2018 Daughter No Yes   Sig: TAKE 1 TABLET BY MOUTH ONCE WEEKLY ON EMPTY STOMACH   cetirizine (ZYRTEC) 5 MG tablet 5/19/2018 at Unknown time Daughter No Yes   Sig: Take 1 tablet (5 mg) by mouth daily   clopidogrel (PLAVIX) 75 MG tablet 5/19/2018 at Unknown time Daughter No Yes   Sig: Take 1 tablet (75 mg) by mouth daily   donepezil (ARICEPT) 5 MG tablet 5/18/2018 at Unknown time Daughter No Yes   Sig: TAKE 1 TABLET(5 MG) BY MOUTH AT BEDTIME   ezetimibe (ZETIA) 10 MG tablet 5/19/2018 at Unknown time Daughter No Yes   Sig: Take 1 tablet (10 mg) by mouth daily   metoprolol tartrate (LOPRESSOR) 25 MG tablet 5/19/2018 at am Daughter No Yes   Sig: TAKE 1/2 TABLET(12.5 MG) BY MOUTH TWICE DAILY   multivitamin, therapeutic with minerals (MULTI-VITAMIN) TABS tablet 5/19/2018 at Unknown time Daughter Yes Yes   Sig: Take 1 tablet by mouth daily   nitroglycerin (NITROSTAT) 0.4 MG SL tablet  Daughter No Yes   Sig: Place 1 tablet (0.4 mg) under the tongue every 5 minutes as needed for chest pain   order for DME  Daughter No No   Sig: Equipment being ordered: Walker with a seat   order for DME  Daughter No No   Sig: Equipment being ordered:  Hospital Bed  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 -ongoing symptoms of lower extremity edema,shortness of breath,cough  sacral pressure sores within last 6 months    An electric hospital bed to allow for increase in head of bed elevation and for ease in wheelchair transfers   Length of need: lifelong  NPI - [id number]   order for DME  Daughter No No   Sig: Mepilex sacral dressings   order for DME  Daughter No No   Sig: always discreet underwear - 2 pairs/day  - 60/month  flushable cleansing cloths ( wipes)  - several packages/month     For incontinence and primitivo-care   order for DME  Daughter No No   Sig: Equipment being ordered: bedside commode and bed pan   Patient not taking: Reported on 4/6/2018   polyethylene glycol (MIRALAX/GLYCOLAX) powder 5/19/2018 at Unknown time Daughter Yes Yes   Sig: Take 0.5 capfuls by mouth every morning    potassium chloride (K-TAB,KLOR-CON) 10 MEQ tablet 5/19/2018 at am Daughter No Yes   Sig: TAKE 1 TABLET(10 MEQ) BY MOUTH TWICE DAILY   senna-docusate (SENNA S) 8.6-50 MG per tablet 5/19/2018 at am Daughter Yes Yes   Sig: Take 2 tablets by mouth 2 times daily   spironolactone (ALDACTONE) 25 MG tablet 5/19/2018 at am Daughter No Yes   Sig: Take 1 tablet (25 mg) by mouth 2 times daily   torsemide (DEMADEX) 20 MG tablet 5/19/2018 at am Daughter No Yes   Sig: Take 1 tablet (20 mg) by mouth 2 times daily Patient needs labs before next refill.   warfarin (COUMADIN) 4 MG tablet 5/18/2018 at Unknown time Daughter Yes Yes   Sig: Take 4 mg daily or as directed by Anticoagulation Clinic      Facility-Administered Medications: None       ALLERGIES:   Allergies   Allergen Reactions     Lorazepam Nausea and Vomiting     Morphine Sulfate Cr [Morphine Sulfate] Nausea and Vomiting     Crestor [Rosuvastatin] Other (See Comments)     Abdominal/Back pain     Lidocaine GI Disturbance     Cozaar [Losartan] Rash       SOCIAL HISTORY:  Social History   Substance Use Topics     Smoking status: Former  Smoker     Types: Cigarettes     Smokeless tobacco: Never Used      Comment: 40 years ago     Alcohol use No       FAMILY HISTORY:  Family History   Problem Relation Age of Onset     HEART DISEASE Mother      Lipids Son      HEART DISEASE Son      Lipids Daughter        PHYSICAL EXAM:   General awake alert comfortable.  Vital Signs with Ranges  Temp: 97.7  F (36.5  C) Temp src: Oral BP: 106/51         O2 Device: None (Room air)         Constitutional: healthy, alert and no distress   Cardiovascular: negative, PMI normal. No lifts, heaves, or thrills. RRR. No murmurs, clicks gallops or rub  Respiratory: negative, Percussion normal. Good diaphragmatic excursion. Lungs clear  Head: Normocephalic. No masses, lesions, tenderness or abnormalities  Neck: Neck supple. No adenopathy. Thyroid symmetric, normal size,, Carotids without bruits.  Abdomen: Abdomen soft, non-tender. BS normal. No masses, organomegaly          ADDITIONAL COMMENTS:   I reviewed the patient's new clinical lab test results.   Recent Labs   Lab Test  05/19/18   1015 05/09/18 04/25/18 03/26/18   1115   01/07/18   0620   WBC  9.1   --    --    --   5.2   --   6.7   HGB  11.6*   --    --    --   14.6   --   12.6   MCV  85   --    --    --   85   --   85   PLT  265   --    --    --   193   --   153   INR  3.30*  2.9*  3.5*   < >  3.31*   < >  2.57*    < > = values in this interval not displayed.     Recent Labs   Lab Test  05/19/18   1015  03/26/18   1115  01/12/18   1433   POTASSIUM  5.1  4.4  4.3   CHLORIDE  106  102  98   CO2  26  26  30   BUN  70*  28  29   ANIONGAP  8  9  6     Recent Labs   Lab Test  05/19/18   1015  03/26/18   1250  03/26/18   1115  01/12/18   1433  01/03/18   1645   10/09/17   1647   07/26/17   1555   ALBUMIN  3.3*   --   3.7  3.5   --    < >   --    < >  3.6   BILITOTAL  1.2   --   1.3  1.0   --    < >   --    < >  1.1   ALT  18   --   23  40   --    < >   --    < >  17   AST  28   --   27  45   --    < >   --    < >  25    PROTEIN   --   Negative   --    --   Negative   --   Negative   < >   --    LIPASE  95   --    --    --    --    --    --    --   60*    < > = values in this interval not displayed.       I reviewed the patient's new imaging results.        CONSULTATION ASSESSMENT AND PLAN:    Active Problems:    Melena    Assessment: 85-year-old female with complicated extensive past medical history on chronic anticoagulation due to mechanical aortic valve and also tricuspid valve disease patient has chronic CHF patient developed acute upper GI bleed with hematemesis and melena patient currently appears to be stable her last hemoglobin was 8.5 patient was given vitamin K.  Patient has been on nonsteroidal recently patient is currently history highly suggestive of peptic ulcer disease most likely from nonsteroidals in the setting of anticoagulation, other differential would include Lucila-Tan tear neoplastic process.  Findings were discussed in detail with patient and her family.  I would recommend the patient to continue on IV Protonix proceed with EGD.  Patient is currently being reversed.  Risks benefits and plan was discussed in detail.  Thank you very much for letting me participate in her care                 Roosevelt Braun MD, FACP  [facility].  Office: [phone]  Cell : [phone]

## 2018-05-19 NOTE — PLAN OF CARE
"Problem: Patient Care Overview  Goal: Individualization & Mutuality  Outcome: No Change  VSS.  Alert to self, does have memory deficit.  Pt denies any c/o of pain.  States \"I do feel weaker\".  Up with assist of 1 to BSC.  1 small tarry stool.  IV protonix infusing.  Orders to dose Vit K.  HGB 8.8, Serial hemoglobins ordered.  GI consult with plan for EGD tomorrow.      "

## 2018-05-19 NOTE — ED NOTES
Pt lives with daughter.   Pt was given glass of orange juice and pills at 0700 this morning and pt vomited black emesis, pt on coumadin.   Pt was given a few sips of water at 0730 and vomited black emesis.   Pt also had black stool this morning.   Pt reports low back pain started 1 hour ago with nausea.   Daughters at bedside.

## 2018-05-19 NOTE — H&P
"Admitted:     05/19/2018      HISTORY AND PHYSICAL      PRIMARY CARE PHYSICIAN:  Dr. Mimi Mcguire.      CARDIOLOGIST:  Dr. Bingham.      CHIEF COMPLAINT:  Black stool, \"throwing up blood.\"      History is obtained from patient, although limited due to some underlying dementia.  The patient's son, Mendel, accompanies her at bedside and contributes to history.  Chart also reviewed.      HISTORY OF PRESENT ILLNESS:  Natalie Hair is an 85-year-old female with past medical history of 3-vessel CABG, dementia (short-term memory issues), mechanical aortic valve replacement on Coumadin, hypertension, hyperlipidemia, pulmonary hypertension, tricuspid insufficiency and chronic diastolic heart failure with history of thoracentesis, atrial fibrillation, and T12 compression fracture who presented as a transfer from [facility] Emergency Department for further evaluation of a GI bleed.      The patient was at home where she lives with her daughter, Digna, this morning.  She took her morning pills with orange juice and had an immediate episode of bloody emesis.  Shortly after that around 8:00 a.m. she had a large bloody stool described as a black.  No prior episodes of GI bleed.  No history of ulceration.  No history of endoscopy.  Colonoscopy greater than 10 years ago which was reported as normal.  The patient is on Plavix and Coumadin with her heart history.  She does not take chronic ibuprofen.  She denies any abdominal pain or throat pain.  She does not use alcohol.  Remote history of tobacco use 40 years ago.      In the ED, the patient was seen and assessed by Dr. Wade who obtained basic labs and imaging which revealed a creatinine of 1.4, which is around the patient's baseline, alkaline phosphatase 158.  Remainder of CMP unremarkable.  Lactic acid 2.1, down to 1.2.  Hemoglobin 11.6 with most recent hemoglobin on 03/26/2018 being 14.6, baseline range between 12 and 14.  INR is 3.3.  CT of the abdomen and pelvis was " performed which showed diverticula without diverticulosis, cholelithiasis without cholecystitis, mild to moderate left pleural effusion.  The patient was given 1 liter bolus of IV fluids in the maintained on maintenance fluids at 125 mL per hour.  The patient was also started on a Protonix drip and given some Zofran.  Transferred to [facility] for further evaluation and treatment.      On my interview, the patient and son confirmed the above history.  She was resting comfortably in bed.  She denies any abdominal pain, chest pain, dizziness, lightheadedness, palpitations, nausea, vomiting.  She denies throat pain.  She did have another episode of maroon to dark black colored stool in the Emergency Department, but has not had recurrence of hematemesis since arriving to the Emergency Department.  She is hungry and requesting ice chips.  She has never been followed by a gastroenterologist.      REVIEW OF SYSTEMS:  A 10-point review of systems was performed and is otherwise negative unless specified in the HPI.      PAST MEDICAL HISTORY:   1.  CAD with 3-vessel CABG with LIMA to LAD, SVG to RCA, radial graft to first OM followed by Dr. Bingham of Cardiology.  CABG was performed in 2002. Last left heart cath from 8/2016 showing restenosis of the left main stent in the range of 50-60% which compromises flow into the cx coronary. Cx coronary has 50-60% distal stenosis. Restenosis of the ostium of the LAD of 75% with a smooth mid vessel 50-60% stenosis. Distal occlusion of the LAD well, revascularlized by widely patent LIMA graft. Occlusion of the first obtuse marginal well revascularized by patent pedicle radial graft. Occlusion of the native right coronary as well as right coronary bypass graft.   2.  Dementia, question Alzheimer's per chart review, son explained short-term memory issues.   3.  Mechanical aortic valve replacement in 12/2002 on Coumadin.       4.  Hypertension.   5.  Hyperlipidemia.   6.  Severe  tricuspid regurgitation with pulmonary hypertension s/p Edsalvadorreynaldo forma tricuspid transcatheter repair device placement in 6/2017.   7.  Chronic diastolic heart failure with history of thoracentesis, the patient has an Carlos FORMA tricuspid transcatheter repair system device placed which was placed at [facility].   8.  Atrial fibrillation, status post AV nehemias ablation also with [employer] 2088 TC Tendril STS placed 7/18/16 by Dr. Kaiser  9.  History of T12 compression fracture.   10.  A 13 mm nodule in the right lower lobe of the lung noted CT in 01/2018 with plans for followup at 3 months, although it appears no followup has been performed.   11.  A 1.3 cm cortical lesion in the lower lobe of the left kidney noted on CT from 01/2018 with associated pelvicalyceal dilatation involving the left kidney.   12.  CKD stage III with baseline creatinine 1.2-1.4.      MEDICATIONS:    Prior to Admission Medications   Prescriptions Last Dose Informant Patient Reported? Taking?   DiphenhydrAMINE HCl, Sleep, (ZZZQUIL PO) 5/18/2018 at Unknown time Daughter Yes Yes   Sig: Take 10 mg by mouth At Bedtime    Diphenhydramine-APAP, sleep, (TYLENOL PM EXTRA STRENGTH PO) 5/18/2018 at Unknown time Daughter Yes Yes   Sig: Take 1 tablet by mouth At Bedtime    acetaminophen (TYLENOL) 325 MG tablet  Daughter No Yes   Sig: Take 1-2 tablets (325-650 mg) by mouth every 6 hours as needed for mild pain or pain   alendronate (FOSAMAX) 70 MG tablet 5/15/2018 Daughter No Yes   Sig: TAKE 1 TABLET BY MOUTH ONCE WEEKLY ON EMPTY STOMACH   cetirizine (ZYRTEC) 5 MG tablet 5/19/2018 at Unknown time Daughter No Yes   Sig: Take 1 tablet (5 mg) by mouth daily   clopidogrel (PLAVIX) 75 MG tablet 5/19/2018 at Unknown time Daughter No Yes   Sig: Take 1 tablet (75 mg) by mouth daily   donepezil (ARICEPT) 5 MG tablet 5/18/2018 at Unknown time Daughter No Yes   Sig: TAKE 1 TABLET(5 MG) BY MOUTH AT BEDTIME   ezetimibe (ZETIA) 10 MG tablet 5/19/2018 at Unknown  time Daughter No Yes   Sig: Take 1 tablet (10 mg) by mouth daily   metoprolol tartrate (LOPRESSOR) 25 MG tablet 5/19/2018 at am Daughter No Yes   Sig: TAKE 1/2 TABLET(12.5 MG) BY MOUTH TWICE DAILY   multivitamin, therapeutic with minerals (MULTI-VITAMIN) TABS tablet 5/19/2018 at Unknown time Daughter Yes Yes   Sig: Take 1 tablet by mouth daily   nitroglycerin (NITROSTAT) 0.4 MG SL tablet  Daughter No Yes   Sig: Place 1 tablet (0.4 mg) under the tongue every 5 minutes as needed for chest pain   order for DME  Daughter No No   Sig: Equipment being ordered: Walker with a seat   order for DME  Daughter No No   Sig: Equipment being ordered: Hospital Bed  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 -ongoing symptoms of lower extremity edema,shortness of breath,cough  sacral pressure sores within last 6 months    An electric hospital bed to allow for increase in head of bed elevation and for ease in wheelchair transfers   Length of need: lifelong  NPI - [id number]   order for DME  Daughter No No   Sig: Mepilex sacral dressings   order for DME  Daughter No No   Sig: always discreet underwear - 2 pairs/day  - 60/month  flushable cleansing cloths ( wipes)  - several packages/month     For incontinence and primitivo-care   order for DME  Daughter No No   Sig: Equipment being ordered: bedside commode and bed pan   Patient not taking: Reported on 4/6/2018   polyethylene glycol (MIRALAX/GLYCOLAX) powder 5/19/2018 at Unknown time Daughter Yes Yes   Sig: Take 0.5 capfuls by mouth every morning    potassium chloride (K-TAB,KLOR-CON) 10 MEQ tablet 5/19/2018 at am Daughter No Yes   Sig: TAKE 1 TABLET(10 MEQ) BY MOUTH TWICE DAILY   senna-docusate (SENNA S) 8.6-50 MG per tablet 5/19/2018 at am Daughter Yes Yes   Sig: Take 2 tablets by mouth 2 times daily   spironolactone (ALDACTONE) 25 MG tablet 5/19/2018 at am Daughter No Yes   Sig: Take 1 tablet (25 mg) by mouth 2 times daily   torsemide (DEMADEX) 20 MG tablet 5/19/2018 at am Daughter No Yes    Sig: Take 1 tablet (20 mg) by mouth 2 times daily Patient needs labs before next refill.   warfarin (COUMADIN) 4 MG tablet 5/18/2018 at Unknown time Daughter Yes Yes   Sig: Take 4 mg daily or as directed by Anticoagulation Clinic      Facility-Administered Medications: None      ALLERGIES:       Allergies   Allergen Reactions     Lorazepam Nausea and Vomiting     Morphine Sulfate Cr [Morphine Sulfate] Nausea and Vomiting     Crestor [Rosuvastatin] Other (See Comments)     Abdominal/Back pain     Lidocaine GI Disturbance     Cozaar [Losartan] Rash      PAST SURGICAL HISTORY:   1.  CABG.   2.  Aortic valve replacement.   3.  Bilateral knee arthroplasty.   4. [employer] 2088 TC tendril STS placed for Afib/flutter on 7.18.16  5. Latif forma tricuspid transcatheter repair system device placed at [facility]      SOCIAL HISTORY:  The patient lives at home with her daughter, Digna.  She uses a walker for ambulatory assistance.  No alcohol use.  Former smoker but quit 40 years ago.      FAMILY HISTORY:  Mother with CAD.  Son with CAD and hyperlipidemia.  Daughter with hyperlipidemia.      LABORATORY DATA:  Reviewed per Epic and noted in the HPI.      IMAGING:  Noted in the HPI.      PHYSICAL EXAMINATION:   VITAL SIGNS:  T 97.6, P 70, /74, R 29 SpO2 99% on room air.     CONSTITUTIONAL: Pt laying in bed, dressed in hospital garb. Appears comfortable. Cooperative with interview. Accompanied by son, Mendel, at bedside.   HEENT: Normocephalic, atraumatic. Pupils equal, round, and reactive to light. Negative for conjunctival redness or scleral icterus.  Oral mucosa pink and moist; negative for ulcerations, erythema, or exudates.  Dentition in good repair.   CARDIOVASCULAR: RRR, no murmurs, rubs, or extra heart sounds appreciated. Pulses +2/4 and regular in upper and lower extremities, bilaterally.   RESPIRATORY: No increased work of breathing.  CTA, bilat; no wheezes, rales, or rhonchi appreciated.  GASTROINTESTINAL:   Abdomen soft, non-distended. BS auscultated in all four quadrants. Negative for tenderness to palpation.  No masses or organomegaly noted.  MUSCULOSKELETAL: No gross deformities noted. Normal muscle tone.   HEMATOLOGIC/LYMPHATIC/IMMUNOLOGIC: Minimal swelling at ankles, bilat.   NEUROLOGIC: Alert and oriented to self only. No focal neuro deficits appreciated.   SKIN: Warm, dry, intact. No jaundice noted. Negative for suspicious lesions, rashes, bruising, open sores or abrasions.      ASSESSMENT AND PLAN:  Natalie Hair is an 85-year-old female with a complex medical history including coronary artery disease with history of 3-vessel coronary artery bypass graft, mechanical aortic valve replacement on Coumadin, hypertension, hyperlipidemia, tricuspid insufficiency and pulmonary hypertension with history of thoracentesis, chronic diastolic heart failure, and AFib who presents as transfer from [facility] for further evaluation of GI bleed after an episode of hematemesis and melena this morning.  Vitals currently within normal limits.  The patient is currently stable.      Melena, hematemesis:  The patient with bout of hematemesis this morning after taking her morning pills with orange juice.  Subsequently, had a large dark BM.  The patient with no prior history of GI bleed.  Maintained on Plavix and Coumadin.  No chronic NSAID use.  No prior endoscopy.  Colonoscopy greater than 10 years ago which was reported as normal.  No alcohol use.  The patient is not followed by a gastroenterologist.  The patient was given 1 liter bolus in the ED, followed by maintenance fluids at 125 mL per hour and started on a Protonix drip.  CT abdomen and pelvis showed diverticula without diverticulitis and cholelithiasis without cholecystitis and a small to moderate left pleural effusion.  Hemoglobin 11.6 down from 14.6 and last drawn on 03/26/2018.  Baseline appears to range between 12 and 14.  CMP unremarkable.   -- Admit under  inpatient status to [facility].   -- Gastroenterology consulted (Dr. Braun), appreciate assistance greatly. Plan for EGD in the AM, reverse INR to <2.0, ideally 1.5. Pharmacy to dose vitamin K.   -- IVF at 75 cc/hr  -- Continue Protonix drip.   -- Hemoglobins q. 6 hours.   -- Conditional orders to transfuse for hemoglobin less than 7.   -- Monitor on telemetry, continuous pulse oximetry.   -- CBC and BMP in the a.m.   -- NPO status.     Coronary artery disease with history of coronary artery bypass graft  Mechanical aortic valve replacement   Hypertension  Hyperlipidemia: Coronary artery bypass graft in 2002 with LIMA to LAD, SVG to RCA, radial graft to the first OM.  Followed by Dr. Ramon of Cardiology.   -- Hold PTA Plavix and coumadin given GI bleed above   -- Continue PTA Zetia  -- Hold PTA Metoprolol    Pulmonary hypertension  Tricuspid insufficiency  Moderate to severe mitral regurgitation   Chronic diastolic heart failure  H/O thoracentesis X3:  The patient with Latif FORMA tricuspid transcatheter repair system device inserted by the [facility] in 6/2017.  Her last echocardiogram from 11/2017 showed an EF of 67%, severely calcified mitral annulus with moderate stenosis and moderate to severe mitral regurgitation. Last thoracentesis on 4/11/18 with 800 ccs of fluid removed. Has had about three total thoracentesis.   -- Monitor I's and O's and daily weights closely given hx above and IVF administration.   -- Monitor for signs of fluid overload.   -- Hold PTA Torsemide and spironolactone given IVF above      Atrial fibrillation, history of AV nehemias ablation   S/P dual chamber pacemaker:  Maintained on beta blockade and Coumadin.  CHADS2-VASc score 5. H/O [employer] 2088 TC Tendril STS dual chamber pacemaker placed on 7/18/16  -- Will hold beta blockade until verified by pharmacy.  We will have to monitor for ongoing signs of GI bleed and will have to monitor for hypotension before restarting this.   -- Hold  Coumadin.     T12 compression fracture: History of this with associated chronic back pain.  Stable.   -- Consider Lidoderm patch if pt complains of pain     13 mm nodule in the right lower lobe of the lung:  Noted during hospitalization in 2018 on CT.  Recommended followup in 3 months from that time, although it appears that patient has not had followup.   -- Will consider CT during this hospitalization but will hold off at this time and focus on GI bleed.     1.3 cm cortical lesion lower lobe of the left kidney:  Noted on CT from hospitalization in 2018.   -- Defer further management to outpatient Urology.     Chronic kidney disease, stage III: baseline creatinine 1.2-1.4.  Creatinine on admission 1.21.     -- Monitor.     Lactic acidosis, resolved: Lactic acid on in the ED 2.1 down to 1.2 after IV fluids.  Monitor.     Deep venous thrombosis prophylaxis:  PCDs ordered.      CODE STATUS:  DNR/DNI.  This was confirmed with the patient with son, Mendel, at bedside.      This patient was seen and assessed with Dr. Yadav of the Hospitalist Service, who agrees with the plan as outlined above.       CLAY YADAV MD       As dictated by LEE MALDONADO PA-C            D: 2018   T: 2018   MT: LENIN      Name:     ANA RILEY   MRN:      [id number]        Account:      [id number]   :      1932        Admitted:     2018                   Document: [id number]

## 2018-05-19 NOTE — IP AVS SNAPSHOT
[facility] [facility]    [address] [place] [address]    Phone:  [phone]                                       After Visit Summary   5/19/2018    Natalie Hair    MRN: [id number]           After Visit Summary Signature Page     I have received my discharge instructions, and my questions have been answered. I have discussed any challenges I see with this plan with the nurse or doctor.    ..........................................................................................................................................  Patient/Patient Representative Signature      ..........................................................................................................................................  Patient Representative Print Name and Relationship to Patient    ..................................................               ................................................  Date                                            Time    ..........................................................................................................................................  Reviewed by Signature/Title    ...................................................              ..............................................  Date                                                            Time

## 2018-05-19 NOTE — CONSULTS
Consult received.  Chart reviewed.  Will see patient today.  Serial Hemoglobin. I/V Protonix  Reverse anti coagulation.  Plan for EGD tomorrow.    Rosoevelt Braun MD  [phone]

## 2018-05-19 NOTE — PHARMACY-ADMISSION MEDICATION HISTORY
Admission medication history interview status for the 5/19/2018  admission is complete. See EPIC admission navigator for prior to admission medications     Medication history source reliability:Good    Actions taken by pharmacist (provider contacted, etc):called daughter Emely     Additional medication history information not noted on PTA med list :None    Medication reconciliation/reorder completed by provider prior to medication history? No    Time spent in this activity: 20 min    Prior to Admission medications    Medication Sig Last Dose Taking? Auth Provider   acetaminophen (TYLENOL) 325 MG tablet Take 1-2 tablets (325-650 mg) by mouth every 6 hours as needed for mild pain or pain  Yes Mimi Mcguire MD   alendronate (FOSAMAX) 70 MG tablet TAKE 1 TABLET BY MOUTH ONCE WEEKLY ON EMPTY STOMACH 5/15/2018 Yes Mimi Mcguire MD   cetirizine (ZYRTEC) 5 MG tablet Take 1 tablet (5 mg) by mouth daily 5/19/2018 at Unknown time Yes Carly Del Toro PA-C   clopidogrel (PLAVIX) 75 MG tablet Take 1 tablet (75 mg) by mouth daily 5/19/2018 at Unknown time Yes Mimi Mcguire MD   DiphenhydrAMINE HCl, Sleep, (ZZZQUIL PO) Take 10 mg by mouth At Bedtime  5/18/2018 at Unknown time Yes Reported, Patient   Diphenhydramine-APAP, sleep, (TYLENOL PM EXTRA STRENGTH PO) Take 1 tablet by mouth At Bedtime  5/18/2018 at Unknown time Yes Reported, Patient   donepezil (ARICEPT) 5 MG tablet TAKE 1 TABLET(5 MG) BY MOUTH AT BEDTIME 5/18/2018 at Unknown time Yes Mimi Mcguire MD   ezetimibe (ZETIA) 10 MG tablet Take 1 tablet (10 mg) by mouth daily 5/19/2018 at Unknown time Yes Emelyn Bingham MD   metoprolol tartrate (LOPRESSOR) 25 MG tablet TAKE 1/2 TABLET(12.5 MG) BY MOUTH TWICE DAILY 5/19/2018 at am Yes Mimi Mcguire MD   multivitamin, therapeutic with minerals (MULTI-VITAMIN) TABS tablet Take 1 tablet by mouth daily 5/19/2018 at Unknown time Yes Reported, Patient   nitroglycerin (NITROSTAT)  0.4 MG SL tablet Place 1 tablet (0.4 mg) under the tongue every 5 minutes as needed for chest pain  Yes Mckinley Billings MD   polyethylene glycol (MIRALAX/GLYCOLAX) powder Take 0.5 capfuls by mouth every morning  5/19/2018 at Unknown time Yes Reported, Patient   potassium chloride (K-TAB,KLOR-CON) 10 MEQ tablet TAKE 1 TABLET(10 MEQ) BY MOUTH TWICE DAILY 5/19/2018 at am Yes Mimi Mcguire MD   senna-docusate (SENNA S) 8.6-50 MG per tablet Take 2 tablets by mouth 2 times daily 5/19/2018 at am Yes Reported, Patient   spironolactone (ALDACTONE) 25 MG tablet Take 1 tablet (25 mg) by mouth 2 times daily 5/19/2018 at am Yes Mimi Mcguire MD   torsemide (DEMADEX) 20 MG tablet Take 1 tablet (20 mg) by mouth 2 times daily Patient needs labs before next refill. 5/19/2018 at am Yes Mimi Mcguire MD   warfarin (COUMADIN) 4 MG tablet Take 4 mg daily or as directed by Anticoagulation Clinic 5/18/2018 at Unknown time Yes Mimi Mcguire MD   order for DME Equipment being ordered: Walker with a seat   Mimi Mcguire MD   order for DME Equipment being ordered: Hospital Bed  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 -ongoing symptoms of lower extremity edema,shortness of breath,cough  sacral pressure sores within last 6 months    An electric hospital bed to allow for increase in head of bed elevation and for ease in wheelchair transfers   Length of need: lifelong  NPI - [id number]   Mimi Mcguire MD   order for DME Mepilex sacral dressings   Leilani Whitman PA-C   order for DME always discreet underwear - 2 pairs/day  - 60/month  flushable cleansing cloths ( wipes)  - several packages/month     For incontinence and primitivo-care   Mimi Mcguire MD   order for DME Equipment being ordered: bedside commode and bed pan  Patient not taking: Reported on 4/6/2018   Mimi Mcguire MD

## 2018-05-20 NOTE — PLAN OF CARE
Problem: Patient Care Overview  Goal: Plan of Care/Patient Progress Review  Outcome: No Change  VSS. MOISE.  Sats 96% on RA.  LE edema +2.  Pt denies any c/o of pain.  Turned and repositioned, blanchable coccyx.  Tele is V paced.  Up with assist of 1 and walker to BSC and chair.  EGD done today.  Hgb 8.5.  INR 1.72, plan to hold coumadin tonight.  Started PO Protonix.  Will continue to monitor.

## 2018-05-20 NOTE — PROVIDER NOTIFICATION
MD Notification    Notified Person: MD    Notified Person Name: Taylor     Notification Date/Time: 05/20/18 5:35 AM     Notification Interaction: Talked with MD    Purpose of Notification: Pt INR came back at 2.33, supposed to have EGD today with goal INR less than 2.0, had one time dose of vit K last night, another dose?    Orders Received: pharm to dose k    Comments:

## 2018-05-20 NOTE — PLAN OF CARE
Problem: Patient Care Overview  Goal: Plan of Care/Patient Progress Review  Outcome: Improving  Pt VSS on RA. Tele 100% vpaced. Pt disoriented to situation and time, has note at bedside from family to remind her. Occasionally will call appropriately. Up with A of 1-2, using bedside commode. NS @ 75, protonix @ 8. Pt's INR yesterday high, given vit K+ for reversal, INR still high, initiated second dose of vit K this am. No signs of active bleeding, hgb's 8.4 and 8.5 this am, continuing to trend. Pt NPO w/ ice chips. Denies pain. Slept. Plan for EGD today. Continue to monitor.

## 2018-05-20 NOTE — PLAN OF CARE
Problem: Patient Care Overview  Goal: Plan of Care/Patient Progress Review  PT: PT orders received, evaluation completed, treatment initiated. Pt is an 84yo female admitted for evaluation of melena and hematemesis, EGD completed, suspected PUD per chart review. Pt oriented to self presently, family (not dtr Emely) present to assist with PLOF. Pt lives with her dtr in a house with 1 step to enter, all needs met on main level with tub/shower, RTS, shower chair, grab bars. Pt has supervision for all mobility with her FWW, has A1-2 when going up/down stair to enter. Pt requires Ax1 for tub transfer and assist to bathe, Ax1 for toilet transfer but IND with pericares, IND with dressing and bathing.     Discharge Planner PT   Patient plan for discharge: Family did not state  Current status: Pt reports she is very tired today, agreeable to get up to chair for dinner. Pt transfers supine>sit with modA at trunk. Pt transfers sit<>Stand to FWW with [place]. Pt able to take a few steps bed>chair with FWW and CGA of 1. Pt reporting SOB with activity, needing extended rest break at EOB despite SpO2 % on RA, noting increased RR, pt's symptoms improved with cues for PLB.  Barriers to return to prior living situation: Decreased functional activity tolerance, weakness  Recommendations for discharge: TCU  Rationale for recommendations: Pt would benefit from continued daily therapies to progress functional strength, activity tolerance, safety and IND with mobility. Pending progress with PT and LOS, pt may progress to be able to return home with assist from dtr as prior. Will continue to monitor.       Entered by: Mary Mccoy 05/20/2018 5:00 PM

## 2018-05-20 NOTE — PROVIDER NOTIFICATION
Brief update:    Paged regarding elevated INR  2 mg IV vitamin K patient received on admission, INR still greater than 2.    Mechanical aortic valve replacement history as well as atrial fibrillation.    At this time, I note an order for pharmacy to dose vitamin K.  Would contact pharmacy at this time.  If they are unavailable, I will order additional vitamin K, 2 mg ×1.  Unclear of timing of EGD, patient may require several units of FFP as well.    No ongoing bleeding reported by nursing at this time.      Deven Taylor MD  5:48 AM

## 2018-05-20 NOTE — PROGRESS NOTES
[facility]    Hospitalist Progress Note    Assessment & Plan    Natalie Hair is an 85-year-old female with a complex medical history including coronary artery disease with history of 3-vessel coronary artery bypass graft, mechanical aortic valve replacement on Coumadin, hypertension, hyperlipidemia, tricuspid insufficiency and pulmonary hypertension with history of thoracentesis, chronic diastolic heart failure, and AFib who presented as transfer from [facility] for further evaluation of GI bleed after an episode of hematemesis and melena morning of admissipon.  Vitals currently within normal limits.  The patient is currently stable. Baseline Hb ~14. Hb 11.6 am of 5/19 on presentation and subsequently dropped to 8 range.  Pt passing black stools during hospitalization. Pt admitted to CCU. She was given ILNS bolus and NS fluids, Vitamin K 2mg IV X2. Goal INR <2. Gastroenterology consulted for EGD.      Melena, hematemesis:  The patient with bout of hematemesis this morning after taking her morning pills with orange juice.  Subsequently, had a large dark BM.  The patient with no prior history of GI bleed.  Maintained on Plavix and Coumadin.  No chronic NSAID use.  No prior endoscopy.  Colonoscopy greater than 10 years ago which was reported as normal.  No alcohol use.  The patient is not followed by a gastroenterologist.  The patient was given 1 liter bolus in the ED, followed by maintenance fluids at 125 mL per hour and started on a Protonix drip.  CT abdomen and pelvis showed diverticula without diverticulitis and cholelithiasis without cholecystitis and a small to moderate left pleural effusion.  Hemoglobin 11.6 down from 14.6 and last drawn on 03/26/2018.  Baseline appears to range between 12 and 14.  CMP unremarkable.     Today: mild epigastric tenderness on exam.  No gerd, esophageal sxs. Suspect PUD.   hemodynoamically stable.   -- [place] [facility]   --  Gastroenterology consulted (Dr. Braun), appreciate assistance greatly. Plan for EGD in the AM, reverse INR to <2.0, ideally 1.5. Pharmacy to dose vitamin K.   -- IVF at 75 cc/hr  -- Continue Protonix drip.   -- Hemoglobins q. 6 hours.   -- Conditional orders to transfuse for hemoglobin less than 7.   -- Monitor on telemetry, continuous pulse oximetry.   -- CBC and BMP in the a.m.   -- NPO status.    -EGD this am.   -INR now to check effects of 0600 Vit K dose. 2 units ffp ordered as likely needed.   Discussed care plan with pt, family and RN.   -will need lifelong PPI likely  -discuss timing of resumption of anticoagulation (coumadin and/or heparin gtt) with GI once EGD done      Addendum: Hb down to 8 range and stable but now down to 7.5. Doing well per RN. Nl HR and bp. , egd reviewed with GI earlier today. Gastric ulcer with clean base and no stigmata of bleeding.no high risk features.  INR 1.7. Am INR. Hold anticoagulation tonight and reassess in am.  Possible start coumadin tomorrow. Keep in CCU for now given Hb drop. Type and screen. Transfuse and call MD if Hb<7    Coronary artery disease with history of coronary artery bypass graft  Mechanical aortic valve replacement   Hypertension  Hyperlipidemia: Coronary artery bypass graft in 2002 with LIMA to LAD, SVG to RCA, radial graft to the first OM.  Followed by Dr. Ramon of Cardiology.   -- Hold PTA Plavix and coumadin given GI bleed above   -- Continue PTA Zetia  -- Hold PTA Metoprolol     Pulmonary hypertension  Tricuspid insufficiency  Moderate to severe mitral regurgitation   Chronic diastolic heart failure  H/O thoracentesis X3:  The patient with Latif FORMA tricuspid transcatheter repair system device inserted by the [facility] in 6/2017.  Her last echocardiogram from 11/2017 showed an EF of 67%, severely calcified mitral annulus with moderate stenosis and moderate to severe mitral regurgitation. Last thoracentesis on 4/11/18 with 800 ccs of fluid  removed. Has had about three total thoracentesis.   -- Monitor I's and O's and daily weights closely given hx above and IVF administration.   -- Monitor for signs of fluid overload.   -- Hold PTA Torsemide and spironolactone given IVF above       Atrial fibrillation, history of AV nehemias ablation   S/P dual chamber pacemaker:  Maintained on beta blockade and Coumadin.  CHADS2-VASc score 5. H/O [employer] 2088 TC Tendril STS dual chamber pacemaker placed on 7/18/16  -- Will hold beta blockade until verified by pharmacy.  We will have to monitor for ongoing signs of GI bleed and will have to monitor for hypotension before restarting this.   -- Hold Coumadin.      T12 compression fracture: History of this with associated chronic back pain.  Stable.   -- Consider Lidoderm patch if pt complains of pain      13 mm nodule in the right lower lobe of the lung:  Noted during hospitalization in 01/2018 on CT.  Recommended followup in 3 months from that time, although it appears that patient has not had followup.   -- Will consider CT during this hospitalization but will hold off at this time and focus on GI bleed.      1.3 cm cortical lesion lower lobe of the left kidney:  Noted on CT from hospitalization in 01/2018.   -- Defer further management to outpatient Urology.      Chronic kidney disease, stage III: baseline creatinine 1.2-1.4.  Creatinine on admission 1.21.     -- Monitor.      Lactic acidosis, resolved: Lactic acid on in the ED 2.1 down to 1.2 after IV fluids.  Monitor.      DVT Prophylaxis: Pneumatic Compression Devices    Code Status: DNR/DNI    Disposition: Expected discharge in >2 days once bleeding has resolved and source appropriately treated.     Tyler Urrutia MD  Text Page  (7am to 6pm)  Interval History   Some epigastric pain with exam.   Stable chronic back pain  No n/v  No cp or sob.   No gerd, dysphagia, odynophagia, anorexia.     -Data reviewed today: I reviewed all new labs and imaging results  over the last 24 hours. I personally reviewed admission labs    Physical Exam   Temp: 97.2  F (36.2  C) Temp src: Axillary BP: 120/54   Heart Rate: 70 Resp: 19 SpO2: 96 % O2 Device: None (Room air)    Vitals:    05/19/18 1700   Weight: 75 kg (165 lb 6.4 oz)     Vital Signs with Ranges  Temp:  [97.2  F (36.2  C)-97.7  F (36.5  C)] 97.2  F (36.2  C)  Pulse:  [70] 70  Heart Rate:  [67-70] 70  Resp:  [11-32] 19  BP: ()/(45-90) 120/54  SpO2:  [91 %-100 %] 96 %  I/O last 3 completed shifts:  In: 60 [P.O.:60]  Out: 1900 [Urine:1900]    Constitutional: Nad, nontoxic, laying in bed.   Neck: ? jvp elevated  HEENT: nl sclera  Respiratory: Slightly tachypneic, CTAB  Cardiovascular: crisp prosthetic S2, RRR no r/g/m. No LE edema  GI: mild epigastric tenderness, soft, ND  Skin/Integumen: no rash  Neuro: nl speech, nl mentation, grossly nonfocal. + cognitive impairment- seems at baseline per son. Disoriented. Oriented to person, season and place     Medications     pantoprazole (PROTONIX) infusion ADULT/PEDS GREATER than or EQUAL to 45 kg 8 mg/hr (05/20/18 0000)     sodium chloride 75 mL/hr at 05/20/18 0407       donepezil  5 mg Oral At Bedtime     ezetimibe  10 mg Oral Daily     sodium chloride (PF)  3 mL Intracatheter Q8H       Data     Recent Labs  Lab 05/20/18  0542 05/20/18  0420 05/20/18  0000 05/19/18  1755 05/19/18  1015   WBC 8.8  --   --   --  9.1   HGB 8.5*  --  8.4* 8.8* 11.6*   MCV 85  --   --   --  85     --   --   --  265   INR  --  2.33*  --   --  3.30*     --   --   --  140   POTASSIUM 4.7  --   --   --  5.1   CHLORIDE 113*  --   --   --  106   CO2 20  --   --   --  26   BUN 67*  --   --   --  70*   CR 1.22*  --   --   --  1.21*   ANIONGAP 11  --   --   --  8   SHANICE 8.7  --   --   --  9.2   *  --   --   --  129*   ALBUMIN  --   --   --   --  3.3*   PROTTOTAL  --   --   --   --  7.2   BILITOTAL  --   --   --   --  1.2   ALKPHOS  --   --   --   --  158*   ALT  --   --   --   --  18   AST  --    --   --   --  28   LIPASE  --   --   --   --  95       Imaging:   Recent Results (from the past 24 hour(s))   CT Abdomen Pelvis w Contrast    Narrative    Exam: CT ABDOMEN PELVIS W CONTRAST  5/19/2018 11:46 AM    History: Gastrointestinal bleeding with lower abdominal pain.    Comparison: CT dated 1/4/2018    Technique: Volumetric acquisition with reconstruction in the axial,  coronal planes through the abdomen and pelvis with contrast. Radiation  dose for this scan was reduced using automated exposure control,  adjustment of the mA and/or kV according to patient size, or iterative  reconstruction technique.    Contrast: 75 ml's isovue 370    Findings:   Lung Bases: Small to moderate left pleural effusion with associated  atelectasis.    Abdomen: Cholelithiasis without signs of cholecystitis. Liver, spleen,  kidneys, adrenal glands appear normal. Atrophic pancreas.    Scattered colonic diverticula without signs of diverticulitis. No  areas of bowel wall thickening or bowel dilatation. Normal appendix.    Uterus and ovaries are unremarkable. No free fluid. No abdominal or  pelvic lymphadenopathy.    Bones: Degenerative changes in the thoracolumbar spine and hips.      Impression    Impression:   1. Scattered colonic diverticula without signs of diverticulitis.  2. Cholelithiasis without signs of cholecystitis.  3. No acute findings are seen in the abdomen or pelvis.  4. Small to moderate left pleural effusion.    CASEY THOMAS MD

## 2018-05-20 NOTE — PROGRESS NOTES
05/20/18 1551   Quick Adds   Type of Visit Initial PT Evaluation   Living Environment   Lives With child(faina), adult   Living Arrangements house   Home Accessibility stairs to enter home;bed and bath on same level;tub/shower is not walk in   Number of Stairs to Enter Home 1   Number of Stairs Within Home 0   Transportation Available family or friend will provide  (pt does not drive)   Living Environment Comment Family assisting in PLOF (Emely not present)   Self-Care   Dominant Hand right   Usual Activity Tolerance moderate   Current Activity Tolerance poor   Regular Exercise no   Equipment Currently Used at Home raised toilet;walker, rolling;wheelchair, manual;grab bar;shower chair   Functional Level Prior   Ambulation 3-->assistive equipment and person  (always has supervision per family)   Transferring 3-->assistive equipment and person   Toileting 1-->assistive equipment   Bathing 3-->assistive equipment and person   Dressing 0-->independent   Eating 0-->independent   Communication 0-->understands/communicates without difficulty   Swallowing 0-->swallows foods/liquids without difficulty   Cognition 1 - attention or memory deficits   Fall history within last six months no   Which of the above functional risks had a recent onset or change? ambulation;transferring;toileting   Prior Functional Level Comment Per discussion with pt's family, pt is usually SBA with FWW for household and short distance community mobility, family will use w/c for longer distances. Pt's family reports pt would have A1-2 on stair to enter typically. Needs assist to toilet transfer but pt's family reports pt usually able to perform pericares, pt needs assist to tub transfer and with bathing. Pt's family reports pt can dress and feed herself.    General Information   Onset of Illness/Injury or Date of Surgery - Date 05/19/18   Referring Physician Niru Lorenzana PA-C   Patient/Family Goals Statement Not stated   Pertinent  History of Current Problem (include personal factors and/or comorbidities that impact the POC) Natalie Hair is an 85-year-old female with a complex medical history including coronary artery disease with history of 3-vessel coronary artery bypass graft, mechanical aortic valve replacement on Coumadin, hypertension, hyperlipidemia, tricuspid insufficiency and pulmonary hypertension with history of thoracentesis, chronic diastolic heart failure, and AFib who presented as transfer from [facility] for further evaluation of GI bleed after an episode of hematemesis and melena; work-up pending. See EMR for full PMH   Precautions/Limitations fall precautions   General Observations Pt resting in bed upon arrival   General Info Comments Activity: up with assist   Cognitive Status Examination   Orientation person   Level of Consciousness alert   Follows Commands and Answers Questions 100% of the time;able to follow single-step instructions   Personal Safety and Judgment intact   Memory impaired   Pain Assessment   Patient Currently in Pain No   Posture    Posture Forward head position;Protracted shoulders   Range of Motion (ROM)   ROM Comment BLE WNL with AROM   Strength   Strength Comments Pt appears functionally weak, deconditioned   Bed Mobility   Bed Mobility Comments modA supine>sit with HOB elevated   Transfer Skills   Transfer Comments [place] sit>stand to FWW   Gait   Gait Comments Pt able to take a few steps bed>chair with FWW And CGA, decreased B step length and foot clearance, downward gaze, forward flexed posture   Balance   Balance Comments SBA at EOB, Unsteady on feet   General Therapy Interventions   Planned Therapy Interventions balance training;bed mobility training;gait training;neuromuscular re-education;ROM;strengthening;stretching;transfer training;home program guidelines;progressive activity/exercise   Clinical Impression   Criteria for Skilled Therapeutic Intervention yes, treatment indicated   PT  "Diagnosis Difficulty with gait   Influenced by the following impairments Weakness, fatigue, decreased activity tolerance, decreased balance, dyspnea   Functional limitations due to impairments Decreased safety and IND wtih functional transfers, gait, stair   Clinical Presentation Stable/Uncomplicated   Clinical Presentation Rationale clinically stable   Clinical Decision Making (Complexity) Low complexity   Therapy Frequency` daily   Predicted Duration of Therapy Intervention (days/wks) 4 days   Anticipated Discharge Disposition Transitional Care Facility;Home with Home Therapy   Risk & Benefits of therapy have been explained Yes   Patient, Family & other staff in agreement with plan of care Yes   [facility] TM \"6 Clicks\"   2016, Trustees of [facility], under license to [employer].  All rights reserved.   6 Clicks Short Forms Basic Mobility Inpatient Short Form   [facility]  \"6 Clicks\" V.2 Basic Mobility Inpatient Short Form   1. Turning from your back to your side while in a flat bed without using bedrails? 3 - A Little   2. Moving from lying on your back to sitting on the side of a flat bed without using bedrails? 2 - A Lot   3. Moving to and from a bed to a chair (including a wheelchair)? 3 - A Little   4. Standing up from a chair using your arms (e.g., wheelchair, or bedside chair)? 3 - A Little   5. To walk in hospital room? 3 - A Little   6. Climbing 3-5 steps with a railing? 2 - A Lot   Basic Mobility Raw Score (Score out of 24.Lower scores equate to lower levels of function) 16   Total Evaluation Time   Total Evaluation Time (Minutes) 10     "

## 2018-05-21 NOTE — PROGRESS NOTES
Upper GI bleed from large gastric ulcer patient's endoscopic findings showed clean base.  There was no active bleeding patient's hemoglobin is fairly stable with slow drift to 7.2.  Patient is obviously high risk for GI bleed however giving her high risk for complication from mechanical aortic valve along with A. fib I will encourage the patient to be started slowly on Coumadin without any loading dose patient should continue on sucralfate 1 g 4 times a day and Protonix 40 mg twice a day.  I would encourage to keep INR on the lower end of therapeutic window at least for next 1-2 weeks.  Risks benefits and plan was discussed in detail with Dr. Urrutia.  So far I do not think patient is having recurrent GI bleed I believe decrease in her hemoglobin is dilutional.  I will recommend to monitor hemoglobin closely.    Roosevelt Braun MD  [phone]

## 2018-05-21 NOTE — CONSULTS
Cardiology Consultation      Natalie Hair MRN# [id number]   YOB: 1932 Age: 85 year old   Date of Admission: 5/19/2018     Reason for consult: GI bleed, mechanical AVR, CAD           Assessment and Plan:     1. CAD s/p CABG x 3 12/2002. Has subsequently undergone LM stenting  2. AS s/p AVR with 23 mm ATS prosthesis at time of CABG  3. AF s/p AVN ablation and PPM implantation  4. Severe TR s/p FORMA device placement 06/2017. Recent echo demonstrates severe TR  5. GI bleed with EGD demonstrating nonbleeding gastric ulcer. HB down to 7.2.    PLAN  -This is a very difficult situation.  The patient has 2 clear indications for anticoagulant therapy, namely the presence of a mechanical aortic valve as well as atrial fibrillation/flutter.  Fortunately her bleeding appears to have stabilized despite the fact that she has significant anemia.  -I appreciate the involvement of our gastroenterology staff.  I agree with the recommendations to initiate Coumadin therapy once cleared from their standpoint.  -Given the significant burden of coronary artery disease and left main stenting, some form of antiplatelet therapy would also be preferable.  We can discuss the initiation of aspirin versus Plavix at the time of discharge once we are sure that her GI bleeding issues have resolved.               Chief Complaint:     GI Bleed         History of Present Illness:   This patient is a 85 year old female who is followed by Dr Bingham in our cardiology clinic.  She has a history of coronary disease and is status post coronary bypass grafting ×3 and aortic valve replacement in 2002 at [facility].  She subsequently undergone AV nehemias ablation and pacemaker implantation for atrial fibrillation.  She also developed severe tricuspid regurgitation and is status post the FORMA device placement at the [facility] last year.  Unfortunately she still has severe tricuspid regurgitation on an echocardiogram obtained in  2017.    She was admitted to [facility] on 2018 with bloody emesis and subsequent melena.  She has no previous history of GI bleeding.  Her Coumadin was reversed with vitamin K and gastroenterology consultation was obtained.  She has developed significant anemia and her most recent hemoglobin was 7.2.    To work this up further she underwent an endoscopy yesterday morning.  This demonstrated 1 nonbleeding gastric ulcer but no other significant esophageal or gastric pathology.  She has had some black stools but has not had any emesis since yesterday.  From a cardiac standpoint she denies any chest discomfort or shortness of breath.             Physical Exam:   Vitals were reviewed  Blood pressure 112/68, temperature 97.4  F (36.3  C), temperature source Oral, resp. rate 19, weight 73.5 kg (162 lb 1.6 oz), SpO2 97 %, not currently breastfeeding.  Temperatures:  Current - Temp: 97.4  F (36.3  C); Max - Temp  Av.5  F (36.4  C)  Min: 97.3  F (36.3  C)  Max: 97.7  F (36.5  C)  Respiration range: Resp  Av.1  Min: 11  Max: 41  Pulse range: No Data Recorded  Blood pressure range: Systolic (24hrs), Av , Min:98 , Max:130   ; Diastolic (24hrs), Av, Min:48, Max:72    Pulse oximetry range: SpO2  Av.6 %  Min: 95 %  Max: 100 %    Intake/Output Summary (Last 24 hours) at 18 1456  Last data filed at 18 1100   Gross per 24 hour   Intake              406 ml   Output             1305 ml   Net             -899 ml     Constitutional:   awake, alert, cooperative, pale      Eyes:   Lids and lashes normal, pupils equal, round and reactive to light, extra ocular muscles intact, sclera clear, conjunctiva normal     Neck:   supple, symmetrical, trachea midline, no JVD     Back:   symmetric     Lungs:   Decreased  BS at bases     Cardiovascular:   RRR, 2/6 HSM     Abdomen:   non-tender     Musculoskeletal:   motor strength is 5 out of 5 all extremities bilaterally     Neurologic:    Grossly nonfocal     Skin:   no bruising or bleeding     Additional findings:   Trace edema               Past Medical History:   I have reviewed this patient's past medical history  Past Medical History:   Diagnosis Date     Backache      Congestive heart failure (H)      H/O aortic valve replacement in 2002    maintained on chronic anticoagulation with warfarin     Hyperlipidemia LDL goal < 100      Hypertension      T12 compression fracture (H)      Unspecified cardiovascular disease     hx of CAD with 3V CABG and AV replacement in 2002     Urinary incontinence      Vitamin D deficiency              Past Surgical History:   I have reviewed this patient's past surgical history  Past Surgical History:   Procedure Laterality Date     ARTHROPLASTY KNEE BILATERAL       ESOPHAGOSCOPY, GASTROSCOPY, DUODENOSCOPY (EGD), COMBINED N/A 5/20/2018    Procedure: COMBINED ESOPHAGOSCOPY, GASTROSCOPY, DUODENOSCOPY (EGD);  Melena ;  Surgeon: Roosevelt Braun MD;  Location:  GI     SURGICAL HISTORY OF -   12/2002    Triple bypass w/valve replacement - aortic               Social History:   I have reviewed this patient's social history  Social History   Substance Use Topics     Smoking status: Former Smoker     Types: Cigarettes     Smokeless tobacco: Never Used      Comment: 40 years ago     Alcohol use No             Family History:   I have reviewed this patient's family history  Family History   Problem Relation Age of Onset     HEART DISEASE Mother      Lipids Son      HEART DISEASE Son      Lipids Daughter              Allergies:     Allergies   Allergen Reactions     Lorazepam Nausea and Vomiting     Morphine Sulfate Cr [Morphine Sulfate] Nausea and Vomiting     Crestor [Rosuvastatin] Other (See Comments)     Abdominal/Back pain     Lidocaine GI Disturbance     Cozaar [Losartan] Rash             Medications:   I have reviewed this patient's current medications  Prescriptions Prior to Admission   Medication Sig Dispense  Refill Last Dose     acetaminophen (TYLENOL) 325 MG tablet Take 1-2 tablets (325-650 mg) by mouth every 6 hours as needed for mild pain or pain 100 tablet 1 5/18/2018 at 1930     alendronate (FOSAMAX) 70 MG tablet TAKE 1 TABLET BY MOUTH ONCE WEEKLY ON EMPTY STOMACH 12 tablet 0 5/15/2018     cetirizine (ZYRTEC) 5 MG tablet Take 1 tablet (5 mg) by mouth daily 30 tablet 3 5/19/2018 at Unknown time     clopidogrel (PLAVIX) 75 MG tablet Take 1 tablet (75 mg) by mouth daily 90 tablet 0 5/19/2018 at Unknown time     DiphenhydrAMINE HCl, Sleep, (ZZZQUIL PO) Take 10 mg by mouth At Bedtime    5/18/2018 at Unknown time     Diphenhydramine-APAP, sleep, (TYLENOL PM EXTRA STRENGTH PO) Take 1 tablet by mouth At Bedtime    5/18/2018 at Unknown time     donepezil (ARICEPT) 5 MG tablet TAKE 1 TABLET(5 MG) BY MOUTH AT BEDTIME 90 tablet 0 5/18/2018 at Unknown time     ezetimibe (ZETIA) 10 MG tablet Take 1 tablet (10 mg) by mouth daily 90 tablet 3 5/19/2018 at Unknown time     metoprolol tartrate (LOPRESSOR) 25 MG tablet TAKE 1/2 TABLET(12.5 MG) BY MOUTH TWICE DAILY 90 tablet 3 5/19/2018 at am     multivitamin, therapeutic with minerals (MULTI-VITAMIN) TABS tablet Take 1 tablet by mouth daily   5/19/2018 at Unknown time     nitroglycerin (NITROSTAT) 0.4 MG SL tablet Place 1 tablet (0.4 mg) under the tongue every 5 minutes as needed for chest pain 25 tablet 1 Not Taking     polyethylene glycol (MIRALAX/GLYCOLAX) powder Take 0.5 capfuls by mouth every morning    5/19/2018 at Unknown time     potassium chloride (K-TAB,KLOR-CON) 10 MEQ tablet TAKE 1 TABLET(10 MEQ) BY MOUTH TWICE DAILY 180 tablet 1 5/19/2018 at am     senna-docusate (SENNA S) 8.6-50 MG per tablet Take 2 tablets by mouth 2 times daily   5/19/2018 at am     spironolactone (ALDACTONE) 25 MG tablet Take 1 tablet (25 mg) by mouth 2 times daily 60 tablet 1 5/19/2018 at am     torsemide (DEMADEX) 20 MG tablet Take 1 tablet (20 mg) by mouth 2 times daily Patient needs labs before  next refill. 60 tablet 0 5/19/2018 at am     warfarin (COUMADIN) 4 MG tablet Take 4 mg daily or as directed by Anticoagulation Clinic 80 tablet 0 5/18/2018 at Unknown time     order for DME Equipment being ordered: Walker with a seat 1 Package 0 Taking     order for DME Equipment being ordered: Hospital Bed  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 -ongoing symptoms of lower extremity edema,shortness of breath,cough  sacral pressure sores within last 6 months    An electric hospital bed to allow for increase in head of bed elevation and for ease in wheelchair transfers   Length of need: lifelong  NPI - [id number] 1 Device 0 Taking     order for DME Mepilex sacral dressings 10 Units 3 Taking     order for DME always discreet underwear - 2 pairs/day  - 60/month  flushable cleansing cloths ( wipes)  - several packages/month     For incontinence and primitivo-care 1 Month 3 Taking     order for DME Equipment being ordered: bedside commode and bed pan (Patient not taking: Reported on 4/6/2018) 1 Device 0 Not Taking     Current Facility-Administered Medications Ordered in Epic   Medication Dose Route Frequency Last Rate Last Dose     acetaminophen (TYLENOL) Suppository 650 mg  650 mg Rectal Q4H PRN         acetaminophen (TYLENOL) tablet 650 mg  650 mg Oral Q4H PRN   650 mg at 05/21/18 0037     benztropine (COGENTIN) tablet 1-2 mg  1-2 mg Oral TID PRN         bisacodyl (DULCOLAX) Suppository 10 mg  10 mg Rectal Daily PRN         donepezil (ARIcept) tablet 5 mg  5 mg Oral At Bedtime   5 mg at 05/20/18 2136     ezetimibe (ZETIA) tablet 10 mg  10 mg Oral Daily   10 mg at 05/21/18 0945     lidocaine (LMX4) cream   Topical Q1H PRN         lidocaine 1 % 1 mL  1 mL Other Q1H PRN         melatonin tablet 1 mg  1 mg Oral At Bedtime PRN         naloxone (NARCAN) injection 0.1-0.4 mg  0.1-0.4 mg Intravenous Q2 Min PRN         nitroGLYcerin (NITROSTAT) sublingual tablet 0.4 mg  0.4 mg Sublingual Q5 Min PRN         ondansetron (ZOFRAN-ODT)  ODT tab 4 mg  4 mg Oral Q6H PRN        Or     ondansetron (ZOFRAN) injection 4 mg  4 mg Intravenous Q6H PRN   4 mg at 05/20/18 2241     pantoprazole (PROTONIX) EC tablet 40 mg  40 mg Oral BID AC   40 mg at 05/21/18 0701     polyethylene glycol (MIRALAX/GLYCOLAX) Packet 17 g  17 g Oral Daily PRN         prochlorperazine (COMPAZINE) injection 5 mg  5 mg Intravenous Q6H PRN   5 mg at 05/21/18 0120    Or     prochlorperazine (COMPAZINE) tablet 5 mg  5 mg Oral Q6H PRN        Or     prochlorperazine (COMPAZINE) Suppository 12.5 mg  12.5 mg Rectal Q12H PRN         senna-docusate (SENOKOT-S;PERICOLACE) 8.6-50 MG per tablet 1 tablet  1 tablet Oral BID PRN        Or     senna-docusate (SENOKOT-S;PERICOLACE) 8.6-50 MG per tablet 2 tablet  2 tablet Oral BID PRN         sodium chloride (PF) 0.9% PF flush 3 mL  3 mL Intracatheter Q1H PRN         sodium chloride (PF) 0.9% PF flush 3 mL  3 mL Intracatheter Q8H   3 mL at 05/21/18 0644     warfarin (COUMADIN) tablet 4 mg  4 mg Oral ONCE at 18:00         Warfarin Therapy Reminder (Check START DATE - warfarin may be starting in the FUTURE)  1 each Does not apply Continuous PRN         No current Epic-ordered outpatient prescriptions on file.             Review of Systems:   The 10 point Review of Systems is negative other than noted in the HPI            Data:   All laboratory data reviewed  Results for orders placed or performed during the hospital encounter of 05/19/18 (from the past 24 hour(s))   Hemoglobin   Result Value Ref Range    Hemoglobin 7.5 (L) 11.7 - 15.7 g/dL   Hemoglobin   Result Value Ref Range    Hemoglobin 7.9 (L) 11.7 - 15.7 g/dL   EKG 12-lead, tracing only   Result Value Ref Range    Interpretation ECG Click View Image link to view waveform and result    CBC with platelets   Result Value Ref Range    WBC 8.1 4.0 - 11.0 10e9/L    RBC Count 2.61 (L) 3.8 - 5.2 10e12/L    Hemoglobin 7.2 (L) 11.7 - 15.7 g/dL    Hematocrit 22.0 (L) 35.0 - 47.0 %    MCV 84 78 - 100 fl     "MCH 27.6 26.5 - 33.0 pg    MCHC 32.7 31.5 - 36.5 g/dL    RDW 16.7 (H) 10.0 - 15.0 %    Platelet Count 171 150 - 450 10e9/L   Basic metabolic panel   Result Value Ref Range    Sodium 137 133 - 144 mmol/L    Potassium 4.8 3.4 - 5.3 mmol/L    Chloride 106 94 - 109 mmol/L    Carbon Dioxide 21 20 - 32 mmol/L    Anion Gap 10 3 - 14 mmol/L    Glucose 123 (H) 70 - 99 mg/dL    Urea Nitrogen 65 (H) 7 - 30 mg/dL    Creatinine 1.36 (H) 0.52 - 1.04 mg/dL    GFR Estimate 37 (L) >60 mL/min/1.7m2    GFR Estimate If Black 45 (L) >60 mL/min/1.7m2    Calcium 8.6 8.5 - 10.1 mg/dL   INR   Result Value Ref Range    INR 1.56 (H) 0.86 - 1.14   Nutrition Services Adult IP Consult    Narrative    Tawnya Sargent, VERONICA, LD     5/21/2018  1:28 PM  BRIEF NUTRITION ASSESSMENT      REASON FOR ASSESSMENT:  MD consult - \"anorexia secondary to ulcer. on coumadin. assess   for nutritional supplement\"      CURRENT DIET AND INTAKE:  Diet:  2400mg Na, LSF              Chart reviewed  Note pt with decreased po intake since admit  Per H&P, pt reported being hungry on admit    5/20: EGD ---> Gastric ulcer with clean base       ANTHROPOMETRICS:  Height: 5'5\"  Weight: (5/21) 73.5 kg  BMI: 26.9 kg/m2  IBW:  56.8 kg  Weight Status: Overweight BMI 25-29.9  %IBW: 129%  Weight History:   Wt Readings from Last 10 Encounters:   05/21/18 73.5 kg (162 lb 1.6 oz)   05/19/18 70.3 kg (155 lb)   04/06/18 73.3 kg (161 lb 11.2 oz)   03/26/18 73.5 kg (162 lb)   02/09/18 72.9 kg (160 lb 12.8 oz)   01/12/18 75.4 kg (166 lb 4.8 oz)   01/07/18 76.6 kg (168 lb 14 oz)   12/14/17 75.4 kg (166 lb 4.8 oz)   11/21/17 78.9 kg (174 lb)   11/14/17 77.6 kg (171 lb 1.6 oz)         LABS:  Labs noted      NUTRITION INTERVENTION:  Nutrition Diagnosis:  No nutrition diagnosis at this time.    Implementation:  Nutrition Education ---> Per Provider order if indicated  Will make pt Room Service with Assist - she will be seen daily   for meal ordering   Will plan to offer/send a nutrition " supplement with meals, per pt   preference      FOLLOW UP/MONITORING:   Will re-evaluate in 7 - 10 days, or sooner, if re-consulted.             *Note: Due to a large number of results and/or encounters for the requested time period, some results have not been displayed. A complete set of results can be found in Results Review.     EKG results: V paced rhythm    Echocardiology: (11/2017, [facility]) - > Normal LV function, normally functioning bileaflet 23 mm ATS aortic prosthesis. FORMA device with severe TR, severe pulmonary HTN

## 2018-05-21 NOTE — PROGRESS NOTES
[facility]    Hospitalist Progress Note    Assessment & Plan    Natalie Hair is an 85-year-old female with a complex medical history including coronary artery disease with history of 3-vessel coronary artery bypass graft, mechanical aortic valve replacement on Coumadin, hypertension, hyperlipidemia, tricuspid insufficiency and pulmonary hypertension with history of thoracentesis, chronic diastolic heart failure, and AFib who presented as transfer from [facility] for further evaluation of GI bleed after an episode of hematemesis and melena morning of admissipon.  Vitals currently within normal limits.  The patient is currently stable. Baseline Hb ~14. Hb 11.6 am of 5/19 on presentation and subsequently dropped to 8 range.  Pt passing black stools during hospitalization. Pt admitted to CCU. She was given ILNS bolus and NS fluids, Vitamin K 2mg IV X2. Goal INR <2. Gastroenterology consulted for EGD.      Melena, hematemesis:   Acute blood loss Anemia, due to, GI bleed  2/2 Gastric Ulcer   The patient with bout of hematemesis this morning after taking her morning pills with orange juice.  Subsequently, had a large dark BM.  The patient with no prior history of GI bleed.  Maintained on Plavix and Coumadin.  No chronic NSAID use.  No prior endoscopy.  Colonoscopy greater than 10 years ago which was reported as normal.  No alcohol use.  The patient is not followed by a gastroenterologist.  The patient was given 1 liter bolus in the ED, followed by maintenance fluids at 125 mL per hour and started on a Protonix drip.  CT abdomen and pelvis showed diverticula without diverticulitis and cholelithiasis without cholecystitis and a small to moderate left pleural effusion.  Hemoglobin 11.6 down from 14.6 and last drawn on 03/26/2018.  Baseline appears to range between 12 and 14.  CMP unremarkable.     EGD 5/19:   egd reviewed with GI earlier today. Gastric ulcer with clean base and no stigmata of  bleeding.no high risk features.  -H pylori testing negative. Consider serologic testing- tx if positive    Today: mild epigastric tenderness on exam. Hb down to 7 range but seems stable at that level.    hemodynoamically stable.     --- Hold PTA Plavix for now  - restart coumadin today. Discussed with dr. Braun today  -- Admit under [place] [facility] [place]   -- Gastroenterology consulted (Dr. Braun), appreciate assistance greatly. Plan for EGD in the AM, reverse INR to <2.0, ideally 1.5. Pharmacy to dose vitamin K.   -- Continue Protonix   -transfuse 1 units blood  -- Hb 3 hours after 1 unit blood  -- Conditional orders to transfuse for hemoglobin less than 7.   -- Monitor on telemetry, continuous pulse oximetry.   -- CBC and BMP in the a.m.   - follow INR  -will need lifelong PPI likely  -discuss timing of resumption of anticoagulation (coumadin and/or heparin gtt) with GI today.     Coronary artery disease with history of coronary artery bypass graft  Mechanical aortic valve replacement   Hypertension  Hyperlipidemia: Coronary artery bypass graft in 2002 with LIMA to LAD, SVG to RCA, radial graft to the first OM.  Followed by Dr. Ramon of Cardiology.   -- Hold PTA Plavix for now-reassess at time of discharge. Cardiology consulted.   - restart coumadin today.   -- Continue PTA Zetia  -- Hold PTA Metoprolol until after transfusion and if sbp proves reasonable     Pulmonary hypertension  Tricuspid insufficiency  Moderate to severe mitral regurgitation   Chronic diastolic heart failure  H/O thoracentesis X3:  The patient with Latif FORMA tricuspid transcatheter repair system device inserted by the [facility] in 6/2017.  Her last echocardiogram from 11/2017 showed an EF of 67%, severely calcified mitral annulus with moderate stenosis and moderate to severe mitral regurgitation. Last thoracentesis on 4/11/18 with 800 ccs of fluid removed. Has had about three total thoracentesis.       -- Monitor for signs of  fluid overload.   -- Hold PTA Torsemide and spironolactone today  -given holding anticoagulation and has AFib/MR and prosthetic valve will involve cardiology     Atrial fibrillation, history of AV nehemias ablation   S/P dual chamber pacemaker:  Maintained on beta blockade and Coumadin.  CHADS2-VASc score 5. H/O [employer] 2088 TC Tendril STS dual chamber pacemaker placed on 7/18/16  -- restart bber later today if stable.    -- may restart coumadin tonight if OK with GI.      T12 compression fracture: History of this with associated chronic back pain.  Stable.   Not seem to be issue at this time.   -- Consider Lidoderm patch if pt complains of pain      13 mm nodule in the right lower lobe of the lung:  Noted during hospitalization in 01/2018 on CT.  Recommended followup in 3 months from that time, although it appears that patient has not had followup.   -- Will consider CT during this hospitalization but will hold off at this time and focus on GI bleed.      1.3 cm cortical lesion lower lobe of the left kidney:  Noted on CT from hospitalization in 01/2018.   -- Defer further management to outpatient Urology.      Chronic kidney disease, stage III: baseline creatinine 1.2-1.4.  Creatinine on admission 1.21.   Cr 1.3. Bun up in part 2/2 gi bleed  -- Monitor.      Lactic acidosis, resolved: Lactic acid on in the ED 2.1 down to 1.2 after IV fluids.  Monitor.      DVT Prophylaxis: Pneumatic Compression Devices    Code Status: DNR/DNI    Disposition: Expected discharge in >2 days once bleeding has resolved and source appropriately treated.     Tyler Urrutia MD  Text Page  (7am to 6pm)  Interval History   Some epigastric pain with exam.   Denies sob, pain without abd exam,   No RN concerns  Stable night but Hb down and stable in 7 range.   Massing melenotic stool    -Data reviewed today: I reviewed all new labs and imaging results over the last 24 hours. I personally reviewed labs from last 24 hours    Physical  Exam   Temp: 97.4  F (36.3  C) Temp src: Oral BP: 104/50   Heart Rate: 68 Resp: 22 SpO2: 99 % O2 Device: None (Room air)    Vitals:    05/19/18 1700 05/20/18 0900 05/21/18 0437   Weight: 75 kg (165 lb 6.4 oz) 75.4 kg (166 lb 4.8 oz) 73.5 kg (162 lb 1.6 oz)     Vital Signs with Ranges  Temp:  [97.3  F (36.3  C)-97.7  F (36.5  C)] 97.4  F (36.3  C)  Heart Rate:  [67-74] 68  Resp:  [11-41] 22  BP: ()/(48-72) 104/50  SpO2:  [95 %-100 %] 99 %  I/O last 3 completed shifts:  In: 1066 [P.O.:760; I.V.:6]  Out: 1000 [Urine:1000]    Constitutional: Nad, nontoxic, laying in bed. Appears tired.   Neck: ? jvp elevated  HEENT: nl sclera  Respiratory: Breathing easily. CTAB  Cardiovascular: crisp prosthetic S2, RRR no r/g/m. No LE edema  GI: mild epigastric tenderness, soft, ND  Skin/Integumen: no rash  Neuro: nl speech, sleepy this am, grossly nonfocal. + cognitive impairment- seems at baseline per son. Disoriented.    Medications       donepezil  5 mg Oral At Bedtime     ezetimibe  10 mg Oral Daily     pantoprazole  40 mg Oral BID AC     sodium chloride (PF)  3 mL Intracatheter Q8H       Data     Recent Labs  Lab 05/21/18  0550 05/21/18  0014 05/20/18  1820 05/20/18  1252 05/20/18  0820 05/20/18  0542  05/19/18  1015   WBC 8.1  --   --   --   --  8.8  --  9.1   HGB 7.2* 7.9* 7.5* 8.5*  --  8.5*  < > 11.6*   MCV 84  --   --   --   --  85  --  85     --   --   --   --  211  --  265   INR 1.56*  --   --  1.72* 2.05*  --   < > 3.30*     --   --   --   --  144  --  140   POTASSIUM 4.8  --   --   --   --  4.7  --  5.1   CHLORIDE 106  --   --   --   --  113*  --  106   CO2 21  --   --   --   --  20  --  26   BUN 65*  --   --   --   --  67*  --  70*   CR 1.36*  --   --   --   --  1.22*  --  1.21*   ANIONGAP 10  --   --   --   --  11  --  8   SHANICE 8.6  --   --   --   --  8.7  --  9.2   *  --   --   --   --  110*  --  129*   ALBUMIN  --   --   --   --   --   --   --  3.3*   PROTTOTAL  --   --   --   --   --   --    --  7.2   BILITOTAL  --   --   --   --   --   --   --  1.2   ALKPHOS  --   --   --   --   --   --   --  158*   ALT  --   --   --   --   --   --   --  18   AST  --   --   --   --   --   --   --  28   LIPASE  --   --   --   --   --   --   --  95   < > = values in this interval not displayed.    Imaging:   No results found for this or any previous visit (from the past 24 hour(s)).

## 2018-05-21 NOTE — PLAN OF CARE
Problem: Patient Care Overview  Goal: Plan of Care/Patient Progress Review  Outcome: No Change  Alert to self only, up to BSC with assist x2 and walker. VSS on RA. Tele: 100% v-paced. Pt c/o sharp right-sided CP that changed to dull right shoulder pain. Resolved with tylenol and ice pack. EKG obtained, incidentally noted QTc of 540ms. No further c/o pain. IV zofran and compazine given for nausea, no emesis. x1 soft black stool. Hgb check q6h. Turned/repositioned off back, heels elevated. Continue to monitor.

## 2018-05-21 NOTE — CONSULTS
"BRIEF NUTRITION ASSESSMENT      REASON FOR ASSESSMENT:  MD consult - \"anorexia secondary to ulcer. on coumadin. assess for nutritional supplement\"      CURRENT DIET AND INTAKE:  Diet:  2400mg Na, LSF              Chart reviewed  Note pt with decreased po intake since admit  Per H&P, pt reported being hungry on admit    5/20: EGD ---> Gastric ulcer with clean base       ANTHROPOMETRICS:  Height: 5'5\"  Weight: (5/21) 73.5 kg  BMI: 26.9 kg/m2  IBW:  56.8 kg  Weight Status: Overweight BMI 25-29.9  %IBW: 129%  Weight History:   Wt Readings from Last 10 Encounters:   05/21/18 73.5 kg (162 lb 1.6 oz)   05/19/18 70.3 kg (155 lb)   04/06/18 73.3 kg (161 lb 11.2 oz)   03/26/18 73.5 kg (162 lb)   02/09/18 72.9 kg (160 lb 12.8 oz)   01/12/18 75.4 kg (166 lb 4.8 oz)   01/07/18 76.6 kg (168 lb 14 oz)   12/14/17 75.4 kg (166 lb 4.8 oz)   11/21/17 78.9 kg (174 lb)   11/14/17 77.6 kg (171 lb 1.6 oz)         LABS:  Labs noted      NUTRITION INTERVENTION:  Nutrition Diagnosis:  No nutrition diagnosis at this time.    Implementation:  Nutrition Education ---> Per Provider order if indicated  Will make pt Room Service with Assist - she will be seen daily for meal ordering   Will plan to offer/send a nutrition supplement with meals, per pt preference      FOLLOW UP/MONITORING:   Will re-evaluate in 7 - 10 days, or sooner, if re-consulted.          "

## 2018-05-21 NOTE — TELEPHONE ENCOUNTER
"DONEPEZIL 5MG TABLETS        Last Written Prescription Date:  11/14/17  Last Fill Quantity: 90,   # refills: 0  Last Office Visit: 4/6/18  Future Office visit:       Requested Prescriptions   Pending Prescriptions Disp Refills     donepezil (ARICEPT) 5 MG tablet [Pharmacy Med Name: DONEPEZIL 5MG TABLETS] 90 tablet 0     Sig: TAKE 1 TABLET(5 MG) BY MOUTH AT BEDTIME    Miscellaneous Dementia Agents Passed    5/21/2018 10:18 AM       Passed - Recent (12 mo) or future (30 days) visit within the authorizing provider's specialty    Patient had office visit in the last 12 months or has a visit in the next 30 days with authorizing provider or within the authorizing provider's specialty.  See \"Patient Info\" tab in inbasket, or \"Choose Columns\" in Meds & Orders section of the refill encounter.           Passed - Patient is 18 years of age or older          "

## 2018-05-21 NOTE — PHARMACY-ANTICOAGULATION SERVICE
Clinical Pharmacy - Warfarin Dosing Consult     Pharmacy has been consulted to manage this patient s warfarin therapy.  Indication: Atrial Fibrillation;Other - specify in comments  Therapy Goal: INR 2-3  Provider/Team: Tyler Urrutia MD  Warfarin Prior to Admission: Yes  Warfarin PTA Regimen: 4mg daily  Significant drug interactions: plavix  Recent documented change in oral intake/nutrition: No  Dose Comments: Prosthetic heart valve    INR   Date Value Ref Range Status   05/21/2018 1.56 (H) 0.86 - 1.14 Final   05/20/2018 1.72 (H) 0.86 - 1.14 Final       Recommend warfarin 4 mg today.  Pharmacy will monitor Natalie Hair daily and order warfarin doses to achieve specified goal.      Please contact pharmacy as soon as possible if the warfarin needs to be held for a procedure or if the warfarin goals change.

## 2018-05-22 NOTE — PLAN OF CARE
Problem: Patient Care Overview  Goal: Plan of Care/Patient Progress Review  VSS. Alert but disoriented. Tele: 100% vpaced; HR 70s. Room air. Ax2 with walker to commode. Some right shoulder pain, tylenol and ice relieved pain. Coccyx has blanchable redness, T/R Q2hrs; had WOC consult today. No BM on this shift. Plan: will have a hospice consult upon discharge. Reported off to nurse on [address], will continue to monitor until transfer.

## 2018-05-22 NOTE — PLAN OF CARE
Problem: Patient Care Overview  Goal: Plan of Care/Patient Progress Review  Outcome: No Change  Pt remains alert to self only and requires frequent reorientation. VSS. Tele shows 100% V-paced. Pt denies any chest pain and continues to have c/o SOB at times. Creat up slightly today at 1.36. Pt continues to have soft black stools, becoming more formed. Hgb today 7.2. I unit of RBC's given. Hgb recheck shows 8.7. Plan is to continue to monitor Hgb and possible transfer to [facility] tomorrow if Hgb remains stable. Bed alarm on. Will continue to monitor pt.

## 2018-05-22 NOTE — CONSULTS
Care Transition Initial Assessment -   Reason For Consult: discharge planning  Met with: Family  (daughter Digna)  Active Problems:    Melena       DATA  Lives With: child(faina), adult (dtr Digna)  Living Arrangements: house  Description of Support System: Supportive, Involved  Who is your support system?: Children  Support Assessment: Adequate family and caregiver support.   Identified issues/concerns regarding health management:      Quality Of Family Relationships: supportive, involved  Transportation Available: family or friend will provide (pt does not drive)    Per social service protocol for discharge planning.  Patient was admitted on 5-19-18 with melena.  The tentative date of discharge is 5-23-18 or 5-24-18.  Reviewed chart and spoke with patient and daughter Digna regarding discharge plans.  Per patient and daughter report, patient lives with her daughter in a house with 1 step to enter.  Once inside, patient can remain on the main level.  Patient uses a forward wheeled walker at baseline.  For longer distances patient uses a wheelchair.  Patient has a raised toilet seat, a shower chair, and grab bars in the bathroom.  Patient is never left alone, she always has supervision.  Reviewed the therapy discharge recommendations with patient's daughter and she is planning on having patient return home upon discharge. Patient's daughter is not interested in any homecare services.  Patient's daughter states that she along with her siblings have met with [facility] on April 23.  Patient stated at that time that she is not ready to die, although patient is confused and really not able to make that decision.  Patient's daughter states that she feels as if patient is hospice appropriate as does patient's primary care physician.  Patient's daughter states that [facility] is just  waiting for her to call when she is ready to enroll.  Patient's daughter is asking for the hospice order to be written when  patient is discharged from the hospital.  Patient's daughter also states that patient does not like the word hospice.  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  Explained that we can have the hospice order and the meeting arranged when patient is discharged so patient is enrolled in hospice once she is discharged to home. Patient's daughter is in agreement.    ASSESSMENT  Cognitive Status:  confused  Concerns to be addressed: discharge planning, hospice at home.     PLAN  Financial costs for the patient includes N/A.  Patient given options and choices for discharge discharge to home versus facility.  Patient/family is agreeable to the plan?  Yes  Patient Goals and Preferences: home with [facility].  Patient anticipates discharging to:  Home with [facility].    Will continue to follow and assist with a safe discharge plan.    NICOLÁS Hernandez, [facility]  [phone]

## 2018-05-22 NOTE — PLAN OF CARE
Problem: Patient Care Overview  Goal: Plan of Care/Patient Progress Review  Discharge Planner PT   Patient plan for discharge: home  Current status: Pt was up in a chair upon PT arrival, daughter present, agreeable to PT. VSS on RA. Sit<>stand transfers from chair to FWW with  modA to lift her hips off the chair, CGA for standing balance up to 2 minutes per trial.  Pt declined gait this date despite encouragement. Also participated with seated LE exercises.  Barriers to return to prior living situation: level of assist required, fatigue, limited activity tolerance  Recommendations for discharge: TCU  Rationale for recommendations: Pt and family report plan to disch home with daughter. Pt will need to be consistently assist of 1 for mobility. Additional family can assist with navigating the single step to enter the home. Would benefit from home PT to improve strength and mobility if she returns directly home.       Entered by: Elizabeth Chapa 05/22/2018 12:09 PM

## 2018-05-22 NOTE — PROGRESS NOTES
Clinic Care Coordination Contact  Care Coordination Transition Communication         Clinical Data: Patient was hospitalized at [facility] from 5-19-18 to Present with diagnosis of Gi Bleed.     Transition to Facility:              Facility Name: TBD              Contact name and phone number/fax: TBD    Plan: RN/SW Care Coordinator will await notification from facility staff informing RN/SW Care Coordinator of patient's discharge plans/needs. RN/SW Care Coordinator will review chart and outreach to facility staff every 4 weeks and as needed.     Jessie Gan  Social Work Care Coordinator  [facility]  [phone]

## 2018-05-22 NOTE — PROGRESS NOTES
[facility] Nurse Inpatient Skin Assessment     Initial Assessment of:   Sacrococcygeal skin        Data:   Patient History:      per MD note(s):  coronary artery disease with history of 3-vessel coronary artery bypass graft, mechanical aortic valve replacement on Coumadin, hypertension, hyperlipidemia, tricuspid insufficiency and pulmonary hypertension with history of thoracentesis, chronic diastolic heart failure, and AFib who presented as transfer from [facility] for further evaluation of GI bleed after an episode of hematemesis and melena morning of admissipon.     Agus Assessment and sub scores:   Agus Score  Av  Min: 16  Max: 18    Positioning: Pillows,     Mattress:  Standard , Atmos Air mattress    Moisture Management:  Incontinence Protocol    Catheter secured? Not applicable    Current Diet / Nutrition:           Active Diet Order      Low Saturated Fat Na <2400 mg      Labs:   Recent Labs   Lab Test  18   0535   18   1015   ALBUMIN   --    --   3.3*   HGB  8.6*   < >  11.6*   INR  1.40*   < >  3.30*   WBC  7.5   < >  9.1    < > = values in this interval not displayed.         Skin Assessment (location):   Sacrococcygeal area  History:  Daughter has been caring for patient at home and has successfully kept her skin healthy in her perineum and sacral/buttocks area.    Skin: intact,      Color: normal and consistent with surrounding tissue    Temperature  normal     Drainage:   none    Odor: none    Pain:  minimal , tender          Intervention:     Patient's chart evaluated.      Assessed: Perineum and sacrococcygeal skin.  Patient is incontinent with moist skin.    Orders  Written    Supplies  placed at the bedside    Discussed plan of care with           All patient / family questions answered         Assessment:      No pressure injury at this time but patient is at risk for injury due to incontinence, moisture and decreased activity.           Plan:  "  Nursing to notify the Provider(s) and re-consult the WOC Nurse if skin deteriorate(s).  Skin care plan to 1. Clean intact skin with gentle soap and water, dry and dry again.  2. Paint with No Sting Skin Prep (#[id number]) and allow to dry thoroughly  3. Press a Mepilex  Sacral Dressing (PS#[id number])  to the area, making sure to conform nicely to skin curvatures (begin placing the Mepilex at the most distal aspect first, smooth into place in an upward direction, then smooth side to side)   4. Time and date dressing change and fatimah with a \"P\" for prevention.  5. Reposition pt every 1 to 2 hours when in bed and hourly when up to the chair to relieve pressure and promote perfusion to tissue    NOTE** make sure to continue to assess under the Mepilex Dressing BID and document findings.  If epidermis  opens notify CWOCN's and change dressing to \"T\" for treatment:       Baby powder to groin skin folds prn after cleansing for incontinence.    WOC Nurse will return: weekly and prn     "

## 2018-05-22 NOTE — PLAN OF CARE
Problem: Patient Care Overview  Goal: Plan of Care/Patient Progress Review  Outcome: No Change  6319-1947: [facility] @ 1500. BP soft, other VSS, O2 wnl RA. A&O to self and place, disoriented to time and situation. Calm and cooperative. Diet advanced to Reg diet, good appetite. UW1-2 with a walker/GB/FR. Incontinent of bowel and bladder. Turn/repo q2h. Coccyx dressing CDI. Trace edema BLE. Denies pain. Hgb 8.6 (8.7 5/21), no signs of active bleeding. Tele 100% V paced.  IVSL. PT/OT/SW following. Possible discharge home vs TCU, SW following to set up Hospice meeting after discharge. RN will cont to monitor.

## 2018-05-22 NOTE — PROGRESS NOTES
05/22/18 1149   Quick Adds   Type of Visit Initial Occupational Therapy Evaluation   Living Environment   Lives With alone;child(faina), adult   Living Arrangements house   Home Accessibility grab bars present (bathtub);grab bars present (toilet);tub/shower is not walk in;stairs to enter home   Number of Stairs to Enter Home 1   Transportation Available family or friend will provide   Self-Care   Dominant Hand right   Equipment Currently Used at Home raised toilet;grab bar;tub bench;wheelchair, manual;walker, rolling  (reacher)   Activity/Exercise/Self-Care Comment uses ww in the house and w/c out in community.   Functional Level Prior   Ambulation 3-->assistive equipment and person   Transferring 3-->assistive equipment and person   Toileting 3-->assistive equipment and person   Bathing 3-->assistive equipment and person   Dressing (pt's dtrs with socks and shoes, pt able to complete rest)   Eating 0-->independent   Communication 0-->understands/communicates without difficulty   Swallowing 0-->swallows foods/liquids without difficulty   Cognition 1 - attention or memory deficits   Fall history within last six months no   Prior Functional Level Comment Pt dtr A's pt with all IADL's med mgmt, transportation, etc, pt is never alone and has 24 hr S and A.    General Information   Onset of Illness/Injury or Date of Surgery - Date 05/19/18   Referring Physician Tyler Urrutia MD   Patient/Family Goals Statement per dtr return home   Additional Occupational Profile Info/Pertinent History of Current Problem Natalie Hair is an 85-year-old female with a complex medical history including coronary artery disease with history of 3-vessel coronary artery bypass graft, mechanical aortic valve replacement on Coumadin, hypertension, hyperlipidemia, tricuspid insufficiency and pulmonary hypertension with history of thoracentesis, chronic diastolic heart failure, and AFib who presented as transfer from [facility] for  further evaluation of GI bleed after an episode of hematemesis and melena morning of admissipon.     Precautions/Limitations fall precautions   Cognitive Status Examination   Orientation person;place   Level of Consciousness alert   Able to Follow Commands WNL/WFL   Personal Safety (Cognitive) moderate impairment   Memory impaired  (STM impairments)   Cognitive Comment Per pt's dtr pt has alzheimers at baseline, has A with all IADL's   Sensory Examination   Sensory Quick Adds No deficits were identified   Pain Assessment   Patient Currently in Pain No   Range of Motion (ROM)   ROM Comment B UE AROM WFL   Strength   Strength Comments B UE shoulders 4-/5 and elbows    Transfer Skill: Sit to Stand   Level of [place]: Sit/Stand minimum assist (75% patients effort)   Assistive Device for Transfer: Sit/Stand standard walker   Transfer Skill: Toilet Transfer   Level of [place]: Toilet unable to perform   Lower Body Dressing   Level of [place]: Dress Lower Body maximum assist (25% patients effort)   Eating/Self Feeding   Physical Assist/Nonphysical Assist: Eating set-up required   Instrumental Activities of Daily Living (IADL)   Previous Responsibilities (all IADL's provided by dtr/family)   Activities of Daily Living Analysis   Impairments Contributing to Impaired Activities of Daily Living balance impaired;strength decreased;cognition impaired  (dizziness, decreased activity tolerance)   General Therapy Interventions   Planned Therapy Interventions ADL retraining;cognition;transfer training;home program guidelines;progressive activity/exercise   Clinical Impression   Criteria for Skilled Therapeutic Interventions Met yes, treatment indicated   OT Diagnosis decreased ADL's and functional mobility   Influenced by the following impairments impaired balance, activity tolerance, dizziness and baseline cognitive impairments STM, orienation and safety   Assessment of Occupational Performance 3-5 Performance  "Deficits   Identified Performance Deficits impaired I with dressing, toielt and tub transfer,   Clinical Decision Making (Complexity) Moderate complexity   Therapy Frequency daily   Predicted Duration of Therapy Intervention (days/wks) 4 days   Anticipated Discharge Disposition Transitional Care Facility;Home with Assist;Home with Home Therapy  (TCU, however, dtr wants pt home, home OT, 24 hrS/A recommend)   Risks and Benefits of Treatment have been explained. Yes   Patient, Family & other staff in agreement with plan of care Yes   [facility] AM-PAC  \"6 Clicks\" Daily Activity Inpatient Short Form   1. Putting on and taking off regular lower body clothing? 2 - A Lot   2. Bathing (including washing, rinsing, drying)? 2 - A Lot   3. Toileting, which includes using toilet, bedpan or urinal? 2 - A Lot   4. Putting on and taking off regular upper body clothing? 3 - A Little   5. Taking care of personal grooming such as brushing teeth? 3 - A Little   6. Eating meals? 3 - A Little   Daily Activity Raw Score (Score out of 24.Lower scores equate to lower levels of function) 15   Total Evaluation Time   Total Evaluation Time (Minutes) 10     "

## 2018-05-22 NOTE — PLAN OF CARE
Problem: Patient Care Overview  Goal: Plan of Care/Patient Progress Review  Outcome: No Change  Pt oriented to self only. Baseline dementia present. Reoriented PRN. VSS. Tele 100% V-paced. Pt reports right shoulder and low back pain. PRN Tylenol given and relieved pain. Pt has blanchable area of redness to coccyx and excoriation to gluteal fold. Mepilex in place and barrier cream applied PRN. Q2H repo when pt allows. Pt transfers with 1 assist and walker to and from bedside commode or chair. Most recent Hgb 8.7 after receiving 1 unit of PRBC's. Recheck this AM. Continue to monitor.   Goals to be met before transfer from Intermediate Care ([facility]) status::   ~ Return to baseline functional status - NOT MET, generalized weakness noted  ~Dyspnea improved and oxygen saturations greater than 88% on room air or prior home oxygen levels NOT MET, MOISE but on RA  ~ ECHO or other diagnostic tests and consults completed and resulted - MET  ~ Vital signs normal or at patient baseline MET

## 2018-05-22 NOTE — TELEPHONE ENCOUNTER
Prescription approved per [facility] Refill Protocol.  Anam Dejesus RN

## 2018-05-22 NOTE — PROGRESS NOTES
Cardiology Progress Note          Assessment and Plan:     1. CAD s/p CABG x 3 2002. Has subsequently undergone LM stenting  2. AS s/p AVR with 23 mm ATS prosthesis at time of CABG  3. AF s/p AVN ablation and PPM implantation  4. Severe TR s/p FORMA device placement 2017. Recent echo demonstrates severe TR  5. GI bleed with EGD demonstrating nonbleeding gastric ulcer    PLAN  - HB increased appropriately post transfusion  - On coumadin appropriately  - would restart plavix at discharge  - will follow up with Dr Bingham in cardiology clinic in [place]        Interval History:     Feels better this AM.  HB has improved significantly with transfusion.              Review of Systems:   As per subjective, otherwise 5 systems reviewed and negative.           Physical Exam:   Blood pressure 127/64, temperature 97.4  F (36.3  C), temperature source Oral, resp. rate 18, weight 72.5 kg (159 lb 12.8 oz), SpO2 99 %, not currently breastfeeding.      Vital Sign Ranges  Temperature Temp  Av.6  F (36.4  C)  Min: 97.4  F (36.3  C)  Max: 97.7  F (36.5  C)   Blood pressure Systolic (24hrs), Av , Min:98 , Max:138        Diastolic (24hrs), Av, Min:49, Max:76      Pulse No Data Recorded   Respirations Resp  Av  Min: 14  Max: 41   Pulse oximetry SpO2  Av.6 %  Min: 96 %  Max: 100 %         Intake/Output Summary (Last 24 hours) at 18 1044  Last data filed at 18 0900   Gross per 24 hour   Intake          1194.17 ml   Output             1430 ml   Net          -235.83 ml       Constitutional: NAD  Skin: Warm and dry  Neck: No JVD  Lungs: CTA  Cardiovascular: RRR, 3/6 HSM  Abdomen: Soft, non tender.  Extremities and Back: No LE edema  Neurological: Nonfocal           Medications:     I have reviewed this patient's current medications.              Data:     Labs reviewed.         Celestino Dumas M.D.

## 2018-05-22 NOTE — PLAN OF CARE
Problem: Patient Care Overview  Goal: Plan of Care/Patient Progress Review  Discharge Planner OT   Patient plan for discharge: per pt's dtr  home  Current status: Eval completed and treatment initiated. Pt lives in a house with her dtr, pt's dtr A's pt with all IADl's ie med mgmt, transportation, cooking, etc. Pt requires SBA for functional mobility with ww for toilet transfer and A's with tub transfer and bathing. Pt's dtr A's with socks and shoes and then pt is able to complete rest of her dressing with reacher use for pants. Sit <> stand with RAIZA and ww use with cues for tech. Pt reports dizziness and unable to walk and after approx 1 min needed to sit, BP dropped slightly and then after sat down pt report dizziness decreased and BP increased, see vital sign flow sheet for details. Pt requires MOD A for LE ADL's.  Barriers to return to prior living situation: dizziness, decreased activity tolerance, overall strength and balance and baseline cognitive deficits, STM, orientation, safety.  Recommendations for discharge: TCU, however,pt's dtr would like to pt to return home and pt has 24 hr S and A, if pt's dtr can A with all Functional mobility safety and 24 hr S and A then home OT for home safety eval and ADL's.   Rationale for recommendations: daily therapy inpt to increase ADL and functional mobility I and safety to PLOF.        Entered by: Jessie Cordova 05/22/2018 12:45 PM

## 2018-05-23 NOTE — PLAN OF CARE
Problem: Patient Care Overview  Goal: Plan of Care/Patient Progress Review  Outcome: No Change  A/O self/place. VSS on RA. Up w/1 GB and walker to pivot to BSC and chair. Weak/dizzy. Denies pain/nausea/SOB. Up in chair for supper fair appetite. Voiding well. No more BM's noted. Coccyx dressing CDI. Family bedside. D/C to home with hospice tomorrow.

## 2018-05-23 NOTE — PROGRESS NOTES
D: SW following for DC planning. Spoke with Dr Valdovinos. Anticipate DC home Thursday with  Hospice.   I:  Hospice meeting set for Thursday at 0900. Dtr Emely and pt updated and in agreement.   P: SW will follow.     NICOLÁS Zhu, LGSW  [phone]

## 2018-05-23 NOTE — PROGRESS NOTES
[facility]    Hospitalist Progress Note      Assessment & Plan   Natalie Hair is an 85-year-old female with a complex medical history of coronary artery disease with history of 3-vessel coronary artery bypass graft, mechanical aortic valve replacement on Coumadin, hypertension, hyperlipidemia, tricuspid insufficiency and pulmonary hypertension with history of thoracentesis, chronic diastolic heart failure, and AFib who presented as transfer from [facility] for further evaluation of GI bleed after an episode of hematemesis and melena morning of admissipon.         GI bleed secondary to gastric ulcer  Acute blood loss Anemia   The patient with bout of hematemesis and melena on day of admission.  She is chronically on plavix/coumadin.  No hx of NSAIDS use. CT abdomen and pelvis showed diverticula without diverticulitis and cholelithiasis without cholecystitis and a small to moderate left pleural effusion. Hemoglobin 11.6 on admission, down from 14.6 drawn on 03/26/2018.  -- EGD on 5/19 revealed gastric ulcer with clean base and no stigmata of bleeding, no high risk features  -- initaily on IV PPI, now on PO PPI BID   -- hgb sanju was 7.2. S/p pRBC transfusion  -- hgb currently stable ~8.7 x 2 days  -- coumadin has been resumed on 5/21 after discussion with GI and cardiology  -- cards recommend Plavix at discharge     Coronary artery disease with history of coronary artery bypass graft  Mechanical aortic valve replacement   Hypertension  Hyperlipidemia:  Coronary artery bypass graft in 2002 with LIMA to LAD, SVG to RCA, radial graft to the first OM.  Followed by Dr. Bingham of Cardiology.   -- Hold PTA Plavix for now-restart at discharge (Pt with severe LM disease)     -- coumadin resumed per pharmD dosing, INR 1.68 today  -- Continue PTA Zetia  -- restart Metoprolol 12.5mg bid     Pulmonary hypertension  Tricuspid insufficiency  Moderate to severe mitral regurgitation   Chronic diastolic heart  failure  H/O thoracentesis X3:  The patient with Latif FORMA tricuspid transcatheter repair system device inserted by the [facility] in 6/2017.  Her last echocardiogram from 11/2017 showed an EF of 67%, severely calcified mitral annulus with moderate stenosis and moderate to severe mitral regurgitation. Last thoracentesis on 4/11/18 with 800 ccs of fluid removed. Has had about three total thoracentesis.   -- Monitor for signs of fluid overload.   -- continue to hold PTA Torsemide and spironolactone given soft BP  -- restart plavix at discharge per cardiology cardiology      Atrial fibrillation, history of AV nehemias ablation   S/P dual chamber pacemaker:  Maintained on beta blockade and Coumadin.  CHADS2-VASc score 5. H/O [employer] 2088 TC Tendril STS dual chamber pacemaker placed on 7/18/16  -- restart bber metoprolol 12.5mg bid      T12 compression fracture: History of this with associated chronic back pain.  Stable.   Not seem to be issue at this time.   -- Consider Lidoderm patch if pt complains of pain       13 mm nodule in the right lower lobe of the lung:  Noted during hospitalization in 01/2018 on CT.  Recommended followup in 3 months from that time, although it appears that patient has not had followup.       1.3 cm cortical lesion lower lobe of the left kidney:  Noted on CT from hospitalization in 01/2018.         Chronic kidney disease, stage III: baseline creatinine 1.2-1.4.  Creatinine on admission 1.21.   cr currently stable at 1.25      Lactic acidosis, resolved: Lactic acid on in the ED 2.1 down to 1.2 after IV fluids.      Goals of Care:   Discussed with daughter on 5/23. She reports to me that her mother does not want to go to rehab or get PT either at TCU or home. Her mother wants to be comfortable at home. It seems like they have met with hospice in Jan and April of this year, but their mother was not ready for hospice at that time. Digna ([email or web address]) reports to me that her mother is ready  for that. Natalie also reports that she just wants to be comfortable and wants to pursue hospice at this time.  It seems like Dr. Mcguire (PCP) has recommended hospice to patient as well. Digna was initially confused as to what to pursue for her mom re hospice vs TCU. Based on patient's wishes and also families, my recommendations to her was to pursue hospice. They want to do hospice at home. Digna states she is able to care for patient at home 24/7 and has adequate other family members support. Discussed with CC/SW, will arrange hospice at time of discharge, likely tomorrow.        # Pain Assessment:  Current Pain Score 5/23/2018   Patient currently in pain? denies   Pain score (0-10) -   Pain location -   Pain descriptors -   Natalie s pain level was assessed and she currently denies pain.      DVT Prophylaxis: Warfarin  Code Status: DNR/DNI    Disposition: Expected discharge tomorrow once hospice arranged    40 minutes spent with >50% of time spent counseling/coordinating care.     Kuwe Edossa Rumicho    Interval History   Feels tired and weak.  Had one maroon/dark stool this morning. Hgb has been stable since yesterday. Denies chest pain or shortness of breath.     -Data reviewed today: I reviewed all new labs and imaging results over the last 24 hours. I personally reviewed no images or EKG's today.    Physical Exam   Temp: 97.6  F (36.4  C) Temp src: Axillary BP: 105/57 Pulse: 68 Heart Rate: 74 Resp: 16 SpO2: 98 % O2 Device: None (Room air)    Vitals:    05/21/18 0437 05/22/18 0320 05/23/18 0615   Weight: 73.5 kg (162 lb 1.6 oz) 72.5 kg (159 lb 12.8 oz) 75.8 kg (167 lb 1.7 oz)     Vital Signs with Ranges  Temp:  [97  F (36.1  C)-97.6  F (36.4  C)] 97.6  F (36.4  C)  Pulse:  [68-70] 68  Heart Rate:  [68-74] 74  Resp:  [16] 16  BP: ()/(46-64) 105/57  SpO2:  [97 %-99 %] 98 %  I/O last 3 completed shifts:  In: 340 [P.O.:340]  Out: 600 [Urine:600]    Constitutional: Alert,awake and no apparent  distress  Respiratory: clear to ausculation bilaterally, no wheezing   Cardiovascular: regular, +mechanical valve click  GI: soft and non-tender  Skin/Integumen: warm and dry       Medications     Warfarin Therapy Reminder         donepezil  5 mg Oral At Bedtime     ezetimibe  10 mg Oral Daily     metoprolol tartrate  12.5 mg Oral BID     pantoprazole  40 mg Oral BID AC     sodium chloride (PF)  3 mL Intracatheter Q8H     sucralfate  1 g Oral 4x Daily AC & HS     warfarin  4 mg Oral ONCE at 18:00       Data     Recent Labs  Lab 05/23/18  0817 05/22/18  0535 05/21/18  1850 05/21/18  0550  05/19/18  1015   WBC 7.5 7.5  --  8.1  < > 9.1   HGB 8.7* 8.6* 8.7* 7.2*  < > 11.6*   MCV 86 85  --  84  < > 85    186  --  171  < > 265   INR 1.68* 1.40*  --  1.56*  < > 3.30*    138  --  137  < > 140   POTASSIUM 4.4 4.5  --  4.8  < > 5.1   CHLORIDE 103 105  --  106  < > 106   CO2 22 22  --  21  < > 26   BUN 48* 56*  --  65*  < > 70*   CR 1.25* 1.35*  --  1.36*  < > 1.21*   ANIONGAP 10 11  --  10  < > 8   SHANICE 8.6 8.7  --  8.6  < > 9.2   * 117*  --  123*  < > 129*   ALBUMIN  --   --   --   --   --  3.3*   PROTTOTAL  --   --   --   --   --  7.2   BILITOTAL  --   --   --   --   --  1.2   ALKPHOS  --   --   --   --   --  158*   ALT  --   --   --   --   --  18   AST  --   --   --   --   --  28   LIPASE  --   --   --   --   --  95   < > = values in this interval not displayed.    No results found for this or any previous visit (from the past 24 hour(s)).

## 2018-05-23 NOTE — PLAN OF CARE
Problem: Patient Care Overview  Goal: Plan of Care/Patient Progress Review  Outcome: No Change  Pt alert but only oriented to self. Dementia at BL. VSS on room air. Denies pain. No signs of bleeding, no dark stools noted. Urinary frequency noted. Up with assist of 2 and walker to bedside commode. Tele 100% v-paced. Possible discharge to TCU or placed on Hospice pending family meeting with HIRAL.

## 2018-05-23 NOTE — PLAN OF CARE
Problem: Patient Care Overview  Goal: Plan of Care/Patient Progress Review  Discharge Planner OT   Patient plan for discharge: per pt's dtr, will DC  home  Current status:Orthostatic BP taken: ckdcpk=558/61;fcgnoad=149/38, standing=111/45;pt. only able to stand approx. 1 min.(plan was to ambulate to bathroom), unable to tolerate standing or sitting due to dizziness. Pt.unable to participate in further activity/ ADl's/fx. mobility this a.m. due to dizziness/weakness. Pt. needed mod. A for sit-supine(LEs only), declined further activity. O2 sats=98%, HR 71 at end session.  Barriers to return to prior living situation: dizziness, decreased activity tolerance, overall strength and balance and baseline cognitive deficits, STM, orientation, safety.  Recommendations for discharge: TCU, however,pt's dtr would like to pt to return home--pt has 24 hr S and A; if pt's dtr can A with all functional mobility safety and 24 hr S and A, then home OT for home safety eval and ADL's.   Rationale for recommendations: daily therapy inpt to increase ADL and functional mobility I and safety to PLOF.        Entered by: Demetria Reid 05/23/2018 11:30 AM

## 2018-05-23 NOTE — PLAN OF CARE
Problem: Patient Care Overview  Goal: Plan of Care/Patient Progress Review  Discharge Planner PT   Patient plan for discharge: Daughter wants to take pt home with hospice. Daughter is concerned about TCU given pt's dementia. Daughter has hospital bed, bedside commode, walker, lift chair and wheelchair  Current status: Pt is symptomatic with dizziness with position changes: supine ( HOB elevated) 103/57, sittin/46, standin/41, returned to sittin/49. Pt uses bedrail and has HOB elevated for supine to sit EOB transfer with cues. Pt stands from elevated bed with [place] of 2. Pt takes a couple of steps to transfer to chair with assist of 2 and has to sit due to dizziness. Decreased tolerance for gait and activity.  Barriers to return to prior living situation: Pt ambulates with walker in daughter's house at baseline. Pt current intolerance to being upright. Level of assist required.  Recommendations for discharge: Home only if mobility can be managed by family and support staff  Rationale for recommendations: Pt may need to use wheelchair at home since pt has only been transferring in hospital using walker and has not been able to tolerate gait due to dizziness. Pt will need 24/7 care. Pt would benefit from home PT if pt needs to be using walker. Hospital bed, commode and lift chair will facilitate mobility. Otherwise, TCU may be needed.       Entered by: Mercedes Arvizu 2018 12:19 PM

## 2018-05-23 NOTE — PLAN OF CARE
Problem: Patient Care Overview  Goal: Plan of Care/Patient Progress Review  Outcome: No Change  VSS. O2 RA. Up with 1 assist; gait belt, walker. Alert to self and place. Fair appetite. Dark black/maroon stool. Voiding via bedside commode. Hgb 8.7. Telle is 100 V paced. Coccyx dressing change ; blanchable redness. D/C pending.

## 2018-05-23 NOTE — PLAN OF CARE
Problem: Patient Care Overview  Goal: Discharge Needs Assessment  Outcome: Improving  Disoriented to time and situation, forgetful. VSS on room air. Reports right shoulder pain, tylenol administered. No signs of bleeding, no dark stools noted. Up with assist of 2 and walker to bathroom. Voiding. Tolerating regular diet. Tele 100% v paced. Possible discharge to TCU.

## 2018-05-24 NOTE — PLAN OF CARE
Problem: Patient Care Overview  Goal: Plan of Care/Patient Progress Review  Outcome: No Change  7p-11p.. Alert, pleasantly confused. Hx of dementia. Daughters at bedside and very supportive with care. Pt has maroon stools x2, EGD on 20th and no signs of active bleeding except esophagitis. Gets dizzy when changing positions. LE edema +1-2. Wound on coccyx, mepilex CDI . Hgb stable 8.7. Will D/C Home with hospice tomorrow. Up +1 to BSC, very unsteady and weak on feet. Resting comfortably at this time ..    Update 0638.. Pt C/O pain overnight, given PO tylenol, heat and Ice with minimal relief. Pt resting comfortably now . Pt awake most of the night, called frequently r/t dementia and forgetfulness,  Pt reorienteated often. Up to BSC 3 times,  Still having maroon output (minimal) of bowel. Will transition to Hospice today at home. Tele 100% V paced. Poor H20 intake overnight

## 2018-05-24 NOTE — DISCHARGE SUMMARY
[facility]    Discharge Summary  Hospitalist    Date of Admission:  5/19/2018  Date of Discharge:  5/24/2018  Discharging Provider: Dorcas Valdovinos  Date of Service (when I saw the patient): 05/24/18    Discharge Diagnoses   GI bleed secondary to gastric ulcer  Acute blood loss Anemia   Coronary artery disease with history of coronary artery bypass graft  Mechanical aortic valve replacement   Hypertension  Hyperlipidemia  Pulmonary hypertension  Tricuspid insufficiency  Moderate to severe mitral regurgitation   Chronic diastolic heart failure  Atrial fibrillation, history of AV nehemias ablation   S/P dual chamber pacemaker  T12 compression fracture  13 mm nodule in the right lower lobe of the lung  1.3 cm cortical lesion lower lobe of the left kidney  CKD-II  Lactic acidosis    Hospice Care    History of Present Illness   Natalie Hair is an 85-year-old female with a complex medical history of coronary artery disease with history of 3-vessel coronary artery bypass graft, mechanical aortic valve replacement on Coumadin, hypertension, hyperlipidemia, tricuspid insufficiency and pulmonary hypertension with history of thoracentesis, chronic diastolic heart failure, and AFib who presented as transfer from [facility] for further evaluation of GI bleed after an episode of hematemesis and melena morning of admission. See h & P for detail.     Hospital Course   Natalie Hair was admitted on 5/19/2018.  The following problems were addressed during her hospitalization:    GI bleed secondary to gastric ulcer  Acute blood loss Anemia   The patient with bout of hematemesis and melena on day of admission.  She is chronically on plavix/coumadin.  No hx of NSAIDS use. CT abdomen and pelvis showed diverticula without diverticulitis and cholelithiasis without cholecystitis and a small to moderate left pleural effusion. Hemoglobin 11.6 on admission, down from 14.6 drawn on 03/26/2018.  -- EGD on 5/19  revealed gastric ulcer with clean base and no stigmata of bleeding, no high risk features  -- continue PO PPI BID at discharge  -- hgb sanju was 7.2. S/p pRBC transfusion  -- hgb currently stable in mid 8s    Coronary artery disease with history of CABG and subsequent left main stenting  Mechanical aortic valve replacement   Hypertension  Hyperlipidemia:  Coronary artery bypass graft in 2002 with LIMA to LAD, SVG to RCA, radial graft to the first OM.  Followed by Dr. Bingham of Cardiology.   -- resume PTA Plavix at discharge given her left main disease  -- would recommend continuing coumadin in light of mechanical valve. INR 1.9 on day of discharge. Recommend INR check tomorrow for dose adjustment of warfarin as indicated  -- continue with BB  -- d/c Zetia since going on hospice    Pulmonary hypertension  Tricuspid insufficiency  Moderate to severe mitral regurgitation   Chronic diastolic heart failure  H/O thoracentesis X3:  The patient with Latif FORMA tricuspid transcatheter repair system device inserted by the [facility] in 6/2017.  Her last echocardiogram from 11/2017 showed an EF of 67%, severely calcified mitral annulus with moderate stenosis and moderate to severe mitral regurgitation. Last thoracentesis on 4/11/18 with 800 ccs of fluid removed. Has had about three total thoracentesis.   -- no signs of fluid overload at this time  -- will resume Torsemide and K supplement at time of discharge to minimize fluid overload  -- d/c aldactone to reduce her pill burden    Atrial fibrillation, history of AV nehemias ablation   S/P dual chamber pacemaker:  Maintained on beta blockade and Coumadin.  CHADS2-VASc score 5. H/O [employer] 2088 TC Tendril STS dual chamber pacemaker placed on 7/18/16  -- metoprolol 12.5mg bid      T12 compression fracture: History of this with associated chronic back pain.  Stable.   Not seem to be issue at this time.       13 mm nodule in the right lower lobe of the lung:  Noted during  hospitalization in 01/2018 on CT.  Recommended followup in 3 months from that time, although it appears that patient has not had followup.       1.3 cm cortical lesion lower lobe of the left kidney:  Noted on CT from hospitalization in 01/2018.      Chronic kidney disease, stage III: baseline creatinine 1.2-1.4.  Creatinine on admission 1.21.   cr currently stable at 1.25      Lactic acidosis, resolved: Lactic acid on in the ED 2.1 down to 1.2 after IV fluids.      Goals of care:  Patient expressed her wish to be comfortable. She does not wish to go to TCU or get PT at home.  This was recommended to her in the past but was not ready at that time. She wishes to discharge with hospice this time. Daughter able to provide her around the clock care.  Has met with hospice. Will discharge home with hospice care.    -- hospice meds completed. Hydromorphone not prescribed per daughters request since there is family member with opiate addiction. Hospice will order after discharge as needed.     # Discharge Pain Plan:   - Patient currently has NO PAIN and is not being prescribed pain medications on discharge.    Dorcas Valdovinos    Significant Results and Procedures   See labs and imaging below    Pending Results   These results will be followed up   Unresulted Labs Ordered in the Past 30 Days of this Admission     No orders found from 3/20/2018 to 5/20/2018.          Code Status   DNR / DNI       Primary Care Physician   Mimi Mcguire    Physical Exam   Temp: 97.1  F (36.2  C) Temp src: Oral BP: 97/47 Pulse: 71 Heart Rate: 74 Resp: 16 SpO2: 99 % O2 Device: None (Room air)    Vitals:    05/21/18 0437 05/22/18 0320 05/23/18 0615   Weight: 73.5 kg (162 lb 1.6 oz) 72.5 kg (159 lb 12.8 oz) 75.8 kg (167 lb 1.7 oz)     Vital Signs with Ranges  Temp:  [95  F (35  C)-97.9  F (36.6  C)] 97.1  F (36.2  C)  Pulse:  [71] 71  Heart Rate:  [71-74] 74  Resp:  [16-20] 16  BP: ()/(47-59) 97/47  SpO2:  [98 %-99 %] 99 %  I/O last  3 completed shifts:  In: 580 [P.O.:580]  Out: 300 [Urine:300]    General: alert, awake and no apparent distress  CVS: regular rate and rhythm  Resp: clear to ausculation bilaterally  Abd: soft and non-tender    Discharge Disposition   Discharged to home  Condition at discharge: Stable    Consultations This Hospital Stay   PHYSICAL THERAPY ADULT IP CONSULT  SOCIAL WORK IP CONSULT  GASTROENTEROLOGY IP CONSULT  PHARMACY IP CONSULT  PHARMACY TO DOSE PHYTONADIONE  OCCUPATIONAL THERAPY ADULT IP CONSULT  NUTRITION SERVICES ADULT IP CONSULT  CARDIOLOGY IP CONSULT  PHARMACY TO DOSE WARFARIN  WOUND OSTOMY CONTINENCE NURSE  IP CONSULT    Time Spent on this Encounter   Dorcas RAMOS, personally saw the patient today and spent 40 minutes discharging this patient.    Discharge Orders     Follow-up and recommended labs and tests    Follow up with primary care provider, Mimi Mcguire, within 7 days for hospital follow- up.  The following labs/tests are recommended: INR check on 5/25/18.     Activity   Your activity upon discharge: activity as tolerated     DNR/DNI     Diet   Follow this diet upon discharge: Regular       Discharge Medications   Current Discharge Medication List      START taking these medications    Details   acetaminophen (TYLENOL) 650 MG Suppository Place 1 suppository (650 mg) rectally every 4 hours as needed for fever or mild pain (Do not exceed 4000 mg total acetaminophen per day.) Unwrap prior to insertion.  Qty: 12 suppository, Refills: 1    Associated Diagnoses: Hospice care patient      atropine 1 % ophthalmic solution Take 2-4 drops by mouth, place under tongue or place inside cheek every 2 hours as needed for other (terminal respiratory secretions) Not for ophthalmic use.  Qty: 5 mL, Refills: 1    Associated Diagnoses: Hospice care patient      bisacodyl (DULCOLAX) 10 MG Suppository Unwrap and insert 1 suppository rectally twice daily as needed for constipation.  Qty: 12 suppository,  Refills: 1    Associated Diagnoses: Hospice care patient      haloperidol (HALDOL) 0.5 MG tablet Take 1-2 tablets (0.5-1 mg) by mouth, place under tongue or insert rectally every 6 hours as needed for agitation (nausea)  Qty: 30 tablet, Refills: 1    Associated Diagnoses: Hospice care patient      hyoscyamine (ANASPAZ/LEVSIN) 0.125 MG tablet Take 1-2 tablets (125-250 mcg) by mouth every 4 hours as needed for other (secretions)  Qty: 40 tablet, Refills: 1    Associated Diagnoses: Hospice care patient      MEDICATION INSTRUCTION If care facility cannot accept or use ranges, facility is instructed to use lower end of dosing range    Associated Diagnoses: Hospice care patient      pantoprazole (PROTONIX) 40 MG EC tablet Take 1 tablet (40 mg) by mouth 2 times daily (before meals)  Qty: 60 tablet, Refills: 0    Associated Diagnoses: PUD (peptic ulcer disease)      sucralfate (CARAFATE) 1 GM/10ML suspension Take 10 mLs (1 g) by mouth 4 times daily (before meals and nightly)  Qty: 840 mL, Refills: 0    Associated Diagnoses: PUD (peptic ulcer disease)         CONTINUE these medications which have NOT CHANGED    Details   acetaminophen (TYLENOL) 325 MG tablet Take 1-2 tablets (325-650 mg) by mouth every 6 hours as needed for mild pain or pain  Qty: 100 tablet, Refills: 1    Associated Diagnoses: Back pain, unspecified back location, unspecified back pain laterality, unspecified chronicity; Chronic bilateral low back pain without sciatica      clopidogrel (PLAVIX) 75 MG tablet Take 1 tablet (75 mg) by mouth daily  Qty: 90 tablet, Refills: 0    Associated Diagnoses: Congestive heart failure, unspecified congestive heart failure chronicity, unspecified congestive heart failure type (H); Lymphedema of both lower extremities      metoprolol tartrate (LOPRESSOR) 25 MG tablet TAKE 1/2 TABLET(12.5 MG) BY MOUTH TWICE DAILY  Qty: 90 tablet, Refills: 3    Associated Diagnoses: Benign essential hypertension      nitroglycerin (NITROSTAT)  0.4 MG SL tablet Place 1 tablet (0.4 mg) under the tongue every 5 minutes as needed for chest pain  Qty: 25 tablet, Refills: 1    Associated Diagnoses: Unspecified cardiovascular disease      polyethylene glycol (MIRALAX/GLYCOLAX) powder Take 0.5 capfuls by mouth every morning       potassium chloride (K-TAB,KLOR-CON) 10 MEQ tablet TAKE 1 TABLET(10 MEQ) BY MOUTH TWICE DAILY  Qty: 180 tablet, Refills: 1    Associated Diagnoses: Shortness of breath on exertion; Essential hypertension      senna-docusate (SENNA S) 8.6-50 MG per tablet Take 2 tablets by mouth 2 times daily      torsemide (DEMADEX) 20 MG tablet Take 1 tablet (20 mg) by mouth 2 times daily Patient needs labs before next refill.  Qty: 60 tablet, Refills: 0    Associated Diagnoses: Lymphedema of both lower extremities; Pleural effusion      warfarin (COUMADIN) 4 MG tablet Take 4 mg daily or as directed by Anticoagulation Clinic  Qty: 80 tablet, Refills: 0    Associated Diagnoses: Congestive heart failure, unspecified congestive heart failure chronicity, unspecified congestive heart failure type (H)      donepezil (ARICEPT) 5 MG tablet TAKE 1 TABLET(5 MG) BY MOUTH AT BEDTIME  Qty: 90 tablet, Refills: 3    Associated Diagnoses: Dementia without behavioral disturbance, unspecified dementia type      !! order for DME Equipment being ordered: bedside commode and bed pan  Qty: 1 Device, Refills: 0    Associated Diagnoses: Lymphedema of both lower extremities; Chronic diastolic congestive heart failure (H); Urinary incontinence, unspecified type; Memory loss; Weakness; Weight loss      !! order for DME always discreet underwear - 2 pairs/day  - 60/month  flushable cleansing cloths ( wipes)  - several packages/month     For incontinence and primitivo-care  Qty: 1 Month, Refills: 3    Associated Diagnoses: Urinary incontinence, unspecified type; History of pressure ulcer      !! order for DME Mepilex sacral dressings  Qty: 10 Units, Refills: 3    Associated Diagnoses:  Decubitus ulcer of sacral region, unspecified ulcer stage      !! order for DME Equipment being ordered: Hospital Bed  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 -ongoing symptoms of lower extremity edema,shortness of breath,cough  sacral pressure sores within last 6 months    An electric hospital bed to allow for increase in head of bed elevation and for ease in wheelchair transfers   Length of need: lifelong  NPI - [id number]  Qty: 1 Device, Refills: 0    Associated Diagnoses: 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); Lymphedema of both lower extremities; Right-sided congestive heart failure; Tricuspid valve insufficiency, unspecified etiology; Congestive heart failure, unspecified congestive heart failure chronicity, unspecified congestive heart failure type (H); History of pressure ulcer      !! order for DME Equipment being ordered: Walker with a seat  Qty: 1 Package, Refills: 0    Associated Diagnoses: Acute on chronic diastolic congestive heart failure (H); At risk for falling       !! - Potential duplicate medications found. Please discuss with provider.      STOP taking these medications       alendronate (FOSAMAX) 70 MG tablet Comments:   Reason for Stopping:         cetirizine (ZYRTEC) 5 MG tablet Comments:   Reason for Stopping:         DiphenhydrAMINE HCl, Sleep, (ZZZQUIL PO) Comments:   Reason for Stopping:         Diphenhydramine-APAP, sleep, (TYLENOL PM EXTRA STRENGTH PO) Comments:   Reason for Stopping:         ezetimibe (ZETIA) 10 MG tablet Comments:   Reason for Stopping:         multivitamin, therapeutic with minerals (MULTI-VITAMIN) TABS tablet Comments:   Reason for Stopping:         spironolactone (ALDACTONE) 25 MG tablet Comments:   Reason for Stopping:             Allergies   Allergies   Allergen Reactions     Lorazepam Nausea and Vomiting     Morphine Sulfate Cr [Morphine Sulfate] Nausea and Vomiting     Crestor [Rosuvastatin] Other (See Comments)      Abdominal/Back pain     Lidocaine GI Disturbance     Cozaar [Losartan] Rash     Data   Most Recent 3 CBC's:  Recent Labs   Lab Test  05/23/18   0817  05/22/18   0535  05/21/18   1850  05/21/18   0550   WBC  7.5  7.5   --   8.1   HGB  8.7*  8.6*  8.7*  7.2*   MCV  86  85   --   84   PLT  181  186   --   171      Most Recent 3 BMP's:  Recent Labs   Lab Test  05/23/18   0817  05/22/18   0535  05/21/18   0550   NA  135  138  137   POTASSIUM  4.4  4.5  4.8   CHLORIDE  103  105  106   CO2  22  22  21   BUN  48*  56*  65*   CR  1.25*  1.35*  1.36*   ANIONGAP  10  11  10   SHANICE  8.6  8.7  8.6   GLC  149*  117*  123*     Most Recent 2 LFT's:  Recent Labs   Lab Test  05/19/18   1015  03/26/18   1115   AST  28  27   ALT  18  23   ALKPHOS  158*  188*   BILITOTAL  1.2  1.3     Most Recent INR's and Anticoagulation Dosing History:  Anticoagulation Dose History     Recent Dosing and Labs Latest Ref Rng & Units 5/20/2018 5/20/2018 5/20/2018 5/21/2018 5/22/2018 5/23/2018 5/24/2018    Warfarin 2 mg - - - - - 4 mg 4 mg -    Warfarin 4 mg - - - - 4 mg - - -    INR 0.86 - 1.14 2.33(H) 2.05(H) 1.72(H) 1.56(H) 1.40(H) 1.68(H) 1.91(H)    INR 0.86 - 1.14 - - - - - - -    INR Point of Care 0.86 - 1.14 - - - - - - -        Most Recent 3 Troponin's:  Recent Labs   Lab Test  03/26/18   1115  01/03/18   1614  09/01/17   1112   TROPI  <0.015  <0.015  <0.015     Most Recent Cholesterol Panel:  Recent Labs   Lab Test  12/28/16   0927   CHOL  177   LDL  121*   HDL  41*   TRIG  76     Most Recent 6 Bacteria Isolates From Any Culture (See EPIC Reports for Culture Details):  Recent Labs   Lab Test  03/26/18   1250  01/03/18   1645  10/09/17   1647  09/01/17   1005  08/21/17   0800  07/31/17   1130   CULT  10,000 to 50,000 colonies/mL  mixed urogenital jose carlos    Susceptibility testing not routinely done  No growth  <10,000 colonies/mL  mixed urogenital jose carlos  Susceptibility testing not routinely done    10,000 to 50,000 colonies/mL  mixed urogenital  jose carlos  Susceptibility testing not routinely done    >100,000 colonies/mL  Escherichia coli  *  >100,000 colonies/mL Escherichia coli*     Most Recent TSH, T4 and A1c Labs:  Recent Labs   Lab Test  07/13/17   1543   TSH  2.21     Results for orders placed or performed during the hospital encounter of 05/19/18   CT Abdomen Pelvis w Contrast    Narrative    Exam: CT ABDOMEN PELVIS W CONTRAST  5/19/2018 11:46 AM    History: Gastrointestinal bleeding with lower abdominal pain.    Comparison: CT dated 1/4/2018    Technique: Volumetric acquisition with reconstruction in the axial,  coronal planes through the abdomen and pelvis with contrast. Radiation  dose for this scan was reduced using automated exposure control,  adjustment of the mA and/or kV according to patient size, or iterative  reconstruction technique.    Contrast: 75 ml's isovue 370    Findings:   Lung Bases: Small to moderate left pleural effusion with associated  atelectasis.    Abdomen: Cholelithiasis without signs of cholecystitis. Liver, spleen,  kidneys, adrenal glands appear normal. Atrophic pancreas.    Scattered colonic diverticula without signs of diverticulitis. No  areas of bowel wall thickening or bowel dilatation. Normal appendix.    Uterus and ovaries are unremarkable. No free fluid. No abdominal or  pelvic lymphadenopathy.    Bones: Degenerative changes in the thoracolumbar spine and hips.      Impression    Impression:   1. Scattered colonic diverticula without signs of diverticulitis.  2. Cholelithiasis without signs of cholecystitis.  3. No acute findings are seen in the abdomen or pelvis.  4. Small to moderate left pleural effusion.    CASEY THOMAS MD     *Note: Due to a large number of results and/or encounters for the requested time period, some results have not been displayed. A complete set of results can be found in Results Review.

## 2018-05-24 NOTE — PLAN OF CARE
Problem: Patient Care Overview  Goal: Plan of Care/Patient Progress Review  PT: Patient discharged home with hospice. PT goals not met.    Physical Therapy Discharge Summary    Reason for therapy discharge:    Discharged to home.    Progress towards therapy goal(s). See goals on Care Plan in [facility] electronic health record for goal details.  Goals not met.  Barriers to achieving goals:   discharge from facility.    Therapy recommendation(s):    No further therapy is recommended.

## 2018-05-24 NOTE — PLAN OF CARE
Problem: Patient Care Overview  Goal: Plan of Care/Patient Progress Review  Outcome: Adequate for Discharge Date Met: 05/24/18  Discharge    Patient discharged to home via daughter with [facility] hospice  Care plan note: Patient was A/O self/place, baseline dementia. Temp 95 this AM, possibly thermometer error, warm blankets given to patient and recheck with other thermometer 97.1 orally. Other VSS on RA. Up w/1 GB and walker, can walk a couple steps to BSC/chair. Denies pain/nausea/SOB. Poor appetite. IV removed, tip intact. Patient belongings sent home with daughter. Discharge AVS reviewed with daughter, educated on new medications. All hospice meds sent with daughter except atropine as it is on back order. Daughter verbalized understanding and had no further questions. Hospice to meet with patient tomorrow and draw INR.       Listed belongings gathered and returned to patient. Yes  Care Plan and Patient education resolved: Yes  Prescriptions if needed, hard copies sent with patient  NA  Home and hospital acquired medications returned to patient: Yes  Medication Bin checked and emptied on discharge Yes  Follow up appointment made for patient: No- hospice

## 2018-05-24 NOTE — TELEPHONE ENCOUNTER
Reason for Call: Request for an order or referral:    Order or referral being requested: Cyn from  Hospice calling to request orders:  Natalie is being discharged to home from [facility] and will be going in to the hospice program.  Cyn would like to know if you are willing to follow hospice orders.  She would also need verbal OK for admitting into hospice.      Please review and advise.  Thank you..Claire Carroll    Date needed: as soon as possible    Has the patient been seen by the PCP for this problem? YES    Phone number Patient can be reached at:  Other phone number:  Cyn [phone]*    Best Time:  Any time    Can we leave a detailed message on this number?  YES  Secure line.    Call taken on 5/24/2018 at 11:43 AM by Claire Carroll

## 2018-05-24 NOTE — PROGRESS NOTES
SPIRITUAL HEALTH SERVICES Progress Note  [facility] 66    , LOS visit. 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 The patient talked about the color and beauty of her nails. The family talked about having heavy hearts as they transition to hospice at home.  observed the family offering caring support for the patient.      provided care in presence/liseing and offered prayer.       has no further plans.       Andrew Stewart  Chaplain Resident

## 2018-05-24 NOTE — PROGRESS NOTES
" Hospice: Met with patients daughter Emely and son Mendel to explain hospice services. Emely tells me that Dr Mcguire had recommended hospice back in January. She has had 2 hospice consults, the first one in January and she was wanting to complete therapies/more informational. The other consult was in April and the patient flat out said \"no\" to hospice. She has been hospitalized for GI Bleed and has declined significantly. I reviewed the hospice program. Emely signed the hospice consent forms for hospice services to begin on discharge. POLST completed and placed on front of chart for signature. The plan would be for the patient to be discharged today back to [facility] home and Emely will transport home. There are no equipment needs today. The hospice admission team will complete the admission tomorrow at 9:30 at Emely's home. Emely would like the patients home meds reviewed to see which ones could be stopped. Please order the following hospice comfort meds for discharge:  Acetaminophen 650 mg supp MA every 4 hrs as needed for pain/fever #2  bisacodyl 10 mg supp MA daily as needed for constipation #2,   atropine sulfate 1% 2 to 4 drops SL as needed every 2 hrs for terminal congestion/secretions # 5 ml,   haloperidol 0.5 mg tab 1 to 2 tabs po/sl every 6 hrs nausea, agitation # 15,   Senna S 8.6 mg/50mg 1 to 2 tabs po 2x day as needed constipation #30.  Hyoscyamine sulfate 0.125mg to 0.25 mg po/sl every 4 hrs as needed for secretions.  Note patient has an allergy to lorazepam and please do not fill hydromorphone as the daughter is requesting this not be filled as there is a family member with an opiate addiction/hospice will order if and when needed.   Emely has her copies of the signed hospice consent forms along with the hospice handbook with the 24 hr number.   Thank you for the referral. Cyn Avelar RN, BSN   Hospice Admission nurse  [phone]  "

## 2018-05-25 NOTE — PROGRESS NOTES
ANTICOAGULATION FOLLOW-UP CLINIC VISIT    Patient Name:  Natalie Hair  Date:  5/25/2018  Contact Type:  Telephone/ Luz SAGE, [facility]    SUBJECTIVE:     Patient Findings     Positives Hospital admission (GIB/Anemia), Intentional hold of therapy    Comments Date of Admission:  5/19/2018  Date of Discharge:  5/24/2018  Patient admitted for a GIB and Anemia. She was given Vitamin K 2 mg on 5/19 and 5/20. She resumed back on 4 mg daily as of 5/21. INR today is 1.9. I instructed RN to have patient take 6 mg today then 4 mg the rest of the week. No current issues with bleeding. Patient is now on Hospice and will have the INR check with home care next Tuesday.           OBJECTIVE    INR   Date Value Ref Range Status   05/25/2018 1.9  Final       ASSESSMENT / PLAN  INR assessment THER +/-1   Recheck INR In: 4 DAYS    INR Location [facility] INR      Anticoagulation Summary as of 5/25/2018     INR goal 2.5-3.5   Today's INR 1.9!   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       Anticoagulation Care Providers     Provider Role Specialty Phone number    Mimi Mcguire MD [facility] [phone]            See the Encounter Report to view Anticoagulation Flowsheet and Dosing Calendar (Go to Encounters tab in chart review, and find the Anticoagulation Therapy Visit)        Jeanna Barber RN

## 2018-05-25 NOTE — MR AVS SNAPSHOT
Natalie PATRICIA Hair   5/25/2018   Anticoagulation Therapy Visit    Description:  85 year old female   Provider:  Jeanna Barber, RN   Department:  [facility]           INR as of 5/25/2018     Today's INR 1.9!      Anticoagulation Summary as of 5/25/2018     INR goal 2.5-3.5   Today's INR 1.9!   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]  Long term current use of anticoagulant therapy [Z79.01]         Your next Anticoagulation Clinic appointment(s)     May 30, 2018 11:30 AM CDT   [facility]ticoagulation [facility] with [facility]   [facility]    [address]  [place] [address]   [phone]              May 2018 Details    Sun Mon Tue Wed Thu Fri Sat       1               2               3               4               5                 6               7               8               9               10               11               12                 13               14               15               16               17               18               19                 20               21               22               23               24               25      6 mg   See details      26      4 mg           27      4 mg         28      4 mg         29            30               31                  Date Details   05/25 This INR check       Date of next INR:  5/29/2018         How to take your warfarin dose     To take:  4 mg Take 1 of the 4 mg tablets.    To take:  6 mg Take 1.5 of the 4 mg tablets.

## 2018-05-29 NOTE — MR AVS SNAPSHOT
Natalie GOMEZ Hair   5/29/2018   Anticoagulation Therapy Visit    Description:  85 year old female   Provider:  Jeanna Barber, RN   Department:  [facility]           INR as of 5/29/2018     Today's INR 3.6!      Anticoagulation Summary as of 5/29/2018     INR goal 2.5-3.5   Today's INR 3.6!   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]  Long term current use of anticoagulant therapy [Z79.01]         May 2018 Details    Sun Mon Tue Wed Thu Fri Sat       1               2               3               4               5                 6               7               8               9               10               11               12                 13               14               15               16               17               18               19                 20               21               22               23               24               25               26                 27               28               29      2 mg   See details      30      4 mg         31      4 mg            Date Details   05/29 This INR check               How to take your warfarin dose     To take:  2 mg Take 0.5 of a 4 mg tablet.    To take:  4 mg Take 1 of the 4 mg tablets.           June 2018 Details    Sun Mon Tue Wed Thu Fri Sat          1            2                 3               4               5               6               7               8               9                 10               11               12               13               14               15               16                 17               18               19               20               21               22               23                 24               25               26               27               28               29               30                Date Details   No additional details    Date of next INR:  6/1/2018         How to take  your warfarin dose     To take:  4 mg Take 1 of the 4 mg tablets.

## 2018-05-29 NOTE — PROGRESS NOTES
ANTICOAGULATION FOLLOW-UP CLINIC VISIT    Patient Name:  Natalie Hair  Date:  5/29/2018  Contact Type:  Telephone/ Luz SAGE [facility]    SUBJECTIVE:     Patient Findings     Positives Change in diet/appetite, Activity level change    Comments Patient is now on hospice. Diet and activity have decreased. I instructed RN to have patient take 2 mg today then 4 mg Wednesday and Thursday. Recheck on Friday. If patient is declining, maintenance dose may need to be decreased. Patient denies signs or symptoms of bleeding. Writer educated patient regarding increased bleed risk and when to seek immediate medical attention. Patient verbalized understanding.             OBJECTIVE    INR   Date Value Ref Range Status   05/29/2018 3.6  Final       ASSESSMENT / PLAN  INR assessment SUPRA    Recheck INR In: 3 DAYS    INR Location [facility] INR      Anticoagulation Summary as of 5/29/2018     INR goal 2.5-3.5   Today's INR 3.6!   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       Anticoagulation Care Providers     Provider Role Specialty Phone number    Mimi Mcguire MD [facility] [phone]            See the Encounter Report to view Anticoagulation Flowsheet and Dosing Calendar (Go to Encounters tab in chart review, and find the Anticoagulation Therapy Visit)        Jeanna Barber RN

## 2018-06-01 NOTE — PROGRESS NOTES
ANTICOAGULATION FOLLOW-UP CLINIC VISIT    Patient Name:  Natalie Hair  Date:  6/1/2018  Contact Type:  Telephone/ Maria Luisa SAGE, [facility]    SUBJECTIVE:     Patient Findings     Positives Change in diet/appetite, Inflammation    Comments Per Maria Luisa SAGE, doctor was out to see the patient. He did not request a lab draw. Per Dr. Haresh Warner, patient is to hold Coumadin for the next 3 days and the RN will recheck on Monday. RN states patient is nauseated with some vomiting and is not eating well.  RN denies signs or symptoms of bleeding. Writer educated RN/patient regarding increased bleed risk and when to seek immediate medical attention.              OBJECTIVE    INR Protime   Date Value Ref Range Status   06/01/2018 8.0 (A) 0.86 - 1.14 Final       ASSESSMENT / PLAN  INR assessment SUPRA    Recheck INR In: 3 DAYS    INR Location [facility]R      Anticoagulation Summary as of 6/1/2018     INR goal 2.5-3.5   Today's INR 8.0!   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       Anticoagulation Care Providers     Provider Role Specialty Phone number    Mimi Mcguire MD [facility] [phone]            See the Encounter Report to view Anticoagulation Flowsheet and Dosing Calendar (Go to Encounters tab in chart review, and find the Anticoagulation Therapy Visit)        Jeanna Barber, RN

## 2018-06-01 NOTE — MR AVS SNAPSHOT
Natalie PATRICIA Hair   6/1/2018   Anticoagulation Therapy Visit    Description:  85 year old female   Provider:  Jeanna Barber, RN   Department:  [facility]           INR as of 6/1/2018     Today's INR 8.0!      Anticoagulation Summary as of 6/1/2018     INR goal 2.5-3.5   Today's INR 8.0!   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]  Long term current use of anticoagulant therapy [Z79.01]         June 2018 Details    Sun Mon Tue Wed Thu Fri Sat          1      Hold   See details      2      Hold           3      Hold         4            5               6               7               8               9                 10               11               12               13               14               15               16                 17               18               19               20               21               22               23                 24               25               26               27               28               29               30                Date Details   06/01 This INR check       Date of next INR:  6/4/2018         How to take your warfarin dose     To take:  4 mg Take 1 of the 4 mg tablets.    Hold Do not take your warfarin dose. See the Details table to the right for additional instructions.

## 2018-06-04 NOTE — PROGRESS NOTES
ANTICOAGULATION FOLLOW-UP CLINIC VISIT    Patient Name:  Natalie Hair  Date:  6/4/2018  Contact Type:  Telephone/ [facility]    SUBJECTIVE:     Patient Findings     Positives Change in diet/appetite (No appetite at all), Inflammation, Activity level change (Bed bound, cannot move herself anymore)    Comments Patient's health is declining, she has no appetite (ate 2 blackberries, 2 grapes, and 1 costello over the weekend). Hospice nurse had a discussion with the family about stopping warfarin. There has been no decision made as of yet. Writer asked about possibly having an INR drawn via venipuncture for more accuracy but due to hospice status this will not be done. Will plan to hold warfarin for the next 2 days and recheck again on Wednesday. There will be further discussion about continuing anticoagulation or not. Patient currently has no indications of bleeding or abnormal bruising.            OBJECTIVE    INR   Date Value Ref Range Status   06/04/2018 7.1  Final       ASSESSMENT / PLAN  No question data found.  Anticoagulation Summary as of 6/4/2018     INR goal 2.5-3.5   Today's INR 7.1!   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 // [facility]      Anticoagulation Care Providers     Provider Role Specialty Phone number    Mimi Mcguire MD [facility] [phone]            See the Encounter Report to view Anticoagulation Flowsheet and Dosing Calendar (Go to Encounters tab in chart  review, and find the Anticoagulation Therapy Visit)        Christa Guzman RN, CACP

## 2018-06-04 NOTE — MR AVS SNAPSHOT
Natalie Hair   6/4/2018   Anticoagulation Therapy Visit    Description:  85 year old female   Provider:  Christa Guzman, RN   Department:  [facility]           INR as of 6/4/2018     Today's INR 7.1!      Anticoagulation Summary as of 6/4/2018     INR goal 2.5-3.5   Today's INR 7.1!   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]  Long term current use of anticoagulant therapy [Z79.01]         June 2018 Details    Sun Mon Tue Wed Thu Fri Sat          1               2                 3               4      Hold   See details      5      Hold         6            7               8               9                 10               11               12               13               14               15               16                 17               18               19               20               21               22               23                 24               25               26               27               28               29               30                Date Details   06/04 This INR check       Date of next INR:  6/6/2018         How to take your warfarin dose     To take:  4 mg Take 1 of the 4 mg tablets.    Hold Do not take your warfarin dose. See the Details table to the right for additional instructions.

## 2018-06-05 NOTE — TELEPHONE ENCOUNTER
Maria Luisa from  Home Care calling to let you know that Natalie passed away this afternoon.      Thank you..Claire Carroll

## 2018-06-06 ENCOUNTER — PATIENT OUTREACH (OUTPATIENT)
Dept: CARE COORDINATION | Facility: CLINIC | Age: 83
End: 2018-06-06

## 2018-06-06 NOTE — TELEPHONE ENCOUNTER
Thank you for letting me know.  Can you find me one of the cards we send out and put it on my desk for Margie and I to sign?     Thanks, C. Kamron Mcguire MD

## 2018-06-06 NOTE — PROGRESS NOTES
Clinic Care Coordination Contact  Care Team Conversations    SW received notification from PCP's care team that pt  yesterday afternoon while under the care of [facility].    SW to close pt to clinic care coordination.    Jessie Gan  Social Work Care Coordinator  [facility]  [phone]

## 2021-05-13 NOTE — PROGRESS NOTES
ANTICOAGULATION FOLLOW-UP CLINIC VISIT    Patient Name:  Natalie Hair  Date:  9/26/2017  Contact Type:  Telephone/ Received INR results from CHU Duarte with [facility], called daughter Digna with dosing, scheduled outpt appt at [facility].    SUBJECTIVE:     Patient Findings     Positives No Problem Findings    Comments Pt is being discharged from homecare today, will be going into [facility] from now on with daughterDigna for INR checks.            OBJECTIVE    INR   Date Value Ref Range Status   09/26/2017 2.8  Corrected       ASSESSMENT / PLAN  No question data found.  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               
,DirectAddress_Unknown

## 2021-05-31 VITALS — HEIGHT: 66 IN | WEIGHT: 176 LBS | BODY MASS INDEX: 28.28 KG/M2

## 2021-05-31 VITALS — WEIGHT: 183 LBS | BODY MASS INDEX: 29.54 KG/M2

## 2021-06-10 NOTE — PROGRESS NOTES
Cardiac Rehab  Phase II Assessment    Assessment Date: 5-11-17      Procedure: PCI MARIA DE JESUS X2 to LAD   Date of Onset: 4/21/17  ICD/Pacemaker: Yes Parameters: Unknown per pt  Post-op Complications: SOB  ECG History: Paced, Afib EF%:60  Past Medical History: Mechanical AVR/CABG X3 in 2002    Physical Assessment  Precautions/ Physical Limitations: Very SOB, Bed sores from sleeping, slight low back pain  Oxygen: No  O2 Sats: 95-99% on RA Edema: Bilateral Ankle edema (Constant at baseline)  Sleeping Pattern: poor-Pt currently sleeping in recliner d/t SOB, daughter is trying to encourage pt to sleep in hospital grade bed to help heal bed sores  Appetite: poor-Intake/appetite is inadequate. Losing weight  Nutrition Risk Screen: Weight loss, Appetite/Intake and Pt has been vomiting if there is too much activity while she is getting ready in the morning.     Pain  Location: Low back  Characteristics:Sore  Intensity: (0-10 scale) 4  Current Pain Management: Tylenol  Intervention: NA  Response: Relief    Psychosocial/ Emotional Health  1. In the past 12 months, have you been in a relationship where you have been abused physically, emotionally, sexually or financially? No  notified: No  2. Who do you turn to for emotional support?: kids  3. Do you have cultural or spiritual needs? No  4. Have there been any major life changes in the past 12 months? Yes Moved twice, extensive health complications    Referral Information  Primary Physician: Amy Mcguire  Cardiologist: Dr. Zachery Garcia [place]    Home exercise/Equipment: NA- Walking ~15ft with walker at home, has to rest then sit down    Patient's long-term goal(s): Sleep in bed    1. Living Accommodations: Home Steps: Yes      Support people at home: Lives with daughter for help, her house is being renovated   2. Marital Status:   3. Family is not able to assist with cares      Buddhism/Community involvement: NA  4. Recreation/Hobbies: NA

## 2021-06-10 NOTE — PROGRESS NOTES
ITP ASSESSMENT   Assessment Day: Initial  Session Number: 0  Precautions: Pre valve Replacment, Very SOB  Diagnosis: Stent;CHF  Risk Stratification: High  Referring Provider: Dr. Rose Jones  EXERCISE  Exercise Assessment: Initial   Pt only able to complete 4 minutes of exercise with multiple rest breaks. Currently working at 1.7 METS. Limited by SOB. Will continue to try to increase as tolerated.                       Exercise Plan  Goals Next 30 days  ADL'S: Sleep in bed 1/2 the night, walk 30 ft 3x/day  Leisure: Go outside in wheelchair  No Data Recorded    Education Goals: Patient can state cardiac s/s and appropriate emergency response.;Has system for taking medication.;Medication review  Education Goals Met: Has system for taking medication.    Exercise Prescription  Exercise Mode: Nustep;Hallway Walking;Arm Erg.  Frequency: 2 days/week  Duration: 6-15minutes  Intensity / THR: 20-30 beats above resting heart rate  RPE 11-14  Progression / Met level: 2  Resistive Training?: No    Current Exercise (mins/week): 1    Interventions  Home Exercise:  Mode: Walking  Frequency: 3x/day  Duration: 30 ft    Education Material : Provide written material;Educational videos;Individual education and counseling;Offer educational classes    Education Completed  Exercise Education Completed: Cardiac Anatomy;Signs and Symptoms;Medication review;RPE;Emergency Plan;Home Exercise;Warm up/cool down;FITT Principles;BP/HR Reponse to exercise;Benefits of Exercise            Exercise Follow-up/Discharge  Follow up/Discharge: Encouraged pt to work up from 15ft to 30ft walking NUTRITION  Nutrition Assessment: Initial    Nutrition Risk Factors:  Nutrition Risk Factors: Dyslipidemia    Nutrition Plan  Interventions  Nutrition Interventions: Diet consult;Diet class;Therapist/Patient discussion;Educational videos;Provide with written material      Education Completed  Nutrition Education Completed: Risk factor overview    Goals  Nutrition  "Goals (Next 30 days): Patient can identify their risk factors for CAD;Patient will follow a low sodium diet;Patient will follow a low saturated fat diet;Patient knows appropriate portion size    Goals Met  Nutrition Goals Met: Provided Rate your Plate Survey;Patient can identify their risk factors for CAD    Height, Weight, and  BMI  Weight: 176 lb (79.8 kg)  Height: 5' 6\" (1.676 m)  BMI: 28.42    Nutrition Follow-up  Follow-up/Discharge: Encouraged pt to meet with dietician       Other Risk Factors  Other Risk Factor Assessment: Initial    HTN Risk Factor: Hypertension    Pre Exercise BP: 106/56  Post Exercise BP: 104/60    Hypertension Plan  Goals  HTN Goals: Follow low sodium diet;Take medication as prescribed    Goals Met  HTN Goals Met: Follow low sodium diet;Take medication as prescribed;Exercises regularly    HTN Interventions  HTN Interventions: Diet consult;Therapist/patient discussion;Provide written material;Offer educational videos;Offer educational classes    HTN Education Completed  HTN Education Completed: Risk factor overview    Tobacco Risk Factor: NA    Risk Factor Follow-up   Follow-up/Discharge: Encouraged pt to continue low sodium diet   PSYCHOSOCIAL  Psychosocial Assessment: Initial       Psychosocial Risk Factor: Stress    Psychosocial Plan  Interventions  If TACO-D > 15 send letter to MD  Interventions: Offer educational videos and classes;Provide written material;Individual education and counseling    Education Completed  Education Completed: Effects of stress on body    Goals  Goals (Next 30 days): Identified Support system;Identify stressors;Practicing stress management skills    Goals Met  Goals Met: Identified Support system;Identify stressors;Oriented to stress management classes    Psychosocial Follow-up  Follow-up/Discharge: Pt is stressed since she is living with daughter/health issues           Patient involved in Goal setting?: Yes    Signature: " _____________________________________________________________    Date: __________________    Time: __________________

## 2021-06-10 NOTE — PROGRESS NOTES
Pt comes in for second day of exercise. She reports feeling tired today because of big day on mothers day. Noted to have bilat. Swelling in legs (had on initial eval)  I did have her do Nustep and she was very winded with minimal exercise. She did rest 3x in 6min. of exercise. O2@ 98%.  Weight is up 8#  but 2 different scales had been used. She denies discomfort except for being SOB.   Talked with both pt and daughter, regarding Fluid overload and daily home wts., also effort of coming to CR . We discussed holding rehab until surg on valve is done, which is scheduled for June 1. They will call MD to report sx and wt. gain, and hold of CR for now.  Discussed walking in house as julienne.

## 2021-06-11 NOTE — PROGRESS NOTES
Discharge note:  Pt arrived 5/11/17 for eval.  Very limited by SOB.  First day of exercise for > 6' was 5/15/17.  Again , pt very limited by fatigue and SOB.  Decided to discontinue  Cardiac rehab until after Valve Surgery, which is  Scheduled for  6/1/17.

## 2021-06-11 NOTE — PROGRESS NOTES
[facility]      Facility:    [facility] [[id number]]  Code Status: FULL CODE      Chief Complaint/Reason for Visit:  Chief Complaint   Patient presents with     H & P       HPI:   Natalie is a 84 y.o. female who was admitted to [facility] in [facility] on June 1 and transferred to this facility on June 9, 2017.  She is a very nice 84-year-old female who continued to live independently until October 2016.  She used to live in [place] by herself.  She moved down to the [facility] to live with her daughter in October who is her primary caregiver.  She has a known history of coronary artery disease status post coronary artery bypass graft many many years ago.  She had stayed stable all these years until recently when she started showing signs of congestive heart failure with dyspnea on exertion lower extremity swelling and fluid overload.  This had led to a series of testing/hospitalizations/procedures starting with the discovery of atrial fibrillation eventually needing a pacemaker implant as well as AV nehemias ablation.  This did not improve her dyspnea on exertion.  She underwent AV replacement.  She was finally referred to palliative cares and comfort cares.  She went to the [facility] and recommended tricuspid valve repair using Latif FORMA spacer device .  Before the definitive treatment, she had to undergo PCI and stent placement.  This happened on May 4.  She finally underwent tricuspid valve repair on June 1.  She also underwent pocket revision and fixation of the forma system.    She had a pretty uneventful postoperative course.  Her leg swelling which caused seeping of fluid in the past continue to improve after surgery.  Her BNP had dropped from over 2000 to over 1000 now.  Her dyspnea is on exertion is      Significantly improved now she can lay on her side without too much trouble.      Over the last 6-8 months, she had on and  off buttock pressure ulcer.  Opening of this has been treated with several agents including Silvadene, hydrocolloid and other staff.  This opened up again while in the hospital and current wound orders  Are in place for this.  At baseline she  Walks with a walker she needs assistance to be transferred in to be seated inside the car.  Her short-term memory has also  Declined over the years.    Her primary care physician is Dr. Rojas at [facility]    Today she denies any complaints of dyspnea on exertion more than her usual.  In fact her breathing has gone progressively better.  Her bilateral leg swelling has also improved.  She denies any chest pain denies any incisional pain  No palpitations no dizziness  She has been urinating well.  She does leak at times wears pull-ups at night.  No bowel incontinence.          Past Medical History:  Past Medical History:   Diagnosis Date     Aortic stenosis     s/p mechanical valve replacement     Atrial fibrillation      CAD (coronary artery disease)      Frailty      Mitral annular calcification      Mitral valve regurgitation      Tricuspid valve regurgitation            Surgical History:  Past Surgical History:   Procedure Laterality Date     AV NODE ABLATION       CARDIAC PACEMAKER PLACEMENT      single chamber     CORONARY ARTERY BYPASS GRAFT       MECHANICAL AORTIC VALVE REPLACEMENT         Family History:   No family history on file.    Social History:    Social History     Social History     Marital status: Single     Spouse name: N/A     Number of children: N/A     Years of education: N/A     Social History Main Topics     Smoking status: Not on file     Smokeless tobacco: Not on file     Alcohol use Not on file     Drug use: Not on file     Sexual activity: Not on file     Other Topics Concern     Not on file     Social History Narrative     No narrative on file          Review of Systems  Unremarkable  There were no vitals filed for this visit.    Physical  Exam  Vital signs noted stable  Patient is alert, awake, oriented to time, place and person, not in acute cardiorespiratory distress  Skin: Warm, and moist,  no lesions,   Head: atraumatic, normocephalic,   Chest wall with incision at the pacer site clean dry and intact.  Eyes: EOM's intact and conjugate, pink palpebral conjunctivae, anicteric sclerae, pupils reactive  Lungs : Clear to auscultation , no crackles, wheezes or rhonchi  Heart : Nornal first and second heart sounds, No murmurs, heaves, or thrills  Abdomen: Soft, non tender, non distended, no hepatosplenomegaly, no ascites  Extremities: Minimal eDema, pulses are full and equal  Stage I to II pressure ulcer on medial aspect of the left cheek of the buttock close to the coccyx level.    Medication List:  Current Outpatient Prescriptions   Medication Sig     acetaminophen (TYLENOL) 325 MG tablet Take 650 mg by mouth every 4 (four) hours as needed for pain.     alendronate (FOSAMAX) 70 MG tablet Take 70 mg by mouth every 7 days. Tuesdays, Take in the morning on an empty stomach with a full glass of water 30 minutes before food     aspirin 81 mg chewable tablet Chew 81 mg daily.     atorvastatin (LIPITOR) 40 MG tablet Take 40 mg by mouth at bedtime.     bumetanide (BUMEX) 2 MG tablet Take 2 mg by mouth 2 (two) times a day at 9am and 6pm.     calcium carbonate (OS-SHANICE) 600 mg calcium (1,500 mg) tablet Take 600 mg by mouth 2 (two) times a day with meals.     calcium carbonate-vitamin D3 600 mg (1,500 mg)-800 unit Chew Chew 1 tablet daily.     cetirizine (ZYRTEC) 5 MG tablet Take 5 mg by mouth daily.     cholecalciferol, vitamin D3, 2,000 unit Tab Take 2,000 Units by mouth daily.     clopidogrel (PLAVIX) 75 mg tablet Take 75 mg by mouth daily.     diphenhydrAMINE (BENADRYL) 25 mg capsule Take 50 mg by mouth at bedtime as needed for itching.     diphenhydrAMINE-acetaminophen (TYLENOL PM EXTRA STRENGTH)  mg Tab Take 2 tablets by mouth at bedtime as needed.      ezetimibe (ZETIA) 10 mg tablet Take 10 mg by mouth daily.     ezetimibe-simvastatin (VYTORIN) 10-10 mg per tablet Take by mouth at bedtime.     metOLazone (ZAROXOLYN) 2.5 MG tablet Take 2.5 mg by mouth daily.     metoprolol succinate (TOPROL-XL) 25 MG Take 25 mg by mouth 2 (two) times a day.     metoprolol tartrate (LOPRESSOR) 25 MG tablet Take 12.5 mg by mouth 2 (two) times a day.     multivitamin therapeutic (THERAGRAN) tablet Take 1 tablet by mouth daily.     nitroglycerin (NITROSTAT) 0.4 MG SL tablet Place 0.4 mg under the tongue every 5 (five) minutes as needed for chest pain.     OMEGA-3/DHA/EPA/FISH OIL (FISH OIL-OMEGA-3 FATTY ACIDS) 300-1,000 mg capsule Take 1 g by mouth daily.      polyethylene glycol (MIRALAX) 17 gram packet Take 17 g by mouth daily.     potassium chloride SA (K-DUR,KLOR-CON) 10 MEQ tablet Take 10 mEq by mouth daily.     silver sulfADIAZINE (SILVADENE, SSD) 1 % cream Apply 1 application topically as needed. Apply to NELY AREA topically as needed for preventative skin     spironolactone (ALDACTONE) 25 MG tablet Take 25 mg by mouth 2 (two) times a day at 9am and 6pm.      torsemide (DEMADEX) 20 MG tablet Take 20 mg by mouth 2 (two) times a day at 9am and 6pm.     vitamin E 400 UNIT capsule Take 400 Units by mouth daily.     warfarin (COUMADIN) 1 MG tablet Take by mouth daily. 6/9/17 Take 2.5mg on Tue, 5mg all other days of the week. Next INR 6/12/17  and Adjust dose based on INR results as directed.       Labs:  Recent Results (from the past 72 hour(s))   INR   Result Value Ref Range    INR 3.28 (H) 0.90 - 1.10           Assessment:    ICD-10-CM    1. Severe tricuspid regurgitation I07.1    2. S/P TVR (tricuspid valve repair) Z98.890    3. HTN (hypertension) I10    4. A-fib I48.91    5. CAD (coronary artery disease) I25.10    6. S/P CABG (coronary artery bypass graft) Z95.1    7. S/P AVR (aortic valve replacement) Z95.2    8. Pressure ulcer L89.90        Plan:  I reviewed her medications  with her and her daughter today.  She used to take aspirin and Plavix and Coumadin, aspirin has been discontinued.  Continue with Coumadin and Plavix.  Her INR today is supratherapeutic we will hold her dose for 2 days and start up to 4 mg once daily recheck INR on the 14th.  She is on 2 diuretics Spironolactone 25 twice daily and torsemide 20 twice daily.  Continue these 2 for now with goal of decreasing the dose or perhaps even stopping 1 of the 2 diuretics.  She will do physical therapy and Occupational Therapy, after which she will go back to living with her daughter.  For her buttock ulcer, I would recommend calmoseptine followed by Mepilex dressing to be changed once a day and as needed.  She is able to turn every 2-3 hours in bed.  Focus on nutrition as well.  Total time spent was 60 minutes with more than 50% spent on counseling, discussion of treatment plan and extensive review of available records  This note has been dictated using voice recognition software. Any grammatical, typographical, or context distortions are unintentional.          Electronically signed by: Zenia Case MD

## 2021-06-11 NOTE — PROGRESS NOTES
[facility]    Facility:   [facility] [[id number]]   Code Status: FULL CODE  PCP: No Primary Care Provider   Phone: None   Fax: [phone]      CHIEF COMPLAINT/REASON FOR VISIT:  Chief Complaint   Patient presents with     Discharge Summary       HISTORY COURSE:  Natalie is a 84 y.o. female who was admitted to [facility] in [facility] on June 1 and transferred to this facility on June 9, 2017.  She is a very nice 84-year-old female who continued to live independently until October 2016.  She used to live in [place] by herself.  She moved down to the [facility] to live with her daughter in October who is her primary caregiver now.  She has a known history of coronary artery disease status post coronary artery bypass graft many many years ago.  She had stayed stable all these years until recently when she started showing signs of congestive heart failure with dyspnea on exertion lower extremity swelling and fluid overload.  This had led to a series of testing/hospitalizations/procedures starting with the discovery of atrial fibrillation eventually needing a pacemaker implant as well as AV nehemias ablation.  This did not improve her dyspnea on exertion.  She underwent AV replacement.  She was finally referred to palliative cares and comfort cares.   her daughter sought another opinion and she went to the [facility] and recommended tricuspid valve repair using Latif FORMA spacer device .  Before the definitive treatment, she had to undergo PCI and stent placement.  This happened on May 4.  She finally underwent tricuspid valve repair on June 1.  She also underwent pocket revision and fixation of the forma system.     She had a pretty uneventful postoperative course.  Her leg swelling which caused seeping of fluid in the past continue to improve after surgery.  Her BNP had dropped from over 2000 to over 1000 now.  Her dyspnea is on exertion  is        Significantly improved now she can lay on her side without too much trouble.       Over the last 6-8 months, she had on and off buttock pressure ulcer.  Opening of this has been treated with several agents including Silvadene, hydrocolloid and other staff.  This opened up again while in the hospital and current wound ordersAre in place for this.  At baseline sheWalks with a walker she needs assistance to be transferred in to be seated inside the car.  Her short-term memory has alsoDeclined over the years.     Her primary care physician is Dr. Rojas at [facility]     She did pretty well with physical therapy while here.  She was walking with a front-wheeled walker.  Her dyspnea on exertion has improved although it is not gone.  Her decubitus ulcers are healing.  Overall good outcome for her.  She did not have any further complications from her surgery.  Weight stayed at 177   2.  Her INR at the time of discharge was 2.99 when she stayed on 4 mg of Coumadin until the 26th.  When another INR will need to be drawn.    Did not need oxygen on discharge  She will be going back to living with her daughter who is her primary caregiver.             Review of Systems  Unremarkable  There were no vitals filed for this visit.    Physical Exam  Vital signs noted and stable  Patient is alert, awake, oriented to time, place and person, not in acute cardiorespiratory distress  Skin: Warm, and moist,  no lesions,   Head: atraumatic, normocephalic,   Surgical site is clean dry and intact.  No dehiscence.  Eyes: EOM's intact and conjugate, pink palpebral conjunctivae, anicteric sclerae, pupils reactive  Lungs : Clear to auscultation , no crackles, wheezes or rhonchi  Heart : Nornal first and second heart sounds, No murmurs, heaves, or thrills  Abdomen: Soft, non tender, non distended, no hepatosplenomegaly, no ascites  Extremities: Unchanged bipedal edema, pulses are full and equal      MEDICATION LIST:  Current Outpatient  Prescriptions   Medication Sig     acetaminophen (TYLENOL) 325 MG tablet Take 650 mg by mouth every 4 (four) hours as needed for pain.     alendronate (FOSAMAX) 70 MG tablet Take 70 mg by mouth every 7 days. Tuesdays, Take in the morning on an empty stomach with a full glass of water 30 minutes before food     calcium carbonate-vitamin D3 600 mg (1,500 mg)-800 unit Chew Chew 1 tablet daily.     cholecalciferol, vitamin D3, 2,000 unit Tab Take 2,000 Units by mouth daily.     clopidogrel (PLAVIX) 75 mg tablet Take 75 mg by mouth daily.     diphenhydrAMINE (BENADRYL) 25 mg capsule Take 50 mg by mouth at bedtime as needed for itching.     diphenhydrAMINE-acetaminophen (TYLENOL PM EXTRA STRENGTH)  mg Tab Take 2 tablets by mouth at bedtime as needed.     ezetimibe (ZETIA) 10 mg tablet Take 10 mg by mouth daily.     menthol-zinc oxide (CALMOSEPTINE) 0.44-20.6 % Oint ointment Apply to DECUBITUS ULCER topically every day shift. Then apply Mepilex and change daily and as needed     metoprolol tartrate (LOPRESSOR) 25 MG tablet Take 12.5 mg by mouth 2 (two) times a day.     nitroglycerin (NITROSTAT) 0.4 MG SL tablet Place 0.4 mg under the tongue every 5 (five) minutes as needed for chest pain.     OMEGA-3/DHA/EPA/FISH OIL (FISH OIL-OMEGA-3 FATTY ACIDS) 300-1,000 mg capsule Take 1 g by mouth daily.      polyethylene glycol (MIRALAX) 17 gram packet Take 17 g by mouth daily.     potassium chloride SA (K-DUR,KLOR-CON) 10 MEQ tablet Take 10 mEq by mouth daily.     silver sulfADIAZINE (SILVADENE, SSD) 1 % cream Apply 1 application topically as needed. Apply to NELY AREA topically as needed for preventative skin     spironolactone (ALDACTONE) 25 MG tablet Take 25 mg by mouth 2 (two) times a day at 9am and 6pm.      torsemide (DEMADEX) 20 MG tablet Take 20 mg by mouth 2 (two) times a day at 9am and 6pm.     vitamin E 400 UNIT capsule Take 400 Units by mouth daily.     warfarin (COUMADIN) 1 MG tablet Take by mouth daily. 6/13/17  4mg qhs Next INR 6/15/17  6/9/17 Take 2.5mg on Tue, 5mg all other days of the week. Next INR 6/12/17  and Adjust dose based on INR results as directed.       DISCHARGE DIAGNOSIS:    ICD-10-CM    1. Severe tricuspid regurgitation I07.1    2. S/P TVR (tricuspid valve repair) Z98.890    3. HTN (hypertension) I10    4. A-fib I48.91    5. Pressure ulcer L89.90    6. CAD (coronary artery disease) I25.10    7. S/P AVR (aortic valve replacement) Z95.2    8. S/P CABG (coronary artery bypass graft) Z95.1        MEDICAL EQUIPMENT NEEDS:  Recent Results (from the past 168 hour(s))   INR   Result Value Ref Range    INR 2.99 (H) 0.90 - 1.10     Lab Results   Component Value Date    INR 2.99 (H) 06/19/2017    INR 2.42 (H) 06/15/2017    INR 3.28 (H) 06/12/2017         DISCHARGE PLAN/FACE TO FACE:  I certify that services are/were furnished while this patient was under the care of a physician and that a physician or an allowed non-physician practitioner (NPP), had a face-to-face encounter that meets the physician face-to-face encounter requirements. The encounter was in whole, or in part, related to the primary reason for home health. The patient is confined to his/her home and needs intermittent skilled nursing, physical therapy, speech-language pathology, or the continued need for occupational therapy. A plan of care has been established by a physician and is periodically reviewed by a physician.  She will be going back to her primary care physician and also go back to do a series of cardiac rehabilitation in the outpatient setting.    Next INR draw June 26 last INR 2.99 taking 4 mg daily.      The patient is, or has been, under my care and I have initiated the establishment of the plan of care. This patient will be followed by a physician who will periodically review the plan of care.  Recommend follow-up with primary care physician within 10 days and follow-up with cardiology as scheduled.    Total discharge time was more than  35 minutes.  This note has been dictated using voice recognition software. Any grammatical, typographical, or context distortions are unintentional.          Electronically signed by: Zenia Case MD

## 2021-06-11 NOTE — PROGRESS NOTES
[facility]    Facility:   [facility] [[id number]]   Code Status: FULL CODE      CHIEF COMPLAINT/REASON FOR VISIT:  Chief Complaint   Patient presents with     Review Of Multiple Medical Conditions       HISTORY:      HPI: Natalie is a 84 y.o. female was seen today in follow-up of her multiple issues.  She has been working with physical therapy very well.  Still gets short of breath with minimal activity but definitely improved from her preoperative state.  Operative site is clean dry and intact.  Denies any chest pains or shortness of breath outside of normal.  Bowel movements have been regular.  INR is therapeutic.  Leg swelling is unchanged    Past Medical History:   Diagnosis Date     Aortic stenosis     s/p mechanical valve replacement     Atrial fibrillation      CAD (coronary artery disease)      Frailty      Mitral annular calcification      Mitral valve regurgitation      Tricuspid valve regurgitation              No family history on file.  Social History     Social History     Marital status: Single     Spouse name: N/A     Number of children: N/A     Years of education: N/A     Social History Main Topics     Smoking status: Not on file     Smokeless tobacco: Not on file     Alcohol use Not on file     Drug use: Not on file     Sexual activity: Not on file     Other Topics Concern     Not on file     Social History Narrative     No narrative on file         Review of Systems  Unremarkable  .There were no vitals filed for this visit.    Physical Exam  Vital signs noted.  There are stable weight is 171 pounds  Patient is alert, awake, oriented to time, place and person, not in acute cardiorespiratory distress  Skin: Warm, and moist,  no lesions,   Head: atraumatic, normocephalic,   Eyes: EOM's intact and conjugate, pink palpebral conjunctivae, anicteric sclerae, pupils reactive  Lungs : Clear to auscultation , no crackles, wheezes or rhonchi  Heart : Nornal first and  second heart sounds, (+) apical as well as second intercostal space murmurs, heaves, or thrills  Abdomen: Soft, non tender, non distended, no hepatosplenomegaly, no ascites  Extremities: Unchanged edema, pulses are full and equal      LABS:   Recent Results (from the past 168 hour(s))   INR   Result Value Ref Range    INR 3.28 (H) 0.90 - 1.10   INR   Result Value Ref Range    INR 2.42 (H) 0.90 - 1.10     Lab Results   Component Value Date    INR 2.42 (H) 06/15/2017    INR 3.28 (H) 06/12/2017    INR 7.40 (HH) 09/16/2016         ASSESSMENT:      ICD-10-CM    1. Severe tricuspid regurgitation I07.1    2. S/P TVR (tricuspid valve repair) Z98.890    3. HTN (hypertension) I10    4. A-fib I48.91    5. Pressure ulcer L89.90        PLAN:    Continue therapies.  Continue Coumadin at 4 mg recheck INR early next week.  Continue with calmoseptine cream on her decubitus ulcer stage I to II.    Total 25 minutes of which 55% was spent counseling and coordination of care of the above plan.    Electronically signed by: Zenia Case MD

## 2021-06-11 NOTE — PROGRESS NOTES
[facility]    Facility:   [facility] [[id number]]   Code Status: FULL CODE      CHIEF COMPLAINT/REASON FOR VISIT:  Chief Complaint   Patient presents with     Review Of Multiple Medical Conditions       HISTORY:      HPI: Natalie is a 84 y.o. female who was seen today in follow-up of her multiple issues.  She is status post tricuspid valve repair for her severe tricuspid regurgitation.  She has significant improvement in her breathing after the surgery.  She is still struggling with swelling in the legs but overall this has actually improved since the surgery.  She is walking with physical therapy using the walker still having poor endurance.  Appetite is fairly well.  She has been having some itch under the skin without any rash over the last day.  Does not remember eating anything different she has not taken any new medications.  Blood pressure sometimes to be at borderline 100s    Past Medical History:   Diagnosis Date     Aortic stenosis     s/p mechanical valve replacement     Atrial fibrillation      CAD (coronary artery disease)      Frailty      Mitral annular calcification      Mitral valve regurgitation      Tricuspid valve regurgitation              No family history on file.  Social History     Social History     Marital status: Single     Spouse name: N/A     Number of children: N/A     Years of education: N/A     Social History Main Topics     Smoking status: Not on file     Smokeless tobacco: Not on file     Alcohol use Not on file     Drug use: Not on file     Sexual activity: Not on file     Other Topics Concern     Not on file     Social History Narrative     No narrative on file         Review of Systems  Unremarkable otherwise    .There were no vitals filed for this visit.    Physical Exam  Vital signs noted.  Patient is alert, awake, oriented to time, place and person, not in acute cardiorespiratory distress  Skin: Warm, and moist,  no lesions,   Head:  atraumatic, normocephalic, no JVdistention  Eyes: EOM's intact and conjugate, pink palpebral conjunctivae, anicteric sclerae, pupils reactive  Lungs : Clear to auscultation , no crackles, wheezes or rhonchi  Heart : Nornal first and second heart sounds, (+) apical  murmurs, heaves, or thrills  Abdomen: Soft, non tender, non distended, no hepatosplenomegaly, no ascites  Extremities: unchanged edema, pulses are full and equal      LABS:   Recent Results (from the past 72 hour(s))   INR   Result Value Ref Range    INR 2.99 (H) 0.90 - 1.10     Lab Results   Component Value Date    INR 2.99 (H) 06/19/2017    INR 2.42 (H) 06/15/2017    INR 3.28 (H) 06/12/2017       ASSESSMENT:      ICD-10-CM    1. Severe tricuspid regurgitation I07.1    2. S/P TVR (tricuspid valve repair) Z98.890    3. HTN (hypertension) I10    4. A-fib I48.91    5. Pressure ulcer L89.90        PLAN:    Continue with current treatments and therapies.  No change or adjustment in her medications needed.  Continue with Coumadin 4 mg recheck June 26.  I do not see any rash in her skin  She has not used any new detergents we will simply monitor for now up apply moisturizer  Please holding parameters for metoprolol to hold with blood pressure less than 100      Total 25 minutes of which 55% was spent counseling and coordination of care of the above plan.    Electronically signed by: Zenia Case MD

## 2022-05-18 NOTE — PROGRESS NOTES
Request for [place]  home care orders as follows...    1. Skilled Nursing 2x per week for 2 weeks, 1 x per week for 3 weeks, 3 PRN visits  2.  to assess for community resources and assist with advanced care planning  3. Orders to check INR in home on 12/27 as ordered    Multiple med issues/ warnings...    1. Zofran and Aricept... may increase QT interval  2. Spironolactone and Potassium... increase excretion of Potassium   Universal Safety Interventions

## 2023-04-19 NOTE — CONSULTS
Care Transition Initial Assessment - RN      Met with: Patient and Family.    DATA   Principal Problem:    CAP (community acquired pneumonia)  Active Problems:    Heart valve replaced    Cardiovascular disease    Hyperlipidemia LDL goal <100    Benign essential hypertension, BP goal <150/90    Primary pulmonary hypertension (H)    Tricuspid valve insufficiency, unspecified etiology    Chronic diastolic congestive heart failure (H)    Memory loss    KYLE (acute kidney injury) (H)    Abnormal CXR    Chronic atrial fibrillation (H)    Hypercalcemia    Nausea and vomiting    Weakness             Contact information and PCP information verified: Yes    ASSESSMENT  Cognitive Status: awake, alert and oriented.             Lives With: child(faina), adult  Living Arrangements: house                 Insurance Concerns: No Insurance issues identified      This writer met with pt and her 2 duaghters, introduced self and role. Discussed discharge planning and Medicare guidelines in regards to home care and SNF benefits. Patient is currently living with her daughter  who has retired to take care of patient. Patient has lived with daughter for the past 15 months and the daughter states it is going well until the last 2 days. Patient has a walker, wheelchair, and hospital bed at home. Daughter states that her mom has made improvements in the hospital and has been able to walk to the doorway and back. The daughter is comfortable with her mother returning home, patient is established with [facility] ([phone] Fax: [phone]) for RN visits and would like to continue with them.  She would like her to also have PT/OT in addition to help her mother with improving strength after this hospitalization.     PLAN    Home with home care      Discharge Planner   Discharge Plans in progress: home with home care  Barriers to discharge plan: medical stability  Follow up plan: hand off to [facility]       Entered by: DAYNA ALVA  Impression: Open angle with borderline findings, high risk, bilateral: H40.023 Bilateral. Condition: will continue to monitor. Plan: Discussed findings. IOP remains stable OU. Unable to perform diagnostic testing and need [facility] for IOP at each visit due to low-sitting wheelchair and cognitive impairment. Continue 6 month IOP checks, sooner with any noted changes. Need dilation at each visit to properly view eye due to small pupils. 01/04/2018 11:39 AM         Jessie Jenkins, MSN, RN, Care Coordinator  [facility] [phone]  [facility] [phone]

## 2024-08-05 NOTE — PROGRESS NOTES
ANTICOAGULATION FOLLOW-UP CLINIC VISIT    Patient Name:  Natalie Hair  Date:  2/22/2017  Contact Type:  Face to Face    SUBJECTIVE:     Patient Findings     Positives No Problem Findings (no changes, concerns or problems reported)    Comments Pt will be at [place] tomorrow for a day of tests, which will be evaluated and the results will determine if she is a canadate for the new mitral valve procedure.           OBJECTIVE    INR Protime   Date Value Ref Range Status   02/22/2017 3.1 (A) 0.86 - 1.14 Final       ASSESSMENT / PLAN  INR assessment THER    Recheck INR In: 1 WEEK    INR Location Clinic      Anticoagulation Summary as of 2/22/2017     INR goal 2.5-3.5   Today's INR 3.1   Maintenance plan 2.5 mg (2.5 mg x 1) on Tue; 5 mg (5 mg x 1) all other days   Full instructions 2.5 mg on Tue; 5 mg all other days   Weekly total 32.5 mg   No change documented Polina Quiroz RN   Plan last modified Polina Quiroz RN (2/15/2017)   Next INR check 3/1/2017   Priority INR   Target end date Indefinite    Indications   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]  Long term current use of anticoagulant therapy [Z79.01]         Anticoagulation Episode Summary     INR check location     Preferred lab     Send INR reminders to [facility]    Comments * Patient will be moving to [place], PLEASE CALL [facility] WITH NEW DOSING INSTRUCTIONS AT  [phone]      Anticoagulation Care Providers     Provider Role Specialty Phone number    Mimi Mcguire MD [facility] [phone]            See the Encounter Report to view Anticoagulation Flowsheet and Dosing Calendar (Go to Encounters tab in chart review, and find the Anticoagulation Therapy Visit)        Polina Quiroz RN                No

## 2024-10-07 NOTE — TELEPHONE ENCOUNTER
Please address refill requests at appointment today. Thank you.  Anam Dejesus RN       f/u outpatient

## (undated) RX ORDER — FENTANYL CITRATE 50 UG/ML
INJECTION, SOLUTION INTRAMUSCULAR; INTRAVENOUS
Status: DISPENSED
Start: 2018-01-01